# Patient Record
Sex: FEMALE | Race: WHITE | NOT HISPANIC OR LATINO | Employment: OTHER | URBAN - METROPOLITAN AREA
[De-identification: names, ages, dates, MRNs, and addresses within clinical notes are randomized per-mention and may not be internally consistent; named-entity substitution may affect disease eponyms.]

---

## 2017-01-05 ENCOUNTER — ALLSCRIPTS OFFICE VISIT (OUTPATIENT)
Dept: OTHER | Facility: OTHER | Age: 77
End: 2017-01-05

## 2017-01-13 ENCOUNTER — ALLSCRIPTS OFFICE VISIT (OUTPATIENT)
Dept: OTHER | Facility: OTHER | Age: 77
End: 2017-01-13

## 2017-03-17 ENCOUNTER — ALLSCRIPTS OFFICE VISIT (OUTPATIENT)
Dept: OTHER | Facility: OTHER | Age: 77
End: 2017-03-17

## 2017-04-13 ENCOUNTER — ALLSCRIPTS OFFICE VISIT (OUTPATIENT)
Dept: OTHER | Facility: OTHER | Age: 77
End: 2017-04-13

## 2017-05-22 ENCOUNTER — TRANSCRIBE ORDERS (OUTPATIENT)
Dept: ADMINISTRATIVE | Facility: HOSPITAL | Age: 77
End: 2017-05-22

## 2017-05-22 ENCOUNTER — ALLSCRIPTS OFFICE VISIT (OUTPATIENT)
Dept: OTHER | Facility: OTHER | Age: 77
End: 2017-05-22

## 2017-05-22 DIAGNOSIS — I10 BENIGN HYPERTENSION: Primary | ICD-10-CM

## 2017-06-08 ENCOUNTER — HOSPITAL ENCOUNTER (OUTPATIENT)
Dept: NON INVASIVE DIAGNOSTICS | Facility: HOSPITAL | Age: 77
Discharge: HOME | End: 2017-06-08
Attending: INTERNAL MEDICINE
Payer: MEDICARE

## 2017-06-08 DIAGNOSIS — I10 BENIGN HYPERTENSION: ICD-10-CM

## 2017-06-08 PROCEDURE — 93306 TTE W/DOPPLER COMPLETE: CPT

## 2017-06-29 ENCOUNTER — ALLSCRIPTS OFFICE VISIT (OUTPATIENT)
Dept: OTHER | Facility: OTHER | Age: 77
End: 2017-06-29

## 2017-06-29 DIAGNOSIS — E78.5 HYPERLIPIDEMIA: ICD-10-CM

## 2017-07-25 ENCOUNTER — APPOINTMENT (OUTPATIENT)
Dept: LAB | Facility: CLINIC | Age: 77
End: 2017-07-25
Payer: MEDICARE

## 2017-07-25 DIAGNOSIS — E78.5 HYPERLIPIDEMIA: ICD-10-CM

## 2017-07-25 LAB
ALBUMIN SERPL BCP-MCNC: 3.6 G/DL (ref 3.5–5)
ALP SERPL-CCNC: 59 U/L (ref 46–116)
ALT SERPL W P-5'-P-CCNC: 49 U/L (ref 12–78)
ANION GAP SERPL CALCULATED.3IONS-SCNC: 5 MMOL/L (ref 4–13)
AST SERPL W P-5'-P-CCNC: 30 U/L (ref 5–45)
BILIRUB SERPL-MCNC: 0.68 MG/DL (ref 0.2–1)
BUN SERPL-MCNC: 18 MG/DL (ref 5–25)
CALCIUM SERPL-MCNC: 8.9 MG/DL (ref 8.3–10.1)
CHLORIDE SERPL-SCNC: 103 MMOL/L (ref 100–108)
CHOLEST SERPL-MCNC: 145 MG/DL (ref 50–200)
CO2 SERPL-SCNC: 31 MMOL/L (ref 21–32)
CREAT SERPL-MCNC: 0.77 MG/DL (ref 0.6–1.3)
ERYTHROCYTE [DISTWIDTH] IN BLOOD BY AUTOMATED COUNT: 14 % (ref 11.6–15.1)
GFR SERPL CREATININE-BSD FRML MDRD: 74 ML/MIN/1.73SQ M
GLUCOSE P FAST SERPL-MCNC: 106 MG/DL (ref 65–99)
HCT VFR BLD AUTO: 40.2 % (ref 34.8–46.1)
HDLC SERPL-MCNC: 37 MG/DL (ref 40–60)
HGB BLD-MCNC: 13.4 G/DL (ref 11.5–15.4)
LDLC SERPL CALC-MCNC: 79 MG/DL (ref 0–100)
MCH RBC QN AUTO: 30.3 PG (ref 26.8–34.3)
MCHC RBC AUTO-ENTMCNC: 33.3 G/DL (ref 31.4–37.4)
MCV RBC AUTO: 91 FL (ref 82–98)
PLATELET # BLD AUTO: 134 THOUSANDS/UL (ref 149–390)
PMV BLD AUTO: 10.2 FL (ref 8.9–12.7)
POTASSIUM SERPL-SCNC: 4.3 MMOL/L (ref 3.5–5.3)
PROT SERPL-MCNC: 6.9 G/DL (ref 6.4–8.2)
RBC # BLD AUTO: 4.42 MILLION/UL (ref 3.81–5.12)
SODIUM SERPL-SCNC: 139 MMOL/L (ref 136–145)
TRIGL SERPL-MCNC: 145 MG/DL
WBC # BLD AUTO: 3.68 THOUSAND/UL (ref 4.31–10.16)

## 2017-07-25 PROCEDURE — 36415 COLL VENOUS BLD VENIPUNCTURE: CPT

## 2017-07-25 PROCEDURE — 85027 COMPLETE CBC AUTOMATED: CPT

## 2017-07-25 PROCEDURE — 80061 LIPID PANEL: CPT

## 2017-07-25 PROCEDURE — 80053 COMPREHEN METABOLIC PANEL: CPT

## 2017-08-28 ENCOUNTER — ALLSCRIPTS OFFICE VISIT (OUTPATIENT)
Dept: OTHER | Facility: OTHER | Age: 77
End: 2017-08-28

## 2017-10-06 ENCOUNTER — ALLSCRIPTS OFFICE VISIT (OUTPATIENT)
Dept: OTHER | Facility: OTHER | Age: 77
End: 2017-10-06

## 2017-10-19 ENCOUNTER — ALLSCRIPTS OFFICE VISIT (OUTPATIENT)
Dept: OTHER | Facility: OTHER | Age: 77
End: 2017-10-19

## 2017-10-19 ENCOUNTER — GENERIC CONVERSION - ENCOUNTER (OUTPATIENT)
Dept: OTHER | Facility: OTHER | Age: 77
End: 2017-10-19

## 2017-10-20 ENCOUNTER — GENERIC CONVERSION - ENCOUNTER (OUTPATIENT)
Dept: OTHER | Facility: OTHER | Age: 77
End: 2017-10-20

## 2017-10-26 ENCOUNTER — GENERIC CONVERSION - ENCOUNTER (OUTPATIENT)
Dept: OTHER | Facility: OTHER | Age: 77
End: 2017-10-26

## 2018-01-09 ENCOUNTER — GENERIC CONVERSION - ENCOUNTER (OUTPATIENT)
Dept: OTHER | Facility: OTHER | Age: 78
End: 2018-01-09

## 2018-01-12 VITALS
HEIGHT: 66 IN | HEART RATE: 72 BPM | WEIGHT: 228.56 LBS | DIASTOLIC BLOOD PRESSURE: 80 MMHG | BODY MASS INDEX: 36.73 KG/M2 | OXYGEN SATURATION: 95 % | SYSTOLIC BLOOD PRESSURE: 164 MMHG

## 2018-01-13 VITALS
BODY MASS INDEX: 36 KG/M2 | SYSTOLIC BLOOD PRESSURE: 137 MMHG | DIASTOLIC BLOOD PRESSURE: 85 MMHG | HEIGHT: 66 IN | HEART RATE: 78 BPM | WEIGHT: 224 LBS | RESPIRATION RATE: 17 BRPM

## 2018-01-14 VITALS
WEIGHT: 224.25 LBS | SYSTOLIC BLOOD PRESSURE: 126 MMHG | HEART RATE: 71 BPM | DIASTOLIC BLOOD PRESSURE: 80 MMHG | OXYGEN SATURATION: 97 % | HEIGHT: 66 IN | BODY MASS INDEX: 36.04 KG/M2

## 2018-01-14 VITALS
WEIGHT: 218 LBS | HEART RATE: 88 BPM | SYSTOLIC BLOOD PRESSURE: 144 MMHG | RESPIRATION RATE: 16 BRPM | BODY MASS INDEX: 35.03 KG/M2 | HEIGHT: 66 IN | DIASTOLIC BLOOD PRESSURE: 85 MMHG

## 2018-01-15 VITALS
BODY MASS INDEX: 35.03 KG/M2 | RESPIRATION RATE: 17 BRPM | HEIGHT: 66 IN | DIASTOLIC BLOOD PRESSURE: 89 MMHG | HEART RATE: 78 BPM | SYSTOLIC BLOOD PRESSURE: 141 MMHG | WEIGHT: 218 LBS

## 2018-01-18 ENCOUNTER — GENERIC CONVERSION - ENCOUNTER (OUTPATIENT)
Dept: OTHER | Facility: OTHER | Age: 78
End: 2018-01-18

## 2018-01-18 ENCOUNTER — ALLSCRIPTS OFFICE VISIT (OUTPATIENT)
Dept: OTHER | Facility: OTHER | Age: 78
End: 2018-01-18

## 2018-01-22 VITALS
DIASTOLIC BLOOD PRESSURE: 82 MMHG | SYSTOLIC BLOOD PRESSURE: 132 MMHG | BODY MASS INDEX: 35.84 KG/M2 | HEIGHT: 66 IN | WEIGHT: 223 LBS | HEART RATE: 78 BPM | RESPIRATION RATE: 16 BRPM

## 2018-01-22 VITALS
HEART RATE: 74 BPM | HEIGHT: 66 IN | OXYGEN SATURATION: 94 % | BODY MASS INDEX: 35.84 KG/M2 | SYSTOLIC BLOOD PRESSURE: 128 MMHG | WEIGHT: 223 LBS | DIASTOLIC BLOOD PRESSURE: 82 MMHG

## 2018-01-24 VITALS
RESPIRATION RATE: 17 BRPM | WEIGHT: 223 LBS | SYSTOLIC BLOOD PRESSURE: 132 MMHG | HEIGHT: 66 IN | DIASTOLIC BLOOD PRESSURE: 82 MMHG | BODY MASS INDEX: 35.84 KG/M2 | HEART RATE: 78 BPM

## 2018-02-19 DIAGNOSIS — I10 ESSENTIAL HYPERTENSION: Primary | ICD-10-CM

## 2018-02-19 DIAGNOSIS — I10 HYPERTENSION, UNSPECIFIED TYPE: Primary | ICD-10-CM

## 2018-02-19 RX ORDER — RAMIPRIL 5 MG/1
5 CAPSULE ORAL 2 TIMES DAILY
Qty: 60 CAPSULE | Refills: 5 | Status: SHIPPED | OUTPATIENT
Start: 2018-02-19 | End: 2018-11-08 | Stop reason: SDUPTHER

## 2018-02-19 RX ORDER — PRAVASTATIN SODIUM 10 MG
1 TABLET ORAL DAILY
COMMUNITY
Start: 2018-02-08 | End: 2018-07-01 | Stop reason: SDUPTHER

## 2018-02-19 RX ORDER — GLUC/MSM/COLGN2/HYAL/ANTIARTH3 375-375-20
TABLET ORAL 2 TIMES DAILY
COMMUNITY
Start: 2012-06-13

## 2018-02-19 RX ORDER — TEMAZEPAM 15 MG/1
1 CAPSULE ORAL
COMMUNITY
Start: 2018-01-31 | End: 2018-12-01 | Stop reason: ALTCHOICE

## 2018-02-19 RX ORDER — CLOBETASOL PROPIONATE 0.5 MG/G
CREAM TOPICAL
COMMUNITY
Start: 2017-11-16

## 2018-02-19 RX ORDER — SERTRALINE HYDROCHLORIDE 25 MG/1
TABLET, FILM COATED ORAL
COMMUNITY
Start: 2018-01-31 | End: 2020-08-18 | Stop reason: ALTCHOICE

## 2018-02-19 RX ORDER — SOTALOL HYDROCHLORIDE 80 MG/1
1 TABLET ORAL EVERY 12 HOURS
COMMUNITY
Start: 2017-11-26 | End: 2018-02-21 | Stop reason: SDUPTHER

## 2018-02-19 RX ORDER — GABAPENTIN 100 MG/1
1 CAPSULE ORAL
COMMUNITY
Start: 2018-01-09 | End: 2018-07-17 | Stop reason: SDUPTHER

## 2018-02-19 RX ORDER — SENNOSIDES 8.6 MG
CAPSULE ORAL
COMMUNITY

## 2018-02-19 RX ORDER — RAMIPRIL 5 MG/1
1 CAPSULE ORAL 2 TIMES DAILY
COMMUNITY
Start: 2017-11-19 | End: 2018-02-19 | Stop reason: SDUPTHER

## 2018-02-19 RX ORDER — FUROSEMIDE 20 MG/1
1 TABLET ORAL DAILY
COMMUNITY
Start: 2017-05-22 | End: 2021-05-03 | Stop reason: SDUPTHER

## 2018-02-19 RX ORDER — METOPROLOL SUCCINATE 25 MG/1
1 TABLET, EXTENDED RELEASE ORAL DAILY
COMMUNITY
Start: 2017-11-26 | End: 2018-02-21 | Stop reason: SDUPTHER

## 2018-02-20 RX ORDER — RAMIPRIL 5 MG/1
CAPSULE ORAL
Qty: 180 CAPSULE | Refills: 2 | Status: SHIPPED | OUTPATIENT
Start: 2018-02-20 | End: 2019-03-12 | Stop reason: SDUPTHER

## 2018-02-21 DIAGNOSIS — I48.91 ATRIAL FIBRILLATION, UNSPECIFIED TYPE (HCC): Primary | ICD-10-CM

## 2018-02-21 RX ORDER — SOTALOL HYDROCHLORIDE 80 MG/1
80 TABLET ORAL EVERY 12 HOURS
Qty: 60 TABLET | Refills: 5 | Status: SHIPPED | OUTPATIENT
Start: 2018-02-21 | End: 2018-08-13 | Stop reason: SDUPTHER

## 2018-02-21 RX ORDER — METOPROLOL SUCCINATE 25 MG/1
25 TABLET, EXTENDED RELEASE ORAL DAILY
Qty: 30 TABLET | Refills: 5 | Status: SHIPPED | OUTPATIENT
Start: 2018-02-21 | End: 2018-08-13 | Stop reason: SDUPTHER

## 2018-03-05 DIAGNOSIS — I48.91 ATRIAL FIBRILLATION, UNSPECIFIED TYPE (HCC): Primary | ICD-10-CM

## 2018-03-06 ENCOUNTER — OFFICE VISIT (OUTPATIENT)
Dept: CARDIOLOGY CLINIC | Facility: CLINIC | Age: 78
End: 2018-03-06
Payer: MEDICARE

## 2018-03-06 VITALS
BODY MASS INDEX: 37.15 KG/M2 | OXYGEN SATURATION: 97 % | HEART RATE: 96 BPM | SYSTOLIC BLOOD PRESSURE: 122 MMHG | WEIGHT: 223 LBS | DIASTOLIC BLOOD PRESSURE: 70 MMHG | HEIGHT: 65 IN

## 2018-03-06 DIAGNOSIS — E78.2 MIXED HYPERLIPIDEMIA: ICD-10-CM

## 2018-03-06 DIAGNOSIS — I50.32 CHRONIC DIASTOLIC CONGESTIVE HEART FAILURE (HCC): ICD-10-CM

## 2018-03-06 DIAGNOSIS — I10 ESSENTIAL HYPERTENSION: Chronic | ICD-10-CM

## 2018-03-06 DIAGNOSIS — I48.0 PAROXYSMAL ATRIAL FIBRILLATION (HCC): Primary | ICD-10-CM

## 2018-03-06 PROCEDURE — 99214 OFFICE O/P EST MOD 30 MIN: CPT | Performed by: INTERNAL MEDICINE

## 2018-03-06 PROCEDURE — 93000 ELECTROCARDIOGRAM COMPLETE: CPT | Performed by: INTERNAL MEDICINE

## 2018-03-06 NOTE — PROGRESS NOTES
Cardiology Follow Up    Ju Torres  1940  [id number]  [facility] [address]      Interval History: Ms Juani Bower is here for follow up of atrial fibrillation and hypertension  She denies any chest pain, lower extremity edema, orthopnea or paroxysmal nocturnal dyspnea  She is shortness of breath, upper airway congestion and postnasal drip that has been present for the past year  She has a history of atrial fibrillation along with sick sinus syndrome and had a pacemaker inserted at [place] [facility] after developing multiple pauses  She denies any previous syncope or near syncope  Pacemaker was interrogated in January showing episodes of atrial fibrillation  Patient Active Problem List   Diagnosis    Hypertension    Cardiac disease    Hyperlipidemia    Paroxysmal atrial fibrillation [facility])     Past Medical History:   Diagnosis Date    Arthritis     Atrial fibrillation ([address] )     Cardiac disease     Hypertension      Social History     Social History    Marital status:      Spouse name: N/A    Number of children: N/A    Years of education: N/A     Occupational History    Not on file  Social History Main Topics    Smoking status: Former Smoker     Packs/day: 1 00     Years: 25 00     Types: Cigarettes    Smokeless tobacco: Never Used      Comment: quit 22 yeears ago    Alcohol use Yes      Comment: occasional    Drug use: No    Sexual activity: Not on file     Other Topics Concern    Not on file     Social History Narrative    No narrative on file      History reviewed  No pertinent family history    Past Surgical History:   Procedure Laterality Date    CARDIAC PACEMAKER PLACEMENT Left     CHOLECYSTECTOMY      EYE SURGERY      KNEE SURGERY Left     left kknee replacement in 2009       Current Outpatient Prescriptions:     acetaminophen (TYLENOL ARTHRITIS PAIN) 650 mg CR tablet, Take by mouth, Disp: , Rfl:     apixaban (ELIQUIS) 5 mg, Take 1 tablet (5 mg total) by mouth 2 (two) times a day (Patient taking differently: Take 2 5 mg by mouth 2 (two) times a day  ), Disp: 60 tablet, Rfl: 5    Calcium Carbonate-Vitamin D 600-200 MG-UNIT CAPS, Take by mouth 2 (two) times a day, Disp: , Rfl:     clobetasol (TEMOVATE) 0 05 % cream, , Disp: , Rfl:     Coenzyme Q10 (COQ-10) 200 MG CAPS, Take 2 capsules by mouth daily, Disp: , Rfl:     Famotidine (PEPCID PO), Take by mouth daily Indications: pt unsure of dose taken at home   , Disp: , Rfl:     furosemide (LASIX) 20 mg tablet, Take 1 tablet by mouth daily, Disp: , Rfl:     gabapentin (NEURONTIN) 100 mg capsule, Take 1 capsule by mouth daily at bedtime, Disp: , Rfl:     metoprolol succinate (TOPROL-XL) 25 mg 24 hr tablet, Take 1 tablet (25 mg total) by mouth daily, Disp: 30 tablet, Rfl: 5    Multiple Vitamins-Minerals (DAILY MULTIVITAMIN PO), Take 1 tablet by mouth daily, Disp: , Rfl:     Omega-3-acid Ethyl Esters (LOVAZA PO), Take by mouth daily Indications: pt unsure of home dose of med   , Disp: , Rfl:     Pantoprazole Sodium (PROTONIX PO), Take by mouth daily Indications: pt unsure of at home dose   , Disp: , Rfl:     pravastatin (PRAVACHOL) 10 mg tablet, Take 1 tablet by mouth daily, Disp: , Rfl:     PRAVASTATIN SODIUM PO, Take by mouth 3 (three) times a week Indications: pt unsure of dose taken at home   , Disp: , Rfl:     ramipril (ALTACE) 5 mg capsule, take 1 capsule by mouth twice a day, Disp: 180 capsule, Rfl: 2    ramipril (ALTACE) 5 mg capsule, Take 1 capsule (5 mg total) by mouth 2 (two) times a day, Disp: 60 capsule, Rfl: 5    sertraline (ZOLOFT) 25 mg tablet, , Disp: , Rfl:     sotalol (BETAPACE) 80 mg tablet, Take 1 tablet (80 mg total) by mouth every 12 (twelve) hours, Disp: 60 tablet, Rfl: 5    temazepam (RESTORIL) 15 mg capsule, Take 1 capsule by mouth daily at bedtime, Disp: , Rfl: Allergies   Allergen Reactions    Ciprofloxacin Rash    Sulfa Antibiotics Rash    Synvisc [Hylan G-F 20] Rash       Labs:  Lab Results   Component Value Date     07/25/2017     09/10/2013    K 4 3 07/25/2017    K 4 0 09/10/2013     07/25/2017     09/10/2013    CO2 31 07/25/2017    CO2 29 09/10/2013    BUN 18 07/25/2017    BUN 26 09/10/2013    CREATININE 0 77 07/25/2017    CREATININE 1 3 09/10/2013    GLUCOSE 124 05/31/2016    CALCIUM 8 9 07/25/2017    CALCIUM 8 9 09/10/2013     Lab Results   Component Value Date    WBC 3 68 (L) 07/25/2017    WBC 5 4 09/10/2013    HGB 13 4 07/25/2017    HGB 14 2 09/10/2013    HCT 40 2 07/25/2017    HCT 42 7 09/10/2013    MCV 91 07/25/2017    MCV 88 8 09/10/2013     (L) 07/25/2017     09/10/2013     Lab Results   Component Value Date    CHOL 145 07/25/2017    CHOL 187 09/10/2013    TRIG 145 07/25/2017    TRIG 166 09/10/2013    HDL 37 (L) 07/25/2017    HDL 37 09/10/2013     Imaging: No results found  EKG:  normal sinus rhythm, unchanged from previous tracings, nonspecific ST and T waves changes  Review of Systems:  Review of Systems   Constitutional: Negative for chills, fatigue and fever  HENT: Positive for congestion, postnasal drip, rhinorrhea and sinus pressure  Negative for nosebleeds  Respiratory: Positive for shortness of breath  Negative for cough and chest tightness  Cardiovascular: Negative for chest pain, palpitations and leg swelling  Gastrointestinal: Negative for abdominal distention, abdominal pain, diarrhea, nausea and vomiting  Endocrine: Negative for polydipsia, polyphagia and polyuria  Musculoskeletal: Negative for gait problem and myalgias  Skin: Negative for color change, pallor and rash  Allergic/Immunologic: Negative for environmental allergies, food allergies and immunocompromised state  Neurological: Negative for dizziness, seizures, syncope and light-headedness     Hematological: Negative for adenopathy  Does not bruise/bleed easily  Psychiatric/Behavioral: Negative for dysphoric mood  The patient is not nervous/anxious  Physical Exam:  Physical Exam   Constitutional: She is oriented to person, place, and time  She appears well-developed  No distress  HENT:   Head: Normocephalic and atraumatic  Eyes: Conjunctivae and EOM are normal  Pupils are equal, round, and reactive to light  Neck: Neck supple  No JVD present  No thyromegaly present  Cardiovascular: Normal rate, regular rhythm and normal heart sounds  Exam reveals no gallop and no friction rub  No murmur heard  Pulmonary/Chest: Effort normal and breath sounds normal    Abdominal: Soft  She exhibits no distension  There is no tenderness  Musculoskeletal: She exhibits no edema  Neurological: She is alert and oriented to person, place, and time  No cranial nerve deficit  Skin: Skin is warm and dry  No rash noted  She is not diaphoretic  No erythema  Psychiatric: She has a normal mood and affect  Her behavior is normal  Judgment and thought content normal        Discussion/Summary:  1  Paroxysmal atrial fibrillation (HCC) - currently in sinus  Continue Eliquis and routine pacemaker clinic follow up  - Continue sotalol  QT interval normal     2  Essential hypertension  - BP well controlled on current Rx    - Comprehensive metabolic panel    3  Mixed hyperlipidemia  - Lipid panel  -  Continue pravastatin  4  Chronic diastolic CHF - stable on lasix 20 mg daily

## 2018-03-07 NOTE — PROGRESS NOTES
"  Discussion/Summary  Normal device function      Results/Data  Cardiac Device Remote [id number] 02:22PM Blaze Shell     Test Name Result Flag Reference   MISCELLANEOUS COMMENT (Report)     CARELINK TRANSMISSION: BATTERY VOLTAGE ADEQUATE  (7 5 YRS)  AP 95%  1% (MVP ON)  ALL AVAILABLE LEAD PARAMETERS WITHIN NORMAL LIMITS  4 VHR EPISODES DETECTED EGMS SHOW SVT VS NSVT CANNOT RULE OUT NSVT  33 AT/AF EPISODES DETECTED LONGEST AFLUTTER EPISODES 10 HOURS  PATIENT IS ON METOPROLOL SUCC AND ELIQUIS  NORMAL DEVICE FUNCTION --[facility]   Cardiac Electrophysiology Report      [id number]  pdf   DEVICE TYPE Pacemaker       Cardiac Electrophysiology Report [id number] 02:22PM Rodríguez Mouna     Test Name Result Flag Reference   Cardiac Electrophysiology Report      [id number]  pdf     Signatures   Electronically signed by : Michael Lindsay, ; Oct 14 2016  2:54PM EST                       (Author)    Electronically signed by : Luisa Maurer DO; Oct 14 2016  3:25PM EST                       (Author)    "

## 2018-03-07 NOTE — PROGRESS NOTES
"  Discussion/Summary  Normal device function     Afib episodes detected  Patient with known atrial fibrillation  Normal device function  Results/Data  Cardiac Device Remote [id number] 05:17PM Js Montezuma     Test Name Result Flag Reference   MISCELLANEOUS COMMENT (Report)     CARELINK TRANSMISSION: [id number] VOLTAGE ADEQUATE  (6 YRS)  AP 96%  <1%  ALL AVAILABLE LEAD PARAMETERS WITHIN NORMAL LIMITS  6 VHR EPISODE DETECTED 6 BEATS @ 350ms  159 AT/AF EPISODES DETECTED 3 HOUR LONG  PATIENT IS ON ELIQUIS AND METOPROLOL SUCC  NORMAL DEVICE FUNCTION --[facility]   Cardiac Electrophysiology Report      [id number]  pdf   DEVICE TYPE Pacemaker       Cardiac Electrophysiology Report [id number] 05:17PM Js Kirill     Test Name Result Flag Reference   Cardiac Electrophysiology Report      [id number]  pdf     Signatures   Electronically signed by : Mya Thompson, ; Jan 19 2018  2:32PM EST                       (Author)    Electronically signed by : Jessica Russ DO; Jan 22 2018 10:27AM EST                       (Author)    "

## 2018-03-07 NOTE — PROGRESS NOTES
"  Discussion/Summary  Normal device function     Patient scheduled for followup next week  Will discuss anticoagulation  She is taking Eliquis but not in chart  Results/Data  Cardiac Device In Clinic [id number] 07:03PM [facility] [employer]     Test Name Result Flag Reference   MISCELLANEOUS COMMENT (Report)     DEVICE INTERROGATED IN THE [facility] OFFICE: NP TO DEVICE CLINIC  BATTERY VOLTAGE ADEQUATE (6 5 YR)  AP 94%  0 2%  ALL AVAILABLE LEAD PARAMETERS WITHIN NORMAL LIMITS  448 AT/AF, 1 FAST A&V, AND 13 VT EPISODES SINCE 07/2016 WITH EGMS SHOWING AF/RVR  LONGEST > 3HR  AF BURDEN = 0 7%  1 NSVT EPISODE (7 @ 168 BPM)  PT TAKES METOPROLOL SUCC , AND SOTALOL  PT STATES SHE TAKES ELIQUIS, BUT NO AC LISTED IN CHART  EF 50 -55% (ECHO 6/2017)  NO PROGRAMMING CHANGES MADE TO DEVICE PARAMETERS  NORMAL DEVICE FUNCTION  RG   Cardiac Electrophysiology Report      [id number]  pdf   DEVICE TYPE Pacemaker       Cardiac Electrophysiology Report [id number] 07:03PM [facility] [employer]     Test Name Result Flag Reference   Cardiac Electrophysiology Report      [id number]     Signatures   Electronically signed by : Vaughn Ward, ; Oct  6 2017  3:21PM EST                       (Author)    Electronically signed by : Fish Mg DO; Oct 11 2017  4:19PM EST                       (Author)    "

## 2018-03-07 NOTE — PROGRESS NOTES
"  Discussion/Summary  Normal device function      Results/Data  Cardiac Device Remote [id number] 04:09PM [facility]     Test Name Result Flag Reference   MISCELLANEOUS COMMENT (Report)     CARELINK TRANSMISSION: BATTERY VOLTAGE ADEQUATE  (7 5 YRS)  AP 93%  1%  ALL AVAILABLE LEAD PARAMETERS WITHIN NORMAL LIMITS  1 VHR EPISODE DETECTED 6 BEATS @ 350ms  43 AT/AF EPISODES DETECTED 49 MIN LONG  PATIENT IS ON ELIQUIS AND METOPROLOL SUCC  NORMAL DEVICE FUNCTION --[facility]   Cardiac Electrophysiology Report      [id number]  pdf   DEVICE TYPE Pacemaker       Cardiac Electrophysiology Report [id number] 04:09PM Elidia Raines     Test Name Result Flag Reference   Cardiac Electrophysiology Report      [id number]  pdf     Signatures   Electronically signed by : Roger Cuba, ; Jan 19 2017  4:17PM EST                       (Author)    Electronically signed by : Candy Navarrete DO; Jan 27 2017  4:34PM EST                       (Author)    "

## 2018-03-07 NOTE — PROGRESS NOTES
"  Discussion/Summary  Normal device function   Additional Treatments: Reprogram Device   Comments: ATP device function turned off because patient has atrial fibrillation episodes frequently  Atrial tachycardia episode noted  Results/Data  Results   Cardiac Device In Clinic [id number] 07:01PM Sandra Nguyen     Test Name Result Flag Reference   MISCELLANEOUS COMMENT (Report)     DEVICE INTERROGATED IN THE [facility] OFFICE  NEW PT TO DEVICE CLINIC: BATTERY VOLTAGE ADEQUATE (8 YRS)  AP 95 2%  0 3%  ALL LEAD PARAMETERS WITHIN NORMAL LIMITS  72 TREATED AT/AF EPISODES WITH 20 EGRAMS SHOWING AT/AFL WITH UNSUCCESSFUL ATRIAL ATP (> 1000 ATP ATTEMPTS)  2 EPISODES SHOW ATRIAL ATP RESULTING IN AFIB  PER DR VIEYRA, ATRIAL THERAPY PROG "OFF"  PT TAKES ELIQUIS, SOTOLOL AND METOPROLOL SUSS  TOTAL ATRIAL BURDEN IS 1 4%  3 VT-MON EPISODES WITH AVAILABLE EGRAMS SHOWING PROBABLE NSAT (MORPHOLOGY SIMILAR TO INTRINSIC)  9 BEATS @ 167 BPM AND 28 BEATS @ 163 BPM  CANNOT RULE OUT NSVT  NO EF DOCUMENTED IN ALLSCRIPTS  REAL-TIME AND TEST EGRAMS UNAVAILABLE DUE TO  MALFUNCTION  PACEMAKER FUNCTIONING APPROPRIATELY  CP   Cardiac Electrophysiology Report      [id number] [id number]  pdf   DEVICE TYPE Pacemaker       Cardiac Electrophysiology Report [id number] 07:01PM Sandra Nguyen     Test Name Result Flag Reference   Cardiac Electrophysiology Report      [id number]  pdf     Signatures   Electronically signed by : Armani Harper, ; Jul 12 2016  9:12AM EST                       (Author)    Electronically signed by : Saloni Vela DO; Jul 15 2016 12:53PM EST                       (Author)    "

## 2018-03-07 NOTE — PROGRESS NOTES
"  Discussion/Summary  Normal device function      Results/Data  Cardiac Device Remote [id number] 08:03PM [facility]     Test Name Result Flag Reference   MISCELLANEOUS COMMENT (Report)     NONBILLABLE- CARELINK TRANSMISSION: 1 NSVT EPISODE FOR 5 BEATS, AVG CL~380MS  EF-50-55% (ECHO 6/8/17)  PT ON METOPROLOL & SOTALOL  16 AF/AFL EPISODES LONGEST 1 5 HRS  PT HAD PREVIOUSLY STATED THAT SHE WAS ON ELIQUIS  AF BURDEN: 1 5%  BATTERY VOLTAGE ADEQUATE (6 5 YRS)  AP-94%, -0 2%  ALL AVAILABLE LEAD PARAMETERS WITHIN NORMAL LIMITS  NORMAL DEVICE FUNCTION  GV   Cardiac Electrophysiology Report      [id number]  pdf   DEVICE TYPE Pacemaker       Cardiac Electrophysiology Report [id number] 08:03PM Ximena Murray     Test Name Result Flag Reference   Cardiac Electrophysiology Report      [id number]  pdf     Signatures   Electronically signed by : Winston Pelayo RN; Oct 20 2017  1:19PM EST                       (Author)    Electronically signed by : Marbella Ryan DO; Oct 20 2017  5:43PM EST                       (Author)    "

## 2018-03-13 ENCOUNTER — TRANSCRIBE ORDERS (OUTPATIENT)
Dept: ADMINISTRATIVE | Facility: HOSPITAL | Age: 78
End: 2018-03-13

## 2018-03-13 DIAGNOSIS — J32.9 CHRONIC SINUSITIS, UNSPECIFIED LOCATION: Primary | ICD-10-CM

## 2018-03-14 ENCOUNTER — APPOINTMENT (OUTPATIENT)
Dept: LAB | Facility: CLINIC | Age: 78
End: 2018-03-14
Payer: MEDICARE

## 2018-03-14 LAB
ALBUMIN SERPL BCP-MCNC: 3.4 G/DL (ref 3.5–5)
ALP SERPL-CCNC: 57 U/L (ref 46–116)
ALT SERPL W P-5'-P-CCNC: 37 U/L (ref 12–78)
ANION GAP SERPL CALCULATED.3IONS-SCNC: 6 MMOL/L (ref 4–13)
AST SERPL W P-5'-P-CCNC: 24 U/L (ref 5–45)
BILIRUB SERPL-MCNC: 0.51 MG/DL (ref 0.2–1)
BUN SERPL-MCNC: 18 MG/DL (ref 5–25)
CALCIUM SERPL-MCNC: 8.7 MG/DL (ref 8.3–10.1)
CHLORIDE SERPL-SCNC: 104 MMOL/L (ref 100–108)
CHOLEST SERPL-MCNC: 147 MG/DL (ref 50–200)
CO2 SERPL-SCNC: 30 MMOL/L (ref 21–32)
CREAT SERPL-MCNC: 0.8 MG/DL (ref 0.6–1.3)
GFR SERPL CREATININE-BSD FRML MDRD: 71 ML/MIN/1.73SQ M
GLUCOSE P FAST SERPL-MCNC: 119 MG/DL (ref 65–99)
HDLC SERPL-MCNC: 42 MG/DL (ref 40–60)
LDLC SERPL CALC-MCNC: 72 MG/DL (ref 0–100)
POTASSIUM SERPL-SCNC: 4.3 MMOL/L (ref 3.5–5.3)
PROT SERPL-MCNC: 6.9 G/DL (ref 6.4–8.2)
SODIUM SERPL-SCNC: 140 MMOL/L (ref 136–145)
TRIGL SERPL-MCNC: 163 MG/DL

## 2018-03-14 PROCEDURE — 80061 LIPID PANEL: CPT | Performed by: INTERNAL MEDICINE

## 2018-03-14 PROCEDURE — 80053 COMPREHEN METABOLIC PANEL: CPT | Performed by: INTERNAL MEDICINE

## 2018-03-14 PROCEDURE — 36415 COLL VENOUS BLD VENIPUNCTURE: CPT | Performed by: INTERNAL MEDICINE

## 2018-03-15 ENCOUNTER — HOSPITAL ENCOUNTER (OUTPATIENT)
Dept: RADIOLOGY | Facility: HOSPITAL | Age: 78
Discharge: HOME | End: 2018-03-15
Attending: OTOLARYNGOLOGY
Payer: MEDICARE

## 2018-03-15 DIAGNOSIS — J32.9 CHRONIC SINUSITIS, UNSPECIFIED LOCATION: ICD-10-CM

## 2018-03-15 PROCEDURE — 70486 CT MAXILLOFACIAL W/O DYE: CPT

## 2018-04-04 ENCOUNTER — TELEPHONE (OUTPATIENT)
Dept: CARDIOLOGY CLINIC | Facility: CLINIC | Age: 78
End: 2018-04-04

## 2018-04-04 NOTE — LETTER
Cardiology Pre Operative Clearance      PRE OPERATIVE CARDIAC RISK ASSESSMENT    04/04/18    Yudy Gaxiola  1940  [id number]    Date of Surgery: To be determined (next week pending)     Type of Surgery: Dental Extraction ( 1 tooth )    Surgeon: Dr Traci Jeffrey    No Cardiac Contrindication for Planned Surgical Procedures    Physician Comment:    Anticoagulation: Hold ELIQUIS 5mg for 48 hours prior to procedure      Physician Comment:     Lexy Timmons DO, [facility]

## 2018-04-04 NOTE — TELEPHONE ENCOUNTER
Patient is having a tooth extracted next week pending your instructions on Eliquis 5mg  Please advise instructions how soon to HOLD and Resume meds   Dentist: Dr Cloud Cuff [phone] Fax [phone]

## 2018-04-16 ENCOUNTER — OFFICE VISIT (OUTPATIENT)
Dept: PODIATRY | Facility: CLINIC | Age: 78
End: 2018-04-16
Payer: MEDICARE

## 2018-04-16 VITALS
SYSTOLIC BLOOD PRESSURE: 134 MMHG | HEART RATE: 80 BPM | DIASTOLIC BLOOD PRESSURE: 78 MMHG | RESPIRATION RATE: 17 BRPM | HEIGHT: 65 IN | WEIGHT: 223 LBS | BODY MASS INDEX: 37.15 KG/M2

## 2018-04-16 DIAGNOSIS — M54.16 RADICULOPATHY OF LUMBAR REGION: ICD-10-CM

## 2018-04-16 DIAGNOSIS — B35.1 ONYCHOMYCOSIS: ICD-10-CM

## 2018-04-16 DIAGNOSIS — I70.209 PERIPHERAL ARTERIOSCLEROSIS (HCC): Primary | ICD-10-CM

## 2018-04-16 DIAGNOSIS — M79.672 PAIN IN BOTH FEET: ICD-10-CM

## 2018-04-16 DIAGNOSIS — M79.671 PAIN IN BOTH FEET: ICD-10-CM

## 2018-04-16 DIAGNOSIS — L84 CORNS: ICD-10-CM

## 2018-04-16 PROCEDURE — 99212 OFFICE O/P EST SF 10 MIN: CPT | Performed by: PODIATRIST

## 2018-04-16 PROCEDURE — 11056 PARNG/CUTG B9 HYPRKR LES 2-4: CPT | Performed by: PODIATRIST

## 2018-04-16 RX ORDER — CEFUROXIME AXETIL 250 MG/1
250 TABLET ORAL 2 TIMES DAILY
Refills: 0 | COMMUNITY
Start: 2018-03-26 | End: 2019-10-02 | Stop reason: ALTCHOICE

## 2018-04-16 NOTE — PROGRESS NOTES
Assessment/Plan:  Pain  Radiculopathy  Callus  Mycotic toenail  Plan  Foot exam performed  All nails debrided  Calluses debrided  Patient will remain on gabapentin    No problem-specific Assessment & Plan notes found for this encounter  Discussion/Summary   The patient was counseled regarding instructions for management,-- patient and family education,-- risks and benefits of treatment options  Patient is able to Self-Care  Possible side effects of new medications were reviewed with the patient/guardian today  The treatment plan was reviewed with the patient/guardian  The patient/guardian understands and agrees with the treatment plan      Chief Complaint   nail care      History of Present Illness   HPI: Asian complains of pain in feet with ambulation  She has pain around the toes  She is also concerned with pain in her right heel  This is been ongoing for several weeks  She has no history of trauma  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      Review of Systems           Cardiac: chest pain,-- rhythm problems,-- AM fatigue-- and-- witnessed apnea episodes  Skin: No complaints of nonhealing sores or skin rash  Genitourinary: loss of bladder control      Psychological: No complaints of feeling depressed, anxiety, panic attacks, or difficulty concentrating  General: trouble sleeping-- and-- lack of energy/fatigue  Respiratory: shortness of breath  HEENT: snoring      Gastrointestinal: heartburn      Hematologic: anemia      Neurological: daytime sleepiness      Musculoskeletal: arthritis-- and-- back pain      Active Problems   1  Allergic rhinitis (477 9) (J30 9)   2  Anxiety disorder (300 00) (F41 9)   3  Arthropathy (716 90) (M12 9)   4  Atherosclerosis of arteries of extremities (440 20) (I70 209)   5  Atrial fibrillation (427 31) (I48 91)   6  Backache (724 5) (M54 9)   7  Callus (700) (L84)   8  Cervicalgia (723 1) (M54 2)   9  Depression (311) (F32 9)   10  Difficulty in walking (719 7) (R26 2)   11  Encounter for screening for malignant neoplasm of colon (V76 51) (Z12 11)   12  Esophagitis, reflux (530 11) (K21 0)   13  Essential hypertension (401 9) (I10)   14  Foot pain, bilateral (729 5) (M79 671,M79 672)   15  Herpes zoster (053 9) (B02 9)   16  Hospital discharge follow-up (V67 59) (Z09)   17  Hyperlipidemia (272 4) (E78 5)   18  Impaired fasting glucose (790 21) (R73 01)   19  Internal Hemorrhoids (455 0)   20  Leg swelling (729 81) (M79 89)   21  Lichen planus (195 5) (L43 9)   22  Limb pain (729 5) (M79 609)   23  Lumbar radiculopathy (724 4) (M54 16)   24  Multiple joint pain (719 49) (M25 50)   25  Need for influenza vaccination (V04 81) (Z23)   26  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)   27  Onychomycosis (110 1) (B35 1)   28  MIGUEL (obstructive sleep apnea) (327 23) (G47 33)   29  Other abnormal finding of urine (791 9) (R82 99)   30  Pes planus, congenital (754 61) (Q66 50)   31  Plantar fascial fibromatosis (728 71) (M72 2)   32  Rectal/anal hemorrhage (569 3) (K62 5)   33  Screening for malignant neoplasm of cervix (V76 2) (Z12 4)   34  Shoulder joint pain, unspecified laterality   35  Thrombocytopenia (287 5) (D69 6)   36  Urticaria (708 9) (L50 9)   37   Vulvovaginitis candida albicans (112 1) (B37 3)     Past Medical History    · History of Acute maxillary sinusitis (461 0) (J01 00)   · History of Acute upper respiratory infection (465 9) (J06 9)   · History of Cellulitis (682 9) (L03 90)   · History of Cough (786 2) (R05)   · History of Dysuria (788 1) (R30 0)   · History of High cholesterol (272 0) (E78 00)   · History of abdominal pain (V13 89) (Q39 267)   · History of acute bronchitis (V12 69) (Z87 09)   · History of acute sinusitis (V12 69) (Z87 09)   · History of arthritis (V13 4) (Z87 39)   · History of atrial fibrillation (V12 59) (Z86 79)   · History of cataract (V12 49) (Z86 69)   · History of gastroesophageal reflux (GERD) (V12 79) (Z87 19)   · History of hypertension (V12 59) (Z86 79)   · History of Skin rash (782 1) (R21)   · History of Vulvovaginitis (616 10) (N76 0)     The active problems and past medical history were reviewed and updated today  Surgical History    · History of Cataract Surgery   · History of Colonoscopy (Fiberoptic) Screening   · History of Gallbladder Surgery   · History of Knee Replacement   · History of Pacemaker Placement     The surgical history was reviewed and updated today  Family History   Father    · Family history of Coronary Artery Disease (V17 49)  Sister    · Family history of Diabetes Mellitus (V18 0)   · Family history of High cholesterol  Family History    · Family history of arthritis (V17 7) (Z82 61)   · Family history of hypertension (V17 49) (Z82 49)     The family history was reviewed and updated today  Social History    · Exercise: Walking   · 2 x/week   · Former smoker (V15 82) (W99 244)   · No alcohol use   ·   The social history was reviewed and updated today  Current Meds    1  Calcium Carbonate-Vitamin D 600-200 MG-UNIT TABS; 1 Two Times A Day; Therapy: 03RPX6673 to  Requested for: 35SHC5693 Recorded   2  CoQ-10 200 MG CAPS; TAKE 2 CAPSULE Daily; Therapy: (Raenelle Boeck) to Recorded   3  Daily Multivitamin TABS; TAKE 1 TABLET DAILY; Therapy: (Recorded:23Jun2016) to Recorded   4  Furosemide 20 MG Oral Tablet; TAKE 1 TABLET DAILY AS DIRECTED; Therapy: 49ZZQ5306 to (Benedetto Bloch)  Requested for: 32VRS6651; Last     Rx:22May2017 Ordered   5  Gabapentin 100 MG Oral Capsule; TAKE 1 CAPSULE AT BEDTIME; Therapy: 45AYV2150 to (Evaluate:05Ujw5746)  Requested for: 26Oct2017; Last     Rx:26Oct2017 Ordered   6  Lovaza 1 GM Oral Capsule; TAKE 2 CAPSULES TWICE DAILY; Therapy: (Recorded:23Jun2016) to Recorded   7  Metoprolol Succinate ER 25 MG Oral Tablet Extended Release 24 Hour; Take 1 tablet     daily  Requested for: 02CJI8927; Last Rx:22May2017 Ordered   8  Pantoprazole Sodium 40 MG Oral Tablet Delayed Release; Take 1 tab in the morning     and 1 tab in the evening; Therapy: (Recorded:19Oct2017) to Recorded   9  Pravastatin Sodium 10 MG Oral Tablet; Take 1 tablet (10 MG) on Monday, Wednesday,     and Friday  Requested for: 88CAE2452; Last Rx:04Jan2018 Ordered   10  Ramipril 5 MG Oral Capsule; Take 1 capsule twice daily  Requested for: 36VOD9262; Last      Rx:22May2017 Ordered   11  Sotalol HCl (AF) 80 MG Oral Tablet; TAKE 1 TABLET EVERY 12 HOURS DAILY       Requested for: 46NNU6150; Last Rx:22May2017 Ordered   12  Temazepam 15 MG Oral Capsule; TAKE 1 CAPSULE AT BEDTIME; Therapy: (VYFHBANZ:85SSY0510) to Recorded   13  Tylenol Arthritis Pain 650 MG TBCR; TAKE 2 TABS AT BEDTIME; Therapy: (ZPWFUPEK:76MVE7410) to Recorded   14  Vitamin D3 1000 UNIT Oral Tablet; TAKE 1 TABLET DAILY; Therapy: (Recorded:23Jun2016) to Recorded     The medication list was reviewed and updated today  Allergies   1  CIPRO   2  Sulfa Drugs   3  Synvisc INJ     Vitals     Recorded: 88ROC8271 02:59PM   Heart Rate 78   Respiration 17   Systolic 178   Diastolic 82   Height 5 ft 6 in   Weight 223 lb    BMI Calculated 35 99   BSA Calculated 2 09      Physical Exam   Left Foot: Appearance: Normal except as noted: excessive pronation-- and-- pes planus  Great toe deformities include a bunion  Tenderness: None except the great toe-- and-- distal first metatarsal     Right Foot: Appearance: Normal except as noted: excessive pronation-- and-- pes planus  Great toe deformities include a bunion  Tenderness: None except the great toe,-- distal first metatarsal,-- medial calcaneous-- and-- insertion of the plantar fascia  Left Ankle: ROM: limited ROM in all planes    Right Ankle: ROM: limited ROM in all planes    Neurological Exam: Light touch was decreased bilaterally  Vibratory sensation was decreased in both first metatarsophalangeal joints      Vascular Exam: performed Dorsalis pedis pulses were diminished bilaterally  Posterior tibial pulses were diminished bilaterally  Elevation Pallor: present bilaterally  Dependence rubor was present bilaterally  Capillary refill time was greater than 3 seconds bilaterally-- and-- Q  8, findings bilateral  Edema: moderate bilaterally  Toenails: All of the toenails were elongated,-- hypertrophied,-- discolored-- and-- Right ptotic  Hyperkeratosis: present on both first toes,-- present on both first sub metatarsals-- and-- Positive xerosis of skin noted  Shoe Gear Evaluation: performed ()  Recommendation(s): SAS style-- and-- OTC inlays  Procedure   All nails, debrided  Bilateral, pre-ulcerative lesions debrided  Procedures performed without pain or complication        There are no diagnoses linked to this encounter  Subjective:      Patient ID: Rojas Wesley is a 77 y o  female  Patient has pain in her feet and toes with ambulation  She is taking gabapentin  The following portions of the patient's history were reviewed and updated as appropriate: allergies, current medications, past family history, past medical history, past social history, past surgical history and problem list     Review of Systems      Objective:      Foot ExamPhysical Exam

## 2018-04-19 ENCOUNTER — IN-CLINIC DEVICE VISIT (OUTPATIENT)
Dept: CARDIOLOGY CLINIC | Facility: CLINIC | Age: 78
End: 2018-04-19
Payer: MEDICARE

## 2018-04-19 DIAGNOSIS — I49.5 SICK SINUS SYNDROME (HCC): Primary | ICD-10-CM

## 2018-04-19 DIAGNOSIS — Z95.0 PRESENCE OF PERMANENT CARDIAC PACEMAKER: ICD-10-CM

## 2018-04-19 PROCEDURE — 93296 REM INTERROG EVL PM/IDS: CPT | Performed by: INTERNAL MEDICINE

## 2018-04-19 PROCEDURE — 93294 REM INTERROG EVL PM/LDLS PM: CPT | Performed by: INTERNAL MEDICINE

## 2018-04-19 NOTE — PROGRESS NOTES
CARELINK TRANSMISSION: BATTERY VOLTAGE ADEQUATE  (6 YRS)  AP 96%  <1%  ALL AVAILABLE LEAD PARAMETERS WITHIN NORMAL LIMITS  135 AT/AF EPISODES DETECTED 6 HOURS LONG  PATIENT IS ON  ELIQUIS  NORMAL DEVICE FUNCTION  ---VENEGAS

## 2018-07-01 DIAGNOSIS — E78.5 DYSLIPIDEMIA: Primary | ICD-10-CM

## 2018-07-05 RX ORDER — PRAVASTATIN SODIUM 10 MG
TABLET ORAL
Qty: 30 TABLET | Refills: 5 | Status: SHIPPED | OUTPATIENT
Start: 2018-07-05 | End: 2018-12-24 | Stop reason: SDUPTHER

## 2018-07-17 DIAGNOSIS — M79.671 PAIN IN BOTH FEET: Primary | ICD-10-CM

## 2018-07-17 DIAGNOSIS — M79.672 PAIN IN BOTH FEET: Primary | ICD-10-CM

## 2018-07-17 RX ORDER — GABAPENTIN 100 MG/1
CAPSULE ORAL
Qty: 90 CAPSULE | Refills: 2 | Status: SHIPPED | OUTPATIENT
Start: 2018-07-17 | End: 2018-10-10 | Stop reason: SDUPTHER

## 2018-07-20 ENCOUNTER — REMOTE DEVICE CLINIC VISIT (OUTPATIENT)
Dept: CARDIOLOGY CLINIC | Facility: CLINIC | Age: 78
End: 2018-07-20
Payer: MEDICARE

## 2018-07-20 DIAGNOSIS — I49.5 SICK SINUS SYNDROME (HCC): Primary | ICD-10-CM

## 2018-07-20 DIAGNOSIS — Z95.0 PRESENCE OF PERMANENT CARDIAC PACEMAKER: ICD-10-CM

## 2018-07-20 PROCEDURE — 93296 REM INTERROG EVL PM/IDS: CPT | Performed by: INTERNAL MEDICINE

## 2018-07-20 PROCEDURE — 93294 REM INTERROG EVL PM/LDLS PM: CPT | Performed by: INTERNAL MEDICINE

## 2018-07-20 NOTE — PROGRESS NOTES
CARELINK TRANSMISSION: BATTERY VOLTAGE ADEQUATE  (6 YRS)  AP 90%  1%  ALL AVAILABLE LEAD PARAMETERS WITHIN NORMAL LIMITS  307 AT/AF EPISODES DETECTED <4 HOURS LONG  3 VHR EPISODES DETECTED  1 EGM (#1288) SHOWS NSVT EPISODE 7 BEATS @ 390ms  PATIENT IS ON ELIQUIS AND METOPROLOL SUCC  EF IS 50%(2017)  NORMAL DEVICE FUNCTION  ---VENEGAS

## 2018-08-13 DIAGNOSIS — I48.91 ATRIAL FIBRILLATION, UNSPECIFIED TYPE (HCC): ICD-10-CM

## 2018-08-13 RX ORDER — METOPROLOL SUCCINATE 25 MG/1
25 TABLET, EXTENDED RELEASE ORAL DAILY
Qty: 30 TABLET | Refills: 5 | Status: SHIPPED | OUTPATIENT
Start: 2018-08-13 | End: 2019-02-10 | Stop reason: SDUPTHER

## 2018-08-13 RX ORDER — SOTALOL HYDROCHLORIDE 80 MG/1
80 TABLET ORAL EVERY 12 HOURS
Qty: 60 TABLET | Refills: 5 | Status: SHIPPED | OUTPATIENT
Start: 2018-08-13 | End: 2019-02-10 | Stop reason: SDUPTHER

## 2018-09-06 ENCOUNTER — OFFICE VISIT (OUTPATIENT)
Dept: CARDIOLOGY CLINIC | Facility: CLINIC | Age: 78
End: 2018-09-06
Payer: MEDICARE

## 2018-09-06 VITALS
BODY MASS INDEX: 38.16 KG/M2 | OXYGEN SATURATION: 97 % | HEART RATE: 80 BPM | SYSTOLIC BLOOD PRESSURE: 120 MMHG | DIASTOLIC BLOOD PRESSURE: 86 MMHG | WEIGHT: 229.3 LBS

## 2018-09-06 DIAGNOSIS — I50.32 CHRONIC DIASTOLIC CONGESTIVE HEART FAILURE (HCC): ICD-10-CM

## 2018-09-06 DIAGNOSIS — I35.1 NONRHEUMATIC AORTIC VALVE INSUFFICIENCY: ICD-10-CM

## 2018-09-06 DIAGNOSIS — I48.91 ATRIAL FIBRILLATION, UNSPECIFIED TYPE (HCC): ICD-10-CM

## 2018-09-06 DIAGNOSIS — I10 ESSENTIAL HYPERTENSION: Primary | Chronic | ICD-10-CM

## 2018-09-06 DIAGNOSIS — E78.2 MIXED HYPERLIPIDEMIA: ICD-10-CM

## 2018-09-06 DIAGNOSIS — I48.0 PAROXYSMAL ATRIAL FIBRILLATION (HCC): ICD-10-CM

## 2018-09-06 DIAGNOSIS — R53.83 FATIGUE, UNSPECIFIED TYPE: ICD-10-CM

## 2018-09-06 PROCEDURE — 93000 ELECTROCARDIOGRAM COMPLETE: CPT | Performed by: INTERNAL MEDICINE

## 2018-09-06 PROCEDURE — 99214 OFFICE O/P EST MOD 30 MIN: CPT | Performed by: INTERNAL MEDICINE

## 2018-09-06 RX ORDER — MELATONIN
1000 DAILY
COMMUNITY

## 2018-09-06 NOTE — PROGRESS NOTES
Cardiology Follow Up    Manisha Scott  1940  [id number]  [place] [id number]      Interval History: Ms Yisel Carrion is here for follow up of atrial fibrillation and hypertension  She complains of shortness of breath and fatigue with exertion  Symptoms have been present for the past few months  She denies any chest pain, lower extremity edema, orthopnea or paroxysmal nocturnal dyspnea  She sleeps poorly and snores  No prior history of MIGUEL  She has a history of atrial fibrillation along with sick sinus syndrome and had a pacemaker inserted at [facility] after developing multiple pauses  She denies any previous syncope or near syncope  Pacemaker was interrogated in January showing episodes of atrial fibrillation  Patient Active Problem List   Diagnosis    Hypertension    Cardiac disease    Hyperlipidemia    Paroxysmal atrial fibrillation (HCC)    Chronic diastolic congestive heart failure (HCC)    Peripheral arteriosclerosis (HCC)    Pain in both feet    Corns    Onychomycosis    Radiculopathy of lumbar region     Past Medical History:   Diagnosis Date    Arthritis     Atrial fibrillation ([address] )     Cardiac disease     Hypertension      Social History     Social History    Marital status:      Spouse name: N/A    Number of children: N/A    Years of education: N/A     Occupational History    Not on file  Social History Main Topics    Smoking status: Former Smoker     Packs/day: 1 00     Years: 25 00     Types: Cigarettes    Smokeless tobacco: Never Used      Comment: quit 22 yeears ago    Alcohol use Yes      Comment: occasional    Drug use: No    Sexual activity: Not on file     Other Topics Concern    Not on file     Social History Narrative    No narrative on file      History reviewed  No pertinent family history    Past Surgical History:   Procedure Laterality Date    CARDIAC PACEMAKER PLACEMENT Left     CHOLECYSTECTOMY      EYE SURGERY      KNEE SURGERY Left     left kknee replacement in 2009       Current Outpatient Prescriptions:     acetaminophen (TYLENOL ARTHRITIS PAIN) 650 mg CR tablet, Take by mouth, Disp: , Rfl:     apixaban (ELIQUIS) 5 mg, Take 1 tablet (5 mg total) by mouth 2 (two) times a day (Patient taking differently: Take 2 5 mg by mouth 2 (two) times a day  ), Disp: 60 tablet, Rfl: 5    Calcium Carbonate-Vitamin D 600-200 MG-UNIT CAPS, Take by mouth 2 (two) times a day, Disp: , Rfl:     cholecalciferol (VITAMIN D3) 1,000 units tablet, Take 1,000 Units by mouth daily, Disp: , Rfl:     Coenzyme Q10 (COQ-10) 200 MG CAPS, Take 2 capsules by mouth daily, Disp: , Rfl:     Famotidine (PEPCID PO), Take by mouth daily Indications: pt unsure of dose taken at home   , Disp: , Rfl:     gabapentin (NEURONTIN) 100 mg capsule, take 1 capsule by mouth three times a day (Patient taking differently: take one qd), Disp: 90 capsule, Rfl: 2    Lactobacillus (ACIDOPHILUS PO), Take by mouth, Disp: , Rfl:     metoprolol succinate (TOPROL-XL) 25 mg 24 hr tablet, Take 1 tablet (25 mg total) by mouth daily, Disp: 30 tablet, Rfl: 5    Multiple Vitamins-Minerals (DAILY MULTIVITAMIN PO), Take 1 tablet by mouth daily, Disp: , Rfl:     Omega-3-acid Ethyl Esters (LOVAZA PO), Take by mouth daily  , Disp: , Rfl:     Pantoprazole Sodium (PROTONIX PO), Take by mouth daily Indications: pt unsure of at home dose   , Disp: , Rfl:     PRAVASTATIN SODIUM PO, Take by mouth 3 (three) times a week Indications: pt unsure of dose taken at home   , Disp: , Rfl:     ramipril (ALTACE) 5 mg capsule, take 1 capsule by mouth twice a day, Disp: 180 capsule, Rfl: 2    sertraline (ZOLOFT) 25 mg tablet, , Disp: , Rfl:     sotalol (BETAPACE) 80 mg tablet, Take 1 tablet (80 mg total) by mouth every 12 (twelve) hours, Disp: 60 tablet, Rfl: 5    temazepam (RESTORIL) 15 mg capsule, Take 1 capsule by mouth daily at bedtime, Disp: , Rfl:     cefuroxime (CEFTIN) 250 mg tablet, Take 250 mg by mouth 2 (two) times a day, Disp: , Rfl: 0    clobetasol (TEMOVATE) 0 05 % cream, , Disp: , Rfl:     furosemide (LASIX) 20 mg tablet, Take 1 tablet by mouth daily, Disp: , Rfl:     pravastatin (PRAVACHOL) 10 mg tablet, take 1 tablet by mouth ON MONDAY, WEDNESDAY AND FRIDAY (Patient not taking: Reported on 9/6/2018), Disp: 30 tablet, Rfl: 5    ramipril (ALTACE) 5 mg capsule, Take 1 capsule (5 mg total) by mouth 2 (two) times a day (Patient not taking: Reported on 9/6/2018 ), Disp: 60 capsule, Rfl: 5  Allergies   Allergen Reactions    Ciprofloxacin Rash    Sulfa Antibiotics Rash    Synvisc [Hylan G-F 20] Rash       Labs:  Lab Results   Component Value Date     03/14/2018     09/10/2013    K 4 3 03/14/2018    K 4 0 09/10/2013     03/14/2018     09/10/2013    CO2 30 03/14/2018    CO2 29 09/10/2013    BUN 18 03/14/2018    BUN 26 09/10/2013    CREATININE 0 80 03/14/2018    CREATININE 1 3 09/10/2013    CALCIUM 8 7 03/14/2018    CALCIUM 8 9 09/10/2013     Lab Results   Component Value Date    WBC 3 68 (L) 07/25/2017    WBC 5 4 09/10/2013    HGB 13 4 07/25/2017    HGB 14 2 09/10/2013    HCT 40 2 07/25/2017    HCT 42 7 09/10/2013    MCV 91 07/25/2017    MCV 88 8 09/10/2013     (L) 07/25/2017     09/10/2013     Lab Results   Component Value Date    CHOL 187 09/10/2013    TRIG 163 (H) 03/14/2018    TRIG 166 09/10/2013    HDL 42 03/14/2018    HDL 37 09/10/2013     Imaging: No results found  EKG:  Atrial paced rhythm with Q waves V1 and V2    Review of Systems:  Review of Systems   Constitutional: Positive for fatigue  Negative for chills and fever  HENT: Negative for congestion, nosebleeds and postnasal drip  Respiratory: Positive for shortness of breath  Negative for cough and chest tightness      Cardiovascular: Negative for chest pain, palpitations and leg swelling  Gastrointestinal: Negative for abdominal distention, abdominal pain, diarrhea, nausea and vomiting  Endocrine: Negative for polydipsia, polyphagia and polyuria  Musculoskeletal: Negative for gait problem and myalgias  Skin: Negative for color change, pallor and rash  Allergic/Immunologic: Negative for environmental allergies, food allergies and immunocompromised state  Neurological: Negative for dizziness, seizures, syncope and light-headedness  Hematological: Negative for adenopathy  Does not bruise/bleed easily  Psychiatric/Behavioral: Negative for dysphoric mood  The patient is not nervous/anxious  Physical Exam:  /86 (BP Location: Right arm, Patient Position: Sitting, Cuff Size: Standard)   Pulse 80   Wt 104 kg (229 lb 4 8 oz)   SpO2 97%   BMI 38 16 kg/m²     Physical Exam   Constitutional: She is oriented to person, place, and time  She appears well-developed  No distress  HENT:   Head: Normocephalic and atraumatic  Eyes: Conjunctivae and EOM are normal  Pupils are equal, round, and reactive to light  Neck: Neck supple  No JVD present  No thyromegaly present  Cardiovascular: Normal rate and regular rhythm  Exam reveals no gallop and no friction rub  Murmur heard  Pulmonary/Chest: Effort normal and breath sounds normal    Abdominal: Soft  She exhibits no distension  There is no tenderness  Musculoskeletal: She exhibits no edema  Neurological: She is alert and oriented to person, place, and time  No cranial nerve deficit  Skin: Skin is warm and dry  No rash noted  She is not diaphoretic  No erythema  Psychiatric: She has a normal mood and affect  Her behavior is normal  Judgment and thought content normal        Discussion/Summary:  1  Paroxysmal atrial fibrillation (HCC) - currently in sinus  Continue Eliquis and routine pacemaker clinic follow up  - Continue sotalol   QT interval normal   2  Essential hypertension  - BP well controlled on current Rx    - Comprehensive metabolic panel  3  Mixed hyperlipidemia  - Lipid panel  -  Continue pravastatin  - Check A1C as fasting blood sugar was elevated during last CMP  4  Chronic diastolic CHF - stable on lasix 20 mg daily   - Discussed risk factor reduction including refraining from smoking, eating a diet high in fruits and vegetables, maintaining a healthy weight, limiting screen time along with controlling BP and cholesterol  Encouraged to exercise 150 minutes a week at a moderate level such as a fast walk or 75 minutes of high intensity  5  Exertional dyspnea/fatigue - will further evaluate with stress test and echocardiogram     - will refer for sleep study    6  Moderate aortic regurgitation - 2D echocardiogram

## 2018-09-06 NOTE — LETTER
September 6, 2018     Fidel Shields [address]    Patient: Luis Manuel Sue   YOB: 1940   Date of Visit: 9/6/2018       Dear Dr Mima Naik: Thank you for referring Chiragaurelia Roby to me for evaluation  Below are my notes for this consultation  If you have questions, please do not hesitate to call me  I look forward to following your patient along with you  Sincerely,        Manuela Degroot DO        CC: No Recipients  Blaze Shell DO  9/6/2018  3:27 PM  Sign at close encounter                                             Cardiology Follow Up    Luis Manuel Sue  1940  [id number]  [address] [place] [id number]      Interval History: Ms Apoorva Kirk is here for follow up of atrial fibrillation and hypertension  She complains of shortness of breath and fatigue with exertion  Symptoms have been present for the past few months  She denies any chest pain, lower extremity edema, orthopnea or paroxysmal nocturnal dyspnea  She sleeps poorly and snores  No prior history of MIGUEL  She has a history of atrial fibrillation along with sick sinus syndrome and had a pacemaker inserted at [facility] after developing multiple pauses  She denies any previous syncope or near syncope  Pacemaker was interrogated in January showing episodes of atrial fibrillation  Patient Active Problem List   Diagnosis    Hypertension    Cardiac disease    Hyperlipidemia    Paroxysmal atrial fibrillation (HCC)    Chronic diastolic congestive heart failure (HCC)    Peripheral arteriosclerosis (HCC)    Pain in both feet    Corns    Onychomycosis    Radiculopathy of lumbar region     Past Medical History:   Diagnosis Date    Arthritis     Atrial fibrillation ([address] )     Cardiac disease     Hypertension      Social History     Social History    Marital status:       Spouse name: N/A    Number of children: N/A    Years of education: N/A     Occupational History    Not on file  Social History Main Topics    Smoking status: Former Smoker     Packs/day: 1 00     Years: 25 00     Types: Cigarettes    Smokeless tobacco: Never Used      Comment: quit 22 yeears ago    Alcohol use Yes      Comment: occasional    Drug use: No    Sexual activity: Not on file     Other Topics Concern    Not on file     Social History Narrative    No narrative on file      History reviewed  No pertinent family history  Past Surgical History:   Procedure Laterality Date    CARDIAC PACEMAKER PLACEMENT Left     CHOLECYSTECTOMY      EYE SURGERY      KNEE SURGERY Left     left kknee replacement in 2009       Current Outpatient Prescriptions:     acetaminophen (TYLENOL ARTHRITIS PAIN) 650 mg CR tablet, Take by mouth, Disp: , Rfl:     apixaban (ELIQUIS) 5 mg, Take 1 tablet (5 mg total) by mouth 2 (two) times a day (Patient taking differently: Take 2 5 mg by mouth 2 (two) times a day  ), Disp: 60 tablet, Rfl: 5    Calcium Carbonate-Vitamin D 600-200 MG-UNIT CAPS, Take by mouth 2 (two) times a day, Disp: , Rfl:     cholecalciferol (VITAMIN D3) 1,000 units tablet, Take 1,000 Units by mouth daily, Disp: , Rfl:     Coenzyme Q10 (COQ-10) 200 MG CAPS, Take 2 capsules by mouth daily, Disp: , Rfl:     Famotidine (PEPCID PO), Take by mouth daily Indications: pt unsure of dose taken at home   , Disp: , Rfl:     gabapentin (NEURONTIN) 100 mg capsule, take 1 capsule by mouth three times a day (Patient taking differently: take one qd), Disp: 90 capsule, Rfl: 2    Lactobacillus (ACIDOPHILUS PO), Take by mouth, Disp: , Rfl:     metoprolol succinate (TOPROL-XL) 25 mg 24 hr tablet, Take 1 tablet (25 mg total) by mouth daily, Disp: 30 tablet, Rfl: 5    Multiple Vitamins-Minerals (DAILY MULTIVITAMIN PO), Take 1 tablet by mouth daily, Disp: , Rfl:     Omega-3-acid Ethyl Esters (LOVAZA PO), Take by mouth daily  , Disp: , Rfl:     Pantoprazole Sodium (PROTONIX PO), Take by mouth daily Indications: pt unsure of at home dose   , Disp: , Rfl:     PRAVASTATIN SODIUM PO, Take by mouth 3 (three) times a week Indications: pt unsure of dose taken at home   , Disp: , Rfl:     ramipril (ALTACE) 5 mg capsule, take 1 capsule by mouth twice a day, Disp: 180 capsule, Rfl: 2    sertraline (ZOLOFT) 25 mg tablet, , Disp: , Rfl:     sotalol (BETAPACE) 80 mg tablet, Take 1 tablet (80 mg total) by mouth every 12 (twelve) hours, Disp: 60 tablet, Rfl: 5    temazepam (RESTORIL) 15 mg capsule, Take 1 capsule by mouth daily at bedtime, Disp: , Rfl:     cefuroxime (CEFTIN) 250 mg tablet, Take 250 mg by mouth 2 (two) times a day, Disp: , Rfl: 0    clobetasol (TEMOVATE) 0 05 % cream, , Disp: , Rfl:     furosemide (LASIX) 20 mg tablet, Take 1 tablet by mouth daily, Disp: , Rfl:     pravastatin (PRAVACHOL) 10 mg tablet, take 1 tablet by mouth ON MONDAY, WEDNESDAY AND FRIDAY (Patient not taking: Reported on 9/6/2018), Disp: 30 tablet, Rfl: 5    ramipril (ALTACE) 5 mg capsule, Take 1 capsule (5 mg total) by mouth 2 (two) times a day (Patient not taking: Reported on 9/6/2018 ), Disp: 60 capsule, Rfl: 5  Allergies   Allergen Reactions    Ciprofloxacin Rash    Sulfa Antibiotics Rash    Synvisc [Hylan G-F 20] Rash       Labs:  Lab Results   Component Value Date     03/14/2018     09/10/2013    K 4 3 03/14/2018    K 4 0 09/10/2013     03/14/2018     09/10/2013    CO2 30 03/14/2018    CO2 29 09/10/2013    BUN 18 03/14/2018    BUN 26 09/10/2013    CREATININE 0 80 03/14/2018    CREATININE 1 3 09/10/2013    CALCIUM 8 7 03/14/2018    CALCIUM 8 9 09/10/2013     Lab Results   Component Value Date    WBC 3 68 (L) 07/25/2017    WBC 5 4 09/10/2013    HGB 13 4 07/25/2017    HGB 14 2 09/10/2013    HCT 40 2 07/25/2017    HCT 42 7 09/10/2013    MCV 91 07/25/2017    MCV 88 8 09/10/2013     (L) 07/25/2017     09/10/2013     Lab Results   Component Value Date    CHOL 187 09/10/2013    TRIG 163 (H) 03/14/2018    TRIG 166 09/10/2013    HDL 42 03/14/2018    HDL 37 09/10/2013     Imaging: No results found  EKG:  Atrial paced rhythm with Q waves V1 and V2    Review of Systems:  Review of Systems   Constitutional: Positive for fatigue  Negative for chills and fever  HENT: Negative for congestion, nosebleeds and postnasal drip  Respiratory: Positive for shortness of breath  Negative for cough and chest tightness  Cardiovascular: Negative for chest pain, palpitations and leg swelling  Gastrointestinal: Negative for abdominal distention, abdominal pain, diarrhea, nausea and vomiting  Endocrine: Negative for polydipsia, polyphagia and polyuria  Musculoskeletal: Negative for gait problem and myalgias  Skin: Negative for color change, pallor and rash  Allergic/Immunologic: Negative for environmental allergies, food allergies and immunocompromised state  Neurological: Negative for dizziness, seizures, syncope and light-headedness  Hematological: Negative for adenopathy  Does not bruise/bleed easily  Psychiatric/Behavioral: Negative for dysphoric mood  The patient is not nervous/anxious  Physical Exam:  /86 (BP Location: Right arm, Patient Position: Sitting, Cuff Size: Standard)   Pulse 80   Wt 104 kg (229 lb 4 8 oz)   SpO2 97%   BMI 38 16 kg/m²      Physical Exam   Constitutional: She is oriented to person, place, and time  She appears well-developed  No distress  HENT:   Head: Normocephalic and atraumatic  Eyes: Conjunctivae and EOM are normal  Pupils are equal, round, and reactive to light  Neck: Neck supple  No JVD present  No thyromegaly present  Cardiovascular: Normal rate and regular rhythm  Exam reveals no gallop and no friction rub  Murmur heard  Pulmonary/Chest: Effort normal and breath sounds normal    Abdominal: Soft  She exhibits no distension  There is no tenderness  Musculoskeletal: She exhibits no edema     Neurological: She is alert and oriented to person, place, and time  No cranial nerve deficit  Skin: Skin is warm and dry  No rash noted  She is not diaphoretic  No erythema  Psychiatric: She has a normal mood and affect  Her behavior is normal  Judgment and thought content normal        Discussion/Summary:  1  Paroxysmal atrial fibrillation (HCC) - currently in sinus  Continue Eliquis and routine pacemaker clinic follow up  - Continue sotalol  QT interval normal   2  Essential hypertension  - BP well controlled on current Rx    - Comprehensive metabolic panel  3  Mixed hyperlipidemia  - Lipid panel  -  Continue pravastatin  - Check A1C as fasting blood sugar was elevated during last CMP  4  Chronic diastolic CHF - stable on lasix 20 mg daily  5  Exertional dyspnea/fatigue - will further evaluate with stress test and echocardiogram     - will refer for sleep study    6  Moderate aortic regurgitation - 2D echocardiogram

## 2018-09-17 ENCOUNTER — APPOINTMENT (OUTPATIENT)
Dept: LAB | Facility: CLINIC | Age: 78
End: 2018-09-17
Payer: MEDICARE

## 2018-09-17 LAB
ALBUMIN SERPL BCP-MCNC: 3.5 G/DL (ref 3.5–5)
ALP SERPL-CCNC: 59 U/L (ref 46–116)
ALT SERPL W P-5'-P-CCNC: 34 U/L (ref 12–78)
ANION GAP SERPL CALCULATED.3IONS-SCNC: 6 MMOL/L (ref 4–13)
AST SERPL W P-5'-P-CCNC: 24 U/L (ref 5–45)
BILIRUB SERPL-MCNC: 0.59 MG/DL (ref 0.2–1)
BUN SERPL-MCNC: 18 MG/DL (ref 5–25)
CALCIUM SERPL-MCNC: 8.9 MG/DL (ref 8.3–10.1)
CHLORIDE SERPL-SCNC: 102 MMOL/L (ref 100–108)
CHOLEST SERPL-MCNC: 145 MG/DL (ref 50–200)
CO2 SERPL-SCNC: 31 MMOL/L (ref 21–32)
CREAT SERPL-MCNC: 0.82 MG/DL (ref 0.6–1.3)
EST. AVERAGE GLUCOSE BLD GHB EST-MCNC: 123 MG/DL
GFR SERPL CREATININE-BSD FRML MDRD: 68 ML/MIN/1.73SQ M
GLUCOSE P FAST SERPL-MCNC: 117 MG/DL (ref 65–99)
HBA1C MFR BLD: 5.9 % (ref 4.2–6.3)
HDLC SERPL-MCNC: 36 MG/DL (ref 40–60)
LDLC SERPL CALC-MCNC: 78 MG/DL (ref 0–100)
NONHDLC SERPL-MCNC: 109 MG/DL
POTASSIUM SERPL-SCNC: 4.4 MMOL/L (ref 3.5–5.3)
PROT SERPL-MCNC: 7 G/DL (ref 6.4–8.2)
SODIUM SERPL-SCNC: 139 MMOL/L (ref 136–145)
TRIGL SERPL-MCNC: 155 MG/DL

## 2018-09-17 PROCEDURE — 36415 COLL VENOUS BLD VENIPUNCTURE: CPT | Performed by: INTERNAL MEDICINE

## 2018-09-17 PROCEDURE — 83036 HEMOGLOBIN GLYCOSYLATED A1C: CPT | Performed by: INTERNAL MEDICINE

## 2018-09-17 PROCEDURE — 80061 LIPID PANEL: CPT | Performed by: INTERNAL MEDICINE

## 2018-09-17 PROCEDURE — 80053 COMPREHEN METABOLIC PANEL: CPT | Performed by: INTERNAL MEDICINE

## 2018-09-19 ENCOUNTER — HOSPITAL ENCOUNTER (OUTPATIENT)
Dept: NON INVASIVE DIAGNOSTICS | Facility: HOSPITAL | Age: 78
Discharge: HOME | End: 2018-09-19
Attending: INTERNAL MEDICINE
Payer: MEDICARE

## 2018-09-19 ENCOUNTER — HOSPITAL ENCOUNTER (OUTPATIENT)
Dept: RADIOLOGY | Facility: HOSPITAL | Age: 78
Discharge: HOME | End: 2018-09-19
Attending: INTERNAL MEDICINE
Payer: MEDICARE

## 2018-09-19 DIAGNOSIS — I50.32 CHRONIC DIASTOLIC CONGESTIVE HEART FAILURE (HCC): ICD-10-CM

## 2018-09-19 DIAGNOSIS — I35.1 NONRHEUMATIC AORTIC VALVE INSUFFICIENCY: ICD-10-CM

## 2018-09-19 LAB
CHEST PAIN STATEMENT: NORMAL
MAX DIASTOLIC BP: 110 MMHG
MAX HEART RATE: 74 BPM
MAX PREDICTED HEART RATE: 141 BPM
MAX. SYSTOLIC BP: 198 MMHG
PROTOCOL NAME: NORMAL
REASON FOR TERMINATION: NORMAL
TARGET HR FORMULA: NORMAL
TIME IN EXERCISE PHASE: NORMAL

## 2018-09-19 PROCEDURE — A9502 TC99M TETROFOSMIN: HCPCS

## 2018-09-19 PROCEDURE — 93306 TTE W/DOPPLER COMPLETE: CPT | Performed by: INTERNAL MEDICINE

## 2018-09-19 PROCEDURE — 78452 HT MUSCLE IMAGE SPECT MULT: CPT

## 2018-09-19 PROCEDURE — 93017 CV STRESS TEST TRACING ONLY: CPT

## 2018-09-19 PROCEDURE — 93306 TTE W/DOPPLER COMPLETE: CPT

## 2018-09-19 RX ADMIN — REGADENOSON 0.4 MG: 0.08 INJECTION, SOLUTION INTRAVENOUS at 10:24

## 2018-09-20 PROCEDURE — 93018 CV STRESS TEST I&R ONLY: CPT | Performed by: INTERNAL MEDICINE

## 2018-09-20 PROCEDURE — 78452 HT MUSCLE IMAGE SPECT MULT: CPT | Performed by: INTERNAL MEDICINE

## 2018-09-20 PROCEDURE — 93016 CV STRESS TEST SUPVJ ONLY: CPT | Performed by: INTERNAL MEDICINE

## 2018-09-24 ENCOUNTER — TELEPHONE (OUTPATIENT)
Dept: CARDIOLOGY CLINIC | Facility: CLINIC | Age: 78
End: 2018-09-24

## 2018-09-24 NOTE — TELEPHONE ENCOUNTER
----- Message from Darlene Joyner DO sent at 9/24/2018 11:18 AM EDT -----  Can you please let the patient know stress test was normal and they should follow up in 6 months?

## 2018-10-10 DIAGNOSIS — M79.672 PAIN IN BOTH FEET: ICD-10-CM

## 2018-10-10 DIAGNOSIS — M79.671 PAIN IN BOTH FEET: ICD-10-CM

## 2018-10-10 RX ORDER — GABAPENTIN 100 MG/1
CAPSULE ORAL
Qty: 90 CAPSULE | Refills: 2 | Status: SHIPPED | OUTPATIENT
Start: 2018-10-10 | End: 2018-12-28 | Stop reason: SDUPTHER

## 2018-11-05 ENCOUNTER — IN-CLINIC DEVICE VISIT (OUTPATIENT)
Dept: CARDIOLOGY CLINIC | Facility: CLINIC | Age: 78
End: 2018-11-05
Payer: MEDICARE

## 2018-11-05 DIAGNOSIS — Z95.0 PRESENCE OF PERMANENT CARDIAC PACEMAKER: ICD-10-CM

## 2018-11-05 DIAGNOSIS — I49.5 SSS (SICK SINUS SYNDROME) (HCC): ICD-10-CM

## 2018-11-05 DIAGNOSIS — I48.0 PAROXYSMAL ATRIAL FIBRILLATION (HCC): Primary | ICD-10-CM

## 2018-11-05 PROCEDURE — 93280 PM DEVICE PROGR EVAL DUAL: CPT | Performed by: INTERNAL MEDICINE

## 2018-11-05 NOTE — PROGRESS NOTES
MDT DUAL PM  DEVICE INTERROGATED IN THE [facility] OFFICE:  BATTERY VOLTAGE ADEQUATE (5 YR)   AP 93 8%  0 7%    ALL LEAD PARAMETERS WITHIN NORMAL LIMITS   50 AT/AF EPISODES (3 6%) WITH EGMS SHOWING AF   LONGEST 10 HR   3 NEW VT EPISODES WITH 2 EGMS SHOWING PAT (6 @ 158 BPM, 13 @ 169 BPM), AND 1 EGM SHOWING NSVT (10 @ 190 BPM)   EF 50 - 55% (ECHO 06/2018)   PT TAKES ELIQUIS, METOPROLOL SUCC , AND SOTALOL   NO PROGRAMMING CHANGES MADE TO DEVICE PARAMETERS   NORMAL DEVICE FUNCTION   RG

## 2018-11-08 ENCOUNTER — TRANSCRIBE ORDERS (OUTPATIENT)
Dept: LAB | Facility: CLINIC | Age: 78
End: 2018-11-08

## 2018-11-08 ENCOUNTER — OFFICE VISIT (OUTPATIENT)
Dept: FAMILY MEDICINE CLINIC | Facility: CLINIC | Age: 78
End: 2018-11-08
Payer: MEDICARE

## 2018-11-08 ENCOUNTER — APPOINTMENT (OUTPATIENT)
Dept: LAB | Facility: CLINIC | Age: 78
End: 2018-11-08
Payer: MEDICARE

## 2018-11-08 VITALS
BODY MASS INDEX: 38.27 KG/M2 | DIASTOLIC BLOOD PRESSURE: 80 MMHG | TEMPERATURE: 97.7 F | SYSTOLIC BLOOD PRESSURE: 132 MMHG | RESPIRATION RATE: 12 BRPM | HEART RATE: 78 BPM | WEIGHT: 230 LBS

## 2018-11-08 DIAGNOSIS — G47.09 OTHER INSOMNIA: ICD-10-CM

## 2018-11-08 DIAGNOSIS — M25.512 ACUTE PAIN OF LEFT SHOULDER: ICD-10-CM

## 2018-11-08 DIAGNOSIS — M25.50 ARTHRALGIA, UNSPECIFIED JOINT: ICD-10-CM

## 2018-11-08 DIAGNOSIS — M25.50 ARTHRALGIA, UNSPECIFIED JOINT: Primary | ICD-10-CM

## 2018-11-08 DIAGNOSIS — K21.9 GASTROESOPHAGEAL REFLUX DISEASE WITHOUT ESOPHAGITIS: ICD-10-CM

## 2018-11-08 LAB — ERYTHROCYTE [SEDIMENTATION RATE] IN BLOOD: 18 MM/HOUR (ref 0–20)

## 2018-11-08 PROCEDURE — 85652 RBC SED RATE AUTOMATED: CPT

## 2018-11-08 PROCEDURE — 99214 OFFICE O/P EST MOD 30 MIN: CPT | Performed by: NURSE PRACTITIONER

## 2018-11-08 PROCEDURE — 36415 COLL VENOUS BLD VENIPUNCTURE: CPT

## 2018-11-08 PROCEDURE — 86431 RHEUMATOID FACTOR QUANT: CPT

## 2018-11-08 PROCEDURE — 86038 ANTINUCLEAR ANTIBODIES: CPT

## 2018-11-08 PROCEDURE — 86430 RHEUMATOID FACTOR TEST QUAL: CPT

## 2018-11-08 RX ORDER — FAMOTIDINE 40 MG/1
40 TABLET, FILM COATED ORAL 2 TIMES DAILY
Qty: 60 TABLET | Refills: 3 | Status: SHIPPED | OUTPATIENT
Start: 2018-11-08 | End: 2019-03-06 | Stop reason: SDUPTHER

## 2018-11-08 NOTE — PROGRESS NOTES
Chief Complaint   Patient presents with    Shoulder Pain     pt  c/o left shoulder pain x2 weeks        Patient ID: Jocelyne Walker is a 77 y o  female  Patient is here today to reestablish her primary care  She is transferring from the [facility] [place] in [facility] for primary care services  Patient sees Cardiology in the [facility] system  Patient reports new complaint of left shoulder pain that is been present for three weeks now  She notes a limitation in range of motion and a burning pain in the shoulder  She has treated this at home with topical creams and patches  She states it continues to be a problem  She has tried to avoid nonsteroidal anti-inflammatories because have her other medications that she uses  Patient reports a marked limitation in range of motion especially when trying to move her arm overhead or behind her back  No injury prior to onset  Patient reports 20 years or more ago she was told by an orthopedist that she had both osteo and rheumatoid arthritis  She has no prior rheumatology eval or treatment for rheumatoid arthritis  She did have left knee replacement with Dr Anthony at [place]  Pt follows with gyn at [facility] and does her mammograms there  Pt follows with Dr Johanna Manriquez for GI/colon cancer screening          Past Medical History:   Diagnosis Date    Arthritis     Atrial fibrillation ([address] )     Cardiac disease     Cataract     Gastro-esophageal reflux     GERD    Hypertension        Past Surgical History:   Procedure Laterality Date    CARDIAC PACEMAKER PLACEMENT Left 2014    CATARACT EXTRACTION      CHOLECYSTECTOMY      resolved: 2009    COLONOSCOPY      Fiberoptic, resolved 2015    EYE SURGERY      KNEE SURGERY Left     left kknee replacement in 2009       Patient Active Problem List   Diagnosis    Hypertension    Cardiac disease    Hyperlipidemia    Paroxysmal atrial fibrillation (HCC)    Chronic diastolic congestive heart failure ([address] )    Peripheral arteriosclerosis ([address] )    Pain in both feet    Corns    Onychomycosis    Radiculopathy of lumbar region    Nonrheumatic aortic valve insufficiency    Joint pain    Acute pain of left shoulder    Other insomnia       Family History   Problem Relation Age of Onset    Coronary artery disease Father     Diabetes Sister     Hyperlipidemia Sister     Arthritis Family     Hypertension Family        Immunization History   Administered Date(s) Administered    Influenza Split High Dose Preservative Free IM 09/18/2013, 10/15/2015    Pneumococcal Polysaccharide PPV23 11/17/2005, 09/18/2013       Allergies   Allergen Reactions    Ciprofloxacin Rash    Sulfa Antibiotics Rash    Synvisc [Hylan G-F 20] Rash       Current Outpatient Prescriptions   Medication Sig Dispense Refill    acetaminophen (TYLENOL ARTHRITIS PAIN) 650 mg CR tablet Take by mouth      apixaban (ELIQUIS) 5 mg Take 1 tablet (5 mg total) by mouth 2 (two) times a day 60 tablet 11    Calcium Carbonate-Vitamin D 600-200 MG-UNIT CAPS Take by mouth 2 (two) times a day      cholecalciferol (VITAMIN D3) 1,000 units tablet Take 1,000 Units by mouth daily      clobetasol (TEMOVATE) 0 05 % cream       Coenzyme Q10 (COQ-10) 200 MG CAPS Take 2 capsules by mouth daily      furosemide (LASIX) 20 mg tablet Take 1 tablet by mouth daily      gabapentin (NEURONTIN) 100 mg capsule take 1 capsule by mouth three times a day 90 capsule 2    Lactobacillus (ACIDOPHILUS PO) Take by mouth      metoprolol succinate (TOPROL-XL) 25 mg 24 hr tablet Take 1 tablet (25 mg total) by mouth daily 30 tablet 5    Multiple Vitamins-Minerals (DAILY MULTIVITAMIN PO) Take 1 tablet by mouth daily      Omega-3-acid Ethyl Esters (LOVAZA PO) Take by mouth daily        Pantoprazole Sodium (PROTONIX PO) Take 40 mg by mouth daily        PRAVASTATIN SODIUM PO Take by mouth 3 (three) times a week Indications: pt unsure of dose taken at home         ramipril (ALTACE) 5 mg capsule take 1 capsule by mouth twice a day 180 capsule 2    sotalol (BETAPACE) 80 mg tablet Take 1 tablet (80 mg total) by mouth every 12 (twelve) hours 60 tablet 5    temazepam (RESTORIL) 15 mg capsule Take 1 capsule by mouth daily at bedtime      cefuroxime (CEFTIN) 250 mg tablet Take 250 mg by mouth 2 (two) times a day  0    Famotidine (PEPCID PO) Take by mouth daily Indications: pt unsure of dose taken at home  Larissa Hart pravastatin (PRAVACHOL) 10 mg tablet take 1 tablet by mouth ON MONDAY, [facility] (Patient not taking: Reported on 9/6/2018) 30 tablet 5    sertraline (ZOLOFT) 25 mg tablet        No current facility-administered medications for this visit  Social History     Social History    Marital status:      Spouse name: N/A    Number of children: N/A    Years of education: N/A     Social History Main Topics    Smoking status: Former Smoker     Packs/day: 1 00     Years: 25 00     Types: Cigarettes    Smokeless tobacco: Never Used      Comment: quit 22 yeears ago    Alcohol use Yes      Comment: occasional, No alcohol use,per [employer]    Drug use: No    Sexual activity: Not Asked     Other Topics Concern    None     Social History Narrative    Exercise: Walking, 2x/week           Review of Systems   Constitutional: Negative  HENT: Negative  Eyes: Negative  Respiratory: Negative  Cardiovascular: Negative  Gastrointestinal: Negative  Endocrine: Negative  Genitourinary: Negative  Musculoskeletal: Positive for arthralgias and joint swelling  Skin: Negative  Allergic/Immunologic: Negative  Neurological: Negative  Hematological: Negative  Psychiatric/Behavioral: Negative            Objective:    /80 (BP Location: Right arm, Patient Position: Sitting, Cuff Size: Adult)   Pulse 78   Temp 97 7 °F (36 5 °C) (Temporal)   Resp 12   Wt 104 kg (230 lb)   BMI 38 27 kg/m²        Physical Exam   Constitutional: She is oriented to person, place, and time  She appears well-developed and well-nourished  No distress  HENT:   Head: Normocephalic  Right Ear: External ear normal    Left Ear: External ear normal    Eyes: Conjunctivae are normal  No scleral icterus  Neck: No JVD present  Cardiovascular: Normal rate, regular rhythm and normal heart sounds  Pulmonary/Chest: Effort normal and breath sounds normal    Abdominal: There is no tenderness  Musculoskeletal: She exhibits tenderness  She exhibits no edema  Enlargement of the IP joints several locations bilateral hands  The left shoulder is globally tender to palpation  Range of motion is globally limited  There is marked discomfort with passive range of motion maneuvers  Neurological: She is alert and oriented to person, place, and time  Skin: Skin is warm and dry  Psychiatric: She has a normal mood and affect  Assessment/Plan:    No problem-specific Assessment & Plan notes found for this encounter  Diagnoses and all orders for this visit:    Arthralgia, unspecified joint  Comments:  Rheumatology referral   Complete blood work  Orders:  -     Ambulatory referral to Rheumatology; Future  -     Sedimentation rate, automated; Future  -     UMAIR Screen w/ Reflex to Titer/Pattern; Future  -     RF Screen w/ Reflex to Titer; Future    Acute pain of left shoulder  Comments:  Rheumatology referral complete blood work  Orders:  -     Ambulatory referral to Rheumatology; Future  -     Sedimentation rate, automated; Future  -     UMAIR Screen w/ Reflex to Titer/Pattern; Future  -     RF Screen w/ Reflex to Titer; Future    Other insomnia  Comments:  Stop Restoril  Switch to over-the-counter melatonin 4 mg with magnesium 250 to 500 mg tab at HS  Lengthy visit with records review, problem focused education with lengthy Q&A to pt stated satisfaction  30 minutes spent with >50% time spent in supportive counseling/education  Medication use/risks/benefits discussed with return karri of same by patient  There are no Patient Instructions on file for this visit                    Cher Torres

## 2018-11-09 LAB
CRYOGLOB RF SER-ACNC: ABNORMAL [IU]/ML
RHEUMATOID FACT SER QL LA: POSITIVE

## 2018-11-13 LAB — RYE IGE QN: NEGATIVE

## 2018-11-21 DIAGNOSIS — E78.5 DYSLIPIDEMIA: Primary | ICD-10-CM

## 2018-11-27 RX ORDER — OMEGA-3-ACID ETHYL ESTERS 1 G/1
2 CAPSULE, LIQUID FILLED ORAL 2 TIMES DAILY
Qty: 60 CAPSULE | Refills: 5 | Status: SHIPPED | OUTPATIENT
Start: 2018-11-27 | End: 2019-02-21 | Stop reason: SDUPTHER

## 2018-12-01 ENCOUNTER — OFFICE VISIT (OUTPATIENT)
Dept: SLEEP CENTER | Facility: CLINIC | Age: 78
End: 2018-12-01
Payer: MEDICARE

## 2018-12-01 VITALS
DIASTOLIC BLOOD PRESSURE: 84 MMHG | HEART RATE: 68 BPM | WEIGHT: 225 LBS | BODY MASS INDEX: 37.49 KG/M2 | HEIGHT: 65 IN | SYSTOLIC BLOOD PRESSURE: 126 MMHG

## 2018-12-01 DIAGNOSIS — I50.32 CHRONIC DIASTOLIC CONGESTIVE HEART FAILURE (HCC): ICD-10-CM

## 2018-12-01 DIAGNOSIS — I48.0 PAROXYSMAL ATRIAL FIBRILLATION (HCC): ICD-10-CM

## 2018-12-01 DIAGNOSIS — E66.9 OBESITY (BMI 30-39.9): ICD-10-CM

## 2018-12-01 DIAGNOSIS — R45.86 MOOD DISTURBANCE: ICD-10-CM

## 2018-12-01 DIAGNOSIS — R53.83 FATIGUE, UNSPECIFIED TYPE: ICD-10-CM

## 2018-12-01 DIAGNOSIS — G47.33 OSA (OBSTRUCTIVE SLEEP APNEA): Primary | ICD-10-CM

## 2018-12-01 DIAGNOSIS — I10 ESSENTIAL HYPERTENSION: ICD-10-CM

## 2018-12-01 DIAGNOSIS — F40.240 CLAUSTROPHOBIA: ICD-10-CM

## 2018-12-01 DIAGNOSIS — G47.09 OTHER INSOMNIA: ICD-10-CM

## 2018-12-01 PROCEDURE — 99204 OFFICE O/P NEW MOD 45 MIN: CPT | Performed by: INTERNAL MEDICINE

## 2018-12-01 NOTE — PROGRESS NOTES
Consultation - [address] [facility] [address]  77 y o  female  UPU:[id number]  JET:[id number]    Physician Requesting Consult: Honorio Cardenas DO     Reason for Consult : At your kind request I saw this patient for initial sleep evaluation today  The patient is here to evaluate for suspected Obstructive Sleep Apnea  PFSH, Problem List, Medications & Allergies were reviewed in EMR  She  has a past medical history of Arthritis; Atrial fibrillation ([address] ); Cardiac disease; Cataract; Gastro-esophageal reflux; and Hypertension  She has a current medication list which includes the following prescription(s): acetaminophen, apixaban, calcium carbonate-vitamin d, cefuroxime, cholecalciferol, clobetasol, coq-10, famotidine, furosemide, gabapentin, lactobacillus, metoprolol succinate, multiple vitamins-minerals, omega-3-acid ethyl esters, omega-3-acid ethyl esters, pantoprazole sodium, pravastatin, pravastatin sodium, ramipril, sertraline, and sotalol  HPI:  She sleeps alone but family notes she snores loudly and have witnessed apneas     At times she awakens herself with snoring  She is not aware of modifying factors  Restless Leg Syndrome: reports no suggestive symptoms    Parasomnia activity: no features reported   Other Complaints:  She has difficulty both initiating and maintaining sleep of around 4 years duration, attributed to stress and started after passing of her   Sleep Routine: Typical Bedtime:  11:00 p m  Gets OOB:  8 or 9:00 a m  TIB:  9 or more hrs Estimated [email or web address] hrs  Sleep latency:> 60 minutes  She has ruminating thoughts, but denied clock watching  She was using temazepam but this has been discontinued and she is now using melatonin as a sleep aid instead  Sleep Interruptions: 1-2 x/night because of nocturia and struggles to fall back asleep  Awakens: spontaneously feeling not always refreshed  She denied Excessive Daytime Sleepiness but may doze off if sedentary    [place] Sleepiness Scale rated at Total score: 4 /24  Habits: reports that she has quit smoking  Her smoking use included Cigarettes  She has a 25 00 pack-year smoking history  She has never used smokeless tobacco , reports that she drinks alcohol ,  reports that she does not use drugs  ,Caffeine use: excessive until 7:00 p m , Exercise routine: none   Family History:  Sisters has obstructive sleep apnea  ROS: reviewed & as attached  Significant for weight has been stable  She has postnasal drip  She reports episodic palpitations  She has shortness of breath and swelling of her legs  She is on sertraline for depression  She is claustrophobic  EXAM:    Vitals /84   Pulse 68   Ht 5' 5" (1 651 m)   Wt 102 kg (225 lb)   BMI 37 44 kg/m²     General  Well groomed female, appears stated age, in no apparent distress  Psychiatric  Alert and cooperative  Mental state appears normal  Judgement & Insight  good   Head   Craniofacial anatomy:prognathia Sinuses: non- tender  TMJ: Normal     Eyes   EOM's intact, conjunctiva/corneas clear         Nasal Airway  is patent Septum:central, Mucous membranes:appear normal     Turbinates:  are normal  There is no rhinorrhea; No PND     Oral   Airway   crowded, there's AP narrowing  and laterally narrowed Tongue:Modified Mallampati class IV (only hard palate visible)  Palate:  redundant soft palate, high hard palate and narrow hard palateTonsils: no hypertrophy  Teeth: dentures - upper      Neck    appears thick and there's extra fatty tissue; Neck Circumference: 43cm; Supple; no abnormal masses; Thyroid:normal  Trachea:central      Lymph    No Cervical or Submandibular Lymhadenopathy   Heart:    RRR; S1,S2 normal; no gallop; nomurmurs     Lungs   Respiratory Effort:normal  Air entry good bilaterally  No wheezes  No rales   Abdomen   Obese, Soft & non-tender     Extremities    1+ pedal edema  No clubbing or cyanosis  Skin   Skin is warm and dry;  Color& Hydration good; no facial rashes or lesions    Neurologic  Speech is clear and coherent  CNII-XII intact  Rombergs positive  Muscskeltl    Muscle bulk, tone and power WNL Gait:  Broad-based          IMPRESSION: Primary Sleep/Secondary(to Medical or Psych conditions) & comorbidities   1  MIGUEL (obstructive sleep apnea)  Diagnostic Sleep Study   2  Other insomnia     3  Fatigue, unspecified type  Ambulatory referral to Sleep Medicine   4  Claustrophobia     5  Essential hypertension     6  Chronic diastolic congestive heart failure (HCC)     7  Paroxysmal atrial fibrillation ([address] )     8  Mood disturbance     9  Obesity (BMI 30-39  9)          PLAN:   1  Comprehensive counseling was provided on pathophysiology, diagnostic strategies & treatment options; effects on symptoms and comorbidities; risks of inadequate therapy; costs and insurance aspects  2  I advised on weight reduction, avoiding sleeping supine, using alcohol or sedating medications close to bed time and on safe driving practices  3  Cognitive behavioral therapy was initiated with advise on Sleep Hygiene and behavioral techniques to manage Insomnia  Specifically, limiting time in bed to less than 8 hours, starting an exercise routine, avoiding caffeine use of to 4:00 p m  And on relaxation techniques  4  Nocturnal polysomnography is indicated and a diagnostic study will be scheduled  5  Patient has reservations about being able to tolerate Positive airway pressure therapy and will need formal desensitization to be successful with use  6  Follow-up will be scheduled after the studies to review results, further details of treatment options and to initiate/adjust therapy  Thank you for allowing me to participate in the care of this patient  I will keep you apprised of developments      Sincerely,     Authenticated electronically by Chantelle Cintron MD   on 42/92/77   Board Certified Specialist

## 2018-12-01 NOTE — PROGRESS NOTES
Review of Systems      Genitourinary hot flashes at night   Cardiology ankle/leg swelling   Gastrointestinal frequent heartburn/acid reflux   Neurology balance problems   Constitutional fatigue   Integumentary none   Psychiatry mood change   Musculoskeletal none   Pulmonary none   ENT none   Endocrine frequent urination   Hematological none

## 2018-12-01 NOTE — PATIENT INSTRUCTIONS
What is MIGUEL? Obstructive sleep apnea is a common and serious sleep disorder that causes you to stop breathing during sleep  The airway repeatedly becomes blocked, limiting the amount of air that reaches your lungs  When this happens, you may snore loudly or making choking noises as you try to breathe  Your brain and body becomes oxygen deprived and you may wake up  This may happen a few times a night, or in more severe cases, several hundred times a night  Sleep apnea can make you wake up in the morning feeling tired or unrefreshed even though you have had a full night of sleep  During the day, you may feel fatigued, have difficulty concentrating or you may even unintentionally fall asleep  This is because your body is waking up numerous times throughout the night, even though you might not be conscious of each awakening  The lack of oxygen your body receives can have negative long-term consequences for your health  This includes:  High blood pressure  Heart disease  Irregular heart rhythms  Stroke  Pre-diabetes and diabetes  Depression    Testing  An objective evaluation of your sleep may be needed before your board certified sleep physician can make a diagnosis  Options include:   In-lab overnight sleep study  This type of sleep study requires you to stay overnight at a sleep center, in a bed that may resemble a hotel room  You will sleep with sensors hooked up to various parts of your body  These sensors record your brain waves, heartbeat, breathing and movement  An overnight sleep study also provides your doctor with the most complete information about your sleep  Learn more about an overnight sleep study      Home sleep apnea test  Some patients with high risk factors for obstructive sleep apnea and no other medical disorders may be candidates for a home sleep apnea test  The testing equipment differs in that it is less complicated than what is used in an overnight sleep study   As such, does not give all the data an in-lab will and if negative, may not mean you do not have the problem  Treatment for sleep apnea  includes using a continuous positive airway pressure (CPAP) machine to keep your airway open during sleep  A mask is placed over your nose and mouth, or just your nose  The mask is hooked to the CPAP machine that blows a gentle stream of air into the mask when you breathe  This helps keep your airway open so you can breathe more regularly  Extra oxygen may be given to you through the machine  You may be given a mouth device  It looks like a mouth guard or dental retainer and stops your tongue and mouth tissues from blocking your throat while you sleep  Surgery may be needed to remove extra tissues that block your mouth, throat, or nose  Manage sleep apnea:   Do not smoke  Nicotine and other chemicals in cigarettes and cigars can cause lung damage  Ask your healthcare provider for information if you currently smoke and need help to quit  E-cigarettes or smokeless tobacco still contain nicotine  Talk to your healthcare provider before you use these products  Do not drink alcohol or take sedative medicine before you go to sleep  Alcohol and sedatives can relax the muscles and tissues around your throat  This can block the airflow to your lungs  Maintain a healthy weight  Excess tissue around your throat may restrict your breathing  Ask your healthcare provider for information if you need to lose weight  Sleep on your side or use pillows designed to prevent sleep apnea  This prevents your tongue or other tissues from blocking your throat  You can also raise the head of your bed  Driving Safety  Refrain from driving when drowsy  Follow up with your healthcare provider as directed:  Write down your questions so you remember to ask them during your visits  Go to [employer] website for more information: Sleepeducation  org     What is MIGUEL?    Obstructive sleep apnea is a common and serious sleep disorder that causes you to stop breathing during sleep  The airway repeatedly becomes blocked, limiting the amount of air that reaches your lungs  When this happens, you may snore loudly or making choking noises as you try to breathe  Your brain and body becomes oxygen deprived and you may wake up  This may happen a few times a night, or in more severe cases, several hundred times a night  Sleep apnea can make you wake up in the morning feeling tired or unrefreshed even though you have had a full night of sleep  During the day, you may feel fatigued, have difficulty concentrating or you may even unintentionally fall asleep  This is because your body is waking up numerous times throughout the night, even though you might not be conscious of each awakening  The lack of oxygen your body receives can have negative long-term consequences for your health  This includes:  High blood pressure  Heart disease  Irregular heart rhythms  Stroke  Pre-diabetes and diabetes  Depression    Testing  An objective evaluation of your sleep may be needed before your board certified sleep physician can make a diagnosis  Options include:   In-lab overnight sleep study  This type of sleep study requires you to stay overnight at a sleep center, in a bed that may resemble a hotel room  You will sleep with sensors hooked up to various parts of your body  These sensors record your brain waves, heartbeat, breathing and movement  An overnight sleep study also provides your doctor with the most complete information about your sleep  Learn more about an overnight sleep study      Home sleep apnea test  Some patients with high risk factors for obstructive sleep apnea and no other medical disorders may be candidates for a home sleep apnea test  The testing equipment differs in that it is less complicated than what is used in an overnight sleep study   As such, does not give all the data an in-lab will and if negative, may not mean you do not have the problem  Treatment for sleep apnea  includes using a continuous positive airway pressure (CPAP) machine to keep your airway open during sleep  A mask is placed over your nose and mouth, or just your nose  The mask is hooked to the CPAP machine that blows a gentle stream of air into the mask when you breathe  This helps keep your airway open so you can breathe more regularly  Extra oxygen may be given to you through the machine  You may be given a mouth device  It looks like a mouth guard or dental retainer and stops your tongue and mouth tissues from blocking your throat while you sleep  Surgery may be needed to remove extra tissues that block your mouth, throat, or nose  Manage sleep apnea:   Do not smoke  Nicotine and other chemicals in cigarettes and cigars can cause lung damage  Ask your healthcare provider for information if you currently smoke and need help to quit  E-cigarettes or smokeless tobacco still contain nicotine  Talk to your healthcare provider before you use these products  Do not drink alcohol or take sedative medicine before you go to sleep  Alcohol and sedatives can relax the muscles and tissues around your throat  This can block the airflow to your lungs  Maintain a healthy weight  Excess tissue around your throat may restrict your breathing  Ask your healthcare provider for information if you need to lose weight  Sleep on your side or use pillows designed to prevent sleep apnea  This prevents your tongue or other tissues from blocking your throat  You can also raise the head of your bed  Driving Safety  Refrain from driving when drowsy  Follow up with your healthcare provider as directed:  Write down your questions so you remember to ask them during your visits  Go to AASM website for more information: Sleepeducation  org           What you can do to improve your sleep: (Sleep Hygiene) Basic rules for a good night's sleep    Create a regular sleep schedule    This will help you form a sleep routine  Keep a record of your sleep patterns, and any sleeping problems you have  Bring the record to follow-up visits with healthcare providers  Avoid prolonged use of light-emitting screens before bedtime or watching TV in bed  Avoid forcing sleep  Do not take naps  Naps could make it hard for you to fall asleep at bedtime  Deal with your worries before bedtime  Keep your bedroom cool, quiet, and dark  Turn on white noise, such as a fan, to help you relax  Do not use your bed for any activity that will keep you awake  Do not read, exercise, eat, or watch TV in your bedroom  Get up if you do not fall asleep within 20 minutes  Move to another room and do something relaxing until you become sleepy  Limit caffeine, alcohol, nicotine and food to earlier in the day  Only drink caffeine in the morning  Do not drink alcohol within 6 hours of bedtime  Do not eat a heavy meal right before you go to bed  Avoid smoking, especially in the evening  Exercise regularly  Daily exercise will help you sleep better  Do not exercise within 4 hours of bedtime  Stimulus control therapy rules  1  Go to bed only when sleepy  2  Do not watch television, read, eat, or worry while in bed  Use bed only for sleep and sex  3  Get out of bed if unable to fall asleep within 20 minutes and go to another room  Return to bed only when sleepy  Repeat this step as many times as necessary throughout the night  4  Set an alarm clock to wake up at a fixed time each morning, including weekends  5  Do not take a nap during the day  Data from: [facility], [address] Nonpharmacologic treatments of insomnia  [facility] 9349; 53:37  Go to AASM website for more information: Sleepeducation  org     Recommended Reading:  Book by authors [address]   No More sleepless nights

## 2018-12-12 ENCOUNTER — OFFICE VISIT (OUTPATIENT)
Dept: PODIATRY | Facility: CLINIC | Age: 78
End: 2018-12-12
Payer: MEDICARE

## 2018-12-12 ENCOUNTER — TELEPHONE (OUTPATIENT)
Dept: CARDIOLOGY CLINIC | Facility: CLINIC | Age: 78
End: 2018-12-12

## 2018-12-12 VITALS
SYSTOLIC BLOOD PRESSURE: 164 MMHG | RESPIRATION RATE: 17 BRPM | WEIGHT: 225 LBS | HEART RATE: 78 BPM | HEIGHT: 65 IN | DIASTOLIC BLOOD PRESSURE: 95 MMHG | BODY MASS INDEX: 37.49 KG/M2

## 2018-12-12 DIAGNOSIS — I70.209 PERIPHERAL ARTERIOSCLEROSIS (HCC): ICD-10-CM

## 2018-12-12 DIAGNOSIS — M54.16 RADICULOPATHY OF LUMBAR REGION: ICD-10-CM

## 2018-12-12 DIAGNOSIS — M79.672 PAIN IN BOTH FEET: Primary | ICD-10-CM

## 2018-12-12 DIAGNOSIS — L84 CORNS: ICD-10-CM

## 2018-12-12 DIAGNOSIS — M79.671 PAIN IN BOTH FEET: Primary | ICD-10-CM

## 2018-12-12 DIAGNOSIS — B35.1 ONYCHOMYCOSIS: ICD-10-CM

## 2018-12-12 PROCEDURE — 11056 PARNG/CUTG B9 HYPRKR LES 2-4: CPT | Performed by: PODIATRIST

## 2018-12-12 PROCEDURE — 99211 OFF/OP EST MAY X REQ PHY/QHP: CPT | Performed by: PODIATRIST

## 2018-12-12 NOTE — PROGRESS NOTES
Procedures   Foot Exam       Signed  Encounter Date: 4/16/2018   Assessment/Plan:  Pain  Radiculopathy  Callus  Mycotic toenail         Plan  Foot exam performed  All nails debrided  Calluses debrided  Patient will remain on gabapentin     No problem-specific Assessment & Plan notes found for this encounter  Discussion/Summary   The patient was counseled regarding instructions for management,-- patient and family education,-- risks and benefits of treatment options     Patient is able to Self-Care     Possible side effects of new medications were reviewed with the patient/guardian today  The treatment plan was reviewed with the patient/guardian  The patient/guardian understands and agrees with the treatment plan      Chief Complaint   nail care      History of Present Illness   HPI:  complains of pain in feet with ambulation  She has pain around the toes  She is also concerned with pain in her right heel  This is been ongoing for several weeks  She has no history of trauma   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      Review of Systems           Cardiac: chest pain,-- rhythm problems,-- AM fatigue-- and-- witnessed apnea episodes       Skin: No complaints of nonhealing sores or skin rash       Genitourinary: loss of bladder control      Psychological: No complaints of feeling depressed, anxiety, panic attacks, or difficulty concentrating       General: trouble sleeping-- and-- lack of energy/fatigue       Respiratory: shortness of breath       HEENT: snoring      Gastrointestinal: heartburn      Hematologic: anemia      Neurological: daytime sleepiness      Musculoskeletal: arthritis-- and-- back pain      Active Problems   1  Allergic rhinitis (477 9) (J30 9)   2  Anxiety disorder (300 00) (F41 9)   3  Arthropathy (716 90) (M12 9)   4  Atherosclerosis of arteries of extremities (440 20) (I70 209)   5  Atrial fibrillation (427 31) (I48 91)   6  Backache (724 5) (M54 9)   7  Callus (700) (L84)   8  Cervicalgia (723 1) (M54 2)   9  Depression (311) (F32 9)   10  Difficulty in walking (719 7) (R26 2)   11  Encounter for screening for malignant neoplasm of colon (V76 51) (Z12 11)   12  Esophagitis, reflux (530 11) (K21 0)   13  Essential hypertension (401 9) (I10)   14  Foot pain, bilateral (729 5) (M79 671,M79 672)   15  Herpes zoster (053 9) (B02 9)   16  Hospital discharge follow-up (V67 59) (Z09)   17  Hyperlipidemia (272 4) (E78 5)   18  Impaired fasting glucose (790 21) (R73 01)   19  Internal Hemorrhoids (455 0)   20  Leg swelling (729 81) (J51 73)   21  Lichen planus (453 6) (L43 9)   22  Limb pain (729 5) (M79 609)   23  Lumbar radiculopathy (724 4) (M54 16)   24  Multiple joint pain (719 49) (M25 50)   25  Need for influenza vaccination (V04 81) (Z23)   26  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)   27  Onychomycosis (110 1) (B35 1)   28  MIGUEL (obstructive sleep apnea) (327 23) (G47 33)   29  Other abnormal finding of urine (791 9) (R82 99)   30  Pes planus, congenital (754 61) (Q66 50)   31  Plantar fascial fibromatosis (728 71) (M72 2)   32  Rectal/anal hemorrhage (569 3) (K62 5)   33  Screening for malignant neoplasm of cervix (V76 2) (Z12 4)   34  Shoulder joint pain, unspecified laterality   35  Thrombocytopenia (287 5) (D69 6)   36  Urticaria (708 9) (L50 9)   37  Vulvovaginitis candida albicans (112 1) (B37 3)     Past Medical History    · History of Acute maxillary sinusitis (461 0) (J01 00)   · History of Acute upper respiratory infection (465 9) (J06 9)   · History of Cellulitis (682 9) (L03 90)   · History of Cough (786 2) (R05)   · History of Dysuria (788 1) (R30 0)   · History of High cholesterol (272 0) (E78 00)   · History of abdominal pain (V13 89) (N68 659)   · History of acute bronchitis (V12 69) (Z87 09)   · History of acute sinusitis (V12 69) (Z87 09)   · History of arthritis (V13 4) (Z87 39)   · History of atrial fibrillation (V12 59) (Z86 79)   · History of cataract (V12 49) (Z86 69)   · History of gastroesophageal reflux (GERD) (V12 79) (Z87 19)   · History of hypertension (V12 59) (Z86 79)   · History of Skin rash (782 1) (R21)   · History of Vulvovaginitis (616 10) (N76 0)     The active problems and past medical history were reviewed and updated today       Surgical History    · History of Cataract Surgery   · History of Colonoscopy (Fiberoptic) Screening   · History of Gallbladder Surgery   · History of Knee Replacement   · History of Pacemaker Placement     The surgical history was reviewed and updated today        Family History   Father    · Family history of Coronary Artery Disease (V17 49)  Sister    · Family history of Diabetes Mellitus (V18 0)   · Family history of High cholesterol  Family History    · Family history of arthritis (V17 7) (Z82 61)   · Family history of hypertension (V17 49) (Z82 49)     The family history was reviewed and updated today        Social History    · Exercise: Walking   · 2 x/week   · Former smoker (V15 82) (Z87 891)   · No alcohol use   ·   The social history was reviewed and updated today       Current Meds    1  Calcium Carbonate-Vitamin D 600-200 MG-UNIT TABS; 1 Two Times A Day;     Therapy: 31UOO7231 to  Requested for: 46WUR4444 Recorded   2  CoQ-10 200 MG CAPS; TAKE 2 CAPSULE Daily;     Therapy: (Recorded:22May2017) to Recorded   3  Daily Multivitamin TABS; TAKE 1 TABLET DAILY;     Therapy: (Recorded:23Jun2016) to Recorded   4  Furosemide 20 MG Oral Tablet; TAKE 1 TABLET DAILY AS DIRECTED;     Therapy: 13WBD4506 to (Renew:31Jat5913)  Requested for: 79GUZ5423; Last     Rx:22May2017 Ordered   5  Gabapentin 100 MG Oral Capsule; TAKE 1 CAPSULE AT BEDTIME;     Therapy: 26Oct2017 to (Evaluate:85Oeh9954)  Requested for: 26Oct2017; Last     Rx:26Oct2017 Ordered   6  Lovaza 1 GM Oral Capsule; TAKE 2 CAPSULES TWICE DAILY;     Therapy: (Recorded:23Jun2016) to Recorded   7  Metoprolol Succinate ER 25 MG Oral Tablet Extended Release 24 Hour;  Take 1 tablet     daily  Requested for: 22RLQ2911; Last Rx:22May2017 Ordered   8  Pantoprazole Sodium 40 MG Oral Tablet Delayed Release; Take 1 tab in the morning     and 1 tab in the evening;     Therapy: (Recorded:19Oct2017) to Recorded   9  Pravastatin Sodium 10 MG Oral Tablet; Take 1 tablet (10 MG) on Monday, Wednesday,     and Friday  Requested for: 49WLP6273; Last Rx:04Jan2018 Ordered   10  Ramipril 5 MG Oral Capsule; Take 1 capsule twice daily  Requested for: 90XLH6044; Last      Rx:22May2017 Ordered   11  Sotalol HCl (AF) 80 MG Oral Tablet; TAKE 1 TABLET EVERY 12 HOURS DAILY       Requested for: 10CIU5458; Last Rx:22May2017 Ordered   12  Temazepam 15 MG Oral Capsule; TAKE 1 CAPSULE AT BEDTIME;      Therapy: (Recorded:23Jun2016) to Recorded   13  Tylenol Arthritis Pain 650 MG TBCR; TAKE 2 TABS AT BEDTIME;      Therapy: (Recorded:23Jun2016) to Recorded   14  Vitamin D3 1000 UNIT Oral Tablet; TAKE 1 TABLET DAILY;      Therapy: (Recorded:23Jun2016) to Recorded     The medication list was reviewed and updated today        Allergies   1  CIPRO   2  Sulfa Drugs   3  Synvisc INJ     Vitals        Heart Rate 78   Respiration 17   Systolic 945   Diastolic 82   Height 5 ft 6 in   Weight 223 lb    BMI Calculated 35 99   BSA Calculated 2 09      Physical Exam   Left Foot: Appearance: Normal except as noted: excessive pronation-- and-- pes planus  Great toe deformities include a bunion  Tenderness: None except the great toe-- and-- distal first metatarsal     Right Foot: Appearance: Normal except as noted: excessive pronation-- and-- pes planus  Great toe deformities include a bunion  Tenderness: None except the great toe,-- distal first metatarsal,-- medial calcaneous-- and-- insertion of the plantar fascia     Left Ankle: ROM: limited ROM in all planes    Right Ankle: ROM: limited ROM in all planes    Neurological Exam: Light touch was decreased bilaterally   Vibratory sensation was decreased in both first metatarsophalangeal joints     Vascular Exam: performed Dorsalis pedis pulses were diminished bilaterally  Posterior tibial pulses were diminished bilaterally  Elevation Pallor: present bilaterally  Dependence rubor was present bilaterally  Capillary refill time was greater than 3 seconds bilaterally-- and-- Q  9, findings bilateral  Edema: moderate bilaterally     Toenails: All of the toenails were elongated,-- hypertrophied,-- discolored-- and-- Right ptotic     Hyperkeratosis: present on both first toes,-- present on both first sub metatarsals-- and-- Positive xerosis of skin noted     Shoe Gear Evaluation: performed ()  Recommendation(s): SAS style-- and-- OTC inlays       Procedure   All nails, debrided  Bilateral, pre-ulcerative lesions debrided  Procedures performed without pain or complication          There are no diagnoses linked to this encounter        Subjective:       Patient ID: Emi Blackmon is a 77 y o  female      Patient has pain in her feet and toes with ambulation    She is taking gabapentin         The following portions of the patient's history were reviewed and updated as appropriate: allergies, current medications, past family history, past medical history, past social history, past surgical history and problem list

## 2018-12-12 NOTE — LETTER
Cardiology Pre Operative Clearance      PRE OPERATIVE CARDIAC RISK ASSESSMENT    12/12/18    Taina Gaxiola  1940  [id number]    Date of Surgery: January 11, 2019    Type of Surgery: Colonoscopy    Surgeon: [facility]    No Cardiac Contraindication for Planned Surgical Procedures    Anticoagulation: YES: Eliquis 5 mg BID - she should hold for 48 hours prior to colonoscopy    Physician Comment:     Electronically Signed: Lalito Celaya DO

## 2018-12-12 NOTE — TELEPHONE ENCOUNTER
Received a Cardiac Clearance form from [facility]   (o) [phone] (Fax) [phone]  Colonoscopy scheduled for Jan 19, 2019

## 2018-12-24 ENCOUNTER — APPOINTMENT (OUTPATIENT)
Dept: RADIOLOGY | Facility: CLINIC | Age: 78
End: 2018-12-24
Payer: MEDICARE

## 2018-12-24 ENCOUNTER — APPOINTMENT (OUTPATIENT)
Dept: LAB | Facility: CLINIC | Age: 78
End: 2018-12-24
Payer: MEDICARE

## 2018-12-24 ENCOUNTER — OFFICE VISIT (OUTPATIENT)
Dept: RHEUMATOLOGY | Facility: CLINIC | Age: 78
End: 2018-12-24
Payer: MEDICARE

## 2018-12-24 VITALS
BODY MASS INDEX: 38.15 KG/M2 | HEART RATE: 71 BPM | SYSTOLIC BLOOD PRESSURE: 167 MMHG | HEIGHT: 65 IN | WEIGHT: 229 LBS | DIASTOLIC BLOOD PRESSURE: 103 MMHG

## 2018-12-24 DIAGNOSIS — M25.512 ACUTE PAIN OF LEFT SHOULDER: ICD-10-CM

## 2018-12-24 DIAGNOSIS — M15.9 PRIMARY OSTEOARTHRITIS INVOLVING MULTIPLE JOINTS: ICD-10-CM

## 2018-12-24 DIAGNOSIS — M25.50 ARTHRALGIA, UNSPECIFIED JOINT: ICD-10-CM

## 2018-12-24 DIAGNOSIS — R76.8 RHEUMATOID FACTOR POSITIVE: Primary | ICD-10-CM

## 2018-12-24 DIAGNOSIS — Z11.59 ENCOUNTER FOR SCREENING FOR OTHER VIRAL DISEASES: ICD-10-CM

## 2018-12-24 LAB
ALBUMIN SERPL BCP-MCNC: 3.7 G/DL (ref 3.5–5)
ALP SERPL-CCNC: 69 U/L (ref 46–116)
ALT SERPL W P-5'-P-CCNC: 34 U/L (ref 12–78)
ANION GAP SERPL CALCULATED.3IONS-SCNC: 3 MMOL/L (ref 4–13)
AST SERPL W P-5'-P-CCNC: 25 U/L (ref 5–45)
BASOPHILS # BLD AUTO: 0.03 THOUSANDS/ΜL (ref 0–0.1)
BASOPHILS NFR BLD AUTO: 1 % (ref 0–1)
BILIRUB SERPL-MCNC: 0.52 MG/DL (ref 0.2–1)
BUN SERPL-MCNC: 20 MG/DL (ref 5–25)
CALCIUM SERPL-MCNC: 9.2 MG/DL (ref 8.3–10.1)
CHLORIDE SERPL-SCNC: 102 MMOL/L (ref 100–108)
CO2 SERPL-SCNC: 33 MMOL/L (ref 21–32)
CREAT SERPL-MCNC: 0.84 MG/DL (ref 0.6–1.3)
CRP SERPL QL: 10.3 MG/L
EOSINOPHIL # BLD AUTO: 0.13 THOUSAND/ΜL (ref 0–0.61)
EOSINOPHIL NFR BLD AUTO: 3 % (ref 0–6)
ERYTHROCYTE [DISTWIDTH] IN BLOOD BY AUTOMATED COUNT: 13.7 % (ref 11.6–15.1)
ERYTHROCYTE [SEDIMENTATION RATE] IN BLOOD: 18 MM/HOUR (ref 0–20)
GFR SERPL CREATININE-BSD FRML MDRD: 66 ML/MIN/1.73SQ M
GLUCOSE SERPL-MCNC: 106 MG/DL (ref 65–140)
HCT VFR BLD AUTO: 43.6 % (ref 34.8–46.1)
HGB BLD-MCNC: 14 G/DL (ref 11.5–15.4)
IMM GRANULOCYTES # BLD AUTO: 0.01 THOUSAND/UL (ref 0–0.2)
IMM GRANULOCYTES NFR BLD AUTO: 0 % (ref 0–2)
LYMPHOCYTES # BLD AUTO: 1.27 THOUSANDS/ΜL (ref 0.6–4.47)
LYMPHOCYTES NFR BLD AUTO: 27 % (ref 14–44)
MCH RBC QN AUTO: 30.4 PG (ref 26.8–34.3)
MCHC RBC AUTO-ENTMCNC: 32.1 G/DL (ref 31.4–37.4)
MCV RBC AUTO: 95 FL (ref 82–98)
MONOCYTES # BLD AUTO: 0.45 THOUSAND/ΜL (ref 0.17–1.22)
MONOCYTES NFR BLD AUTO: 10 % (ref 4–12)
NEUTROPHILS # BLD AUTO: 2.81 THOUSANDS/ΜL (ref 1.85–7.62)
NEUTS SEG NFR BLD AUTO: 59 % (ref 43–75)
NRBC BLD AUTO-RTO: 0 /100 WBCS
PLATELET # BLD AUTO: 160 THOUSANDS/UL (ref 149–390)
PMV BLD AUTO: 10.5 FL (ref 8.9–12.7)
POTASSIUM SERPL-SCNC: 4.6 MMOL/L (ref 3.5–5.3)
PROT SERPL-MCNC: 7.6 G/DL (ref 6.4–8.2)
RBC # BLD AUTO: 4.61 MILLION/UL (ref 3.81–5.12)
SODIUM SERPL-SCNC: 138 MMOL/L (ref 136–145)
WBC # BLD AUTO: 4.7 THOUSAND/UL (ref 4.31–10.16)

## 2018-12-24 PROCEDURE — 80053 COMPREHEN METABOLIC PANEL: CPT

## 2018-12-24 PROCEDURE — 99204 OFFICE O/P NEW MOD 45 MIN: CPT | Performed by: INTERNAL MEDICINE

## 2018-12-24 PROCEDURE — 86705 HEP B CORE ANTIBODY IGM: CPT

## 2018-12-24 PROCEDURE — 86140 C-REACTIVE PROTEIN: CPT

## 2018-12-24 PROCEDURE — 87340 HEPATITIS B SURFACE AG IA: CPT

## 2018-12-24 PROCEDURE — 73130 X-RAY EXAM OF HAND: CPT

## 2018-12-24 PROCEDURE — 36415 COLL VENOUS BLD VENIPUNCTURE: CPT

## 2018-12-24 PROCEDURE — 85025 COMPLETE CBC W/AUTO DIFF WBC: CPT

## 2018-12-24 PROCEDURE — 73630 X-RAY EXAM OF FOOT: CPT

## 2018-12-24 PROCEDURE — 86803 HEPATITIS C AB TEST: CPT

## 2018-12-24 PROCEDURE — 73030 X-RAY EXAM OF SHOULDER: CPT

## 2018-12-24 PROCEDURE — 85652 RBC SED RATE AUTOMATED: CPT

## 2018-12-24 PROCEDURE — 86704 HEP B CORE ANTIBODY TOTAL: CPT

## 2018-12-24 PROCEDURE — 86200 CCP ANTIBODY: CPT

## 2018-12-24 RX ORDER — POLYETHYLENE GLYCOL 3350, SODIUM CHLORIDE, SODIUM BICARBONATE, POTASSIUM CHLORIDE 420; 11.2; 5.72; 1.48 G/4L; G/4L; G/4L; G/4L
POWDER, FOR SOLUTION ORAL
Refills: 0 | COMMUNITY
Start: 2018-12-11 | End: 2021-11-24

## 2018-12-24 NOTE — PROGRESS NOTES
Assessment and Plan:   Ms Marcie Delgado is a 69-year-old female with history significant for osteoarthritis status post left total knee replacement, lumbar radiculopathy and obesity, who presents for further evaluation of left shoulder pain  - Annmarie Moore presents today for further evaluation of diffuse arthralgias, most predominantly affecting her left shoulder in the past 2 months, and was also found to have a borderline positive rheumatoid factor of 20  She does also report additional symptoms concerning for an inflammatory arthritis including prolonged morning stiffness  Based on her physical examination today she does have significant osteoarthritic changes present at her hands bilaterally, with possible mild synovitis verses bony enlargement noted  There are also mild flexion deformities noted at her bilateral 4th PIPs  In view of these findings and the borderline positive rheumatoid factor, I would like to obtain the additional labs and x-rays as listed below  - I suspect the acute onset of left shoulder pain may have been related to osteoarthritic symptoms or frozen shoulder, but overall her symptoms appear to be improving  We will obtain a left shoulder x-ray to further evaluate  - I advised her to continue Tylenol or NSAIDs as needed for now  - I would like to see her back in the office in 4 weeks to review the results, and discuss further treatment if indicated for rheumatoid arthritis  Plan:  Diagnoses and all orders for this visit:    Rheumatoid factor positive    Arthralgia, unspecified joint  Comments:  Rheumatology referral   Complete blood work  Orders:  -     Ambulatory referral to Rheumatology  -     CBC and differential; Future  -     Chronic Hepatitis Panel; Future  -     Comprehensive metabolic panel; Future  -     C-reactive protein; Future  -     Sedimentation rate, automated; Future  -     Cyclic citrul peptide antibody, IgG;  Future  -     XR hand 3+ vw right; Future  -     XR hand 3+ vw left; Future  -     XR foot 3+ vw right; Future  -     XR foot 3+ vw left; Future  -     XR shoulder 2+ vw left; Future    Acute pain of left shoulder  Comments:  Rheumatology referral complete blood work  Orders:  -     Ambulatory referral to Rheumatology    Encounter for screening for other viral diseases   -     Chronic Hepatitis Panel; Future    Primary osteoarthritis involving multiple joints    Other orders  -     polyethylene glycol-electrolytes (NULYTELY) 4000 mL solution; take by mouth as directed  FOR COLONOSCOPY      Activities as tolerated    Diet: low carb/low fat, more greens/vegetables, adequate hydration  Exercise: try to maintain a low impact exercise regimen as much as possible  Walk for 30 minutes a day for at least 3 days a week    Encouraged to maintain good sleep hygiene  Continue other medications as prescribed by PCP and other specialists        RTC in 4 weeks  HPI  Ms  Jeferson Gage is a 48-year-old female with history significant for osteoarthritis status post left total knee replacement, lumbar radiculopathy and obesity, who presents for further evaluation of left shoulder pain  Patient states she developed sudden onset of pain and limited range of motion in her left shoulder approximately 2 months ago, and states it has been gradually improving since then  Initially the symptoms were constant, but more recently they have been intermittent in nature  She does have slight recurrence of pain with certain movements, but states her range of motion has also improved  She cannot associate any aggravating or relieving factors  When her symptoms were more constant she did try topical BenGay and salon pas, but they did not help her  She did not really try taking any over-the-counter NSAIDs  She does take Tylenol daily at bedtime, but is unsure if this really helps with any of her joint pains    Otherwise she also reports pain affecting all of her joints diffusely, but states they are intermittent in nature  She occasionally will develop swelling around her ankles, which is managed by her cardiologist with as needed diuretics  She denies any other joint swelling  She does experience morning stiffness diffusely which lasts for a few hours  She was seen by a rheumatologist in [place] approximately 15 years ago, and was diagnosed with rheumatoid arthritis and osteoarthritis at that time  She did not return for follow-up to see the rheumatologist, and was never started on any antirheumatic medications  At her first visit with the rheumatologist she was advised to continue Tylenol as needed for her joint pains  She was previously seen by Orthopedics in [place] where she had her knee surgery done, but is currently not under the care of an orthopedist   She has not had any recent x-rays done  She was seen by her primary care physician in view of the acute onset of left shoulder pain, and had labs done which showed a borderline positive rheumatoid factor of 20  UMAIR screen and ESR were unremarkable  The following portions of the patient's history were reviewed and updated as appropriate: allergies, current medications, past family history, past medical history, past social history, past surgical history and problem list       Review of Systems  Constitutional: Negative for fevers, chills, night sweats, fatigue  Positive for weight gain  ENT/Mouth: Negative for hearing changes, ear pain, nasal congestion, sinus pain, hoarseness, sore throat, rhinorrhea, swallowing difficulty  Eyes: Negative for pain, redness, discharge, vision changes  Cardiovascular: Negative for chest pain, SOB, palpitations  Positive for leg swelling  Respiratory: Negative for sputum  Positive for cough, shortness of breath and wheezing  Gastrointestinal: Negative for nausea, vomiting, diarrhea, constipation, heartburn  Positive for pain  Genitourinary: Negative for dysuria, hematuria    Positive for urinary frequency  Musculoskeletal: As per HPI  Skin: Negative for color changes  Positive for rash on hands  Neuro: Negative for weakness, tingling, loss of consciousness  Positive for numbness in feet  Psych: Negative for anxiety, depression  Heme/Lymph: Negative for easy bruising, bleeding, lymphadenopathy  Past Medical History:   Diagnosis Date    Arthritis     Atrial fibrillation ([facility] [address] )     Cardiac disease     Cataract     Gastro-esophageal reflux     GERD    Hypertension        Past Surgical History:   Procedure Laterality Date    CARDIAC PACEMAKER PLACEMENT Left 2014    CATARACT EXTRACTION      CHOLECYSTECTOMY      resolved: 2009    COLONOSCOPY      Fiberoptic, resolved 2015    EYE SURGERY      KNEE SURGERY Left     left kknee replacement in 2009       Social History     Social History    Marital status:      Spouse name: N/A    Number of children: N/A    Years of education: N/A     Occupational History    Not on file       Social History Main Topics    Smoking status: Former Smoker     Packs/day: 1 00     Years: 25 00     Types: Cigarettes    Smokeless tobacco: Never Used      Comment: quit 25 yeears ago    Alcohol use Yes      Comment: occasional, No alcohol use,per [employer]    Drug use: No    Sexual activity: Not on file     Other Topics Concern    Not on file     Social History Narrative    Exercise: Walking, 2x/week           Family History   Problem Relation Age of Onset    Coronary artery disease Father     Diabetes Sister     Hyperlipidemia Sister     Arthritis Family     Hypertension Family        Allergies   Allergen Reactions    Ciprofloxacin Rash    Sulfa Antibiotics Rash    Synvisc [Hylan G-F 20] Rash       Current Outpatient Prescriptions:     acetaminophen (TYLENOL ARTHRITIS PAIN) 650 mg CR tablet, Take by mouth, Disp: , Rfl:     apixaban (ELIQUIS) 5 mg, Take 1 tablet (5 mg total) by mouth 2 (two) times a day, Disp: 60 tablet, Rfl: 11    Calcium Carbonate-Vitamin D 600-200 MG-UNIT CAPS, Take by mouth 2 (two) times a day, Disp: , Rfl:     cefuroxime (CEFTIN) 250 mg tablet, Take 250 mg by mouth 2 (two) times a day, Disp: , Rfl: 0    cholecalciferol (VITAMIN D3) 1,000 units tablet, Take 1,000 Units by mouth daily, Disp: , Rfl:     clobetasol (TEMOVATE) 0 05 % cream, , Disp: , Rfl:     Coenzyme Q10 (COQ-10) 200 MG CAPS, Take 2 capsules by mouth daily, Disp: , Rfl:     famotidine (PEPCID) 40 MG tablet, Take 1 tablet (40 mg total) by mouth 2 (two) times a day, Disp: 60 tablet, Rfl: 3    furosemide (LASIX) 20 mg tablet, Take 1 tablet by mouth daily, Disp: , Rfl:     gabapentin (NEURONTIN) 100 mg capsule, take 1 capsule by mouth three times a day, Disp: 90 capsule, Rfl: 2    Lactobacillus (ACIDOPHILUS PO), Take by mouth, Disp: , Rfl:     metoprolol succinate (TOPROL-XL) 25 mg 24 hr tablet, Take 1 tablet (25 mg total) by mouth daily, Disp: 30 tablet, Rfl: 5    Multiple Vitamins-Minerals (DAILY MULTIVITAMIN PO), Take 1 tablet by mouth daily, Disp: , Rfl:     omega-3-acid ethyl esters (LOVAZA) 1 g capsule, Take 2 capsules (2 g total) by mouth 2 (two) times a day, Disp: 60 capsule, Rfl: 5    polyethylene glycol-electrolytes (NULYTELY) 4000 mL solution, take by mouth as directed  FOR COLONOSCOPY, Disp: , Rfl: 0    PRAVASTATIN SODIUM PO, Take by mouth 3 (three) times a week Indications: pt unsure of dose taken at home   , Disp: , Rfl:     ramipril (ALTACE) 5 mg capsule, take 1 capsule by mouth twice a day, Disp: 180 capsule, Rfl: 2    sertraline (ZOLOFT) 25 mg tablet, , Disp: , Rfl:     sotalol (BETAPACE) 80 mg tablet, Take 1 tablet (80 mg total) by mouth every 12 (twelve) hours, Disp: 60 tablet, Rfl: 5      Objective:    Vitals:    12/24/18 1005   BP: (!) 167/103   Pulse: 71   Weight: 104 kg (229 lb)   Height: 5' 5" (1 651 m)       Physical Exam  General: Well appearing, well nourished, in no distress   Oriented x 3, normal mood and affect  Ambulating without difficulty  Skin: Good turgor, no rash, unusual bruising or prominent lesions  Hair: Normal texture and distribution  Nails: Normal color, no deformities  HEENT:  Head: Normocephalic, atraumatic  Eyes: Conjunctiva clear, sclera non-icteric, EOM intact  Nose: No external lesions, mucosa non-inflamed  Mouth: Mucous membranes moist, no mucosal lesions  Neck: Supple, thyroid non-enlarged and non-tender  No lymphadenopathy  Heart: Regular rate and rhythm, no murmur or gallop  Lungs: Clear to auscultation, no crackles or wheezing  Abdomen: Soft, non-tender, non-distended, bowel sounds normal    Extremities: No amputations or deformities, cyanosis  Musculoskeletal:   Hands - she is unable to make a full fist with her right hand  She has possible mild soft tissue swelling noted at her 3rd and 4th PIPs bilaterally, which could also be bony enlargement  She has osteoarthritic changes present at her DIPs and PIPs, as well as at her bilateral 1st IP joints     She has mild fixed flexion deformities present at her bilateral 4th PIPs  MCPs are unremarkable  No ulnar deviation noted  Wrists - she has mild discomfort on manipulation, but there is no soft tissue swelling noted  Elbows - mild tenderness to palpation of her right elbow, but I do not appreciate any evidence of soft tissue swelling bilaterally  Shoulders - the right shoulder is unremarkable  The left shoulder demonstrates mild tenderness to palpation anteriorly, she has full abduction, but has limited external range of motion  No soft tissue swelling identified  Hips - unremarkable  Knees - left knee status post total knee replacement  She has bony enlargement noted at her right knee with difficulty in palpating the joint space  Crepitus present  She is nontender  Ankles - unremarkable  Feet - mild positive MTP squeeze test   14/18 positive fibromyalgia tender points  Neurologic: Alert and oriented   No focal neurological deficits appreciated  Psychiatric: Normal mood and affect  DOMINIQUE Leos    Rheumatology

## 2018-12-25 LAB
HBV CORE AB SER QL: NORMAL
HBV CORE IGM SER QL: NORMAL
HBV SURFACE AG SER QL: NORMAL
HCV AB SER QL: NORMAL

## 2018-12-26 LAB — CCP IGA+IGG SERPL IA-ACNC: 5 UNITS (ref 0–19)

## 2018-12-28 DIAGNOSIS — M79.671 PAIN IN BOTH FEET: ICD-10-CM

## 2018-12-28 DIAGNOSIS — M79.672 PAIN IN BOTH FEET: ICD-10-CM

## 2018-12-28 RX ORDER — GABAPENTIN 100 MG/1
CAPSULE ORAL
Qty: 90 CAPSULE | Refills: 2 | Status: SHIPPED | OUTPATIENT
Start: 2018-12-28 | End: 2019-03-06 | Stop reason: SDUPTHER

## 2019-01-21 ENCOUNTER — TRANSCRIBE ORDERS (OUTPATIENT)
Dept: SLEEP CENTER | Facility: CLINIC | Age: 79
End: 2019-01-21

## 2019-01-21 ENCOUNTER — OFFICE VISIT (OUTPATIENT)
Dept: RHEUMATOLOGY | Facility: CLINIC | Age: 79
End: 2019-01-21
Payer: COMMERCIAL

## 2019-01-21 VITALS
HEIGHT: 65 IN | BODY MASS INDEX: 38.42 KG/M2 | WEIGHT: 230.6 LBS | DIASTOLIC BLOOD PRESSURE: 88 MMHG | SYSTOLIC BLOOD PRESSURE: 150 MMHG | RESPIRATION RATE: 94 BRPM | HEART RATE: 91 BPM

## 2019-01-21 DIAGNOSIS — R76.8 RHEUMATOID FACTOR POSITIVE: ICD-10-CM

## 2019-01-21 DIAGNOSIS — M15.9 PRIMARY OSTEOARTHRITIS INVOLVING MULTIPLE JOINTS: Primary | ICD-10-CM

## 2019-01-21 DIAGNOSIS — G47.33 OSA (OBSTRUCTIVE SLEEP APNEA): Primary | ICD-10-CM

## 2019-01-21 PROCEDURE — 20610 DRAIN/INJ JOINT/BURSA W/O US: CPT | Performed by: INTERNAL MEDICINE

## 2019-01-21 PROCEDURE — 99214 OFFICE O/P EST MOD 30 MIN: CPT | Performed by: INTERNAL MEDICINE

## 2019-01-21 RX ORDER — METHYLPREDNISOLONE ACETATE 40 MG/ML
40 INJECTION, SUSPENSION INTRA-ARTICULAR; INTRALESIONAL; INTRAMUSCULAR; SOFT TISSUE ONCE
Status: COMPLETED | OUTPATIENT
Start: 2019-01-21 | End: 2019-01-21

## 2019-01-21 RX ORDER — HYDROXYCHLOROQUINE SULFATE 200 MG/1
200 TABLET, FILM COATED ORAL 2 TIMES DAILY WITH MEALS
Qty: 60 TABLET | Refills: 11 | Status: SHIPPED | OUTPATIENT
Start: 2019-01-21 | End: 2020-01-03

## 2019-01-21 RX ORDER — BUPIVACAINE HYDROCHLORIDE 2.5 MG/ML
4 INJECTION, SOLUTION INFILTRATION; PERINEURAL ONCE
Status: COMPLETED | OUTPATIENT
Start: 2019-01-21 | End: 2019-01-21

## 2019-01-21 RX ADMIN — METHYLPREDNISOLONE ACETATE 40 MG: 40 INJECTION, SUSPENSION INTRA-ARTICULAR; INTRALESIONAL; INTRAMUSCULAR; SOFT TISSUE at 12:41

## 2019-01-21 RX ADMIN — BUPIVACAINE HYDROCHLORIDE 4 ML: 2.5 INJECTION, SOLUTION INFILTRATION; PERINEURAL at 12:39

## 2019-01-21 NOTE — PROGRESS NOTES
Assessment and Plan:   Ms Keisha Light is a 69-year-old female with history significant for osteoarthritis status post left total knee replacement, lumbar radiculopathy and obesity, who presents for follow-up of arthritis  - Dylan Orantes presents today for follow-up of diffuse arthralgias, which has predominantly been affecting her left shoulder recently  She does also intermittently complain of other joint pains, also affecting her bilateral hands and feet  She has not noticed any joint swelling, but does report symptoms concerning for an underlying inflammatory arthritis such as prolonged morning stiffness  Till date her labs have revealed a borderline positive rheumatoid factor at a titer of 20, with a negative CCP antibody  X-rays of her hands and feet have showed significant osteoarthritic changes, without any erosive arthropathy  We also reviewed her left shoulder x-ray which shows mild acromioclavicular and glenohumeral osteoarthritis  - At this point of time I discussed with her that her symptoms are likely related to advanced osteoarthritis  As her left shoulder has continue to be bothersome, we did proceed with an intra-articular cortisone injection which she tolerated well  I would also like her to start physical therapy exercises at home and maintain range of motion of her left shoulder joint  In view of the significant hand and feet osteoarthritis, I did discuss with her that it is difficult for me to appreciate for any subtle erosive changes, and I would like to proceed with an ultrasound of her hands in view of this  In addition I did discuss starting her on hydroxychloroquine 200 mg twice daily, which can possibly also help with advanced erosive type osteoarthritis, as otherwise we are limited with options  Once I review the results of the ultrasound, and assess her response to the hydroxychloroquine, we can decide at that time if it is a medication she needs to be continued on or discontinued    - I will plan to see her back in the office in 4 months, but advised her to call with any concerns  I will also call her with the results of the ultrasound when I receive it  Plan:  Diagnoses and all orders for this visit:    Primary osteoarthritis involving multiple joints  -     US MSK limited; Future  -     hydroxychloroquine (PLAQUENIL) 200 mg tablet; Take 1 tablet (200 mg total) by mouth 2 (two) times a day with meals  -     bupivacaine (MARCAINE) 0 25 % injection 4 mL; Inject 4 mL as directed once   -     methylPREDNISolone acetate (DEPO-MEDROL) injection 40 mg; Inject 1 mL (40 mg total) into the joint once   -     Large joint arthrocentesis    Rheumatoid factor positive  -     US MSK limited; Future  -     hydroxychloroquine (PLAQUENIL) 200 mg tablet; Take 1 tablet (200 mg total) by mouth 2 (two) times a day with meals      Procedure notes:  After consent was obtained, the area (left shoulder) was prepared in sterile fashion with chlorhexidine and ETOH pads  Ethylchloride was used for numbing purposes   The area was injected in a posterior approach with Depo-Medrol 40 mg and 4 mL Bupivacaine 0 25%  The patient tolerated the procedure well with no complications  Patient was informed about possible complications like swelling, pain, fever and bleeding  Patient was told to use Tylenol as needed and apply ice locally as needed  Advised to go to ER if the mentioned measures do not help  Avoid strenuous exercise for 48 hours  Activities as tolerated    Diet: low carb/low fat, more greens/vegetables, adequate hydration  Exercise: try to maintain a low impact exercise regimen as much as possible  Walk for 30 minutes a day for at least 3 days a week    Encouraged to maintain good sleep hygiene  Continue other medications as prescribed by PCP and other specialists        RTC in 4 months          HPI    INITIAL VISIT NOTE:  Ms Ez Sol is a 50-year-old female with history significant for osteoarthritis status post left total knee replacement, lumbar radiculopathy and obesity, who presents for further evaluation of left shoulder pain      Patient states she developed sudden onset of pain and limited range of motion in her left shoulder approximately 2 months ago, and states it has been gradually improving since then  Initially the symptoms were constant, but more recently they have been intermittent in nature  She does have slight recurrence of pain with certain movements, but states her range of motion has also improved  She cannot associate any aggravating or relieving factors  When her symptoms were more constant she did try topical BenGay and salon pas, but they did not help her  She did not really try taking any over-the-counter NSAIDs  She does take Tylenol daily at bedtime, but is unsure if this really helps with any of her joint pains  Otherwise she also reports pain affecting all of her joints diffusely, but states they are intermittent in nature  She occasionally will develop swelling around her ankles, which is managed by her cardiologist with as needed diuretics  She denies any other joint swelling  She does experience morning stiffness diffusely which lasts for a few hours      She was seen by a rheumatologist in [place] approximately 15 years ago, and was diagnosed with rheumatoid arthritis and osteoarthritis at that time  She did not return for follow-up to see the rheumatologist, and was never started on any antirheumatic medications  At her first visit with the rheumatologist she was advised to continue Tylenol as needed for her joint pains  She was previously seen by Orthopedics in [place] where she had her knee surgery done, but is currently not under the care of an orthopedist   She has not had any recent x-rays done    She was seen by her primary care physician in view of the acute onset of left shoulder pain, and had labs done which showed a borderline positive rheumatoid factor of 20  UMAIR screen and ESR were unremarkable  1/21/2019:  Patient presents for follow-up today  We reviewed her labs done following the last visit which showed a negative CCP antibody and normal ESR  CRP was elevated at 10 3  CBC, CMP and chronic hepatitis panel unremarkable  The x-rays were also reviewed and showed mild degenerative changes in the glenohumeral and acromioclavicular joints  X-rays of her bilateral feet showed osteoarthritis without any erosive arthropathy  X-rays of her hands showed advanced polyarticular osteoarthritis  On personal review of the x-rays it is difficult to appreciate for any erosive changes due to severe osteoarthritis  Patient states since the last office visit she has continued to experience pain in her left shoulder, but it is not as severe as when the symptoms first started  There are certain activities that she does which aggravates the symptoms, and it is also aggravated when she sleeps on her shoulder at night  She has not noticed any specific relieving factors  Otherwise she continues to experience diffuse intermittent joint pains affecting her bilateral hands, wrists, hips, knees, ankles and feet  She has not noticed any joint swelling  She experiences morning stiffness which affects her diffusely and lasts for a few hours  She refrains from NSAID use as per her cardiologist's instructions  She takes 2 Tylenol at bedtime which does not provide her with significant relief  As mentioned previously she does have a borderline rheumatoid factor with a titer of 20, and approximately 15 years ago was diagnosed with rheumatoid arthritis and osteoarthritis, but patient states she was never started on any DMARDs  No other acute complaints at today's visit      The following portions of the patient's history were reviewed and updated as appropriate: allergies, current medications, past family history, past medical history, past social history, past surgical history and problem list       Review of Systems  Constitutional: Negative for weight change, fevers, chills, night sweats, fatigue  ENT/Mouth: Negative for hearing changes, ear pain, nasal congestion, sinus pain, hoarseness, sore throat, rhinorrhea, swallowing difficulty  Eyes: Negative for pain, redness, discharge, vision changes  Cardiovascular: Negative for chest pain, palpitations  Respiratory: Negative for cough, sputum  Positive for shortness of breath and wheezing  Gastrointestinal: Negative for nausea, vomiting, diarrhea, constipation, pain, heartburn  Genitourinary: Negative for dysuria, urinary frequency, hematuria  Musculoskeletal: As per HPI  Skin: Negative for skin rash, color changes  Neuro: Negative for weakness, numbness, tingling, loss of consciousness  Psych: Negative for anxiety, depression  Heme/Lymph: Negative for easy bruising, bleeding, lymphadenopathy  Past Medical History:   Diagnosis Date    Arthritis     Atrial fibrillation ([address] )     Cardiac disease     Cataract     Gastro-esophageal reflux     GERD    Hypertension        Past Surgical History:   Procedure Laterality Date    CARDIAC PACEMAKER PLACEMENT Left 2014    CATARACT EXTRACTION      CHOLECYSTECTOMY      resolved: 2009    COLONOSCOPY      Fiberoptic, resolved 2015    EYE SURGERY      KNEE SURGERY Left     left kknee replacement in 2009       Social History     Social History    Marital status:      Spouse name: N/A    Number of children: N/A    Years of education: N/A     Occupational History    Not on file       Social History Main Topics    Smoking status: Former Smoker     Packs/day: 1 00     Years: 25 00     Types: Cigarettes    Smokeless tobacco: Never Used      Comment: quit 22 yeears ago    Alcohol use Yes      Comment: occasional, No alcohol use,per [employer]    Drug use: No    Sexual activity: Not on file     Other Topics Concern    Not on file     Social History Narrative Exercise: Walking, 2x/week           Family History   Problem Relation Age of Onset    Coronary artery disease Father     Diabetes Sister     Hyperlipidemia Sister     Arthritis Family     Hypertension Family        Allergies   Allergen Reactions    Ciprofloxacin Rash    Sulfa Antibiotics Rash    Synvisc [[address]F 20] Rash       Current Outpatient Prescriptions:     acetaminophen (TYLENOL ARTHRITIS PAIN) 650 mg CR tablet, Take by mouth, Disp: , Rfl:     apixaban (ELIQUIS) 5 mg, Take 1 tablet (5 mg total) by mouth 2 (two) times a day, Disp: 60 tablet, Rfl: 11    Calcium Carbonate-Vitamin D 600-200 MG-UNIT CAPS, Take by mouth 2 (two) times a day, Disp: , Rfl:     cefuroxime (CEFTIN) 250 mg tablet, Take 250 mg by mouth 2 (two) times a day, Disp: , Rfl: 0    cholecalciferol (VITAMIN D3) 1,000 units tablet, Take 1,000 Units by mouth daily, Disp: , Rfl:     clobetasol (TEMOVATE) 0 05 % cream, , Disp: , Rfl:     Coenzyme Q10 (COQ-10) 200 MG CAPS, Take 2 capsules by mouth daily, Disp: , Rfl:     famotidine (PEPCID) 40 MG tablet, Take 1 tablet (40 mg total) by mouth 2 (two) times a day, Disp: 60 tablet, Rfl: 3    furosemide (LASIX) 20 mg tablet, Take 1 tablet by mouth daily, Disp: , Rfl:     gabapentin (NEURONTIN) 100 mg capsule, take 1 capsule by mouth three times a day, Disp: 90 capsule, Rfl: 2    Lactobacillus (ACIDOPHILUS PO), Take by mouth, Disp: , Rfl:     metoprolol succinate (TOPROL-XL) 25 mg 24 hr tablet, Take 1 tablet (25 mg total) by mouth daily, Disp: 30 tablet, Rfl: 5    Multiple Vitamins-Minerals (DAILY MULTIVITAMIN PO), Take 1 tablet by mouth daily, Disp: , Rfl:     omega-3-acid ethyl esters (LOVAZA) 1 g capsule, Take 2 capsules (2 g total) by mouth 2 (two) times a day, Disp: 60 capsule, Rfl: 5    polyethylene glycol-electrolytes (NULYTELY) 4000 mL solution, take by mouth as directed  FOR COLONOSCOPY, Disp: , Rfl: 0    PRAVASTATIN SODIUM PO, Take by mouth 3 (three) times a week Indications: pt unsure of dose taken at home   , Disp: , Rfl:     ramipril (ALTACE) 5 mg capsule, take 1 capsule by mouth twice a day, Disp: 180 capsule, Rfl: 2    sertraline (ZOLOFT) 25 mg tablet, , Disp: , Rfl:     sotalol (BETAPACE) 80 mg tablet, Take 1 tablet (80 mg total) by mouth every 12 (twelve) hours, Disp: 60 tablet, Rfl: 5    hydroxychloroquine (PLAQUENIL) 200 mg tablet, Take 1 tablet (200 mg total) by mouth 2 (two) times a day with meals, Disp: 60 tablet, Rfl: 11    Current Facility-Administered Medications:     bupivacaine (MARCAINE) 0 25 % injection 4 mL, 4 mL, Injection, Once, Patti Harman MD    methylPREDNISolone acetate (DEPO-MEDROL) injection 40 mg, 40 mg, Intra-articular, Once, Patti Harman MD      Objective:    Vitals:    01/21/19 1107   BP: 150/88   BP Location: Left arm   Patient Position: Sitting   Cuff Size: Standard   Pulse: 91   Resp: (!) 94   Weight: 105 kg (230 lb 9 6 oz)   Height: 5' 5" (1 651 m)       Physical Exam  General: Well appearing, well nourished, in no distress  Oriented x 3, normal mood and affect  Ambulating without difficulty  Skin: Good turgor, no rash, unusual bruising or prominent lesions  Hair: Normal texture and distribution  Nails: Normal color, no deformities  HEENT:  Head: Normocephalic, atraumatic  Eyes: Conjunctiva clear, sclera non-icteric, EOM intact  Nose: No external lesions, mucosa non-inflamed  Mouth: Mucous membranes moist, no mucosal lesions  Neck: Supple, thyroid non-enlarged and non-tender  No lymphadenopathy  Extremities: No amputations or deformities, cyanosis, edema  Musculoskeletal:   Hands - she is unable to make a full fist with her right hand  She has more bony enlargement noted at her right hand 3rd and 4th PIPs  She has osteoarthritic changes present at her DIPs and PIPs, as well as at her bilateral 1st IP joints  She has mild fixed flexion deformities present at her bilateral 4th PIPs  MCPs are unremarkable    No ulnar deviation noted  Wrists - no tenderness or soft tissue swelling noted  Elbows - no tenderness or soft tissue swelling noted  Shoulders - the right shoulder is unremarkable  The left shoulder demonstrates mild tenderness to palpation anteriorly, she has full abduction, but has limited external range of motion  No soft tissue swelling identified  Hips - unremarkable  Knees - left knee status post total knee replacement  She has bony enlargement noted at her right knee with difficulty in palpating the joint space  Crepitus present  She is nontender  Ankles - unremarkable  Feet - mild positive MTP squeeze test   14/18 positive fibromyalgia tender points  Neurologic: Alert and oriented  No focal neurological deficits appreciated  Psychiatric: Normal mood and affect  DOMINIQUE Stahl    Rheumatology

## 2019-01-22 DIAGNOSIS — G47.9 SLEEP DISTURBANCE: Primary | ICD-10-CM

## 2019-01-22 RX ORDER — ZOLPIDEM TARTRATE 5 MG/1
TABLET ORAL
Qty: 1 TABLET | Refills: 0 | Status: SHIPPED | OUTPATIENT
Start: 2019-01-22 | End: 2019-09-26 | Stop reason: ALTCHOICE

## 2019-01-24 ENCOUNTER — DOCUMENTATION (OUTPATIENT)
Dept: SLEEP CENTER | Facility: CLINIC | Age: 79
End: 2019-01-24

## 2019-01-24 ENCOUNTER — HOSPITAL ENCOUNTER (OUTPATIENT)
Dept: SLEEP CENTER | Facility: CLINIC | Age: 79
Discharge: HOME | End: 2019-01-24
Payer: COMMERCIAL

## 2019-01-24 DIAGNOSIS — G47.33 OSA (OBSTRUCTIVE SLEEP APNEA): ICD-10-CM

## 2019-01-24 PROCEDURE — 95811 POLYSOM 6/>YRS CPAP 4/> PARM: CPT

## 2019-01-24 NOTE — TELEPHONE ENCOUNTER
Ordered zolpidem 5 mg- take 1/2 hour before lights out on the night of your sleep study- advise pt  to take to sleep study

## 2019-01-25 ENCOUNTER — DOCUMENTATION (OUTPATIENT)
Dept: SLEEP CENTER | Facility: CLINIC | Age: 79
End: 2019-01-25

## 2019-01-25 DIAGNOSIS — G47.33 OSA (OBSTRUCTIVE SLEEP APNEA): Primary | ICD-10-CM

## 2019-01-25 NOTE — PROGRESS NOTES
Sleep Study Documentation    Pre-Sleep Study       Sleep testing procedure explained to patient:YES    Patient napped prior to study:NO    Caffeine:Dayshift worker after 12PM   Caffeine use:NO    Alcohol:Dayshift workers after 5PM: Alcohol use:NO    Typical day for patient:YES       Study Documentation  Split Optimal PAP pressure: 15cm  Leak:Small  Snore:Eliminated  REM Obtained:yes  Supplemental O2: yes  O2 flow rate (L/min) range 2L  O2 flow rate (L/min) final 2L  Minimum SaO2 53%  Baseline SaO2 88%  PAP mask tried (list all)Resmed F20  PAP mask choice (final)Resmed F20  PAP mask type:full face  PAP pressure at which snoring was eliminated 10cm  Minimum SaO2 at final PAP pressure 90%  Mode of Therapy:CPAP  ETCO2:No  CPAP changed to BiPAP:No    Mode of Therapy:CPAP    EKG abnormalities: no     EEG abnormalities: no    Study Terminated:yes N/A study was completed    Patient classification: retired       Post-Sleep Study    Medication used at bedtime or during sleep study:YES prescription sleep aid and other prescription medications    Patient reports time it took to fall asleep:20 to 30 minutes    Patient reports waking up during study:Denied    Patient reports sleeping 4 to 6 hours without dreaming  Patient reports sleep during study:better than usual    Patient rated sleepiness: Somewhat sleepy or tired    PAP treatment:yes: Post PAP treatment patient reports feeling better and  would wear PAP mask at home

## 2019-01-31 ENCOUNTER — TELEPHONE (OUTPATIENT)
Dept: CARDIOLOGY CLINIC | Facility: CLINIC | Age: 79
End: 2019-01-31

## 2019-01-31 NOTE — TELEPHONE ENCOUNTER
Patient is going to have a colonoscopy on 2/5 and wants to ask Dr Maximiliano William how many days she should hold her Eliquis

## 2019-02-01 ENCOUNTER — REMOTE DEVICE CLINIC VISIT (OUTPATIENT)
Dept: CARDIOLOGY CLINIC | Facility: CLINIC | Age: 79
End: 2019-02-01
Payer: COMMERCIAL

## 2019-02-01 DIAGNOSIS — Z95.0 PRESENCE OF PERMANENT CARDIAC PACEMAKER: ICD-10-CM

## 2019-02-01 DIAGNOSIS — I49.5 SICK SINUS SYNDROME (HCC): ICD-10-CM

## 2019-02-01 DIAGNOSIS — I48.0 PAROXYSMAL ATRIAL FIBRILLATION (HCC): Primary | ICD-10-CM

## 2019-02-01 PROCEDURE — 93296 REM INTERROG EVL PM/IDS: CPT | Performed by: INTERNAL MEDICINE

## 2019-02-01 PROCEDURE — 93294 REM INTERROG EVL PM/LDLS PM: CPT | Performed by: INTERNAL MEDICINE

## 2019-02-01 NOTE — PROGRESS NOTES
Results for orders placed or performed in visit on 02/01/19   Cardiac EP device report    Narrative    MDT DUAL PM  CARELINK TRANSMISSION: BATTERY VOLTAGE ADEQUATE  (5 5 YRS)  AP 97%  1%  ALL AVAILABLE LEAD PARAMETERS WITHIN NORMAL LIMITS  76 AT/AF EPISODES DETECTED <4 HOURS LONG (1 1% OF TIME)  6 VHR EPISODES DETECTED  1 EGM (#5233) SHOWS NSVT EPISODE 5 BEATS @ 330ms  OTHERS SHOW SVT  PATIENT IS ON ELIQUIS AND METOPROLOL SUCC  EF IS 50%(2017)  NORMAL DEVICE FUNCTION  ---VENEGAS

## 2019-02-10 DIAGNOSIS — I48.91 ATRIAL FIBRILLATION, UNSPECIFIED TYPE (HCC): ICD-10-CM

## 2019-02-11 RX ORDER — METOPROLOL SUCCINATE 25 MG/1
TABLET, EXTENDED RELEASE ORAL
Qty: 30 TABLET | Refills: 5 | Status: SHIPPED | OUTPATIENT
Start: 2019-02-11 | End: 2019-07-29 | Stop reason: SDUPTHER

## 2019-02-11 RX ORDER — SOTALOL HYDROCHLORIDE 80 MG/1
TABLET ORAL
Qty: 60 TABLET | Refills: 5 | Status: SHIPPED | OUTPATIENT
Start: 2019-02-11 | End: 2019-07-22 | Stop reason: SDUPTHER

## 2019-02-21 ENCOUNTER — HOSPITAL ENCOUNTER (OUTPATIENT)
Dept: RADIOLOGY | Facility: HOSPITAL | Age: 79
Discharge: HOME | End: 2019-02-21
Attending: INTERNAL MEDICINE | Admitting: RADIOLOGY
Payer: COMMERCIAL

## 2019-02-21 ENCOUNTER — TRANSCRIBE ORDERS (OUTPATIENT)
Dept: RADIOLOGY | Facility: HOSPITAL | Age: 79
End: 2019-02-21

## 2019-02-21 DIAGNOSIS — R76.8 RHEUMATOID FACTOR POSITIVE: ICD-10-CM

## 2019-02-21 DIAGNOSIS — E78.5 DYSLIPIDEMIA: ICD-10-CM

## 2019-02-21 DIAGNOSIS — M15.9 PRIMARY OSTEOARTHRITIS INVOLVING MULTIPLE JOINTS: ICD-10-CM

## 2019-02-21 PROCEDURE — 76881 US COMPL JOINT R-T W/IMG: CPT

## 2019-02-22 RX ORDER — OMEGA-3-ACID ETHYL ESTERS 1 G/1
CAPSULE, LIQUID FILLED ORAL
Qty: 60 CAPSULE | Refills: 5 | Status: SHIPPED | OUTPATIENT
Start: 2019-02-22 | End: 2019-06-18 | Stop reason: SDUPTHER

## 2019-03-06 ENCOUNTER — OFFICE VISIT (OUTPATIENT)
Dept: PODIATRY | Facility: CLINIC | Age: 79
End: 2019-03-06
Payer: COMMERCIAL

## 2019-03-06 VITALS
SYSTOLIC BLOOD PRESSURE: 137 MMHG | HEART RATE: 87 BPM | HEIGHT: 65 IN | WEIGHT: 230 LBS | DIASTOLIC BLOOD PRESSURE: 80 MMHG | RESPIRATION RATE: 16 BRPM | BODY MASS INDEX: 38.32 KG/M2

## 2019-03-06 DIAGNOSIS — M79.671 PAIN IN BOTH FEET: ICD-10-CM

## 2019-03-06 DIAGNOSIS — M79.672 PAIN IN BOTH FEET: ICD-10-CM

## 2019-03-06 DIAGNOSIS — K21.9 GASTROESOPHAGEAL REFLUX DISEASE WITHOUT ESOPHAGITIS: ICD-10-CM

## 2019-03-06 DIAGNOSIS — I70.209 PERIPHERAL ARTERIOSCLEROSIS (HCC): Primary | ICD-10-CM

## 2019-03-06 DIAGNOSIS — L84 CORNS: ICD-10-CM

## 2019-03-06 DIAGNOSIS — M54.16 RADICULOPATHY OF LUMBAR REGION: ICD-10-CM

## 2019-03-06 DIAGNOSIS — B35.1 ONYCHOMYCOSIS: ICD-10-CM

## 2019-03-06 PROCEDURE — 11056 PARNG/CUTG B9 HYPRKR LES 2-4: CPT | Performed by: PODIATRIST

## 2019-03-06 PROCEDURE — 99211 OFF/OP EST MAY X REQ PHY/QHP: CPT | Performed by: PODIATRIST

## 2019-03-06 RX ORDER — GABAPENTIN 100 MG/1
100 CAPSULE ORAL 3 TIMES DAILY
Qty: 90 CAPSULE | Refills: 0 | OUTPATIENT
Start: 2019-03-06 | End: 2019-10-02 | Stop reason: SDUPTHER

## 2019-03-06 NOTE — PROGRESS NOTES
Procedures   Foot Exam     Assessment/Plan:  Pain   Radiculopathy   Callus   Mycotic toenail         Plan   Foot exam performed   All nails debrided   Calluses debrided   Patient will remain on gabapentin     No problem-specific Assessment & Plan notes found for this encounter  Discussion/Summary   The patient was counseled regarding instructions for management,-- patient and family education,-- risks and benefits of treatment options     Patient is able to Self-Care     Possible side effects of new medications were reviewed with the patient/guardian today  The treatment plan was reviewed with the patient/guardian  The patient/guardian understands and agrees with the treatment plan      Chief Complaint   nail care      History of Present Illness   HPI:  complains of pain in feet with ambulation  She has pain around the toes  She is also concerned with pain in her right heel  This is been ongoing for several weeks  She has no history of trauma   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      Review of Systems           Cardiac: chest pain,-- rhythm problems,-- AM fatigue-- and-- witnessed apnea episodes       Skin: No complaints of nonhealing sores or skin rash       Genitourinary: loss of bladder control      Psychological: No complaints of feeling depressed, anxiety, panic attacks, or difficulty concentrating       General: trouble sleeping-- and-- lack of energy/fatigue       Respiratory: shortness of breath       HEENT: snoring      Gastrointestinal: heartburn      Hematologic: anemia      Neurological: daytime sleepiness      Musculoskeletal: arthritis-- and-- back pain      Active Problems   1  Allergic rhinitis (477 9) (J30 9)   2  Anxiety disorder (300 00) (F41 9)   3  Arthropathy (716 90) (M12 9)   4  Atherosclerosis of arteries of extremities (440 20) (I70 209)   5  Atrial fibrillation (427 31) (I48 91)   6  Backache (724 5) (M54 9)   7  Callus (700) (L84)   8  Cervicalgia (723 1) (M54 2)   9  Depression (311) (F32 9)   10  Difficulty in walking (719 7) (R26 2)   11  Encounter for screening for malignant neoplasm of colon (V76 51) (Z12 11)   12  Esophagitis, reflux (530 11) (K21 0)   13  Essential hypertension (401 9) (I10)   14  Foot pain, bilateral (729 5) (M79 671,M79 672)   15  Herpes zoster (053 9) (B02 9)   16  Hospital discharge follow-up (V67 59) (Z09)   17  Hyperlipidemia (272 4) (E78 5)   18  Impaired fasting glucose (790 21) (R73 01)   19  Internal Hemorrhoids (455 0)   20  Leg swelling (729 81) (O88 11)   21  Lichen planus (514 7) (L43 9)   22  Limb pain (729 5) (M79 609)   23  Lumbar radiculopathy (724 4) (M54 16)   24  Multiple joint pain (719 49) (M25 50)   25  Need for influenza vaccination (V04 81) (Z23)   26  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)   27  Onychomycosis (110 1) (B35 1)   28  MIGUEL (obstructive sleep apnea) (327 23) (G47 33)   29  Other abnormal finding of urine (791 9) (R82 99)   30  Pes planus, congenital (754 61) (Q66 50)   31  Plantar fascial fibromatosis (728 71) (M72 2)   32  Rectal/anal hemorrhage (569 3) (K62 5)   33  Screening for malignant neoplasm of cervix (V76 2) (Z12 4)   34  Shoulder joint pain, unspecified laterality   35  Thrombocytopenia (287 5) (D69 6)   36  Urticaria (708 9) (L50 9)   37  Vulvovaginitis candida albicans (112 1) (B37 3)     Past Medical History    · History of Acute maxillary sinusitis (461 0) (J01 00)   · History of Acute upper respiratory infection (465 9) (J06 9)   · History of Cellulitis (682 9) (L03 90)   · History of Cough (786 2) (R05)   · History of Dysuria (788 1) (R30 0)   · History of High cholesterol (272 0) (E78 00)   · History of abdominal pain (V13 89) (Q87 696)   · History of acute bronchitis (V12 69) (Z87 09)   · History of acute sinusitis (V12 69) (Z87 09)   · History of arthritis (V13 4) (Z87 39)   · History of atrial fibrillation (V12 59) (Z86 79)   · History of cataract (V12 49) (Z86 69)   · History of gastroesophageal reflux (GERD) (V12 79) (Z87 19)   · History of hypertension (V12 59) (Z86 79)   · History of Skin rash (782 1) (R21)   · History of Vulvovaginitis (616 10) (N76 0)     The active problems and past medical history were reviewed and updated today       Surgical History    · History of Cataract Surgery   · History of Colonoscopy (Fiberoptic) Screening   · History of Gallbladder Surgery   · History of Knee Replacement   · History of Pacemaker Placement     The surgical history was reviewed and updated today        Family History   Father    · Family history of Coronary Artery Disease (V17 49)  Sister    · Family history of Diabetes Mellitus (V18 0)   · Family history of High cholesterol  Family History    · Family history of arthritis (V17 7) (Z82 61)   · Family history of hypertension (V17 49) (Z82 49)     The family history was reviewed and updated today        Social History    · Exercise: Walking   · 2 x/week   · Former smoker (V15 82) (Z87 891)   · No alcohol use   ·   The social history was reviewed and updated today       Physical Exam   Left Foot: Appearance: Normal except as noted: excessive pronation-- and-- pes planus  Great toe deformities include a bunion  Tenderness: None except the great toe-- and-- distal first metatarsal     Right Foot: Appearance: Normal except as noted: excessive pronation-- and-- pes planus  Great toe deformities include a bunion  Tenderness: None except the great toe,-- distal first metatarsal,-- medial calcaneous-- and-- insertion of the plantar fascia     Left Ankle: ROM: limited ROM in all planes    Right Ankle: ROM: limited ROM in all planes    Neurological Exam: Light touch was decreased bilaterally  Vibratory sensation was decreased in both first metatarsophalangeal joints     Vascular Exam: performed Dorsalis pedis pulses were diminished bilaterally  Posterior tibial pulses were diminished bilaterally  Elevation Pallor: present bilaterally  Dependence rubor was present bilaterally   Capillary refill time was greater than 3 seconds bilaterally-- and-- Q  9, findings bilateral  Edema: moderate bilaterally     Toenails: All of the toenails were elongated,-- hypertrophied,-- discolored-- and-- Right ptotic     Hyperkeratosis: present on both first toes,-- present on both first sub metatarsals-- and-- Positive xerosis of skin noted     Shoe Gear Evaluation: performed ()  Recommendation(s): SAS style-- and-- OTC inlays       Procedure   All nails, debrided  Bilateral, pre-ulcerative lesions debrided   Procedures performed without pain or complication

## 2019-03-10 RX ORDER — FAMOTIDINE 40 MG/1
TABLET, FILM COATED ORAL
Qty: 60 TABLET | Refills: 3 | Status: SHIPPED | OUTPATIENT
Start: 2019-03-10 | End: 2019-06-30 | Stop reason: SDUPTHER

## 2019-03-12 DIAGNOSIS — I10 ESSENTIAL HYPERTENSION: ICD-10-CM

## 2019-03-12 RX ORDER — RAMIPRIL 5 MG/1
CAPSULE ORAL
Qty: 60 CAPSULE | Refills: 5 | Status: SHIPPED | OUTPATIENT
Start: 2019-03-12 | End: 2019-09-17 | Stop reason: SDUPTHER

## 2019-03-25 DIAGNOSIS — I51.9 CARDIAC DISEASE: Primary | Chronic | ICD-10-CM

## 2019-03-27 RX ORDER — NITROGLYCERIN 0.4 MG/1
0.4 TABLET SUBLINGUAL
Qty: 30 TABLET | Refills: 0 | Status: SHIPPED | OUTPATIENT
Start: 2019-03-27 | End: 2019-03-30 | Stop reason: SDUPTHER

## 2019-03-29 DIAGNOSIS — M79.671 PAIN IN BOTH FEET: ICD-10-CM

## 2019-03-29 DIAGNOSIS — M79.672 PAIN IN BOTH FEET: ICD-10-CM

## 2019-03-29 RX ORDER — GABAPENTIN 100 MG/1
CAPSULE ORAL
Qty: 90 CAPSULE | Refills: 2 | Status: SHIPPED | OUTPATIENT
Start: 2019-03-29 | End: 2019-06-26 | Stop reason: SDUPTHER

## 2019-03-30 DIAGNOSIS — I51.9 CARDIAC DISEASE: Chronic | ICD-10-CM

## 2019-04-01 RX ORDER — NITROGLYCERIN 0.4 MG/1
TABLET SUBLINGUAL
Qty: 25 TABLET | Refills: 0 | Status: SHIPPED | OUTPATIENT
Start: 2019-04-01 | End: 2021-04-22 | Stop reason: SDUPTHER

## 2019-04-25 ENCOUNTER — OFFICE VISIT (OUTPATIENT)
Dept: SLEEP CENTER | Facility: CLINIC | Age: 79
End: 2019-04-25
Payer: COMMERCIAL

## 2019-04-25 VITALS
HEIGHT: 65 IN | HEART RATE: 90 BPM | WEIGHT: 224 LBS | SYSTOLIC BLOOD PRESSURE: 153 MMHG | DIASTOLIC BLOOD PRESSURE: 91 MMHG | BODY MASS INDEX: 37.32 KG/M2

## 2019-04-25 DIAGNOSIS — G47.33 OSA (OBSTRUCTIVE SLEEP APNEA): Primary | ICD-10-CM

## 2019-04-25 DIAGNOSIS — F40.240 CLAUSTROPHOBIA: ICD-10-CM

## 2019-04-25 DIAGNOSIS — R45.86 MOOD DISTURBANCE: ICD-10-CM

## 2019-04-25 DIAGNOSIS — E66.9 OBESITY (BMI 30-39.9): ICD-10-CM

## 2019-04-25 DIAGNOSIS — G47.34 SLEEP RELATED HYPOXIA: ICD-10-CM

## 2019-04-25 DIAGNOSIS — I48.0 PAROXYSMAL ATRIAL FIBRILLATION (HCC): ICD-10-CM

## 2019-04-25 DIAGNOSIS — I50.32 CHRONIC DIASTOLIC CONGESTIVE HEART FAILURE (HCC): ICD-10-CM

## 2019-04-25 DIAGNOSIS — G47.09 OTHER INSOMNIA: ICD-10-CM

## 2019-04-25 PROCEDURE — 99214 OFFICE O/P EST MOD 30 MIN: CPT | Performed by: INTERNAL MEDICINE

## 2019-05-03 ENCOUNTER — REMOTE DEVICE CLINIC VISIT (OUTPATIENT)
Dept: CARDIOLOGY CLINIC | Facility: CLINIC | Age: 79
End: 2019-05-03
Payer: COMMERCIAL

## 2019-05-03 DIAGNOSIS — Z95.0 PRESENCE OF PERMANENT CARDIAC PACEMAKER: ICD-10-CM

## 2019-05-03 DIAGNOSIS — I49.5 SICK SINUS SYNDROME (HCC): Primary | ICD-10-CM

## 2019-05-03 PROCEDURE — 93296 REM INTERROG EVL PM/IDS: CPT | Performed by: INTERNAL MEDICINE

## 2019-05-03 PROCEDURE — 93294 REM INTERROG EVL PM/LDLS PM: CPT | Performed by: INTERNAL MEDICINE

## 2019-05-08 ENCOUNTER — OFFICE VISIT (OUTPATIENT)
Dept: PODIATRY | Facility: CLINIC | Age: 79
End: 2019-05-08
Payer: COMMERCIAL

## 2019-05-08 VITALS — RESPIRATION RATE: 16 BRPM | HEIGHT: 65 IN | WEIGHT: 224 LBS | BODY MASS INDEX: 37.32 KG/M2

## 2019-05-08 DIAGNOSIS — I70.209 PERIPHERAL ARTERIOSCLEROSIS (HCC): Primary | ICD-10-CM

## 2019-05-08 DIAGNOSIS — M54.16 RADICULOPATHY OF LUMBAR REGION: ICD-10-CM

## 2019-05-08 DIAGNOSIS — M79.671 PAIN IN BOTH FEET: ICD-10-CM

## 2019-05-08 DIAGNOSIS — M79.672 PAIN IN BOTH FEET: ICD-10-CM

## 2019-05-08 DIAGNOSIS — L84 CORNS: ICD-10-CM

## 2019-05-08 DIAGNOSIS — B35.1 ONYCHOMYCOSIS: ICD-10-CM

## 2019-05-08 PROCEDURE — 11056 PARNG/CUTG B9 HYPRKR LES 2-4: CPT | Performed by: PODIATRIST

## 2019-05-08 PROCEDURE — 11721 DEBRIDE NAIL 6 OR MORE: CPT | Performed by: PODIATRIST

## 2019-05-08 PROCEDURE — 99212 OFFICE O/P EST SF 10 MIN: CPT | Performed by: PODIATRIST

## 2019-05-08 RX ORDER — GABAPENTIN 300 MG/1
300 CAPSULE ORAL 2 TIMES DAILY
Qty: 60 CAPSULE | Refills: 1 | Status: SHIPPED | OUTPATIENT
Start: 2019-05-08 | End: 2019-06-07

## 2019-05-23 ENCOUNTER — OFFICE VISIT (OUTPATIENT)
Dept: RHEUMATOLOGY | Facility: CLINIC | Age: 79
End: 2019-05-23
Payer: COMMERCIAL

## 2019-05-23 VITALS
BODY MASS INDEX: 37.22 KG/M2 | WEIGHT: 223.4 LBS | DIASTOLIC BLOOD PRESSURE: 84 MMHG | HEART RATE: 87 BPM | SYSTOLIC BLOOD PRESSURE: 140 MMHG | HEIGHT: 65 IN

## 2019-05-23 DIAGNOSIS — M15.0 PRIMARY GENERALIZED (OSTEO)ARTHRITIS: ICD-10-CM

## 2019-05-23 DIAGNOSIS — M05.9 SEROPOSITIVE RHEUMATOID ARTHRITIS (HCC): Primary | ICD-10-CM

## 2019-05-23 PROCEDURE — 99213 OFFICE O/P EST LOW 20 MIN: CPT | Performed by: INTERNAL MEDICINE

## 2019-05-23 PROCEDURE — 20610 DRAIN/INJ JOINT/BURSA W/O US: CPT | Performed by: INTERNAL MEDICINE

## 2019-05-23 RX ORDER — METHYLPREDNISOLONE ACETATE 40 MG/ML
40 INJECTION, SUSPENSION INTRA-ARTICULAR; INTRALESIONAL; INTRAMUSCULAR; SOFT TISSUE ONCE
Status: COMPLETED | OUTPATIENT
Start: 2019-05-23 | End: 2019-05-23

## 2019-05-23 RX ORDER — BUPIVACAINE HYDROCHLORIDE 2.5 MG/ML
4 INJECTION, SOLUTION INFILTRATION; PERINEURAL ONCE
Status: DISCONTINUED | OUTPATIENT
Start: 2019-05-23 | End: 2019-05-23

## 2019-05-23 RX ORDER — BUPIVACAINE HYDROCHLORIDE 5 MG/ML
4 INJECTION, SOLUTION PERINEURAL ONCE
Status: COMPLETED | OUTPATIENT
Start: 2019-05-23 | End: 2019-05-23

## 2019-05-23 RX ORDER — METHYLPREDNISOLONE ACETATE 80 MG/ML
80 INJECTION, SUSPENSION INTRA-ARTICULAR; INTRALESIONAL; INTRAMUSCULAR; SOFT TISSUE ONCE
Status: DISCONTINUED | OUTPATIENT
Start: 2019-05-23 | End: 2019-05-23

## 2019-05-23 RX ADMIN — METHYLPREDNISOLONE ACETATE 40 MG: 40 INJECTION, SUSPENSION INTRA-ARTICULAR; INTRALESIONAL; INTRAMUSCULAR; SOFT TISSUE at 12:17

## 2019-05-23 RX ADMIN — BUPIVACAINE HYDROCHLORIDE 4 ML: 5 INJECTION, SOLUTION PERINEURAL at 12:17

## 2019-06-18 DIAGNOSIS — E78.5 DYSLIPIDEMIA: ICD-10-CM

## 2019-06-19 RX ORDER — OMEGA-3-ACID ETHYL ESTERS 1 G/1
2 CAPSULE, LIQUID FILLED ORAL 2 TIMES DAILY
Qty: 120 CAPSULE | Refills: 5 | Status: SHIPPED | OUTPATIENT
Start: 2019-06-19 | End: 2020-01-13

## 2019-06-26 DIAGNOSIS — M79.671 PAIN IN BOTH FEET: ICD-10-CM

## 2019-06-26 DIAGNOSIS — M79.672 PAIN IN BOTH FEET: ICD-10-CM

## 2019-06-26 RX ORDER — GABAPENTIN 100 MG/1
CAPSULE ORAL
Qty: 90 CAPSULE | Refills: 2 | Status: SHIPPED | OUTPATIENT
Start: 2019-06-26 | End: 2020-08-18 | Stop reason: ALTCHOICE

## 2019-06-30 DIAGNOSIS — K21.9 GASTROESOPHAGEAL REFLUX DISEASE WITHOUT ESOPHAGITIS: ICD-10-CM

## 2019-07-01 RX ORDER — FAMOTIDINE 40 MG/1
TABLET, FILM COATED ORAL
Qty: 60 TABLET | Refills: 3 | Status: SHIPPED | OUTPATIENT
Start: 2019-07-01 | End: 2019-10-27 | Stop reason: SDUPTHER

## 2019-07-10 ENCOUNTER — OFFICE VISIT (OUTPATIENT)
Dept: PODIATRY | Facility: CLINIC | Age: 79
End: 2019-07-10
Payer: COMMERCIAL

## 2019-07-10 VITALS
WEIGHT: 223 LBS | HEIGHT: 65 IN | RESPIRATION RATE: 17 BRPM | DIASTOLIC BLOOD PRESSURE: 83 MMHG | BODY MASS INDEX: 37.15 KG/M2 | HEART RATE: 89 BPM | SYSTOLIC BLOOD PRESSURE: 143 MMHG

## 2019-07-10 DIAGNOSIS — M79.671 PAIN IN BOTH FEET: ICD-10-CM

## 2019-07-10 DIAGNOSIS — M79.672 PAIN IN BOTH FEET: ICD-10-CM

## 2019-07-10 DIAGNOSIS — B35.1 ONYCHOMYCOSIS: ICD-10-CM

## 2019-07-10 DIAGNOSIS — M54.16 RADICULOPATHY OF LUMBAR REGION: ICD-10-CM

## 2019-07-10 DIAGNOSIS — L84 CORNS: ICD-10-CM

## 2019-07-10 DIAGNOSIS — I70.209 PERIPHERAL ARTERIOSCLEROSIS (HCC): Primary | ICD-10-CM

## 2019-07-10 PROCEDURE — 99213 OFFICE O/P EST LOW 20 MIN: CPT | Performed by: PODIATRIST

## 2019-07-10 NOTE — PROGRESS NOTES
Assessment/Plan:  Pain   Radiculopathy   Callus   Mycotic toenail  Peripheral artery disease         Plan   Foot exam performed   All nails debrided   Calluses debrided   Procedures performed without pain or complication      No problem-specific Assessment & Plan notes found for this encounter  Discussion/Summary   The patient was counseled regarding instructions for management,-- patient and family education,-- risks and benefits of treatment options     Patient is able to Self-Care     Possible side effects of new medications were reviewed with the patient/guardian today  The treatment plan was reviewed with the patient/guardian  The patient/guardian understands and agrees with the treatment plan      Chief Complaint   Foot pain wearing of shoes     History of Present Illness   HPI:  Patient complains of pain in feet with ambulation  She has pain around the toes  She is also concerned with pain in her right heel  This is been ongoing for several weeks  She has no history of trauma  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     Patient was unable tolerate gabapentin     Review of Systems           Cardiac: chest pain,-- rhythm problems,-- AM fatigue-- and-- witnessed apnea episodes       Skin: No complaints of nonhealing sores or skin rash       Genitourinary: loss of bladder control      Psychological: No complaints of feeling depressed, anxiety, panic attacks, or difficulty concentrating       General: trouble sleeping-- and-- lack of energy/fatigue       Respiratory: shortness of breath       HEENT: snoring      Gastrointestinal: heartburn      Hematologic: anemia      Neurological: daytime sleepiness      Musculoskeletal: arthritis-- and-- back pain      Active Problems   1  Allergic rhinitis (477 9) (J30 9)   2  Anxiety disorder (300 00) (F41 9)   3  Arthropathy (716 90) (M12 9)   4  Atherosclerosis of arteries of extremities (440 20) (I70 209)   5  Atrial fibrillation (427 31) (I48 91)   6  Backache (724 5) (M54 9)   7  Callus (700) (L84)   8  Cervicalgia (723 1) (M54 2)   9  Depression (311) (F32 9)   10  Difficulty in walking (719 7) (R26 2)   11  Encounter for screening for malignant neoplasm of colon (V76 51) (Z12 11)   12  Esophagitis, reflux (530 11) (K21 0)   13  Essential hypertension (401 9) (I10)   14  Foot pain, bilateral (729 5) (M79 671,M79 672)   15  Herpes zoster (053 9) (B02 9)   16  Hospital discharge follow-up (V67 59) (Z09)   17  Hyperlipidemia (272 4) (E78 5)   18  Impaired fasting glucose (790 21) (R73 01)   19  Internal Hemorrhoids (455 0)   20  Leg swelling (729 81) (N71 94)   21  Lichen planus (531 2) (L43 9)   22  Limb pain (729 5) (M79 609)   23  Lumbar radiculopathy (724 4) (M54 16)   24  Multiple joint pain (719 49) (M25 50)   25  Need for influenza vaccination (V04 81) (Z23)   26  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)   27  Onychomycosis (110 1) (B35 1)   28  MIGUEL (obstructive sleep apnea) (327 23) (G47 33)   29  Other abnormal finding of urine (791 9) (R82 99)   30  Pes planus, congenital (754 61) (Q66 50)   31  Plantar fascial fibromatosis (728 71) (M72 2)   32  Rectal/anal hemorrhage (569 3) (K62 5)   33  Screening for malignant neoplasm of cervix (V76 2) (Z12 4)   34  Shoulder joint pain, unspecified laterality   35  Thrombocytopenia (287 5) (D69 6)   36  Urticaria (708 9) (L50 9)   37  Vulvovaginitis candida albicans (112 1) (B37 3)     Past Medical History    · History of Acute maxillary sinusitis (461 0) (J01 00)   · History of Acute upper respiratory infection (465 9) (J06 9)   · History of Cellulitis (682 9) (L03 90)   · History of Cough (786 2) (R05)   · History of Dysuria (788 1) (R30 0)   · History of High cholesterol (272 0) (E78 00)   · History of abdominal pain (V13 89) (M83 821)   · History of acute bronchitis (V12 69) (Z87 09)   · History of acute sinusitis (V12 69) (Z87 09)   · History of arthritis (V13 4) (Z87 39)   · History of atrial fibrillation (V12 59) (Z86 79)   · History of cataract (V12 49) (Z86 69)   · History of gastroesophageal reflux (GERD) (V12 79) (Z87 19)   · History of hypertension (V12 59) (Z86 79)   · History of Skin rash (782 1) (R21)   · History of Vulvovaginitis (616 10) (N76 0)     The active problems and past medical history were reviewed and updated today       Surgical History    · History of Cataract Surgery   · History of Colonoscopy (Fiberoptic) Screening   · History of Gallbladder Surgery   · History of Knee Replacement   · History of Pacemaker Placement     The surgical history was reviewed and updated today        Family History   Father    · Family history of Coronary Artery Disease (V17 49)  Sister    · Family history of Diabetes Mellitus (V18 0)   · Family history of High cholesterol  Family History    · Family history of arthritis (V17 7) (Z82 61)   · Family history of hypertension (V17 49) (Z82 49)     The family history was reviewed and updated today        Social History    · Exercise: Walking   · 2 x/week   · Former smoker (V15 82) (Z87 891)   · No alcohol use   ·   The social history was reviewed and updated today       Physical Exam   Left Foot: Appearance: Normal except as noted: excessive pronation-- and-- pes planus  Great toe deformities include a bunion  Tenderness: None except the great toe-- and-- distal first metatarsal     Right Foot: Appearance: Normal except as noted: excessive pronation-- and-- pes planus  Great toe deformities include a bunion  Tenderness: None except the great toe,-- distal first metatarsal,-- medial calcaneous-- and-- insertion of the plantar fascia     Left Ankle: ROM: limited ROM in all planes    Right Ankle: ROM: limited ROM in all planes    Neurological Exam: Light touch was decreased bilaterally  Vibratory sensation was decreased in both first metatarsophalangeal joints     Vascular Exam: performed Dorsalis pedis pulses were diminished bilaterally  Posterior tibial pulses were diminished bilaterally  Elevation Pallor: present bilaterally  Dependence rubor was present bilaterally  Capillary refill time was greater than 3 seconds bilaterally-- and-- Q  9, findings bilateral  Edema: moderate bilaterally     Toenails: All of the toenails were elongated,-- hypertrophied,-- discolored-- and-- Right ptotic     Hyperkeratosis: present on both first toes,-- present on both first sub metatarsals-- and-- Positive xerosis of skin noted     Shoe Gear Evaluation: performed ()   Recommendation(s): SAS style-- and-- OTC inlays

## 2019-07-22 DIAGNOSIS — I48.91 ATRIAL FIBRILLATION, UNSPECIFIED TYPE (HCC): ICD-10-CM

## 2019-07-22 RX ORDER — SOTALOL HYDROCHLORIDE 80 MG/1
TABLET ORAL
Qty: 60 TABLET | Refills: 5 | Status: SHIPPED | OUTPATIENT
Start: 2019-07-22 | End: 2020-02-11

## 2019-07-29 ENCOUNTER — TELEPHONE (OUTPATIENT)
Dept: SLEEP CENTER | Facility: CLINIC | Age: 79
End: 2019-07-29

## 2019-07-29 DIAGNOSIS — I48.91 ATRIAL FIBRILLATION, UNSPECIFIED TYPE (HCC): ICD-10-CM

## 2019-07-29 RX ORDER — METOPROLOL SUCCINATE 25 MG/1
TABLET, EXTENDED RELEASE ORAL
Qty: 30 TABLET | Refills: 5 | Status: SHIPPED | OUTPATIENT
Start: 2019-07-29 | End: 2020-02-11

## 2019-07-29 NOTE — TELEPHONE ENCOUNTER
Patient feels fine using CPAP without oxygen- she doesn't want to use the oxygen anymore  She has called [facility] and they are picking it up tomorrow  She is going to sign a waiver with them as there is no discontinue order  I did attempt to explain that her oxygen levels dropped significantly during her sleep study but she states she hasn't used the oxygen in almost 2 months and feels fine just with CPAP  Next office visit 9/26

## 2019-07-29 NOTE — TELEPHONE ENCOUNTER
Patient called & left message stating she doesn't want her oxygen anymore- she states she hasn't used it for over a month and wants it returned to [employer]  Left message for patient to call office to get more information  I personally performed the service described in the documentation recorded by the scribe in my presence, and it accurately and completely records my words and actions.

## 2019-08-02 ENCOUNTER — REMOTE DEVICE CLINIC VISIT (OUTPATIENT)
Dept: CARDIOLOGY CLINIC | Facility: CLINIC | Age: 79
End: 2019-08-02
Payer: COMMERCIAL

## 2019-08-02 DIAGNOSIS — Z95.0 PRESENCE OF PERMANENT CARDIAC PACEMAKER: Primary | ICD-10-CM

## 2019-08-02 PROCEDURE — 93294 REM INTERROG EVL PM/LDLS PM: CPT | Performed by: INTERNAL MEDICINE

## 2019-08-02 PROCEDURE — 93296 REM INTERROG EVL PM/IDS: CPT | Performed by: INTERNAL MEDICINE

## 2019-08-02 NOTE — PROGRESS NOTES
Results for orders placed or performed in visit on 08/02/19   Cardiac EP device report    Narrative    MDT DUAL PM  CARELINK TRANSMISSION: BATTERY VOLTAGE ADEQUATE  (4 5 YRS)  AP 99%  1%  ALL AVAILABLE LEAD PARAMETERS WITHIN NORMAL LIMITS  NO SIGNIFICANT HIGH RATE EPISODES  NORMAL DEVICE FUNCTION  ---VENEGAS

## 2019-08-14 DIAGNOSIS — E78.5 DYSLIPIDEMIA: ICD-10-CM

## 2019-08-16 RX ORDER — PRAVASTATIN SODIUM 10 MG
TABLET ORAL
Qty: 30 TABLET | Refills: 5 | Status: SHIPPED | OUTPATIENT
Start: 2019-08-16 | End: 2020-02-13 | Stop reason: SDUPTHER

## 2019-09-06 ENCOUNTER — TELEPHONE (OUTPATIENT)
Dept: CARDIOLOGY CLINIC | Facility: CLINIC | Age: 79
End: 2019-09-06

## 2019-09-06 NOTE — LETTER
Cardiology Pre Operative Clearance      PRE OPERATIVE CARDIAC RISK ASSESSMENT    09/06/19    Heather Hinkle  1940  [id number]    Date of Surgery: 10/15/19    Type of Surgery: EGD    Surgeon: [facility]    No Cardiac Contraindication for Planned Surgical Procedures    Anticoagulation: Patient is on chronic Eliquis therapy for atrial fibrillation which should be held for 48 hours prior to procedure    Electronically Signed: Kylie Perez DO

## 2019-09-09 DIAGNOSIS — I10 ESSENTIAL HYPERTENSION: ICD-10-CM

## 2019-09-09 RX ORDER — RAMIPRIL 5 MG/1
CAPSULE ORAL
Qty: 60 CAPSULE | Refills: 0 | OUTPATIENT
Start: 2019-09-09

## 2019-09-17 DIAGNOSIS — I10 ESSENTIAL HYPERTENSION: ICD-10-CM

## 2019-09-17 RX ORDER — RAMIPRIL 5 MG/1
CAPSULE ORAL
Qty: 60 CAPSULE | Refills: 0 | Status: SHIPPED | OUTPATIENT
Start: 2019-09-17 | End: 2019-10-18 | Stop reason: SDUPTHER

## 2019-09-18 ENCOUNTER — OFFICE VISIT (OUTPATIENT)
Dept: PODIATRY | Facility: CLINIC | Age: 79
End: 2019-09-18
Payer: COMMERCIAL

## 2019-09-18 VITALS
RESPIRATION RATE: 17 BRPM | HEART RATE: 78 BPM | DIASTOLIC BLOOD PRESSURE: 80 MMHG | SYSTOLIC BLOOD PRESSURE: 130 MMHG | HEIGHT: 65 IN | WEIGHT: 223 LBS | BODY MASS INDEX: 37.15 KG/M2

## 2019-09-18 DIAGNOSIS — L84 CORNS: ICD-10-CM

## 2019-09-18 DIAGNOSIS — M79.672 PAIN IN BOTH FEET: ICD-10-CM

## 2019-09-18 DIAGNOSIS — M79.671 PAIN IN BOTH FEET: ICD-10-CM

## 2019-09-18 DIAGNOSIS — I70.209 PERIPHERAL ARTERIOSCLEROSIS (HCC): Primary | ICD-10-CM

## 2019-09-18 DIAGNOSIS — M54.16 RADICULOPATHY OF LUMBAR REGION: ICD-10-CM

## 2019-09-18 DIAGNOSIS — B35.1 ONYCHOMYCOSIS: ICD-10-CM

## 2019-09-18 PROCEDURE — 99213 OFFICE O/P EST LOW 20 MIN: CPT | Performed by: PODIATRIST

## 2019-09-18 NOTE — PROGRESS NOTES
Assessment/Plan:  Pain   Radiculopathy   Callus   Mycotic toenail  Peripheral artery disease         Plan   Foot exam performed   All nails debrided   Calluses debrided   Procedures performed without pain or complication  Patient remain on gabapentin as directed       Discussion/Summary   The patient was counseled regarding instructions for management,-- patient and family education,-- risks and benefits of treatment options     Patient is able to Self-Care     Possible side effects of new medications were reviewed with the patient/guardian today  The treatment plan was reviewed with the patient/guardian  The patient/guardian understands and agrees with the treatment plan      Chief Complaint   Foot pain wearing of shoes     History of Present Illness   HPI:  Patient complains of pain in feet with ambulation  She has pain around the toes  She is also concerned with pain in her right heel  This is been ongoing for several weeks  She has no history of trauma  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     Patient was unable tolerate gabapentin     Review of Systems           Cardiac: chest pain,-- rhythm problems,-- AM fatigue-- and-- witnessed apnea episodes       Skin: No complaints of nonhealing sores or skin rash       Genitourinary: loss of bladder control      Psychological: No complaints of feeling depressed, anxiety, panic attacks, or difficulty concentrating       General: trouble sleeping-- and-- lack of energy/fatigue       Respiratory: shortness of breath       HEENT: snoring      Gastrointestinal: heartburn      Hematologic: anemia      Neurological: daytime sleepiness      Musculoskeletal: arthritis-- and-- back pain      Active Problems   1  Allergic rhinitis (477 9) (J30 9)   2  Anxiety disorder (300 00) (F41 9)   3  Arthropathy (716 90) (M12 9)   4  Atherosclerosis of arteries of extremities (440 20) (I70 209)   5  Atrial fibrillation (427 31) (I48 91)   6  Backache (724 5) (M54 9)   7  Callus (700) (L84)   8  Cervicalgia (723 1) (M54 2)   9  Depression (311) (F32 9)   10  Difficulty in walking (719 7) (R26 2)   11  Encounter for screening for malignant neoplasm of colon (V76 51) (Z12 11)   12  Esophagitis, reflux (530 11) (K21 0)   13  Essential hypertension (401 9) (I10)   14  Foot pain, bilateral (729 5) (M79 671,M79 672)   15  Herpes zoster (053 9) (B02 9)   16  Hospital discharge follow-up (V67 59) (Z09)   17  Hyperlipidemia (272 4) (E78 5)   18  Impaired fasting glucose (790 21) (R73 01)   19  Internal Hemorrhoids (455 0)   20  Leg swelling (729 81) (W40 78)   21  Lichen planus (656 9) (L43 9)   22  Limb pain (729 5) (M79 609)   23  Lumbar radiculopathy (724 4) (M54 16)   24  Multiple joint pain (719 49) (M25 50)   25  Need for influenza vaccination (V04 81) (Z23)   26  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)   27  Onychomycosis (110 1) (B35 1)   28  MIGUEL (obstructive sleep apnea) (327 23) (G47 33)   29  Other abnormal finding of urine (791 9) (R82 99)   30  Pes planus, congenital (754 61) (Q66 50)   31  Plantar fascial fibromatosis (728 71) (M72 2)   32  Rectal/anal hemorrhage (569 3) (K62 5)   33  Screening for malignant neoplasm of cervix (V76 2) (Z12 4)   34  Shoulder joint pain, unspecified laterality   35  Thrombocytopenia (287 5) (D69 6)   36  Urticaria (708 9) (L50 9)   37  Vulvovaginitis candida albicans (112 1) (B37 3)     Past Medical History    · History of Acute maxillary sinusitis (461 0) (J01 00)   · History of Acute upper respiratory infection (465 9) (J06 9)   · History of Cellulitis (682 9) (L03 90)   · History of Cough (786 2) (R05)   · History of Dysuria (788 1) (R30 0)   · History of High cholesterol (272 0) (E78 00)   · History of abdominal pain (V13 89) (K90 804)   · History of acute bronchitis (V12 69) (Z87 09)   · History of acute sinusitis (V12 69) (Z87 09)   · History of arthritis (V13 4) (Z87 39)   · History of atrial fibrillation (V12 59) (Z86 79)   · History of cataract (V12 49) (Z86 69)   · History of gastroesophageal reflux (GERD) (V12 79) (Z87 19)   · History of hypertension (V12 59) (Z86 79)   · History of Skin rash (782 1) (R21)   · History of Vulvovaginitis (616 10) (N76 0)     The active problems and past medical history were reviewed and updated today       Surgical History    · History of Cataract Surgery   · History of Colonoscopy (Fiberoptic) Screening   · History of Gallbladder Surgery   · History of Knee Replacement   · History of Pacemaker Placement     The surgical history was reviewed and updated today        Family History   Father    · Family history of Coronary Artery Disease (V17 49)  Sister    · Family history of Diabetes Mellitus (V18 0)   · Family history of High cholesterol  Family History    · Family history of arthritis (V17 7) (Z82 61)   · Family history of hypertension (V17 49) (Z82 49)     The family history was reviewed and updated today        Social History    · Exercise: Walking   · 2 x/week   · Former smoker (V15 82) (Z87 891)   · No alcohol use   ·   The social history was reviewed and updated today       Physical Exam   Left Foot: Appearance: Normal except as noted: excessive pronation-- and-- pes planus  Great toe deformities include a bunion  Tenderness: None except the great toe-- and-- distal first metatarsal     Right Foot: Appearance: Normal except as noted: excessive pronation-- and-- pes planus  Great toe deformities include a bunion  Tenderness: None except the great toe,-- distal first metatarsal,-- medial calcaneous-- and-- insertion of the plantar fascia     Left Ankle: ROM: limited ROM in all planes    Right Ankle: ROM: limited ROM in all planes    Neurological Exam: Light touch was decreased bilaterally  Vibratory sensation was decreased in both first metatarsophalangeal joints     Vascular Exam: performed Dorsalis pedis pulses were diminished bilaterally  Posterior tibial pulses were diminished bilaterally   Elevation Pallor: present bilaterally  Dependence rubor was present bilaterally  Capillary refill time was greater than 3 seconds bilaterally-- and-- Q  9, findings bilateral  Edema: moderate bilaterally     Toenails: All of the toenails were elongated,-- hypertrophied,-- discolored-- and-- Right ptotic     Hyperkeratosis: present on both first toes,-- present on both first sub metatarsals-- and-- Positive xerosis of skin noted     Shoe Gear Evaluation: performed ()   Recommendation(s): SAS style-- and-- OTC inlays

## 2019-09-26 ENCOUNTER — OFFICE VISIT (OUTPATIENT)
Dept: SLEEP CENTER | Facility: CLINIC | Age: 79
End: 2019-09-26
Payer: COMMERCIAL

## 2019-09-26 VITALS
BODY MASS INDEX: 36.32 KG/M2 | DIASTOLIC BLOOD PRESSURE: 90 MMHG | HEIGHT: 65 IN | HEART RATE: 80 BPM | WEIGHT: 218 LBS | SYSTOLIC BLOOD PRESSURE: 158 MMHG

## 2019-09-26 DIAGNOSIS — E66.9 OBESITY (BMI 30-39.9): ICD-10-CM

## 2019-09-26 DIAGNOSIS — I50.32 CHRONIC DIASTOLIC CONGESTIVE HEART FAILURE (HCC): ICD-10-CM

## 2019-09-26 DIAGNOSIS — G47.34 SLEEP RELATED HYPOXIA: ICD-10-CM

## 2019-09-26 DIAGNOSIS — G47.09 OTHER INSOMNIA: ICD-10-CM

## 2019-09-26 DIAGNOSIS — G47.33 OSA (OBSTRUCTIVE SLEEP APNEA): Primary | ICD-10-CM

## 2019-09-26 DIAGNOSIS — R06.02 SHORTNESS OF BREATH: ICD-10-CM

## 2019-09-26 DIAGNOSIS — I48.0 PAROXYSMAL ATRIAL FIBRILLATION (HCC): ICD-10-CM

## 2019-09-26 DIAGNOSIS — I10 ESSENTIAL HYPERTENSION: ICD-10-CM

## 2019-09-26 DIAGNOSIS — M25.50 ARTHRALGIA, UNSPECIFIED JOINT: ICD-10-CM

## 2019-09-26 PROCEDURE — 99214 OFFICE O/P EST MOD 30 MIN: CPT | Performed by: INTERNAL MEDICINE

## 2019-09-26 NOTE — PROGRESS NOTES
Review of Systems      Genitourinary none   Cardiology none   Gastrointestinal frequent heartburn/acid reflux   Neurology none   Constitutional none   Integumentary none   Psychiatry none   Musculoskeletal joint pain and sciatica   Pulmonary shortness of breath with activity, wheezing, frequent cough and snoring   ENT throat clearing and ringing in ears   Endocrine excessive thirst   Hematological none

## 2019-09-26 NOTE — PROGRESS NOTES
Follow-Up Note - [facility]   Yumiko Gaxiola  [de-identified] y o  female  VR  BYB:[id number]    CC: I saw this patient for follow-up in clinic today for her Sleep Disordered Breathing, Coexisting Sleep and Medical Problems  PFSH, Problem List, Medications & Allergies were reviewed in EMR  Interval changes: none reported  She  has a past medical history of Arthritis, Atrial fibrillation ([address] ), Cardiac disease, Cataract, Gastro-esophageal reflux, and Hypertension  She has a current medication list which includes the following prescription(s): acetaminophen, apixaban, calcium carbonate-vitamin d, cefuroxime, cholecalciferol, clobetasol, coq-10, famotidine, furosemide, gabapentin, gabapentin, gabapentin, hydroxychloroquine, lactobacillus, metoprolol succinate, multiple vitamins-minerals, nitroglycerin, omega-3-acid ethyl esters, polyethylene glycol-electrolytes, pravastatin, pravastatin sodium, ramipril, sertraline, and sotalol  ROS: Reviewed (see attached)  Significant for some postnasal drip  She has some shortness of breath  She is not reporting any cardiac symptoms  Musculoskeletal aches and pains are reasonably controlled  She feels mood is stable and is no longer taking Zoloft  DATA REVIEWED:  using PAP > 4 hours/night 97% of the time  Estimated GELA 1 4/hour at pressure of 16 6cm H2O @90th percentile  She discontinued supplemental oxygen because she found the concentrated to be too noisy  SUBJECTIVE: Regarding use of PAP, Yumiko reports:   · She is experiencing some adverse effects: dry mouth and mask leaks   · She is   benefiting from use: sleeping better   Sleep Routine: She reports getting 8 hrs sleep  ; less frequently has difficulty initiating and maintaining sleep   She awakens spontaneously and feels refreshed  She denied excessive drowsiness   She rated herself at Total score: 2 /24 on the [place] sleepiness scale  Habits: reports that she has quit smoking   Her smoking use included cigarettes  She has a 25 00 pack-year smoking history  She has never used smokeless tobacco ,  reports that she drinks alcohol ,  reports that she does not use drugs  , Caffeine use: limited , Exercise routine: regular    OBJECTIVE: /90   Pulse 80   Ht 5' 5" (1 651 m)   Wt 98 9 kg (218 lb)   BMI 36 28 kg/m²    Constitutional: Patient is well groomed; well appearing  Skin/Extrem: warm & dry; col & hydration normal; no edema  Psych: cooperativeand in no distress  Mental State appears normal   CNS: Alert, orientated, clear & coherent speech  H&N: EOMI; NC/AT:no facial pressure marks, no rashes  ENMT Mucus membranes normal Nasal airway:patent  Oral airway: crowded  Resp:effort is normal CVS: RRR ABD:truncal obesity MSK:Gait ambulant with a cane     ASSESSMENT: Primary Sleep/Secondary(to Medical or Psych conditions) & comorbidities   1  MIGUEL (obstructive sleep apnea)  Pulse oximetry overnight   2  Sleep related hypoxia  Pulse oximetry overnight   3  Other insomnia     4  Shortness of breath     5  Essential hypertension     6  Chronic diastolic congestive heart failure (HCC)     7  Paroxysmal atrial fibrillation ([address] )     8  Obesity (BMI 30-39 9)     9  Arthralgia, unspecified joint       PLAN:  1  Results review  2  I discuss treatment options with risks and benefit  3  Treatment with  PAP is medically necessary and Yumiko is agreable to continue use  4  Care of equipment, methods to improve comfort using PAP and importance of compliance with therapy were discussed  5  Pressure setting: continue 15-18 cmH2O     6  Rx provided to replace supplies and Care coordinated with DME provider  7  Strategies for weight reduction were discussed  8  Will repeat nocturnal pulse oximetry on CPAP  9  If she continues to have hypoxia, may benefit from BiPAP in place of CPAP for which she will need a repeat titration study    Unfortunately, she is insistent that she will not use supplemental oxygen during sleep even if necessary  She understands the risks and is willing to accept  10  Follow-up is advised in 2 months or sooner if needed to monitor progress, compliance and to adjust therapy  Thank you for allowing me to participate in the care of this patient      Sincerely,    Authenticated electronically by Ivanna Wilkerson MD on 65/75/07   Board Certified Specialist

## 2019-09-26 NOTE — PATIENT INSTRUCTIONS

## 2019-09-29 DIAGNOSIS — I48.91 ATRIAL FIBRILLATION, UNSPECIFIED TYPE (HCC): ICD-10-CM

## 2019-09-30 ENCOUNTER — OFFICE VISIT (OUTPATIENT)
Dept: RHEUMATOLOGY | Facility: CLINIC | Age: 79
End: 2019-09-30
Payer: COMMERCIAL

## 2019-09-30 VITALS
HEIGHT: 65 IN | BODY MASS INDEX: 36.46 KG/M2 | WEIGHT: 218.8 LBS | SYSTOLIC BLOOD PRESSURE: 167 MMHG | DIASTOLIC BLOOD PRESSURE: 95 MMHG | HEART RATE: 90 BPM

## 2019-09-30 DIAGNOSIS — M05.9 SEROPOSITIVE RHEUMATOID ARTHRITIS (HCC): Primary | ICD-10-CM

## 2019-09-30 DIAGNOSIS — Z79.899 LONG-TERM USE OF PLAQUENIL: ICD-10-CM

## 2019-09-30 DIAGNOSIS — M15.0 PRIMARY GENERALIZED (OSTEO)ARTHRITIS: ICD-10-CM

## 2019-09-30 PROCEDURE — 99213 OFFICE O/P EST LOW 20 MIN: CPT | Performed by: INTERNAL MEDICINE

## 2019-09-30 RX ORDER — AMOXICILLIN 500 MG/1
CAPSULE ORAL
Refills: 0 | COMMUNITY
Start: 2019-07-16 | End: 2019-10-02 | Stop reason: ALTCHOICE

## 2019-09-30 RX ORDER — GABAPENTIN 300 MG/1
300 CAPSULE ORAL 2 TIMES DAILY
Refills: 0 | COMMUNITY
Start: 2019-08-20 | End: 2019-10-02 | Stop reason: ALTCHOICE

## 2019-09-30 NOTE — PROGRESS NOTES
Assessment and Plan:   Ms Gaxiola is an 76-year-old female with history significant for seropositive rheumatoid arthritis, osteoarthritis status post left total knee replacement, lumbar radiculopathy and obesity, who presents for follow-up  She is currently on hydroxychloroquine 200 mg twice daily      # Seropositive rheumatoid arthritis (positive rheumatoid factor), with evidence of erosive arthropathy  - Yumiko presents today for follow-up of seropositive rheumatoid arthritis  She continues to experience diffuse arthralgias, although they appear to be intermittent, with ongoing hand swelling and a new region of right thumb MCP swelling  She is currently only on hydroxychloroquine 200 mg twice daily   I discussed with her that based on the ultrasound findings and her ongoing symptoms/exam, I would strongly suggest step up therapy to alternate DMARDs keeping in mind her comorbidities and extensive medication list   She is very hesitant to start any sort of immunosuppressive medication, although she is aware that uncontrolled inflammation from the rheumatoid arthritis can eventually lead to joint damage and deformities  There is already slight evidence of hand deformities and synovitis present at multiple joints  She is understanding of the complications, and would like to refrain from the use of additional DMARDs  In view of this I will plan to continue her on hydroxychloroquine 200 mg twice daily  I advised her if at any time the arthralgias are more persistent or bothersome, we can always discuss the use of alternate medications  I also offered her a short course of oral prednisone to acutely bring down the inflammation, but she would like to defer on this for now    - I advised her to follow up with annual ophthalmology exams for the hydroxychloroquine monitoring      # Right knee osteoarthritis  - She was previously advised that she would be a candidate for a total knee replacement surgery, but she would like to avoid this if possible  She did receive some benefit (1-2 months) with the last intra-articular cortisone injection in May 2019  She does not want a repeat injection today  Plan:  Diagnoses and all orders for this visit:    Seropositive rheumatoid arthritis ([address] )    Primary generalized (osteo)arthritis    Long-term use of Plaquenil    Other orders  -     amoxicillin (AMOXIL) 500 mg capsule; take 4 capsules by mouth 1 hour prior to appointment  -     gabapentin (NEURONTIN) 300 mg capsule; Take 300 mg by mouth 2 (two) times a day      Activities as tolerated    Diet: low carb/low fat, more greens/vegetables, adequate hydration  Exercise: try to maintain a low impact exercise regimen as much as possible  Walk for 30 minutes a day for at least 3 days a week    Encouraged to maintain good sleep hygiene  Continue other medications as prescribed by PCP and other specialists        RTC in 4 months          HPI    INITIAL VISIT NOTE:  Ms Gaxiola is a 65-year-old female with history significant for osteoarthritis status post left total knee replacement, lumbar radiculopathy and obesity, who presents for further evaluation of left shoulder pain      Patient states she developed sudden onset of pain and limited range of motion in her left shoulder approximately 2 months ago, and states it has been gradually improving since then   Initially the symptoms were constant, but more recently they have been intermittent in nature   She does have slight recurrence of pain with certain movements, but states her range of motion has also improved   She cannot associate any aggravating or relieving factors   When her symptoms were more constant she did try topical BenGay and salon pas, but they did not help her  Childress Charles did not really try taking any over-the-counter NSAIDs   She does take Tylenol daily at bedtime, but is unsure if this really helps with any of her joint pains   Otherwise she also reports pain affecting all of her joints diffusely, but states they are intermittent in nature   She occasionally will develop swelling around her ankles, which is managed by her cardiologist with as needed diuretics   She denies any other joint swelling   She does experience morning stiffness diffusely which lasts for a few hours      She was seen by a rheumatologist in [place] approximately 15 years ago, and was diagnosed with rheumatoid arthritis and osteoarthritis at that time  Josephine Batres did not return for follow-up to see the rheumatologist, and was never started on any antirheumatic medications   At her first visit with the rheumatologist she was advised to continue Tylenol as needed for her joint pains   She was previously seen by Orthopedics in [facility] she had her knee surgery done, but is currently not under the care of an orthopedist  Josephine Batres has not had any recent x-rays done   She was seen by her primary care physician in view of the acute onset of left shoulder pain, and had labs done which showed a borderline positive rheumatoid factor of 20  UMAIR screen and ESR were unremarkable         1/21/2019:  Patient presents for follow-up today  Bonita Narayan reviewed her labs done following the last visit which showed a negative CCP antibody and normal ESR   CRP was elevated at 10  3   CBC, CMP and chronic hepatitis panel unremarkable   The x-rays were also reviewed and showed mild degenerative changes in the glenohumeral and acromioclavicular joints   X-rays of her bilateral feet showed osteoarthritis without any erosive arthropathy   X-rays of her hands showed advanced polyarticular osteoarthritis   On personal review of the x-rays it is difficult to appreciate for any erosive changes due to severe osteoarthritis      Patient states since the last office visit she has continued to experience pain in her left shoulder, but it is not as severe as when the symptoms first started  José Miguel Morillo are certain activities that she does which aggravates the symptoms, and it is also aggravated when she sleeps on her shoulder at night   She has not noticed any specific relieving factors   Otherwise she continues to experience diffuse intermittent joint pains affecting her bilateral hands, wrists, hips, knees, ankles and feet   She has not noticed any joint swelling   She experiences morning stiffness which affects her diffusely and lasts for a few hours   She refrains from NSAID use as per her cardiologist's instructions   She takes 2 Tylenol at bedtime which does not provide her with significant relief      As mentioned previously she does have a borderline rheumatoid factor with a titer of 20, and approximately 15 years ago was diagnosed with rheumatoid arthritis and osteoarthritis, but patient states she was never started on any DMARDs   No other acute complaints at today's visit         5/23/2019:  Patient presents for a follow-up of seropositive rheumatoid arthritis  She is currently on hydroxychloroquine 200 mg twice daily  We reviewed the ultrasound done following the prior office visit which showed evidence of inflammatory arthritis superimposed on severe osteoarthritis  It was present at multiple joints most pronounced at the bilateral wrists, right 2nd PIP joint, with evidence of scattered bony erosions in several MCP and PIP joints      Patient states with the hydroxychloroquine she has been tolerating it well, and thinks it may have had a mild effect with her overall joint pains, especially at her left shoulder  She did receive an intra-articular cortisone injection to the left shoulder at the last visit, and she states this significantly helped her  The same degree of pain has not recurred and she has improved range of motion with her left shoulder  She does continue to experience pain most prominent at her hands, hips, right knee, ankles and feet  She has noticed swelling affecting her hands    She experiences morning stiffness which affects her diffusely and lasts 1-2 hours  She does not take any over-the-counter NSAIDs       9/30/2019:  Patient presents for a follow-up of seropositive rheumatoid arthritis  She is currently on hydroxychloroquine 200 mg twice daily  Patient states with the hydroxychloroquine she has been tolerating it well, and thinks it may have had an overall improvement with her joint pains  We did administer an intra-articular cortisone injection into her right knee at the last office visit which helped her for about 1-2 months  She is aware that she is a candidate for a total knee replacement, but would like to hold off on surgery for now  At today's office visit she is primarily complaining of intermittent pain in her neck region, in her hands and chronically affecting her lower extremities  She has noticed an area of swelling at her right thumb MCP joint and reports right knee swelling, but denies any other swollen joints  She does experience morning stiffness which primarily affects her neck and shoulder region and lasts a few minutes  She states resting significantly improves all of her joint pains, and she does take Tylenol as needed  No other complaints noted at this time  The following portions of the patient's history were reviewed and updated as appropriate: allergies, current medications, past family history, past medical history, past social history, past surgical history and problem list       Review of Systems  Constitutional: Negative for weight change, fevers, chills, night sweats, fatigue  ENT/Mouth: Negative for hearing changes, ear pain, nasal congestion, sinus pain, hoarseness, sore throat, rhinorrhea, swallowing difficulty  Eyes: Negative for pain, redness, discharge, vision changes  Positive for dry eyes  Cardiovascular: Negative for chest pain, palpitations  Respiratory: Negative for sputum, wheezing  Positive for cough and shortness of breath    Gastrointestinal: Negative for vomiting, diarrhea, constipation, heartburn  Positive for intermittent pain and nausea  Genitourinary: Negative for dysuria, urinary frequency, hematuria  Musculoskeletal: As per HPI  Skin: Negative for skin rash, color changes  Neuro: Negative for weakness, numbness, tingling, loss of consciousness  Psych: Negative for anxiety, depression  Heme/Lymph: Negative for easy bruising, bleeding, lymphadenopathy  Past Medical History:   Diagnosis Date    Arthritis     Atrial fibrillation ([address] )     Cardiac disease     Cataract     Gastro-esophageal reflux     GERD    Hypertension        Past Surgical History:   Procedure Laterality Date    CARDIAC PACEMAKER PLACEMENT Left 2014    CATARACT EXTRACTION      CHOLECYSTECTOMY      resolved: 2009    COLONOSCOPY      Fiberoptic, resolved 2015    EYE SURGERY      KNEE SURGERY Left     left kknee replacement in 2009       Social History     Socioeconomic History    Marital status:       Spouse name: Not on file    Number of children: Not on file    Years of education: Not on file    Highest education level: Not on file   Occupational History    Not on file   Social Needs    Financial resource strain: Not on file    Food insecurity:     Worry: Not on file     Inability: Not on file    Transportation needs:     Medical: Not on file     Non-medical: Not on file   Tobacco Use    Smoking status: Former Smoker     Packs/day: 1 00     Years: 25 00     Pack years: 25 00     Types: Cigarettes    Smokeless tobacco: Never Used    Tobacco comment: quit 22 yeears ago   Substance and Sexual Activity    Alcohol use: Yes     Comment: occasional, No alcohol use,per [employer]    Drug use: No    Sexual activity: Not on file   Lifestyle    Physical activity:     Days per week: Not on file     Minutes per session: Not on file    Stress: Not on file   Relationships    Social connections:     Talks on phone: Not on file     Gets together: Not on file     Attends Nondenominational service: Not on file     Active member of club or organization: Not on file     Attends meetings of clubs or organizations: Not on file     Relationship status: Not on file    Intimate partner violence:     Fear of current or ex partner: Not on file     Emotionally abused: Not on file     Physically abused: Not on file     Forced sexual activity: Not on file   Other Topics Concern    Not on file   Social History Narrative    Exercise: Walking, 2x/week       Family History   Problem Relation Age of Onset    Coronary artery disease Father     Diabetes Sister     Hyperlipidemia Sister     Arthritis Family     Hypertension Family        Allergies   Allergen Reactions    Ciprofloxacin Rash    Sulfa Antibiotics Rash    Synvisc [[address]] Rash       Current Outpatient Medications:     acetaminophen (TYLENOL ARTHRITIS PAIN) 650 mg CR tablet, Take by mouth, Disp: , Rfl:     amoxicillin (AMOXIL) 500 mg capsule, take 4 capsules by mouth 1 hour prior to appointment, Disp: , Rfl: 0    apixaban (ELIQUIS) 5 mg, Take 1 tablet (5 mg total) by mouth 2 (two) times a day, Disp: 60 tablet, Rfl: 11    Calcium Carbonate-Vitamin D 600-200 MG-UNIT CAPS, Take by mouth 2 (two) times a day, Disp: , Rfl:     cefuroxime (CEFTIN) 250 mg tablet, Take 250 mg by mouth 2 (two) times a day, Disp: , Rfl: 0    cholecalciferol (VITAMIN D3) 1,000 units tablet, Take 1,000 Units by mouth daily, Disp: , Rfl:     clobetasol (TEMOVATE) 0 05 % cream, , Disp: , Rfl:     Coenzyme Q10 (COQ-10) 200 MG CAPS, Take 2 capsules by mouth daily, Disp: , Rfl:     famotidine (PEPCID) 40 MG tablet, take 1 tablet by mouth twice a day, Disp: 60 tablet, Rfl: 3    furosemide (LASIX) 20 mg tablet, Take 1 tablet by mouth daily, Disp: , Rfl:     gabapentin (NEURONTIN) 100 mg capsule, take 1 capsule by mouth three times a day, Disp: 90 capsule, Rfl: 2    gabapentin (NEURONTIN) 300 mg capsule, Take 300 mg by mouth 2 (two) times a day, Disp: , Rfl: 0    hydroxychloroquine (PLAQUENIL) 200 mg tablet, Take 1 tablet (200 mg total) by mouth 2 (two) times a day with meals, Disp: 60 tablet, Rfl: 11    Lactobacillus (ACIDOPHILUS PO), Take by mouth, Disp: , Rfl:     metoprolol succinate (TOPROL-XL) 25 mg 24 hr tablet, take 1 tablet by mouth once daily, Disp: 30 tablet, Rfl: 5    Multiple Vitamins-Minerals (DAILY MULTIVITAMIN PO), Take 1 tablet by mouth daily, Disp: , Rfl:     nitroglycerin (NITROSTAT) 0 4 mg SL tablet, place 1 tablet under the tongue if needed every 5 minutes for chest pain for 3 doses IF NO RELIEF AFTER FIRST DOSE CALL PRESCRIBER  , Disp: 25 tablet, Rfl: 0    omega-3-acid ethyl esters (LOVAZA) 1 g capsule, Take 2 capsules (2 g total) by mouth 2 (two) times a day, Disp: 120 capsule, Rfl: 5    polyethylene glycol-electrolytes (NULYTELY) 4000 mL solution, take by mouth as directed  FOR COLONOSCOPY, Disp: , Rfl: 0    pravastatin (PRAVACHOL) 10 mg tablet, take 1 tablet by mouth ON MONDAY, WEDNESDAY AND FRIDAY, Disp: 30 tablet, Rfl: 5    PRAVASTATIN SODIUM PO, Take by mouth 3 (three) times a week Indications: pt unsure of dose taken at home   , Disp: , Rfl:     ramipril (ALTACE) 5 mg capsule, take 1 capsule by mouth twice a day, Disp: 60 capsule, Rfl: 0    sertraline (ZOLOFT) 25 mg tablet, , Disp: , Rfl:     sotalol (BETAPACE) 80 mg tablet, take 1 tablet by mouth every 12 hours, Disp: 60 tablet, Rfl: 5    gabapentin (NEURONTIN) 100 mg capsule, Take 1 capsule (100 mg total) by mouth 3 (three) times a day for 30 days, Disp: 90 capsule, Rfl: 0    gabapentin (NEURONTIN) 300 mg capsule, Take 1 capsule (300 mg total) by mouth 2 (two) times a day for 30 days, Disp: 60 capsule, Rfl: 1      Objective:    Vitals:    09/30/19 1432   BP: 167/95   BP Location: Right arm   Patient Position: Sitting   Cuff Size: Extra-Large   Pulse: 90   Weight: 99 2 kg (218 lb 12 8 oz)   Height: 5' 5" (1 651 m)       Physical Exam  General: Well appearing, well nourished, in no distress  Oriented x 3, normal mood and affect  Ambulating with aid of a cane  Skin: Good turgor, no rash, unusual bruising or prominent lesions  Hair: Normal texture and distribution  Nails: Normal color, no deformities  HEENT:  Head: Normocephalic, atraumatic  Eyes: Conjunctiva clear, sclera non-icteric, EOM intact  Nose: No external lesions, mucosa non-inflamed  Mouth: Mucous membranes moist, no mucosal lesions  Extremities: No amputations or deformities, cyanosis  Musculoskeletal:   Hands - there is synovitis appreciated at her right hand 3rd and 4th PIPs  There are mild deformities noted at the same joints  She has osteoarthritic changes present at her DIPs and PIPs, as well as at her bilateral 1st IP joints  There is significant soft tissue swelling present at her right thumb MCP without significant tenderness  She has mild fixed flexion deformities present at her bilateral 4th PIPs  There is no soft tissue swelling or tenderness appreciated at her MCPs bilaterally  No ulnar deviation noted  Wrists - there is mild puffiness noted at her bilateral wrists, but no obvious soft tissue swelling or tenderness appreciated  She has full range of motion bilaterally  Elbows - there is mild synovial hypertrophy noted at the left elbow, but there is no tenderness or restriction in range of motion  Shoulders - there is no tenderness or soft tissue swelling noted  Knees - left knee status post total knee replacement  She has bony enlargement and patellar shift at the right knee with difficulty in accurately palpating the joint space  Crepitus is present  There is no tenderness  Ankles - unremarkable  Feet - mildly positive MTP squeeze test bilaterally  Neurologic: Alert and oriented  No focal neurological deficits appreciated  Psychiatric: Normal mood and affect  DOMINIQUE Ko    Rheumatology

## 2019-10-02 ENCOUNTER — OFFICE VISIT (OUTPATIENT)
Dept: FAMILY MEDICINE CLINIC | Facility: CLINIC | Age: 79
End: 2019-10-02
Payer: COMMERCIAL

## 2019-10-02 VITALS
HEIGHT: 65 IN | SYSTOLIC BLOOD PRESSURE: 138 MMHG | DIASTOLIC BLOOD PRESSURE: 88 MMHG | BODY MASS INDEX: 36.15 KG/M2 | HEART RATE: 80 BPM | TEMPERATURE: 97.8 F | WEIGHT: 217 LBS | RESPIRATION RATE: 16 BRPM

## 2019-10-02 DIAGNOSIS — I48.91 ATRIAL FIBRILLATION, UNSPECIFIED TYPE (HCC): ICD-10-CM

## 2019-10-02 DIAGNOSIS — G47.33 OSA (OBSTRUCTIVE SLEEP APNEA): ICD-10-CM

## 2019-10-02 DIAGNOSIS — I10 ESSENTIAL HYPERTENSION: Chronic | ICD-10-CM

## 2019-10-02 DIAGNOSIS — R73.03 PREDIABETES: Primary | ICD-10-CM

## 2019-10-02 DIAGNOSIS — E78.2 MIXED HYPERLIPIDEMIA: ICD-10-CM

## 2019-10-02 DIAGNOSIS — Z78.0 POSTMENOPAUSAL: ICD-10-CM

## 2019-10-02 DIAGNOSIS — Z23 FLU VACCINE NEED: ICD-10-CM

## 2019-10-02 PROCEDURE — 3079F DIAST BP 80-89 MM HG: CPT | Performed by: FAMILY MEDICINE

## 2019-10-02 PROCEDURE — 99214 OFFICE O/P EST MOD 30 MIN: CPT | Performed by: FAMILY MEDICINE

## 2019-10-02 PROCEDURE — G0008 ADMIN INFLUENZA VIRUS VAC: HCPCS | Performed by: FAMILY MEDICINE

## 2019-10-02 PROCEDURE — 1101F PT FALLS ASSESS-DOCD LE1/YR: CPT | Performed by: FAMILY MEDICINE

## 2019-10-02 PROCEDURE — 90662 IIV NO PRSV INCREASED AG IM: CPT | Performed by: FAMILY MEDICINE

## 2019-10-02 PROCEDURE — 3075F SYST BP GE 130 - 139MM HG: CPT | Performed by: FAMILY MEDICINE

## 2019-10-02 RX ORDER — APIXABAN 5 MG/1
TABLET, FILM COATED ORAL
Qty: 60 TABLET | Refills: 2 | Status: SHIPPED | OUTPATIENT
Start: 2019-10-02 | End: 2020-01-02

## 2019-10-02 NOTE — PROGRESS NOTES
Assessment/Plan:     Diagnoses and all orders for this visit:    Prediabetes  -     Hemoglobin A1C; Future  Will repeat as a1c 1 year ago 5 8  No diet changes made  Mixed hyperlipidemia  -     Comprehensive metabolic panel; Future  -     CBC; Future  -     Lipid Panel with Direct LDL reflex; Future  Will repeat lipid panel to monitor TAG  Will check cmp  Essential hypertension  -     CBC; Future  BP monitoring requesting at home as previous BP elevated  Advised to monitor 1-2 times a day at the same time and bring log in 1 month  Discussed low sodium diet  Precautions including dizziness, visual disturbance, or headaches  Continue ramipril  MIGUEL (obstructive sleep apnea)  Follow up with sleep medicine  Continue with machine  Postmenopausal  -     DXA bone density spine hip and pelvis; Future    Flu vaccine need  -     influenza vaccine, [id number], high-dose, PF 0 5 mL (FLUZONE HIGH-DOSE)    Atrial fibrillation, unspecified type [facility])  Cardiology appointment made  Continue with sotalol and eliquis  BMI 36 0-36 9,adult    BMI Counseling: Body mass index is 36 11 kg/m²  The BMI is above normal  Nutrition recommendations include reducing portion sizes, 3-5 servings of fruits/vegetables daily, increasing intake of lean protein and reducing intake of cholesterol  Exercise recommendations include exercising 3-5 times per week and strength training exercises  rtc in 1 month for BP check, AVW  Subjective:      Patient ID: Dain Jim is a [de-identified] y o  female  [de-identified] y/o female presents to establish care  HTN: currently on ramipril 5 mg  Patient does not monitor BP at home  Does see a cardiologist which she has an appointment end of October  No diet restrictions  No exercising  HLD: last lipid panel 1 year ago with elevated TAG  Will repeat  Atrial fibrillation: currently on sotalol and eliquis  Cardiology appointment end of October       Sleep Apnea: currently using cpap machine and has been helping, but want her to switch to bipap  Follows up with sleep medicine  No snoring and improvement on apnea as per patient  Prediabetes: A1c: 5 8 one year ago  The following portions of the patient's history were reviewed and updated as appropriate: allergies, current medications, past family history, past medical history, past social history, past surgical history and problem list     Review of Systems   Constitutional: Negative for appetite change and fever  HENT: Negative for ear pain and sore throat  Eyes: Negative for visual disturbance  Respiratory: Negative for shortness of breath  Cardiovascular: Negative for chest pain and leg swelling  Gastrointestinal: Negative for abdominal pain, diarrhea, nausea and vomiting  Musculoskeletal: Negative for arthralgias  Skin: Negative for color change  Neurological: Negative for dizziness, tremors, light-headedness and headaches  Psychiatric/Behavioral: Negative for agitation and behavioral problems  Objective:      /88 (BP Location: Left arm, Patient Position: Sitting, Cuff Size: Extra-Large)   Pulse 80   Temp 97 8 °F (36 6 °C) (Tympanic)   Resp 16   Ht 5' 5" (1 651 m)   Wt 98 4 kg (217 lb)   PF 93 L/min   BMI 36 11 kg/m²          Physical Exam   Constitutional: She is oriented to person, place, and time  She appears well-developed and well-nourished  No distress  Obese     HENT:   Head: Normocephalic and atraumatic  Right Ear: External ear normal    Left Ear: External ear normal    Nose: Nose normal    Mouth/Throat: Oropharynx is clear and moist  No oropharyngeal exudate  Eyes: EOM are normal  Right eye exhibits no discharge  Left eye exhibits no discharge  Neck: Normal range of motion  Neck supple  Cardiovascular: Normal rate, regular rhythm and intact distal pulses  Murmur heard  Pulmonary/Chest: Effort normal and breath sounds normal  No respiratory distress  Abdominal: Soft   Bowel sounds are normal  She exhibits no distension  There is no tenderness  Musculoskeletal: Normal range of motion  She exhibits no edema  Neurological: She is alert and oriented to person, place, and time  Skin: Skin is warm  No rash noted  Psychiatric: She has a normal mood and affect   Her behavior is normal

## 2019-10-07 ENCOUNTER — OFFICE VISIT (OUTPATIENT)
Dept: CARDIOLOGY CLINIC | Facility: CLINIC | Age: 79
End: 2019-10-07
Payer: COMMERCIAL

## 2019-10-07 VITALS
BODY MASS INDEX: 35.99 KG/M2 | HEIGHT: 65 IN | SYSTOLIC BLOOD PRESSURE: 122 MMHG | HEART RATE: 71 BPM | OXYGEN SATURATION: 96 % | WEIGHT: 216 LBS | DIASTOLIC BLOOD PRESSURE: 86 MMHG

## 2019-10-07 DIAGNOSIS — G47.33 OSA (OBSTRUCTIVE SLEEP APNEA): ICD-10-CM

## 2019-10-07 DIAGNOSIS — I48.0 PAF (PAROXYSMAL ATRIAL FIBRILLATION) (HCC): Primary | ICD-10-CM

## 2019-10-07 DIAGNOSIS — I48.0 PAROXYSMAL ATRIAL FIBRILLATION (HCC): ICD-10-CM

## 2019-10-07 DIAGNOSIS — I50.32 CHRONIC DIASTOLIC CONGESTIVE HEART FAILURE (HCC): ICD-10-CM

## 2019-10-07 DIAGNOSIS — I35.1 NONRHEUMATIC AORTIC VALVE INSUFFICIENCY: ICD-10-CM

## 2019-10-07 DIAGNOSIS — E78.2 MIXED HYPERLIPIDEMIA: ICD-10-CM

## 2019-10-07 DIAGNOSIS — I10 ESSENTIAL HYPERTENSION: Chronic | ICD-10-CM

## 2019-10-07 DIAGNOSIS — I50.32 CHRONIC DIASTOLIC (CONGESTIVE) HEART FAILURE (HCC): ICD-10-CM

## 2019-10-07 PROCEDURE — 93000 ELECTROCARDIOGRAM COMPLETE: CPT | Performed by: INTERNAL MEDICINE

## 2019-10-07 PROCEDURE — 99214 OFFICE O/P EST MOD 30 MIN: CPT | Performed by: INTERNAL MEDICINE

## 2019-10-07 NOTE — PROGRESS NOTES
Cardiology Follow Up    Rupert Funk  1940  [id number]    Interval History: Ms Elias Hernandez is here for follow up of atrial fibrillation and hypertension  She has not returned for followup for a year but has regular remote pacemaker interrogations  She denies any chest pain  Continues to have exertional dyspnea  Denies any LE edema, orthopnea or PND  Symptoms have been present for over a year  No recent episodes of atrial fibrillation (last occurred in April 2019)  She is taking Eliquis regularly along with sotalol  She has a history of atrial fibrillation along with sick sinus syndrome and had a pacemaker inserted at [facility] after developing multiple pauses  She denies any previous syncope or near syncope  Pacemaker was interrogated in September showing no recent atrial fibrillation  Past Medical History:   Diagnosis Date    Arthritis     Atrial fibrillation ([address] )     Cardiac disease     Cataract     Gastro-esophageal reflux     GERD    Hypertension      Social History     Socioeconomic History    Marital status:       Spouse name: Not on file    Number of children: Not on file    Years of education: Not on file    Highest education level: Not on file   Occupational History    Not on file   Social Needs    Financial resource strain: Not on file    Food insecurity:     Worry: Not on file     Inability: Not on file    Transportation needs:     Medical: Not on file     Non-medical: Not on file   Tobacco Use    Smoking status: Former Smoker     Packs/day: 1 00     Years: 25 00     Pack years: 25 00     Types: Cigarettes    Smokeless tobacco: Never Used    Tobacco comment: quit 22 yeears ago   Substance and Sexual Activity    Alcohol use: Yes     Comment: occasional, No alcohol use,per [employer]    Drug use: No    Sexual activity: Not on file   Lifestyle    Physical activity:     Days per week: Not on file     Minutes per session: Not on file    Stress: Not on file   Relationships    Social connections:     Talks on phone: Not on file     Gets together: Not on file     Attends Mormon service: Not on file     Active member of club or organization: Not on file     Attends meetings of clubs or organizations: Not on file     Relationship status: Not on file    Intimate partner violence:     Fear of current or ex partner: Not on file     Emotionally abused: Not on file     Physically abused: Not on file     Forced sexual activity: Not on file   Other Topics Concern    Not on file   Social History Narrative    Exercise: Walking, 2x/week      Family History   Problem Relation Age of Onset    Coronary artery disease Father     Diabetes Sister     Hyperlipidemia Sister     Arthritis Family     Hypertension Family      Past Surgical History:   Procedure Laterality Date    CARDIAC PACEMAKER PLACEMENT Left 2014    CATARACT EXTRACTION      CHOLECYSTECTOMY      resolved: 2009    COLONOSCOPY      Fiberoptic, resolved 2015    EYE SURGERY      KNEE SURGERY Left     left kknee replacement in 2009       Current Outpatient Medications:     acetaminophen (TYLENOL ARTHRITIS PAIN) 650 mg CR tablet, Take by mouth, Disp: , Rfl:     Calcium Carbonate-Vitamin D 600-200 MG-UNIT CAPS, Take by mouth 2 (two) times a day, Disp: , Rfl:     cholecalciferol (VITAMIN D3) 1,000 units tablet, Take 1,000 Units by mouth daily, Disp: , Rfl:     clobetasol (TEMOVATE) 0 05 % cream, , Disp: , Rfl:     Coenzyme Q10 (COQ-10) 200 MG CAPS, Take 2 capsules by mouth daily, Disp: , Rfl:     ELIQUIS 5 MG, take 1 tablet by mouth twice a day, Disp: 60 tablet, Rfl: 2    famotidine (PEPCID) 40 MG tablet, take 1 tablet by mouth twice a day, Disp: 60 tablet, Rfl: 3    furosemide (LASIX) 20 mg tablet, Take 1 tablet by mouth daily, Disp: , Rfl:     gabapentin (NEURONTIN) 100 mg capsule, take 1 capsule by mouth three times a day (Patient taking differently: 2 (two) times a day ), Disp: 90 capsule, Rfl: 2    hydroxychloroquine (PLAQUENIL) 200 mg tablet, Take 1 tablet (200 mg total) by mouth 2 (two) times a day with meals, Disp: 60 tablet, Rfl: 11    Lactobacillus (ACIDOPHILUS PO), Take by mouth, Disp: , Rfl:     metoprolol succinate (TOPROL-XL) 25 mg 24 hr tablet, take 1 tablet by mouth once daily, Disp: 30 tablet, Rfl: 5    Multiple Vitamins-Minerals (DAILY MULTIVITAMIN PO), Take 1 tablet by mouth daily, Disp: , Rfl:     nitroglycerin (NITROSTAT) 0 4 mg SL tablet, place 1 tablet under the tongue if needed every 5 minutes for chest pain for 3 doses IF NO RELIEF AFTER FIRST DOSE CALL PRESCRIBER  , Disp: 25 tablet, Rfl: 0    omega-3-acid ethyl esters (LOVAZA) 1 g capsule, Take 2 capsules (2 g total) by mouth 2 (two) times a day, Disp: 120 capsule, Rfl: 5    pravastatin (PRAVACHOL) 10 mg tablet, take 1 tablet by mouth ON MONDAY, WEDNESDAY AND FRIDAY, Disp: 30 tablet, Rfl: 5    ramipril (ALTACE) 5 mg capsule, take 1 capsule by mouth twice a day, Disp: 60 capsule, Rfl: 0    sertraline (ZOLOFT) 25 mg tablet, , Disp: , Rfl:     sotalol (BETAPACE) 80 mg tablet, take 1 tablet by mouth every 12 hours, Disp: 60 tablet, Rfl: 5    gabapentin (NEURONTIN) 300 mg capsule, Take 1 capsule (300 mg total) by mouth 2 (two) times a day for 30 days, Disp: 60 capsule, Rfl: 1    polyethylene glycol-electrolytes (NULYTELY) 4000 mL solution, take by mouth as directed  FOR COLONOSCOPY, Disp: , Rfl: 0  Allergies   Allergen Reactions    Ciprofloxacin Rash    Sulfa Antibiotics Rash    Synvisc [Hylan G-F 20] Rash       Labs:  Lab Results   Component Value Date     09/10/2013    K 4 6 12/24/2018    K 4 0 09/10/2013     12/24/2018     09/10/2013    CO2 33 (H) 12/24/2018    CO2 29 09/10/2013    BUN 20 12/24/2018    BUN 26 09/10/2013    CREATININE 0 84 12/24/2018    CREATININE 1 3 09/10/2013    CALCIUM 9 2 12/24/2018    CALCIUM 8 9 09/10/2013     Lab Results Component Value Date    WBC 4 70 12/24/2018    WBC 5 4 09/10/2013    HGB 14 0 12/24/2018    HGB 14 2 09/10/2013    HCT 43 6 12/24/2018    HCT 42 7 09/10/2013    MCV 95 12/24/2018    MCV 88 8 09/10/2013     12/24/2018     09/10/2013     Lab Results   Component Value Date    CHOL 187 09/10/2013    TRIG 155 (H) 09/17/2018    TRIG 166 09/10/2013    HDL 36 (L) 09/17/2018    HDL 37 09/10/2013     Imaging: No results found  EKG:  Atrial paced rhythm with Q waves V1 and V2    Review of Systems:  Review of Systems   Constitutional: Positive for fatigue  Negative for chills and fever  HENT: Negative for congestion, nosebleeds and postnasal drip  Respiratory: Positive for shortness of breath  Negative for cough and chest tightness  Cardiovascular: Negative for chest pain, palpitations and leg swelling  Gastrointestinal: Negative for abdominal distention, abdominal pain, diarrhea, nausea and vomiting  Endocrine: Negative for polydipsia, polyphagia and polyuria  Musculoskeletal: Negative for gait problem and myalgias  Skin: Negative for color change, pallor and rash  Allergic/Immunologic: Negative for environmental allergies, food allergies and immunocompromised state  Neurological: Negative for dizziness, seizures, syncope and light-headedness  Hematological: Negative for adenopathy  Does not bruise/bleed easily  Psychiatric/Behavioral: Negative for dysphoric mood  The patient is not nervous/anxious  Physical Exam:  /86 (BP Location: Left arm, Patient Position: Sitting, Cuff Size: Large)   Pulse 71   Ht 5' 5" (1 651 m)   Wt 98 kg (216 lb)   SpO2 96%   BMI 35 94 kg/m²     Physical Exam   Constitutional: She is oriented to person, place, and time  She appears well-developed  No distress  HENT:   Head: Normocephalic and atraumatic  Eyes: Pupils are equal, round, and reactive to light  Conjunctivae and EOM are normal    Neck: Neck supple  No JVD present   No thyromegaly present  Cardiovascular: Normal rate and regular rhythm  Exam reveals no gallop and no friction rub  Murmur heard  Pulmonary/Chest: Effort normal and breath sounds normal    Abdominal: Soft  She exhibits no distension  There is no tenderness  Musculoskeletal: She exhibits no edema  Neurological: She is alert and oriented to person, place, and time  No cranial nerve deficit  Skin: Skin is warm and dry  No rash noted  She is not diaphoretic  No erythema  Psychiatric: She has a normal mood and affect  Her behavior is normal  Judgment and thought content normal        Discussion/Summary:  1  Paroxysmal atrial fibrillation (HCC) - currently in sinus  Continue Eliquis and routine pacemaker clinic follow up  - Continue sotalol  QT interval normal   2  Essential hypertension  - BP well controlled on current Rx    - Comprehensive metabolic panel  3  Mixed hyperlipidemia  - Lipid panel  -  Continue pravastatin  4  Chronic diastolic CHF - stable on lasix 20 mg daily   - Discussed risk factor reduction including refraining from smoking, eating a diet high in fruits and vegetables, maintaining a healthy weight, limiting screen time along with controlling BP and cholesterol  Encouraged to exercise 150 minutes a week at a moderate level such as a fast walk or 75 minutes of high intensity  5  Exertional dyspnea/fatigue - stress test and echocardiogram were done last visit  No ischemia present and EF was normal       6  Moderate aortic regurgitation - 2D echocardiogram will be repeated

## 2019-10-08 LAB
ALBUMIN SERPL-MCNC: 4.4 G/DL (ref 3.5–4.7)
ALBUMIN/GLOB SERPL: 2.4 {RATIO} (ref 1.2–2.2)
ALP SERPL-CCNC: 52 IU/L (ref 39–117)
ALT SERPL-CCNC: 20 IU/L (ref 0–32)
AST SERPL-CCNC: 25 IU/L (ref 0–40)
BASOPHILS # BLD AUTO: 0 X10E3/UL (ref 0–0.2)
BASOPHILS NFR BLD AUTO: 0 %
BILIRUB SERPL-MCNC: 0.4 MG/DL (ref 0–1.2)
BUN SERPL-MCNC: 21 MG/DL (ref 8–27)
BUN/CREAT SERPL: 19 (ref 12–28)
CALCIUM SERPL-MCNC: 9.1 MG/DL (ref 8.7–10.3)
CHLORIDE SERPL-SCNC: 100 MMOL/L (ref 96–106)
CHOLEST SERPL-MCNC: 119 MG/DL (ref 100–199)
CO2 SERPL-SCNC: 26 MMOL/L (ref 20–29)
CREAT SERPL-MCNC: 1.1 MG/DL (ref 0.57–1)
EOSINOPHIL # BLD AUTO: 0.1 X10E3/UL (ref 0–0.4)
EOSINOPHIL NFR BLD AUTO: 3 %
ERYTHROCYTE [DISTWIDTH] IN BLOOD BY AUTOMATED COUNT: 15.1 % (ref 12.3–15.4)
GLOBULIN SER-MCNC: 1.8 G/DL (ref 1.5–4.5)
GLUCOSE SERPL-MCNC: 99 MG/DL (ref 65–99)
HBA1C MFR BLD: 5.2 % (ref 4.8–5.6)
HCT VFR BLD AUTO: 35.1 % (ref 34–46.6)
HDLC SERPL-MCNC: 34 MG/DL
HGB BLD-MCNC: 11.9 G/DL (ref 11.1–15.9)
IMM GRANULOCYTES # BLD: 0 X10E3/UL (ref 0–0.1)
IMM GRANULOCYTES NFR BLD: 0 %
LDLC SERPL CALC-MCNC: 60 MG/DL (ref 0–99)
LYMPHOCYTES # BLD AUTO: 0.9 X10E3/UL (ref 0.7–3.1)
LYMPHOCYTES NFR BLD AUTO: 26 %
MCH RBC QN AUTO: 30.6 PG (ref 26.6–33)
MCHC RBC AUTO-ENTMCNC: 33.9 G/DL (ref 31.5–35.7)
MCV RBC AUTO: 90 FL (ref 79–97)
MICRODELETION SYND BLD/T FISH: NORMAL
MICRODELETION SYND BLD/T FISH: NORMAL
MONOCYTES # BLD AUTO: 0.3 X10E3/UL (ref 0.1–0.9)
MONOCYTES NFR BLD AUTO: 10 %
NEUTROPHILS # BLD AUTO: 2.1 X10E3/UL (ref 1.4–7)
NEUTROPHILS NFR BLD AUTO: 61 %
PLATELET # BLD AUTO: 111 X10E3/UL (ref 150–450)
POTASSIUM SERPL-SCNC: 4.6 MMOL/L (ref 3.5–5.2)
PROT SERPL-MCNC: 6.2 G/DL (ref 6–8.5)
RBC # BLD AUTO: 3.89 X10E6/UL (ref 3.77–5.28)
SL AMB EGFR AFRICAN AMERICAN: 55 ML/MIN/1.73
SL AMB EGFR NON AFRICAN AMERICAN: 48 ML/MIN/1.73
SL AMB PDF IMAGE: NORMAL
SODIUM SERPL-SCNC: 141 MMOL/L (ref 134–144)
TRIGL SERPL-MCNC: 123 MG/DL (ref 0–149)
WBC # BLD AUTO: 3.4 X10E3/UL (ref 3.4–10.8)

## 2019-10-09 ENCOUNTER — TELEPHONE (OUTPATIENT)
Dept: FAMILY MEDICINE CLINIC | Facility: CLINIC | Age: 79
End: 2019-10-09

## 2019-10-09 NOTE — TELEPHONE ENCOUNTER
Health Maintenance Summary     Topic Due On Due Status Completed On    Colorectal Cancer Screening - Colonoscopy Mar 24, 2025 Not Due Mar 24, 2015    Diabetes Eye Exam May 1, 2018 Not Due May 1, 2017    Glycohemoglobin A1C  (Diabetes Sugar)  Apr 20, 2017 Overdue Jan 20, 2017    Diabetes Foot Exam  Feb 24, 2018 Not Due Feb 24, 2017    IMMUNIZATION - DTaP/Tdap/Td Nov 6, 2020 Not Due Nov 6, 2010    Immunization-Influenza Sep 1, 2017 Not Due Oct 1, 2016          Patient is due for topics as listed above, he wishes to discuss with provider .     Patient missed your call

## 2019-10-10 DIAGNOSIS — D69.1 PLATELET DYSFUNCTION (HCC): ICD-10-CM

## 2019-10-10 DIAGNOSIS — R79.89 ELEVATED SERUM CREATININE: Primary | ICD-10-CM

## 2019-10-18 ENCOUNTER — TELEPHONE (OUTPATIENT)
Dept: CARDIOLOGY CLINIC | Facility: CLINIC | Age: 79
End: 2019-10-18

## 2019-10-18 DIAGNOSIS — I10 ESSENTIAL HYPERTENSION: ICD-10-CM

## 2019-10-21 RX ORDER — RAMIPRIL 5 MG/1
CAPSULE ORAL
Qty: 60 CAPSULE | Refills: 5 | Status: SHIPPED | OUTPATIENT
Start: 2019-10-21 | End: 2020-02-13 | Stop reason: SDUPTHER

## 2019-10-23 ENCOUNTER — HOSPITAL ENCOUNTER (OUTPATIENT)
Dept: NON INVASIVE DIAGNOSTICS | Facility: HOSPITAL | Age: 79
Discharge: HOME | End: 2019-10-23
Attending: INTERNAL MEDICINE
Payer: COMMERCIAL

## 2019-10-23 ENCOUNTER — TELEPHONE (OUTPATIENT)
Dept: CARDIOLOGY CLINIC | Facility: CLINIC | Age: 79
End: 2019-10-23

## 2019-10-23 DIAGNOSIS — I35.1 NONRHEUMATIC AORTIC VALVE INSUFFICIENCY: ICD-10-CM

## 2019-10-23 PROCEDURE — 93306 TTE W/DOPPLER COMPLETE: CPT | Performed by: INTERNAL MEDICINE

## 2019-10-23 PROCEDURE — 93306 TTE W/DOPPLER COMPLETE: CPT

## 2019-10-23 NOTE — TELEPHONE ENCOUNTER
----- Message from Kentrell Reece DO sent at 10/23/2019  3:39 PM EDT -----  Can you please let the patient know echocardiogram was unchanged from her last one

## 2019-10-27 DIAGNOSIS — K21.9 GASTROESOPHAGEAL REFLUX DISEASE WITHOUT ESOPHAGITIS: ICD-10-CM

## 2019-10-30 RX ORDER — FAMOTIDINE 40 MG/1
TABLET, FILM COATED ORAL
Qty: 60 TABLET | Refills: 2 | Status: SHIPPED | OUTPATIENT
Start: 2019-10-30 | End: 2021-05-06

## 2019-11-01 ENCOUNTER — HOSPITAL ENCOUNTER (OUTPATIENT)
Dept: RADIOLOGY | Facility: HOSPITAL | Age: 79
Discharge: HOME | End: 2019-11-01
Attending: FAMILY MEDICINE
Payer: COMMERCIAL

## 2019-11-01 DIAGNOSIS — Z78.0 POSTMENOPAUSAL: ICD-10-CM

## 2019-11-01 PROCEDURE — 77080 DXA BONE DENSITY AXIAL: CPT

## 2019-11-22 RX ORDER — PANTOPRAZOLE SODIUM 40 MG/1
40 TABLET, DELAYED RELEASE ORAL DAILY
Refills: 0 | COMMUNITY
Start: 2019-10-15 | End: 2020-08-18 | Stop reason: ALTCHOICE

## 2019-11-25 ENCOUNTER — OFFICE VISIT (OUTPATIENT)
Dept: FAMILY MEDICINE CLINIC | Facility: CLINIC | Age: 79
End: 2019-11-25
Payer: COMMERCIAL

## 2019-11-25 VITALS
TEMPERATURE: 98.3 F | WEIGHT: 214.4 LBS | SYSTOLIC BLOOD PRESSURE: 130 MMHG | HEIGHT: 65 IN | BODY MASS INDEX: 35.72 KG/M2 | RESPIRATION RATE: 16 BRPM | HEART RATE: 76 BPM | DIASTOLIC BLOOD PRESSURE: 80 MMHG

## 2019-11-25 DIAGNOSIS — R79.89 ELEVATED SERUM CREATININE: ICD-10-CM

## 2019-11-25 DIAGNOSIS — L25.3 CONTACT DERMATITIS DUE TO OTHER CHEMICAL PRODUCT, UNSPECIFIED CONTACT DERMATITIS TYPE: ICD-10-CM

## 2019-11-25 DIAGNOSIS — Z00.00 MEDICARE ANNUAL WELLNESS VISIT, INITIAL: Primary | ICD-10-CM

## 2019-11-25 PROCEDURE — 3725F SCREEN DEPRESSION PERFORMED: CPT | Performed by: FAMILY MEDICINE

## 2019-11-25 PROCEDURE — 4040F PNEUMOC VAC/ADMIN/RCVD: CPT | Performed by: FAMILY MEDICINE

## 2019-11-25 PROCEDURE — 1160F RVW MEDS BY RX/DR IN RCRD: CPT | Performed by: FAMILY MEDICINE

## 2019-11-25 PROCEDURE — G0438 PPPS, INITIAL VISIT: HCPCS | Performed by: FAMILY MEDICINE

## 2019-11-25 RX ORDER — BETAMETHASONE DIPROPIONATE 0.5 MG/G
CREAM TOPICAL 2 TIMES DAILY
Qty: 45 G | Refills: 2 | Status: SHIPPED | OUTPATIENT
Start: 2019-11-25

## 2019-11-25 NOTE — PROGRESS NOTES
Assessment and Plan:     Problem List Items Addressed This Visit     None      Visit Diagnoses     Medicare annual wellness visit, initial    -  Primary    Contact dermatitis due to other chemical product, unspecified contact dermatitis type        Relevant Medications    betamethasone dipropionate (DIPROSONE) 0 05 % cream    Elevated serum creatinine                Contact dermatitis medications refilled at this time; rash is not present  Reviewed elevated creatinine/low platelet levels  Encouraged to repeat CBC and CMP  Will address appropriately once results are in  Encouraged hydration  Review DEXA scan results with patient      Preventive health issues were discussed with patient, and age appropriate screening tests were ordered as noted in patient's After Visit Summary  Personalized health advice and appropriate referrals for health education or preventive services given if needed, as noted in patient's After Visit Summary  History of Present Illness:     Patient presents for Medicare Annual Wellness visit  Would like to review results  Would like contact dermtitis refill of her cream  Which works amazing  Usually 2/2 to cleaning agents  Dr Lupillo Landry Rheum  Dr Kim Herrera Sleep Study  Dr Joseph Rizzo PCP  Dr Matteo Garrison GI-> recent EGD/ streching performed to help with swallowing    Dr Bridgett Keith Podiatry  Dr Keara Nicole Cardiology    Patient Care Team:  Boo Truong MD as PCP - General (Rheumatology)  MD Ronaldo Rodriguez DO     Problem List:     Patient Active Problem List   Diagnosis    Hypertension    Cardiac disease    Hyperlipidemia    Paroxysmal atrial fibrillation ([address] )    Chronic diastolic congestive heart failure ([address] )    Peripheral arteriosclerosis ([address]    Pain in both feet    Corns    Onychomycosis    Radiculopathy of lumbar region    Nonrheumatic aortic valve insufficiency    Joint pain    Acute pain of left shoulder    Other insomnia    Gastroesophageal reflux disease without esophagitis    MIGUEL (obstructive sleep apnea)    Fatigue    Claustrophobia    Mood disturbance    Obesity (BMI 30-39  9)    Prediabetes      Past Medical and Surgical History:     Past Medical History:   Diagnosis Date    Arthritis     Atrial fibrillation ([address] )     Cardiac disease     Cataract     Gastro-esophageal reflux     GERD    Hypertension      Past Surgical History:   Procedure Laterality Date    CARDIAC PACEMAKER PLACEMENT Left 2014    CATARACT EXTRACTION      CHOLECYSTECTOMY      resolved: 2009    COLONOSCOPY      Fiberoptic, resolved 2015    EYE SURGERY      KNEE SURGERY Left     left kknee replacement in 2009      Family History:     Family History   Problem Relation Age of Onset    Coronary artery disease Father     Diabetes Sister     Hyperlipidemia Sister     Arthritis Family     Hypertension Family       Social History:     Social History     Socioeconomic History    Marital status:       Spouse name: None    Number of children: None    Years of education: None    Highest education level: None   Occupational History    None   Social Needs    Financial resource strain: None    Food insecurity:     Worry: None     Inability: None    Transportation needs:     Medical: None     Non-medical: None   Tobacco Use    Smoking status: Former Smoker     Packs/day: 1 00     Years: 25 00     Pack years: 25 00     Types: Cigarettes    Smokeless tobacco: Never Used    Tobacco comment: quit 22 yeears ago   Substance and Sexual Activity    Alcohol use: Yes     Comment: occasional, No alcohol use,per [employer]    Drug use: No    Sexual activity: None   Lifestyle    Physical activity:     Days per week: None     Minutes per session: None    Stress: None   Relationships    Social connections:     Talks on phone: None     Gets together: None     Attends Taoism service: None     Active member of club or organization: None     Attends meetings of clubs or organizations: None     Relationship status: None    Intimate partner violence:     Fear of current or ex partner: None     Emotionally abused: None     Physically abused: None     Forced sexual activity: None   Other Topics Concern    None   Social History Narrative    Exercise: Walking, 2x/week       Medications and Allergies:     Current Outpatient Medications   Medication Sig Dispense Refill    acetaminophen (TYLENOL ARTHRITIS PAIN) 650 mg CR tablet Take by mouth      Calcium Carbonate-Vitamin D 600-200 MG-UNIT CAPS Take by mouth 2 (two) times a day      cholecalciferol (VITAMIN D3) 1,000 units tablet Take 1,000 Units by mouth daily      clobetasol (TEMOVATE) 0 05 % cream       Coenzyme Q10 (COQ-10) 200 MG CAPS Take 2 capsules by mouth daily      ELIQUIS 5 MG take 1 tablet by mouth twice a day 60 tablet 2    famotidine (PEPCID) 40 MG tablet take 1 tablet by mouth twice a day 60 tablet 2    furosemide (LASIX) 20 mg tablet Take 1 tablet by mouth daily      gabapentin (NEURONTIN) 100 mg capsule take 1 capsule by mouth three times a day (Patient taking differently: 2 (two) times a day ) 90 capsule 2    hydroxychloroquine (PLAQUENIL) 200 mg tablet Take 1 tablet (200 mg total) by mouth 2 (two) times a day with meals 60 tablet 11    Lactobacillus (ACIDOPHILUS PO) Take by mouth      metoprolol succinate (TOPROL-XL) 25 mg 24 hr tablet take 1 tablet by mouth once daily 30 tablet 5    Multiple Vitamins-Minerals (DAILY MULTIVITAMIN PO) Take 1 tablet by mouth daily      nitroglycerin (NITROSTAT) 0 4 mg SL tablet place 1 tablet under the tongue if needed every 5 minutes for chest pain for 3 doses IF NO RELIEF AFTER FIRST DOSE CALL PRESCRIBER   25 tablet 0    omega-3-acid ethyl esters (LOVAZA) 1 g capsule Take 2 capsules (2 g total) by mouth 2 (two) times a day 120 capsule 5    pantoprazole (PROTONIX) 40 mg tablet Take 40 mg by mouth daily  0    polyethylene glycol-electrolytes (NULYTELY) 4000 mL solution take by mouth as directed  FOR COLONOSCOPY  0    pravastatin (PRAVACHOL) 10 mg tablet take 1 tablet by mouth ON MONDAY, WEDNESDAY AND FRIDAY 30 tablet 5    ramipril (ALTACE) 5 mg capsule take 1 capsule by mouth twice a day 60 capsule 5    sertraline (ZOLOFT) 25 mg tablet       sotalol (BETAPACE) 80 mg tablet take 1 tablet by mouth every 12 hours 60 tablet 5    betamethasone dipropionate (DIPROSONE) 0 05 % cream Apply topically 2 (two) times a day 45 g 2    gabapentin (NEURONTIN) 300 mg capsule Take 1 capsule (300 mg total) by mouth 2 (two) times a day for 30 days 60 capsule 1     No current facility-administered medications for this visit  Allergies   Allergen Reactions    Ciprofloxacin Rash    Sulfa Antibiotics Rash    Synvisc [Hylan G-F 20] Rash      Immunizations:     Immunization History   Administered Date(s) Administered    Influenza Split High Dose Preservative Free IM 09/18/2013, 10/15/2015    Influenza, high dose seasonal 0 5 mL 10/02/2019    Pneumococcal Conjugate 13-Valent 11/20/2019    Pneumococcal Polysaccharide PPV23 11/17/2005, 09/18/2013, 06/16/2018      Health Maintenance:         Topic Date Due    CRC Screening: Colonoscopy  02/05/2022     There are no preventive care reminders to display for this patient  Medicare Health Risk Assessment:     Blood Pressure 130/80 (BP Location: Left arm, Patient Position: Sitting, Cuff Size: Standard)   Pulse 76   Temperature 98 3 °F (36 8 °C)   Respiration 16   Height 5' 5" (1 651 m)   Weight 97 3 kg (214 lb 6 4 oz)   Body Mass Index 35 68 kg/m²      Yumiko is here for her Initial Wellness visit  Health Risk Assessment:   Patient rates overall health as good  Patient feels that their physical health rating is same  Eyesight was rated as same  Hearing was rated as same  Patient feels that their emotional and mental health rating is same  Pain experienced in the last 7 days has been some  Patient's pain rating has been 6/10  Arthritis neuropathy; knee/legs/ and hands    Depression Screening:   PHQ-2 Score: 0      Fall Risk Screening: In the past year, patient has experienced: no history of falling in past year      Urinary Incontinence Screening:   Patient has leaked urine accidently in the last six months  History of vaginal deliveries  Home Safety:  Patient has trouble with stairs inside or outside of their home  Patient has working smoke alarms and has working carbon monoxide detector  Home safety hazards include: none  Unstable 2/2 to arthritis  Nutrition:   Current diet is Regular  Medications:   Patient is currently taking over-the-counter supplements  OTC medications include: see medication list tylenol PM  Patient is able to manage medications  Activities of Daily Living (ADLs)/Instrumental Activities of Daily Living (IADLs):   Walk and transfer into and out of bed and chair?: Yes  Dress and groom yourself?: Yes    Bathe or shower yourself?: Yes    Feed yourself? Yes  Do your laundry/housekeeping?: Yes  Manage your money, pay your bills and track your expenses?: Yes  Make your own meals?: Yes    Do your own shopping?: Yes    Previous Hospitalizations:   Any hospitalizations or ED visits within the last 12 months?: No      Advance Care Planning:   Living will: Yes    Durable POA for healthcare:  Yes    Advanced directive: Yes    Advanced directive counseling given: Yes    End of Life Decisions reviewed with patient: Yes    Provider agrees with end of life decisions: Yes      Cognitive Screening:   Provider or family/friend/caregiver concerned regarding cognition?: No    PREVENTIVE SCREENINGS      Cardiovascular Screening:    General: History Lipid Disorder and Screening Current      Diabetes Screening:     General: Screening Current      Colorectal Cancer Screening:     General: Screening Current      Breast Cancer Screening:     General: Screening Not Indicated      Cervical Cancer Screening:    General: Screening Not Indicated      Osteoporosis Screening:    General: Screening Current      Abdominal Aortic Aneurysm (AAA) Screening:        General: Screening Not Indicated      Lung Cancer Screening:     General: Screening Not Indicated      Hepatitis C Screening:    General: Risks and Benefits Discussed    Physical Exam   Constitutional: She is oriented to person, place, and time  She appears well-developed and well-nourished  HENT:   Head: Normocephalic and atraumatic  Right Ear: External ear normal    Left Ear: External ear normal    Nose: Nose normal    Mouth/Throat: Oropharynx is clear and moist    Eyes: Pupils are equal, round, and reactive to light  Conjunctivae and EOM are normal    Neck: Normal range of motion  Neck supple  Cardiovascular: Normal rate, regular rhythm, normal heart sounds and intact distal pulses  No murmur heard  Pulmonary/Chest: Effort normal and breath sounds normal  No respiratory distress  She has no wheezes  Abdominal: Soft  Bowel sounds are normal  She exhibits no distension  There is no tenderness  Musculoskeletal: Normal range of motion  She exhibits no edema  Normal gait with use of cane   Neurological: She is alert and oriented to person, place, and time  She displays normal reflexes  No cranial nerve deficit  She exhibits normal muscle tone  Coordination normal    Skin: Skin is warm  No rash noted     Rash currently not present   Psychiatric: Her behavior is normal          Arnav Sears MD

## 2019-12-09 ENCOUNTER — IN-CLINIC DEVICE VISIT (OUTPATIENT)
Dept: CARDIOLOGY CLINIC | Facility: CLINIC | Age: 79
End: 2019-12-09
Payer: COMMERCIAL

## 2019-12-09 DIAGNOSIS — I49.5 SSS (SICK SINUS SYNDROME) (HCC): ICD-10-CM

## 2019-12-09 DIAGNOSIS — I48.0 PAROXYSMAL ATRIAL FIBRILLATION (HCC): Primary | ICD-10-CM

## 2019-12-09 DIAGNOSIS — Z95.0 PRESENCE OF PERMANENT CARDIAC PACEMAKER: ICD-10-CM

## 2019-12-09 PROCEDURE — 93280 PM DEVICE PROGR EVAL DUAL: CPT | Performed by: INTERNAL MEDICINE

## 2019-12-09 NOTE — PROGRESS NOTES
MDT DUAL PM  DEVICE INTERROGATED IN THE [facility] OFFICE:  BATTERY VOLTAGE ADEQUATE (4 YR)   AP 99 1%  0 1%    ALL LEAD PARAMETERS WITHIN NORMAL LIMITS   NO NEW SIGNIFICANT HIGH RATE EPISODES   NO PROGRAMMING CHANGES MADE TO DEVICE PARAMETERS   NORMAL DEVICE FUNCTION  Brisa Rangel

## 2020-01-01 DIAGNOSIS — I48.91 ATRIAL FIBRILLATION, UNSPECIFIED TYPE (HCC): ICD-10-CM

## 2020-01-02 RX ORDER — APIXABAN 5 MG/1
TABLET, FILM COATED ORAL
Qty: 60 TABLET | Refills: 0 | Status: SHIPPED | OUTPATIENT
Start: 2020-01-02 | End: 2020-02-04

## 2020-01-03 ENCOUNTER — OFFICE VISIT (OUTPATIENT)
Dept: PODIATRY | Facility: CLINIC | Age: 80
End: 2020-01-03
Payer: COMMERCIAL

## 2020-01-03 VITALS
DIASTOLIC BLOOD PRESSURE: 76 MMHG | BODY MASS INDEX: 35.65 KG/M2 | SYSTOLIC BLOOD PRESSURE: 132 MMHG | RESPIRATION RATE: 16 BRPM | HEIGHT: 65 IN | HEART RATE: 78 BPM | WEIGHT: 214 LBS

## 2020-01-03 DIAGNOSIS — B35.1 ONYCHOMYCOSIS: ICD-10-CM

## 2020-01-03 DIAGNOSIS — M15.9 PRIMARY OSTEOARTHRITIS INVOLVING MULTIPLE JOINTS: ICD-10-CM

## 2020-01-03 DIAGNOSIS — L84 CORNS: ICD-10-CM

## 2020-01-03 DIAGNOSIS — R76.8 RHEUMATOID FACTOR POSITIVE: ICD-10-CM

## 2020-01-03 DIAGNOSIS — M79.672 PAIN IN BOTH FEET: ICD-10-CM

## 2020-01-03 DIAGNOSIS — M79.671 PAIN IN BOTH FEET: ICD-10-CM

## 2020-01-03 DIAGNOSIS — M54.16 RADICULOPATHY OF LUMBAR REGION: ICD-10-CM

## 2020-01-03 DIAGNOSIS — I70.209 PERIPHERAL ARTERIOSCLEROSIS (HCC): Primary | ICD-10-CM

## 2020-01-03 PROCEDURE — 99213 OFFICE O/P EST LOW 20 MIN: CPT | Performed by: PODIATRIST

## 2020-01-03 RX ORDER — HYDROXYCHLOROQUINE SULFATE 200 MG/1
TABLET, FILM COATED ORAL
Qty: 60 TABLET | Refills: 5 | Status: SHIPPED | OUTPATIENT
Start: 2020-01-03 | End: 2020-07-06

## 2020-01-03 NOTE — PROGRESS NOTES
Assessment/Plan:  Pain   Radiculopathy   Callus   Mycotic toenail   Peripheral artery disease         Plan   Foot exam performed   All nails debrided   Calluses debrided   Procedures performed without pain or complication  Patient remain on gabapentin as directed        Discussion/Summary   The patient was counseled regarding instructions for management,-- patient and family education,-- risks and benefits of treatment options     Patient is able to Self-Care     Possible side effects of new medications were reviewed with the patient/guardian today  The treatment plan was reviewed with the patient/guardian  The patient/guardian understands and agrees with the treatment plan      Chief Complaint   Patient has foot pain  She has pain when she wears shoes  No history of trauma         History of Present Illness   HPI:  Patient complains of pain in feet with ambulation  She has pain around the toes  She is also concerned with pain in her right heel  This is been ongoing for several weeks  She has no history of trauma   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   Sushant Schmidt was unable tolerate gabapentin     Review of Systems           Cardiac: chest pain,-- rhythm problems,-- AM fatigue-- and-- witnessed apnea episodes       Skin: No complaints of nonhealing sores or skin rash       Genitourinary: loss of bladder control      Psychological: No complaints of feeling depressed, anxiety, panic attacks, or difficulty concentrating       General: trouble sleeping-- and-- lack of energy/fatigue       Respiratory: shortness of breath       HEENT: snoring      Gastrointestinal: heartburn      Hematologic: anemia      Neurological: daytime sleepiness      Musculoskeletal: arthritis-- and-- back pain      Active Problems   1  Allergic rhinitis (477 9) (J30 9)   2  Anxiety disorder (300 00) (F41 9)   3  Arthropathy (716 90) (M12 9)   4  Atherosclerosis of arteries of extremities (440 20) (I70 209)   5  Atrial fibrillation (427 31) (I48 91)   6  Backache (724 5) (M54 9)   7  Callus (700) (L84)   8  Cervicalgia (723 1) (M54 2)   9  Depression (311) (F32 9)   10  Difficulty in walking (719 7) (R26 2)   11  Encounter for screening for malignant neoplasm of colon (V76 51) (Z12 11)   12  Esophagitis, reflux (530 11) (K21 0)   13  Essential hypertension (401 9) (I10)   14  Foot pain, bilateral (729 5) (M79 671,M79 672)   15  Herpes zoster (053 9) (B02 9)   16  Hospital discharge follow-up (V67 59) (Z09)   17  Hyperlipidemia (272 4) (E78 5)   18  Impaired fasting glucose (790 21) (R73 01)   19  Internal Hemorrhoids (455 0)   20  Leg swelling (729 81) (S18 18)   21  Lichen planus (199 6) (L43 9)   22  Limb pain (729 5) (M79 609)   23  Lumbar radiculopathy (724 4) (M54 16)   24  Multiple joint pain (719 49) (M25 50)   25  Need for influenza vaccination (V04 81) (Z23)   26  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)   27  Onychomycosis (110 1) (B35 1)   28  MIGUEL (obstructive sleep apnea) (327 23) (G47 33)   29  Other abnormal finding of urine (791 9) (R82 99)   30  Pes planus, congenital (754 61) (Q66 50)   31  Plantar fascial fibromatosis (728 71) (M72 2)   32  Rectal/anal hemorrhage (569 3) (K62 5)   33  Screening for malignant neoplasm of cervix (V76 2) (Z12 4)   34  Shoulder joint pain, unspecified laterality   35  Thrombocytopenia (287 5) (D69 6)   36  Urticaria (708 9) (L50 9)   37  Vulvovaginitis candida albicans (112 1) (B37 3)     Past Medical History    · History of Acute maxillary sinusitis (461 0) (J01 00)   · History of Acute upper respiratory infection (465 9) (J06 9)   · History of Cellulitis (682 9) (L03 90)   · History of Cough (786 2) (R05)   · History of Dysuria (788 1) (R30 0)   · History of High cholesterol (272 0) (E78 00)   · History of abdominal pain (V13 89) (T69 109)   · History of acute bronchitis (V12 69) (Z87 09)   · History of acute sinusitis (V12 69) (Z87 09)   · History of arthritis (V13 4) (Z87 39)   · History of atrial fibrillation (V12 59) (Z86 79)   · History of cataract (V12 49) (Z86 69)   · History of gastroesophageal reflux (GERD) (V12 79) (Z87 19)   · History of hypertension (V12 59) (Z86 79)   · History of Skin rash (782 1) (R21)   · History of Vulvovaginitis (616 10) (N76 0)     The active problems and past medical history were reviewed and updated today       Surgical History    · History of Cataract Surgery   · History of Colonoscopy (Fiberoptic) Screening   · History of Gallbladder Surgery   · History of Knee Replacement   · History of Pacemaker Placement     The surgical history was reviewed and updated today        Family History   Father    · Family history of Coronary Artery Disease (V17 49)  Sister    · Family history of Diabetes Mellitus (V18 0)   · Family history of High cholesterol  Family History    · Family history of arthritis (V17 7) (Z82 61)   · Family history of hypertension (V17 49) (Z82 49)     The family history was reviewed and updated today        Social History    · Exercise: Walking   · 2 x/week   · Former smoker (V15 82) (Z87 891)   · No alcohol use   ·   The social history was reviewed and updated today       Physical Exam   Left Foot: Appearance: Normal except as noted: excessive pronation-- and-- pes planus  Great toe deformities include a bunion  Tenderness: None except the great toe-- and-- distal first metatarsal     Right Foot: Appearance: Normal except as noted: excessive pronation-- and-- pes planus  Great toe deformities include a bunion  Tenderness: None except the great toe,-- distal first metatarsal,-- medial calcaneous-- and-- insertion of the plantar fascia     Left Ankle: ROM: limited ROM in all planes    Right Ankle: ROM: limited ROM in all planes    Neurological Exam: Light touch was decreased bilaterally  Vibratory sensation was decreased in both first metatarsophalangeal joints     Vascular Exam: performed Dorsalis pedis pulses were diminished bilaterally   Posterior tibial pulses were diminished bilaterally  Elevation Pallor: present bilaterally  Dependence rubor was present bilaterally  Capillary refill time was greater than 3 seconds bilaterally-- and-- Q  9, findings bilateral  Edema: moderate bilaterally     Toenails: All of the toenails were elongated,-- hypertrophied,-- discolored-- and-- Right ptotic     Hyperkeratosis: present on both first toes,-- present on both first sub metatarsals-- and-- Positive xerosis of skin noted     Shoe Gear Evaluation: performed ()   Recommendation(s): SAS style-- and-- OTC inlays

## 2020-01-11 DIAGNOSIS — E78.5 DYSLIPIDEMIA: ICD-10-CM

## 2020-01-13 RX ORDER — OMEGA-3-ACID ETHYL ESTERS 1 G/1
CAPSULE, LIQUID FILLED ORAL
Qty: 120 CAPSULE | Refills: 0 | Status: SHIPPED | OUTPATIENT
Start: 2020-01-13 | End: 2020-02-11

## 2020-01-23 ENCOUNTER — OFFICE VISIT (OUTPATIENT)
Dept: SLEEP CENTER | Facility: CLINIC | Age: 80
End: 2020-01-23
Payer: COMMERCIAL

## 2020-01-23 VITALS
WEIGHT: 209 LBS | HEIGHT: 65 IN | SYSTOLIC BLOOD PRESSURE: 131 MMHG | DIASTOLIC BLOOD PRESSURE: 87 MMHG | HEART RATE: 92 BPM | BODY MASS INDEX: 34.82 KG/M2

## 2020-01-23 DIAGNOSIS — G47.09 OTHER INSOMNIA: ICD-10-CM

## 2020-01-23 DIAGNOSIS — R06.02 SHORTNESS OF BREATH: ICD-10-CM

## 2020-01-23 DIAGNOSIS — I10 ESSENTIAL HYPERTENSION: ICD-10-CM

## 2020-01-23 DIAGNOSIS — I48.0 PAROXYSMAL ATRIAL FIBRILLATION (HCC): ICD-10-CM

## 2020-01-23 DIAGNOSIS — G47.33 OSA (OBSTRUCTIVE SLEEP APNEA): Primary | ICD-10-CM

## 2020-01-23 DIAGNOSIS — I50.32 CHRONIC DIASTOLIC CONGESTIVE HEART FAILURE (HCC): ICD-10-CM

## 2020-01-23 DIAGNOSIS — G47.34 SLEEP RELATED HYPOXIA: ICD-10-CM

## 2020-01-23 DIAGNOSIS — E66.9 OBESITY (BMI 30-39.9): ICD-10-CM

## 2020-01-23 PROCEDURE — 3075F SYST BP GE 130 - 139MM HG: CPT | Performed by: INTERNAL MEDICINE

## 2020-01-23 PROCEDURE — 3079F DIAST BP 80-89 MM HG: CPT | Performed by: INTERNAL MEDICINE

## 2020-01-23 PROCEDURE — 99214 OFFICE O/P EST MOD 30 MIN: CPT | Performed by: INTERNAL MEDICINE

## 2020-01-23 NOTE — PROGRESS NOTES
Follow[facility]Up Note - [facility]   Yumiko Gaxiola  [de-identified] y o  female  VAV:[birth year]  JDS:[id number]    CC: I saw this patient for follow-up in clinic today for her Sleep Disordered Breathing, Coexisting Sleep and Medical Problems  Had split study: The diagnostic portion demonstrated  : AHI of 60 per hour,  Intermittent snoring of moderate intensity was noted  Minimum oxygen saturation was 53 % and 62 minutes of total sleep time during this portion of the study was spent with saturations less than 90%  During the therapeutic portion of the study, his sleep disordered breathing was partially remediated with nasal CPAP at 15 cm H2O together with supplemental oxygen at 2 liters/minute  The AHI at this setting was 9 6 per hour  PFSH, Problem List, Medications & Allergies were reviewed in EMR  Interval changes: none reported  She  has a past medical history of Arthritis, Atrial fibrillation ([address] ), Cardiac disease, Cataract, Gastro-esophageal reflux, and Hypertension  She has a current medication list which includes the following prescription(s): acetaminophen, betamethasone dipropionate, calcium carbonate-vitamin d, cholecalciferol, clobetasol, coq-10, eliquis, famotidine, furosemide, gabapentin, gabapentin, hydroxychloroquine, lactobacillus, metoprolol succinate, multiple vitamins-minerals, nitroglycerin, omega-3-acid ethyl esters, pantoprazole, polyethylene glycol-electrolytes, pravastatin, ramipril, sertraline, and sotalol  ROS: A 10 point review of systems undertaken (as attached)  Significant for some intentional LOW  +THORPE but no other Resp/ cardiac symptoms  DATA REVIEWED:  using PAP > 4 hours/night 83% of the time  Estimated GELA 1 1/hour at pressure of 16 7cm H2O @90th percentile     SUBJECTIVE: Regarding use of PAP, Yumiko reports:   · She is experiencing some adverse effects: dry mouth and mask dislodges during sleep  · She is   benefiting from use: sleeping better   Sleep Routine: She reports getting 6-7 hrs sleep; she has no difficulty initiating or maintaining sleep   She awakens spontaneously and feels refreshed  Unable to sleep longer even if she tries  She denied excessive drowsiness   She rated herself at Total score: 2 /24 on the [place] sleepiness scale  Habits: reports that she has quit smoking  Her smoking use included cigarettes  She has a 25 00 pack-year smoking history  She has never used smokeless tobacco ,  reports that she drinks alcohol ,  reports that she does not use drugs  , Caffeine use: limited , Exercise routine: none but is physically active  OBJECTIVE: /87   Pulse 92   Ht 5' 5" (1 651 m)   Wt 94 8 kg (209 lb)   BMI 34 78 kg/m²    Constitutional: Patient is well groomed; well appearing  Skin/Extrem: warm & dry; col & hydration normal; no edema  Psych: cooperativeand in no distress  Mental State appears normal   CNS: Alert, orientated, clear & coherent speech  H&N: EOMI; NC/AT:no facial pressure marks, no rashes  ENMT Mucus membranes normal Nasal airway:patent  Oral airway: crowded  Resp:effort is normal CVS: RRR ABD:truncal obesity MSK:Gait normal     ASSESSMENT: Primary Sleep/Secondary(to Medical or Psych conditions) & comorbidities   1  MIGUEL (obstructive sleep apnea)  PAP DME Resupply/Reorder   2  Sleep related hypoxia     3  Other insomnia      Improved   4  Shortness of breath     5  Chronic diastolic congestive heart failure ([address] )     6  Essential hypertension     7  Paroxysmal atrial fibrillation (HCC)     8  Obesity (BMI 30-39  9)       PLAN:  1  Treatment with  PAP is medically necessary and Yumiko is agreable to continue use  2  Care of equipment, methods to improve comfort using PAP and importance of compliance with therapy were discussed  3  Pressure setting: continue 15-18 cmH2O     4  Rx provided to replace supplies and Care coordinated with DME provider  5  Strategies for weight reduction were discussed      6  Follow-up is advised in 1 year or sooner if needed to monitor progress, compliance and to adjust therapy  Thank you for allowing me to participate in the care of this patient      Sincerely,    Authenticated electronically by David Davenport MD on 67/21/46   Board Certified Specialist

## 2020-01-23 NOTE — PROGRESS NOTES
Review of Systems      Genitourinary none   Cardiology none   Gastrointestinal none   Neurology none   Constitutional none   Integumentary itching   Psychiatry none   Musculoskeletal joint pain and sciatica   Pulmonary shortness of breath with activity   ENT throat clearing   Endocrine none   Hematological none

## 2020-01-23 NOTE — PATIENT INSTRUCTIONS

## 2020-01-24 ENCOUNTER — TELEPHONE (OUTPATIENT)
Dept: SLEEP CENTER | Facility: CLINIC | Age: 80
End: 2020-01-24

## 2020-01-29 NOTE — PROGRESS NOTES
Assessment and Plan:   Ms Gaxiola is an 43-year-old female with history significant for seropositive rheumatoid arthritis, osteoarthritis status post left total knee replacement, lumbar radiculopathy and obesity, who presents for follow-up   She is currently on hydroxychloroquine 200 mg daily      # Seropositive rheumatoid arthritis (positive rheumatoid factor), with evidence of erosive arthropathy  # Pruritis  - Yumiko presents today for follow-up of seropositive rheumatoid arthritis which is currently managed with hydroxychloroquine 200 mg once daily  She decreased the dose from twice daily approximately 3 weeks ago, as she felt like this could be contributing to pruritis  An improvement was noted in her symptoms with the dose decrease, but she continues to be symptomatic at her right shoulder region  In view of this I offered her the option of discontinuing the hydroxychloroquine and changing to an alternate DMARD, but she would like to hold off on this for now until workup for the kidney disease is complete  - I advised her in this case to continue the hydroxychloroquine 200 mg once daily, and to treat the local pruritus she can take antihistamines as needed or apply topical cortisone  - In terms of the rheumatoid arthritis, she appears to be most symptomatic at her right hand at this time  As she opts to continue the hydroxychloroquine and Tylenol at this time, she can continue with these measures  At future office visits we can discuss if a change to an alternate DMARD may be more beneficial     - I advised her to follow up with annual ophthalmology exams for the hydroxychloroquine monitoring      # Right knee osteoarthritis  - She was previously advised that she would be a candidate for a total knee replacement surgery, but she would like to avoid this if possible  She did receive some benefit (1-2 months) with the last intra-articular cortisone injection in May 2019    She does not want a repeat injection today  Plan:  Diagnoses and all orders for this visit:    Seropositive rheumatoid arthritis ([address] )    Primary generalized (osteo)arthritis    Long-term use of Plaquenil    Pruritus      Activities as tolerated    Diet: low carb/low fat, more greens/vegetables, adequate hydration  Exercise: try to maintain a low impact exercise regimen as much as possible  Walk for 30 minutes a day for at least 3 days a week    Encouraged to maintain good sleep hygiene  Continue other medications as prescribed by PCP and other specialists        RTC in 3 months          HPI    INITIAL VISIT NOTE:  Ms Gaxiola is a 71-year-old female with history significant for osteoarthritis status post left total knee replacement, lumbar radiculopathy and obesity, who presents for further evaluation of left shoulder pain      Patient states she developed sudden onset of pain and limited range of motion in her left shoulder approximately 2 months ago, and states it has been gradually improving since then   Initially the symptoms were constant, but more recently they have been intermittent in nature   She does have slight recurrence of pain with certain movements, but states her range of motion has also improved   She cannot associate any aggravating or relieving factors   When her symptoms were more constant she did try topical BenGay and salon pas, but they did not help her  Jolinda Melissa did not really try taking any over-the-counter NSAIDs   She does take Tylenol daily at bedtime, but is unsure if this really helps with any of her joint pains   Otherwise she also reports pain affecting all of her joints diffusely, but states they are intermittent in nature   She occasionally will develop swelling around her ankles, which is managed by her cardiologist with as needed diuretics   She denies any other joint swelling   She does experience morning stiffness diffusely which lasts for a few hours      She was seen by a rheumatologist in [place] approximately 15 years ago, and was diagnosed with rheumatoid arthritis and osteoarthritis at that time  Kailash Rao did not return for follow-up to see the rheumatologist, and was never started on any antirheumatic medications   At her first visit with the rheumatologist she was advised to continue Tylenol as needed for her joint pains   She was previously seen by Orthopedics in [facility] she had her knee surgery done, but is currently not under the care of an orthopedist  Kailash Rao has not had any recent x-rays done   She was seen by her primary care physician in view of the acute onset of left shoulder pain, and had labs done which showed a borderline positive rheumatoid factor of 20  UMAIR screen and ESR were unremarkable         1/21/2019:  Patient presents for follow-up today  Deja López reviewed her labs done following the last visit which showed a negative CCP antibody and normal ESR   CRP was elevated at 10  3   CBC, CMP and chronic hepatitis panel unremarkable   The x-rays were also reviewed and showed mild degenerative changes in the glenohumeral and acromioclavicular joints   X-rays of her bilateral feet showed osteoarthritis without any erosive arthropathy   X-rays of her hands showed advanced polyarticular osteoarthritis   On personal review of the x-rays it is difficult to appreciate for any erosive changes due to severe osteoarthritis      Patient states since the last office visit she has continued to experience pain in her left shoulder, but it is not as severe as when the symptoms first started  Buzzy Mad are certain activities that she does which aggravates the symptoms, and it is also aggravated when she sleeps on her shoulder at night   She has not noticed any specific relieving factors   Otherwise she continues to experience diffuse intermittent joint pains affecting her bilateral hands, wrists, hips, knees, ankles and feet   She has not noticed any joint swelling   She experiences morning stiffness which affects her diffusely and lasts for a few hours   She refrains from NSAID use as per her cardiologist's instructions   She takes 2 Tylenol at bedtime which does not provide her with significant relief      As mentioned previously she does have a borderline rheumatoid factor with a titer of 20, and approximately 15 years ago was diagnosed with rheumatoid arthritis and osteoarthritis, but patient states she was never started on any DMARDs   No other acute complaints at today's visit         5/23/2019:  Patient presents for a follow-up of seropositive rheumatoid arthritis  Geoff Mccracken is currently on hydroxychloroquine 200 mg twice daily   We reviewed the ultrasound done following the prior office visit which showed evidence of inflammatory arthritis superimposed on severe osteoarthritis   It was present at multiple joints most pronounced at the bilateral wrists, right 2nd PIP joint, with evidence of scattered bony erosions in several MCP and PIP joints      Patient states with the hydroxychloroquine she has been tolerating it well, and thinks it may have had a mild effect with her overall joint pains, especially at her left shoulder   She did receive an intra-articular cortisone injection to the left shoulder at the last visit, and she states this significantly helped her   The same degree of pain has not recurred and she has improved range of motion with her left shoulder   She does continue to experience pain most prominent at her hands, hips, right knee, ankles and feet   She has noticed swelling affecting her hands   She experiences morning stiffness which affects her diffusely and lasts 1-2 hours  Millen Field does not take any over-the-counter NSAIDs         9/30/2019:  Patient presents for a follow-up of seropositive rheumatoid arthritis    She is currently on hydroxychloroquine 200 mg twice daily      Patient states with the hydroxychloroquine she has been tolerating it well, and thinks it may have had an overall improvement with her joint pains  We did administer an intra-articular cortisone injection into her right knee at the last office visit which helped her for about 1-2 months  She is aware that she is a candidate for a total knee replacement, but would like to hold off on surgery for now  At today's office visit she is primarily complaining of intermittent pain in her neck region, in her hands and chronically affecting her lower extremities  She has noticed an area of swelling at her right thumb MCP joint and reports right knee swelling, but denies any other swollen joints  She does experience morning stiffness which primarily affects her neck and shoulder region and lasts a few minutes  She states resting significantly improves all of her joint pains, and she does take Tylenol as needed      No other complaints noted at this time  1/30/2020:  Patient presents for a follow-up of seropositive rheumatoid arthritis  She is currently on hydroxychloroquine 200 mg once daily  Patient reports approximately 4 weeks ago she had onset of itching affecting various parts of her body, but this eventually settled down and she is only complaining of itching affecting her right shoulder at this time  She states that it is not present all the time, but when it does appear it can be very significant  She has tried applying topical cortisone which does provide her with some relief  She was concerned that this may be related to a side effect of the hydroxychloroquine, so approximately 3 weeks ago she decreased the dose to 1 tablet once daily  She states there was an improvement noted with the itching  She has otherwise been tolerating the hydroxychloroquine well, and is due to schedule an eye exam     She mentions in terms of her joint pains, it is most prominently affecting her right hand and right knee at this time  She does experience other aches and pains, but states that they are short-lived and not significant    She has noticed swelling affecting her right hand  She does experience morning stiffness affecting her hands which can last up to an hour  She will take Tylenol as needed but this does not help significantly  She does mention that she is being worked up for chronic kidney disease, and is scheduled to follow-up with her primary care physician  The following portions of the patient's history were reviewed and updated as appropriate: allergies, current medications, past family history, past medical history, past social history, past surgical history and problem list       Review of Systems  Constitutional: Negative for weight change, fevers, chills, night sweats, fatigue  ENT/Mouth: Negative for hearing changes, ear pain, sinus pain, hoarseness, sore throat, rhinorrhea, swallowing difficulty  Positive for nasal congestion  Eyes: Negative for pain, redness, discharge, vision changes  Positive for dry eyes  Cardiovascular: Negative for chest pain, palpitations  Respiratory: Negative for sputum, wheezing  Positive for cough and shortness of breath  Gastrointestinal: Negative for nausea, vomiting, diarrhea, constipation, pain, heartburn  Genitourinary: Negative for dysuria, urinary frequency, hematuria  Musculoskeletal: As per HPI  Skin: Negative for skin rash, color changes  Neuro: Negative for weakness, numbness, tingling, loss of consciousness  Psych: Negative for anxiety, depression  Heme/Lymph: Negative for easy bruising, bleeding, lymphadenopathy          Past Medical History:   Diagnosis Date    Arthritis     Atrial fibrillation ([address] )     Cardiac disease     Cataract     Gastro-esophageal reflux     GERD    Hypertension        Past Surgical History:   Procedure Laterality Date    CARDIAC PACEMAKER PLACEMENT Left 2014    CATARACT EXTRACTION      CHOLECYSTECTOMY      resolved: 2009    COLONOSCOPY      Fiberoptic, resolved 2015    EYE SURGERY      KNEE SURGERY Left     left [facility] replacement in 2009       Social History     Socioeconomic History    Marital status:       Spouse name: Not on file    Number of children: Not on file    Years of education: Not on file    Highest education level: Not on file   Occupational History    Not on file   Social Needs    Financial resource strain: Not on file    Food insecurity:     Worry: Not on file     Inability: Not on file    Transportation needs:     Medical: Not on file     Non-medical: Not on file   Tobacco Use    Smoking status: Former Smoker     Packs/day: 1 00     Years: 25 00     Pack years: 25 00     Types: Cigarettes    Smokeless tobacco: Never Used    Tobacco comment: quit 22 yeears ago   Substance and Sexual Activity    Alcohol use: Yes     Comment: occasional, No alcohol use,per [employer]    Drug use: No    Sexual activity: Not on file   Lifestyle    Physical activity:     Days per week: Not on file     Minutes per session: Not on file    Stress: Not on file   Relationships    Social connections:     Talks on phone: Not on file     Gets together: Not on file     Attends [employer] service: Not on file     Active member of club or organization: Not on file     Attends meetings of clubs or organizations: Not on file     Relationship status: Not on file    Intimate partner violence:     Fear of current or ex partner: Not on file     Emotionally abused: Not on file     Physically abused: Not on file     Forced sexual activity: Not on file   Other Topics Concern    Not on file   Social History Narrative    Exercise: Walking, 2x/week       Family History   Problem Relation Age of Onset    Coronary artery disease Father     Diabetes Sister     Hyperlipidemia Sister     Arthritis Family     Hypertension Family        Allergies   Allergen Reactions    Ciprofloxacin Rash    Sulfa Antibiotics Rash    Synvisc [[address]] Rash       Current Outpatient Medications:     acetaminophen (TYLENOL ARTHRITIS PAIN) 650 mg CR tablet, Take by mouth, Disp: , Rfl:     betamethasone dipropionate (DIPROSONE) 0 05 % cream, Apply topically 2 (two) times a day, Disp: 45 g, Rfl: 2    Calcium Carbonate-Vitamin D 600-200 MG-UNIT CAPS, Take by mouth 2 (two) times a day, Disp: , Rfl:     cholecalciferol (VITAMIN D3) 1,000 units tablet, Take 1,000 Units by mouth daily, Disp: , Rfl:     clobetasol (TEMOVATE) 0 05 % cream, , Disp: , Rfl:     Coenzyme Q10 (COQ-10) 200 MG CAPS, Take 2 capsules by mouth daily, Disp: , Rfl:     ELIQUIS 5 MG, take 1 tablet by mouth twice a day, Disp: 60 tablet, Rfl: 0    famotidine (PEPCID) 40 MG tablet, take 1 tablet by mouth twice a day, Disp: 60 tablet, Rfl: 2    furosemide (LASIX) 20 mg tablet, Take 1 tablet by mouth daily, Disp: , Rfl:     hydroxychloroquine (PLAQUENIL) 200 mg tablet, take 1 tablet by mouth twice a day with meals, Disp: 60 tablet, Rfl: 5    Lactobacillus (ACIDOPHILUS PO), Take by mouth, Disp: , Rfl:     metoprolol succinate (TOPROL-XL) 25 mg 24 hr tablet, take 1 tablet by mouth once daily, Disp: 30 tablet, Rfl: 5    Multiple Vitamins-Minerals (DAILY MULTIVITAMIN PO), Take 1 tablet by mouth daily, Disp: , Rfl:     nitroglycerin (NITROSTAT) 0 4 mg SL tablet, place 1 tablet under the tongue if needed every 5 minutes for chest pain for 3 doses IF NO RELIEF AFTER FIRST DOSE CALL PRESCRIBER  , Disp: 25 tablet, Rfl: 0    omega-3-acid ethyl esters (LOVAZA) 1 g capsule, take 2 capsules by mouth twice a day, Disp: 120 capsule, Rfl: 0    pantoprazole (PROTONIX) 40 mg tablet, Take 40 mg by mouth daily, Disp: , Rfl: 0    pravastatin (PRAVACHOL) 10 mg tablet, take 1 tablet by mouth ON MONDAY, WEDNESDAY AND FRIDAY, Disp: 30 tablet, Rfl: 5    ramipril (ALTACE) 5 mg capsule, take 1 capsule by mouth twice a day, Disp: 60 capsule, Rfl: 5    sotalol (BETAPACE) 80 mg tablet, take 1 tablet by mouth every 12 hours, Disp: 60 tablet, Rfl: 5    gabapentin (NEURONTIN) 100 mg capsule, take 1 capsule by mouth three times a day (Patient not taking: No sig reported), Disp: 90 capsule, Rfl: 2    gabapentin (NEURONTIN) 300 mg capsule, Take 1 capsule (300 mg total) by mouth 2 (two) times a day for 30 days, Disp: 60 capsule, Rfl: 1    polyethylene glycol-electrolytes (NULYTELY) 4000 mL solution, take by mouth as directed  FOR COLONOSCOPY, Disp: , Rfl: 0    sertraline (ZOLOFT) 25 mg tablet, , Disp: , Rfl:       Objective:    Vitals:    01/30/20 1028   BP: 122/76   Pulse: 81   Weight: 95 7 kg (211 lb)   Height: 5' 5 25" (1 657 m)       Physical Exam  General: Well appearing, well nourished, in no distress  Oriented x 3, normal mood and affect  Ambulating with aid of a cane  Skin: Good turgor, no rash, unusual bruising or prominent lesions  Hair: Normal texture and distribution  Nails: Normal color, no deformities  HEENT:  Head: Normocephalic, atraumatic  Eyes: Conjunctiva clear, sclera non-icteric, EOM intact  Nose: No external lesions, mucosa non-inflamed  Mouth: Mucous membranes moist, no mucosal lesions  Neck: Supple  Extremities: No amputations or deformities, cyanosis, edema  Musculoskeletal:   Hands - she does have synovial enlargement present at her right hand 3rd and 4th PIP joints  There are mild deformities noted at the same joints  She has osteoarthritic changes present at her DIPs and PIPs, as well as at her bilateral 1st IP joints  There is soft tissue swelling present at her right thumb MCP without significant tenderness  She has mild fixed flexion deformities present at her bilateral 4th PIP joints  There is soft tissue swelling with tenderness present at her right hand 2nd MCP joint  Her left hand is unremarkable in terms of soft tissue swelling  There is no ulnar deviation noted  Wrists - there does not appear to be any soft tissue swelling or tenderness bilaterally  She has full range of motion    Elbows - there is mild synovial hypertrophy noted at the left elbow, but there is no tenderness or restriction in range of motion  Shoulders - there is no tenderness or soft tissue swelling noted  Knees - left knee status post total knee replacement  She has bony enlargement and patellar shift at the right knee with difficulty in accurately palpating the joint space  Crepitus is present  There is no tenderness  Ankles and feet - no tenderness or soft tissue swelling noted  Neurologic: Alert and oriented  No focal neurological deficits appreciated  Psychiatric: Normal mood and affect  DOMINIQUE Garcia    Rheumatology

## 2020-01-30 ENCOUNTER — OFFICE VISIT (OUTPATIENT)
Dept: RHEUMATOLOGY | Facility: CLINIC | Age: 80
End: 2020-01-30
Payer: COMMERCIAL

## 2020-01-30 VITALS
HEIGHT: 65 IN | HEART RATE: 81 BPM | SYSTOLIC BLOOD PRESSURE: 122 MMHG | WEIGHT: 211 LBS | BODY MASS INDEX: 35.16 KG/M2 | DIASTOLIC BLOOD PRESSURE: 76 MMHG

## 2020-01-30 DIAGNOSIS — M05.9 SEROPOSITIVE RHEUMATOID ARTHRITIS (HCC): Primary | ICD-10-CM

## 2020-01-30 DIAGNOSIS — Z79.899 LONG-TERM USE OF PLAQUENIL: ICD-10-CM

## 2020-01-30 DIAGNOSIS — M15.0 PRIMARY GENERALIZED (OSTEO)ARTHRITIS: ICD-10-CM

## 2020-01-30 DIAGNOSIS — L29.9 PRURITUS: ICD-10-CM

## 2020-01-30 PROCEDURE — 99214 OFFICE O/P EST MOD 30 MIN: CPT | Performed by: INTERNAL MEDICINE

## 2020-02-04 ENCOUNTER — APPOINTMENT (OUTPATIENT)
Dept: LAB | Facility: CLINIC | Age: 80
End: 2020-02-04
Payer: COMMERCIAL

## 2020-02-04 ENCOUNTER — OFFICE VISIT (OUTPATIENT)
Dept: FAMILY MEDICINE CLINIC | Facility: CLINIC | Age: 80
End: 2020-02-04
Payer: COMMERCIAL

## 2020-02-04 VITALS
DIASTOLIC BLOOD PRESSURE: 90 MMHG | TEMPERATURE: 97.5 F | HEIGHT: 65 IN | WEIGHT: 211 LBS | SYSTOLIC BLOOD PRESSURE: 150 MMHG | BODY MASS INDEX: 35.16 KG/M2 | HEART RATE: 78 BPM | RESPIRATION RATE: 14 BRPM

## 2020-02-04 DIAGNOSIS — Z83.49 FAMILY HISTORY OF THYROID DISEASE: ICD-10-CM

## 2020-02-04 DIAGNOSIS — E78.2 MIXED HYPERLIPIDEMIA: Primary | ICD-10-CM

## 2020-02-04 DIAGNOSIS — E78.2 MIXED HYPERLIPIDEMIA: ICD-10-CM

## 2020-02-04 DIAGNOSIS — I72.3 ANEURYSM OF ILIAC ARTERY (HCC): ICD-10-CM

## 2020-02-04 DIAGNOSIS — I48.91 ATRIAL FIBRILLATION, UNSPECIFIED TYPE (HCC): ICD-10-CM

## 2020-02-04 DIAGNOSIS — D69.1 PLATELET DYSFUNCTION (HCC): ICD-10-CM

## 2020-02-04 LAB
ALBUMIN SERPL BCP-MCNC: 3.9 G/DL (ref 3.5–5)
ALP SERPL-CCNC: 52 U/L (ref 46–116)
ALT SERPL W P-5'-P-CCNC: 26 U/L (ref 12–78)
ANION GAP SERPL CALCULATED.3IONS-SCNC: 2 MMOL/L (ref 4–13)
AST SERPL W P-5'-P-CCNC: 17 U/L (ref 5–45)
BILIRUB SERPL-MCNC: 0.55 MG/DL (ref 0.2–1)
BUN SERPL-MCNC: 22 MG/DL (ref 5–25)
CALCIUM SERPL-MCNC: 9 MG/DL (ref 8.3–10.1)
CHLORIDE SERPL-SCNC: 107 MMOL/L (ref 100–108)
CO2 SERPL-SCNC: 29 MMOL/L (ref 21–32)
CREAT SERPL-MCNC: 0.85 MG/DL (ref 0.6–1.3)
ERYTHROCYTE [DISTWIDTH] IN BLOOD BY AUTOMATED COUNT: 13.7 % (ref 11.6–15.1)
GFR SERPL CREATININE-BSD FRML MDRD: 65 ML/MIN/1.73SQ M
GLUCOSE P FAST SERPL-MCNC: 111 MG/DL (ref 65–99)
HCT VFR BLD AUTO: 38.3 % (ref 34.8–46.1)
HGB BLD-MCNC: 12.1 G/DL (ref 11.5–15.4)
MCH RBC QN AUTO: 29.4 PG (ref 26.8–34.3)
MCHC RBC AUTO-ENTMCNC: 31.6 G/DL (ref 31.4–37.4)
MCV RBC AUTO: 93 FL (ref 82–98)
PLATELET # BLD AUTO: 137 THOUSANDS/UL (ref 149–390)
PMV BLD AUTO: 10.1 FL (ref 8.9–12.7)
POTASSIUM SERPL-SCNC: 4.5 MMOL/L (ref 3.5–5.3)
PROT SERPL-MCNC: 7.2 G/DL (ref 6.4–8.2)
RBC # BLD AUTO: 4.12 MILLION/UL (ref 3.81–5.12)
SODIUM SERPL-SCNC: 138 MMOL/L (ref 136–145)
TSH SERPL DL<=0.05 MIU/L-ACNC: 1.07 UIU/ML (ref 0.36–3.74)
WBC # BLD AUTO: 4.06 THOUSAND/UL (ref 4.31–10.16)

## 2020-02-04 PROCEDURE — 85027 COMPLETE CBC AUTOMATED: CPT

## 2020-02-04 PROCEDURE — 3080F DIAST BP >= 90 MM HG: CPT | Performed by: FAMILY MEDICINE

## 2020-02-04 PROCEDURE — 99213 OFFICE O/P EST LOW 20 MIN: CPT | Performed by: FAMILY MEDICINE

## 2020-02-04 PROCEDURE — 36415 COLL VENOUS BLD VENIPUNCTURE: CPT

## 2020-02-04 PROCEDURE — 1036F TOBACCO NON-USER: CPT | Performed by: FAMILY MEDICINE

## 2020-02-04 PROCEDURE — 3077F SYST BP >= 140 MM HG: CPT | Performed by: FAMILY MEDICINE

## 2020-02-04 PROCEDURE — 1160F RVW MEDS BY RX/DR IN RCRD: CPT | Performed by: FAMILY MEDICINE

## 2020-02-04 PROCEDURE — 80053 COMPREHEN METABOLIC PANEL: CPT

## 2020-02-04 PROCEDURE — 4040F PNEUMOC VAC/ADMIN/RCVD: CPT | Performed by: FAMILY MEDICINE

## 2020-02-04 PROCEDURE — 84443 ASSAY THYROID STIM HORMONE: CPT

## 2020-02-04 RX ORDER — APIXABAN 5 MG/1
TABLET, FILM COATED ORAL
Qty: 60 TABLET | Refills: 0 | Status: SHIPPED | OUTPATIENT
Start: 2020-02-04 | End: 2020-02-13 | Stop reason: SDUPTHER

## 2020-02-07 PROBLEM — D69.1 PLATELET DYSFUNCTION (HCC): Status: ACTIVE | Noted: 2020-02-07

## 2020-02-07 NOTE — PROGRESS NOTES
Assessment/Plan:    1  Mixed hyperlipidemia  -     CBC and Platelet; Future  -     Comprehensive metabolic panel; Future; Expected date: 02/04/2020    2  Aneurysm of iliac artery (HCC)  -     Ambulatory referral to Vascular Surgery; Future    3  Family history of thyroid disease  -     TSH, 3rd generation with Free T4 reflex; Future    4  Platelet dysfunction (HCC)  -     CBC and differential; Future; Expected date: 03/01/2020        1  HLD: Will check CMP as on statin therapy  2  Aneurysm of iliac artery and abnormal carotid screening: will send to vascular for evaluation  3  Cbc abnormal will check cbc in 1 month for WBC as decreased along with platelet  There are no Patient Instructions on file for this visit  No follow-ups on file  Subjective:      Patient ID: Kalee Gibson is a [de-identified] y o  female  Chief Complaint   Patient presents with    Follow-up     [facility]  HPI  Patient presents with reports after going to [facility] [place] for screening  Patient had an abnormal screening for carotid and right iliac artery  Patient had two abnormal screening 2 X year for the iliac artery  No changes in blood flow for patient with discoloration in the leg  No difficulty with walking  Patient also would like to get her TSH checked as she has a family history  On statin will check LFts  The following portions of the patient's history were reviewed and updated as appropriate: allergies, current medications, past family history, past medical history, past social history, past surgical history and problem list     Review of Systems   Constitutional: Negative for chills and fever  HENT: Negative for sore throat  Eyes: Negative for photophobia and discharge  Respiratory: Negative for cough and shortness of breath  Cardiovascular: Negative for chest pain and leg swelling  Gastrointestinal: Negative for abdominal pain, constipation, nausea and vomiting  Genitourinary: Negative for dysuria  Musculoskeletal: Negative for back pain  Neurological: Negative for dizziness, syncope, speech difficulty, weakness, light-headedness and headaches  Psychiatric/Behavioral: Negative for agitation           Current Outpatient Medications   Medication Sig Dispense Refill    acetaminophen (TYLENOL ARTHRITIS PAIN) 650 mg CR tablet Take by mouth      betamethasone dipropionate (DIPROSONE) 0 05 % cream Apply topically 2 (two) times a day 45 g 2    Calcium Carbonate-Vitamin D 600-200 MG-UNIT CAPS Take by mouth 2 (two) times a day      cholecalciferol (VITAMIN D3) 1,000 units tablet Take 1,000 Units by mouth daily      clobetasol (TEMOVATE) 0 05 % cream       Coenzyme Q10 (COQ-10) 200 MG CAPS Take 2 capsules by mouth daily      famotidine (PEPCID) 40 MG tablet take 1 tablet by mouth twice a day 60 tablet 2    furosemide (LASIX) 20 mg tablet Take 1 tablet by mouth daily      hydroxychloroquine (PLAQUENIL) 200 mg tablet take 1 tablet by mouth twice a day with meals 60 tablet 5    Lactobacillus (ACIDOPHILUS PO) Take by mouth      metoprolol succinate (TOPROL-XL) 25 mg 24 hr tablet take 1 tablet by mouth once daily 30 tablet 5    Multiple Vitamins-Minerals (DAILY MULTIVITAMIN PO) Take 1 tablet by mouth daily      nitroglycerin (NITROSTAT) 0 4 mg SL tablet place 1 tablet under the tongue if needed every 5 minutes for chest pain for 3 doses IF NO RELIEF AFTER FIRST DOSE CALL PRESCRIBER   25 tablet 0    omega-3-acid ethyl esters (LOVAZA) 1 g capsule take 2 capsules by mouth twice a day 120 capsule 0    pantoprazole (PROTONIX) 40 mg tablet Take 40 mg by mouth daily  0    pravastatin (PRAVACHOL) 10 mg tablet take 1 tablet by mouth ON MONDAY, WEDNESDAY AND FRIDAY 30 tablet 5    ramipril (ALTACE) 5 mg capsule take 1 capsule by mouth twice a day 60 capsule 5    sertraline (ZOLOFT) 25 mg tablet       sotalol (BETAPACE) 80 mg tablet take 1 tablet by mouth every 12 hours 60 tablet 5    ELIQUIS 5 MG take 1 tablet by mouth twice a day 60 tablet 0    gabapentin (NEURONTIN) 100 mg capsule take 1 capsule by mouth three times a day (Patient not taking: No sig reported) 90 capsule 2    gabapentin (NEURONTIN) 300 mg capsule Take 1 capsule (300 mg total) by mouth 2 (two) times a day for 30 days 60 capsule 1    polyethylene glycol-electrolytes (NULYTELY) 4000 mL solution take by mouth as directed  FOR COLONOSCOPY  0     No current facility-administered medications for this visit  Objective:    /90 (BP Location: Left arm, Patient Position: Sitting, Cuff Size: Adult)   Pulse 78   Temp 97 5 °F (36 4 °C) (Tympanic)   Resp 14   Ht 5' 5 25" (1 657 m)   Wt 95 7 kg (211 lb)   BMI 34 84 kg/m²        Physical Exam   Constitutional: She is oriented to person, place, and time  She appears well-developed and well-nourished  No distress  HENT:   Head: Normocephalic and atraumatic  Right Ear: External ear normal    Left Ear: External ear normal    Nose: Nose normal    Mouth/Throat: Oropharynx is clear and moist  No oropharyngeal exudate  Eyes: EOM are normal  Right eye exhibits no discharge  Left eye exhibits no discharge  Neck: Normal range of motion  Neck supple  No JVD present  Cardiovascular: Normal rate, regular rhythm and intact distal pulses  Murmur heard  Pulmonary/Chest: Effort normal and breath sounds normal  No respiratory distress  Abdominal: Soft  Bowel sounds are normal  She exhibits no distension  There is no tenderness  Musculoskeletal: Normal range of motion  She exhibits no edema, tenderness or deformity  Neurological: She is alert and oriented to person, place, and time  Skin: Skin is warm  No rash noted  Psychiatric: She has a normal mood and affect   Her behavior is normal               Justin Vitale MD

## 2020-02-08 DIAGNOSIS — E78.5 DYSLIPIDEMIA: ICD-10-CM

## 2020-02-08 DIAGNOSIS — I48.91 ATRIAL FIBRILLATION, UNSPECIFIED TYPE (HCC): ICD-10-CM

## 2020-02-11 RX ORDER — SOTALOL HYDROCHLORIDE 80 MG/1
TABLET ORAL
Qty: 60 TABLET | Refills: 5 | Status: SHIPPED | OUTPATIENT
Start: 2020-02-11 | End: 2020-02-13 | Stop reason: SDUPTHER

## 2020-02-11 RX ORDER — METOPROLOL SUCCINATE 25 MG/1
TABLET, EXTENDED RELEASE ORAL
Qty: 30 TABLET | Refills: 5 | Status: SHIPPED | OUTPATIENT
Start: 2020-02-11 | End: 2020-02-13 | Stop reason: SDUPTHER

## 2020-02-11 RX ORDER — OMEGA-3-ACID ETHYL ESTERS 1 G/1
CAPSULE, LIQUID FILLED ORAL
Qty: 120 CAPSULE | Refills: 0 | Status: SHIPPED | OUTPATIENT
Start: 2020-02-11 | End: 2020-03-09 | Stop reason: SDUPTHER

## 2020-02-13 ENCOUNTER — OFFICE VISIT (OUTPATIENT)
Dept: CARDIOLOGY CLINIC | Facility: CLINIC | Age: 80
End: 2020-02-13
Payer: COMMERCIAL

## 2020-02-13 VITALS
WEIGHT: 212 LBS | OXYGEN SATURATION: 94 % | HEIGHT: 65 IN | DIASTOLIC BLOOD PRESSURE: 76 MMHG | BODY MASS INDEX: 35.32 KG/M2 | HEART RATE: 70 BPM | SYSTOLIC BLOOD PRESSURE: 124 MMHG

## 2020-02-13 DIAGNOSIS — I35.1 NONRHEUMATIC AORTIC VALVE INSUFFICIENCY: ICD-10-CM

## 2020-02-13 DIAGNOSIS — I10 ESSENTIAL HYPERTENSION: Chronic | ICD-10-CM

## 2020-02-13 DIAGNOSIS — E78.2 MIXED HYPERLIPIDEMIA: ICD-10-CM

## 2020-02-13 DIAGNOSIS — I48.91 ATRIAL FIBRILLATION, UNSPECIFIED TYPE (HCC): ICD-10-CM

## 2020-02-13 DIAGNOSIS — I48.0 PAROXYSMAL ATRIAL FIBRILLATION (HCC): Primary | ICD-10-CM

## 2020-02-13 DIAGNOSIS — I50.32 CHRONIC DIASTOLIC CONGESTIVE HEART FAILURE (HCC): ICD-10-CM

## 2020-02-13 DIAGNOSIS — E78.5 DYSLIPIDEMIA: ICD-10-CM

## 2020-02-13 PROCEDURE — 1160F RVW MEDS BY RX/DR IN RCRD: CPT | Performed by: INTERNAL MEDICINE

## 2020-02-13 PROCEDURE — 3008F BODY MASS INDEX DOCD: CPT | Performed by: INTERNAL MEDICINE

## 2020-02-13 PROCEDURE — 3078F DIAST BP <80 MM HG: CPT | Performed by: INTERNAL MEDICINE

## 2020-02-13 PROCEDURE — 4040F PNEUMOC VAC/ADMIN/RCVD: CPT | Performed by: INTERNAL MEDICINE

## 2020-02-13 PROCEDURE — 3074F SYST BP LT 130 MM HG: CPT | Performed by: INTERNAL MEDICINE

## 2020-02-13 PROCEDURE — 1036F TOBACCO NON-USER: CPT | Performed by: INTERNAL MEDICINE

## 2020-02-13 PROCEDURE — 99214 OFFICE O/P EST MOD 30 MIN: CPT | Performed by: INTERNAL MEDICINE

## 2020-02-13 RX ORDER — PRAVASTATIN SODIUM 10 MG
10 TABLET ORAL 3 TIMES WEEKLY
Qty: 60 TABLET | Refills: 3 | Status: SHIPPED | OUTPATIENT
Start: 2020-02-14 | End: 2021-03-24

## 2020-02-13 RX ORDER — SOTALOL HYDROCHLORIDE 80 MG/1
80 TABLET ORAL EVERY 12 HOURS
Qty: 180 TABLET | Refills: 3 | Status: SHIPPED | OUTPATIENT
Start: 2020-02-13 | End: 2021-03-31

## 2020-02-13 RX ORDER — RAMIPRIL 5 MG/1
5 CAPSULE ORAL 2 TIMES DAILY
Qty: 180 CAPSULE | Refills: 3 | Status: SHIPPED | OUTPATIENT
Start: 2020-02-13 | End: 2021-04-02

## 2020-02-13 RX ORDER — METOPROLOL SUCCINATE 25 MG/1
25 TABLET, EXTENDED RELEASE ORAL DAILY
Qty: 90 TABLET | Refills: 3 | Status: SHIPPED | OUTPATIENT
Start: 2020-02-13 | End: 2021-03-31

## 2020-02-13 NOTE — PROGRESS NOTES
Cardiology Follow Up    Alfonso Carr  1940  [id number]    Interval History: Ms Alaina Hurley is here for follow up of atrial fibrillation and hypertension  Since her last visit, she denies any chest pain or shortness of breath  She denies any palpitations, lower extremity edema, orthopnea or paroxysmal nocturnal dyspnea  No recent episodes of atrial fibrillation (last occurred in April 2019)  She is taking Eliquis regularly along with sotalol  She has a history of atrial fibrillation along with sick sinus syndrome and had a pacemaker inserted at [facility] after developing multiple pauses  She denies any previous syncope or near syncope  Pacemaker was interrogated in December showing no recent atrial fibrillation  Past Medical History:   Diagnosis Date    Arthritis     Atrial fibrillation ([facility] [address] )     Cardiac disease     Cataract     Gastro-esophageal reflux     GERD    Hypertension      Social History     Socioeconomic History    Marital status:       Spouse name: Not on file    Number of children: Not on file    Years of education: Not on file    Highest education level: Not on file   Occupational History    Not on file   Social Needs    Financial resource strain: Not on file    Food insecurity:     Worry: Not on file     Inability: Not on file    Transportation needs:     Medical: Not on file     Non-medical: Not on file   Tobacco Use    Smoking status: Former Smoker     Packs/day: 1 00     Years: 25 00     Pack years: 25 00     Types: Cigarettes    Smokeless tobacco: Never Used    Tobacco comment: quit 22 yeears ago   Substance and Sexual Activity    Alcohol use: Yes     Comment: occasional, No alcohol use,per [employer]    Drug use: No    Sexual activity: Not on file   Lifestyle    Physical activity:     Days per week: Not on file     Minutes per session: Not on file    Stress: Not on file   Relationships    Social connections:     Talks on phone: Not on file     Gets together: Not on file     Attends [employer] service: Not on file     Active member of club or organization: Not on file     Attends meetings of clubs or organizations: Not on file     Relationship status: Not on file    Intimate partner violence:     Fear of current or ex partner: Not on file     Emotionally abused: Not on file     Physically abused: Not on file     Forced sexual activity: Not on file   Other Topics Concern    Not on file   Social History Narrative    Exercise: Walking, 2x/week      Family History   Problem Relation Age of Onset    Coronary artery disease Father     Diabetes Sister     Hyperlipidemia Sister     Arthritis Family     Hypertension Family      Past Surgical History:   Procedure Laterality Date    CARDIAC PACEMAKER PLACEMENT Left 2014    CATARACT EXTRACTION      CHOLECYSTECTOMY      resolved: 2009    COLONOSCOPY      Fiberoptic, resolved 2015    EYE SURGERY      KNEE SURGERY Left     left kknee replacement in 2009       Current Outpatient Medications:     acetaminophen (TYLENOL ARTHRITIS PAIN) 650 mg CR tablet, Take by mouth, Disp: , Rfl:     betamethasone dipropionate (DIPROSONE) 0 05 % cream, Apply topically 2 (two) times a day, Disp: 45 g, Rfl: 2    Calcium Carbonate-Vitamin D 600-200 MG-UNIT CAPS, Take by mouth 2 (two) times a day, Disp: , Rfl:     cholecalciferol (VITAMIN D3) 1,000 units tablet, Take 1,000 Units by mouth daily, Disp: , Rfl:     clobetasol (TEMOVATE) 0 05 % cream, , Disp: , Rfl:     Coenzyme Q10 (COQ-10) 200 MG CAPS, Take 2 capsules by mouth daily, Disp: , Rfl:     ELIQUIS 5 MG, take 1 tablet by mouth twice a day, Disp: 60 tablet, Rfl: 0    famotidine (PEPCID) 40 MG tablet, take 1 tablet by mouth twice a day, Disp: 60 tablet, Rfl: 2    hydroxychloroquine (PLAQUENIL) 200 mg tablet, take 1 tablet by mouth twice a day with meals, Disp: 60 tablet, Rfl: 5    Lactobacillus (ACIDOPHILUS PO), Take by mouth, Disp: , Rfl:     metoprolol succinate (TOPROL-XL) 25 mg 24 hr tablet, take 1 tablet by mouth once daily, Disp: 30 tablet, Rfl: 5    Multiple Vitamins-Minerals (DAILY MULTIVITAMIN PO), Take 1 tablet by mouth daily, Disp: , Rfl:     nitroglycerin (NITROSTAT) 0 4 mg SL tablet, place 1 tablet under the tongue if needed every 5 minutes for chest pain for 3 doses IF NO RELIEF AFTER FIRST DOSE CALL PRESCRIBER  , Disp: 25 tablet, Rfl: 0    omega-3-acid ethyl esters (LOVAZA) 1 g capsule, take 2 capsules by mouth twice a day (Patient taking differently: Take 1 g by mouth 2 (two) times a day ), Disp: 120 capsule, Rfl: 0    pantoprazole (PROTONIX) 40 mg tablet, Take 40 mg by mouth daily, Disp: , Rfl: 0    pravastatin (PRAVACHOL) 10 mg tablet, take 1 tablet by mouth ON MONDAY, WEDNESDAY AND FRIDAY, Disp: 30 tablet, Rfl: 5    ramipril (ALTACE) 5 mg capsule, take 1 capsule by mouth twice a day, Disp: 60 capsule, Rfl: 5    sertraline (ZOLOFT) 25 mg tablet, , Disp: , Rfl:     sotalol (BETAPACE) 80 mg tablet, take 1 tablet by mouth every 12 hours, Disp: 60 tablet, Rfl: 5    furosemide (LASIX) 20 mg tablet, Take 1 tablet by mouth daily, Disp: , Rfl:     gabapentin (NEURONTIN) 100 mg capsule, take 1 capsule by mouth three times a day (Patient not taking: No sig reported), Disp: 90 capsule, Rfl: 2    gabapentin (NEURONTIN) 300 mg capsule, Take 1 capsule (300 mg total) by mouth 2 (two) times a day for 30 days, Disp: 60 capsule, Rfl: 1    polyethylene glycol-electrolytes (NULYTELY) 4000 mL solution, take by mouth as directed  FOR COLONOSCOPY, Disp: , Rfl: 0  Allergies   Allergen Reactions    Ciprofloxacin Rash    Sulfa Antibiotics Rash    Synvisc [Hylan G-F 20] Rash       Labs:  Lab Results   Component Value Date     09/10/2013    K 4 5 02/04/2020    K 4 6 10/08/2019     02/04/2020     10/08/2019    CO2 29 02/04/2020    CO2 26 10/08/2019    BUN 22 02/04/2020    BUN 21 10/08/2019 CREATININE 0 85 02/04/2020    CREATININE 1 3 09/10/2013    CALCIUM 9 0 02/04/2020    CALCIUM 8 9 09/10/2013     Lab Results   Component Value Date    WBC 4 06 (L) 02/04/2020    WBC 5 4 09/10/2013    HGB 12 1 02/04/2020    HGB 14 2 09/10/2013    HCT 38 3 02/04/2020    HCT 42 7 09/10/2013    MCV 93 02/04/2020    MCV 88 8 09/10/2013     (L) 02/04/2020     09/10/2013     Lab Results   Component Value Date    CHOL 187 09/10/2013    TRIG 123 10/08/2019    HDL 34 (L) 10/08/2019     Imaging: No results found  EKG:  Normal sinus rhythm with Q waves V1 and V2    Review of Systems:  Review of Systems   Constitutional: Negative for chills, fatigue and fever  HENT: Negative for congestion, nosebleeds and postnasal drip  Respiratory: Negative for cough, chest tightness and shortness of breath  Cardiovascular: Negative for chest pain, palpitations and leg swelling  Gastrointestinal: Negative for abdominal distention, abdominal pain, diarrhea, nausea and vomiting  Endocrine: Negative for polydipsia, polyphagia and polyuria  Musculoskeletal: Negative for gait problem and myalgias  Skin: Negative for color change, pallor and rash  Allergic/Immunologic: Negative for environmental allergies, food allergies and immunocompromised state  Neurological: Negative for dizziness, seizures, syncope and light-headedness  Hematological: Negative for adenopathy  Does not bruise/bleed easily  Psychiatric/Behavioral: Negative for dysphoric mood  The patient is not nervous/anxious  Physical Exam:  /76 (BP Location: Left arm, Patient Position: Sitting, Cuff Size: Large)   Pulse 70   Ht 5' 5 23" (1 657 m)   Wt 96 2 kg (212 lb)   SpO2 94% Comment: RA  BMI 35 04 kg/m²     Physical Exam   Constitutional: She is oriented to person, place, and time  She appears well-developed  No distress  HENT:   Head: Normocephalic and atraumatic  Eyes: Pupils are equal, round, and reactive to light  Conjunctivae and EOM are normal    Neck: Neck supple  No JVD present  No thyromegaly present  Cardiovascular: Normal rate and regular rhythm  Exam reveals no gallop and no friction rub  Murmur heard  Pulmonary/Chest: Effort normal and breath sounds normal    Abdominal: Soft  She exhibits no distension  There is no tenderness  Musculoskeletal: She exhibits no edema  Neurological: She is alert and oriented to person, place, and time  No cranial nerve deficit  Skin: Skin is warm and dry  No rash noted  She is not diaphoretic  No erythema  Psychiatric: She has a normal mood and affect  Her behavior is normal  Judgment and thought content normal        Discussion/Summary:  1  Paroxysmal atrial fibrillation (HCC) - currently in sinus  Continue Eliquis and routine pacemaker clinic follow up  - Continue sotalol  QT interval normal   2  Essential hypertension  - BP well controlled on current Rx    - Comprehensive metabolic panel was reviewed  No significant electrolyte abnormalities  LFTs normal   3  Mixed hyperlipidemia  - Lipid panel reviewed - LDL was 60  -  Continue pravastatin  4  Chronic diastolic CHF - stable on lasix 20 mg daily   - Discussed risk factor reduction including refraining from smoking, eating a diet high in fruits and vegetables, maintaining a healthy weight, limiting screen time along with controlling BP and cholesterol  Encouraged to exercise 150 minutes a week at a moderate level such as a fast walk or 75 minutes of high intensity  5  Exertional dyspnea/fatigue - stress test and echocardiogram were done in 2019    No ischemia present and EF was normal       6  Moderate aortic regurgitation - 2D echocardiogram will be repeated in 2021

## 2020-02-14 PROCEDURE — 93000 ELECTROCARDIOGRAM COMPLETE: CPT | Performed by: INTERNAL MEDICINE

## 2020-02-18 ENCOUNTER — OFFICE VISIT (OUTPATIENT)
Dept: FAMILY MEDICINE CLINIC | Facility: CLINIC | Age: 80
End: 2020-02-18
Payer: COMMERCIAL

## 2020-02-18 VITALS
BODY MASS INDEX: 35.16 KG/M2 | HEART RATE: 72 BPM | HEIGHT: 65 IN | TEMPERATURE: 97.5 F | WEIGHT: 211 LBS | RESPIRATION RATE: 14 BRPM | DIASTOLIC BLOOD PRESSURE: 90 MMHG | SYSTOLIC BLOOD PRESSURE: 140 MMHG

## 2020-02-18 DIAGNOSIS — I10 ESSENTIAL HYPERTENSION: Primary | Chronic | ICD-10-CM

## 2020-02-18 PROCEDURE — 1036F TOBACCO NON-USER: CPT | Performed by: FAMILY MEDICINE

## 2020-02-18 PROCEDURE — 1160F RVW MEDS BY RX/DR IN RCRD: CPT | Performed by: FAMILY MEDICINE

## 2020-02-18 PROCEDURE — 99213 OFFICE O/P EST LOW 20 MIN: CPT | Performed by: FAMILY MEDICINE

## 2020-02-18 PROCEDURE — 3008F BODY MASS INDEX DOCD: CPT | Performed by: FAMILY MEDICINE

## 2020-02-18 PROCEDURE — 4040F PNEUMOC VAC/ADMIN/RCVD: CPT | Performed by: FAMILY MEDICINE

## 2020-02-18 PROCEDURE — 3080F DIAST BP >= 90 MM HG: CPT | Performed by: FAMILY MEDICINE

## 2020-02-18 PROCEDURE — 3077F SYST BP >= 140 MM HG: CPT | Performed by: FAMILY MEDICINE

## 2020-02-19 NOTE — PROGRESS NOTES
Assessment/Plan:    1  Essential hypertension    Given home BP are stable  Will continue with current regimen  Patient should monitor BP at home  Monitor symptoms including dizziness, light-headedness, or visual disturbance  Exercise and salt restriction  There are no Patient Instructions on file for this visit  Return in about 6 months (around 8/18/2020)  Subjective:      Patient ID: Simba Salazar is a [de-identified] y o  female  Chief Complaint   Patient presents with    Blood Pressure Check       HPI  [de-identified] y/o female presents for follow up for BP check  Patient brings log of BP twice a day ranging from 120-130's/80's  Patient states elevated today as her friend is in the hospital  Patient states home BP have been stable  Denies any chest pain, sob, or abdominal pain  No dizziness or light-headedness  The following portions of the patient's history were reviewed and updated as appropriate: allergies, current medications, past family history, past medical history, past social history, past surgical history and problem list     Review of Systems   Constitutional: Negative for appetite change and fever  HENT: Negative for ear pain and sore throat  Eyes: Negative for visual disturbance  Respiratory: Negative for shortness of breath  Cardiovascular: Negative for chest pain and leg swelling  Gastrointestinal: Negative for abdominal pain, diarrhea, nausea and vomiting  Genitourinary: Negative for dysuria  Skin: Negative for color change  Neurological: Negative for dizziness, tremors, light-headedness and headaches  Psychiatric/Behavioral: Negative for agitation and behavioral problems           Current Outpatient Medications   Medication Sig Dispense Refill    apixaban (ELIQUIS) 5 mg Take 1 tablet (5 mg total) by mouth 2 (two) times a day 180 tablet 3    betamethasone dipropionate (DIPROSONE) 0 05 % cream Apply topically 2 (two) times a day 45 g 2    Calcium Carbonate-Vitamin D 600-200 MG-UNIT CAPS Take by mouth 2 (two) times a day      cholecalciferol (VITAMIN D3) 1,000 units tablet Take 1,000 Units by mouth daily      clobetasol (TEMOVATE) 0 05 % cream       Coenzyme Q10 (COQ-10) 200 MG CAPS Take 2 capsules by mouth daily      famotidine (PEPCID) 40 MG tablet take 1 tablet by mouth twice a day 60 tablet 2    furosemide (LASIX) 20 mg tablet Take 1 tablet by mouth daily      hydroxychloroquine (PLAQUENIL) 200 mg tablet take 1 tablet by mouth twice a day with meals 60 tablet 5    Lactobacillus (ACIDOPHILUS PO) Take by mouth      metoprolol succinate (TOPROL-XL) 25 mg 24 hr tablet Take 1 tablet (25 mg total) by mouth daily 90 tablet 3    Multiple Vitamins-Minerals (DAILY MULTIVITAMIN PO) Take 1 tablet by mouth daily      nitroglycerin (NITROSTAT) 0 4 mg SL tablet place 1 tablet under the tongue if needed every 5 minutes for chest pain for 3 doses IF NO RELIEF AFTER FIRST DOSE CALL PRESCRIBER   25 tablet 0    omega-3-acid ethyl esters (LOVAZA) 1 g capsule take 2 capsules by mouth twice a day (Patient taking differently: Take 1 g by mouth 2 (two) times a day ) 120 capsule 0    pantoprazole (PROTONIX) 40 mg tablet Take 40 mg by mouth daily  0    polyethylene glycol-electrolytes (NULYTELY) 4000 mL solution take by mouth as directed  FOR COLONOSCOPY  0    pravastatin (PRAVACHOL) 10 mg tablet Take 1 tablet (10 mg total) by mouth 3 (three) times a week Monday Wednesday and Friday 60 tablet 3    ramipril (ALTACE) 5 mg capsule Take 1 capsule (5 mg total) by mouth 2 (two) times a day 180 capsule 3    sertraline (ZOLOFT) 25 mg tablet       sotalol (BETAPACE) 80 mg tablet Take 1 tablet (80 mg total) by mouth every 12 (twelve) hours 180 tablet 3    acetaminophen (TYLENOL ARTHRITIS PAIN) 650 mg CR tablet Take by mouth      gabapentin (NEURONTIN) 100 mg capsule take 1 capsule by mouth three times a day (Patient not taking: No sig reported) 90 capsule 2    gabapentin (NEURONTIN) 300 mg capsule Take 1 capsule (300 mg total) by mouth 2 (two) times a day for 30 days 60 capsule 1     No current facility-administered medications for this visit  Objective:    /90 (BP Location: Left arm, Patient Position: Sitting, Cuff Size: Adult)   Pulse 72   Temp 97 5 °F (36 4 °C) (Tympanic)   Resp 14   Ht 5' 5 25" (1 657 m)   Wt 95 7 kg (211 lb)   BMI 34 84 kg/m²        Physical Exam   Constitutional: She is oriented to person, place, and time  She appears well-developed and well-nourished  No distress  HENT:   Head: Normocephalic and atraumatic  Right Ear: External ear normal    Left Ear: External ear normal    Nose: Nose normal    Mouth/Throat: Oropharynx is clear and moist  No oropharyngeal exudate  Eyes: EOM are normal  Right eye exhibits no discharge  Left eye exhibits no discharge  Neck: Normal range of motion  Neck supple  Cardiovascular: Normal rate, regular rhythm, normal heart sounds and intact distal pulses  No murmur heard  Pulmonary/Chest: Effort normal and breath sounds normal  No respiratory distress  Abdominal: Soft  Bowel sounds are normal  She exhibits no distension  There is no tenderness  Musculoskeletal: Normal range of motion  She exhibits no edema  Neurological: She is alert and oriented to person, place, and time  Skin: Skin is warm  Psychiatric: She has a normal mood and affect   Her behavior is normal               Terry Porras MD

## 2020-02-21 ENCOUNTER — OFFICE VISIT (OUTPATIENT)
Dept: VASCULAR SURGERY | Facility: CLINIC | Age: 80
End: 2020-02-21
Payer: COMMERCIAL

## 2020-02-21 VITALS
TEMPERATURE: 98.5 F | HEART RATE: 70 BPM | BODY MASS INDEX: 34.66 KG/M2 | RESPIRATION RATE: 14 BRPM | HEIGHT: 65 IN | SYSTOLIC BLOOD PRESSURE: 128 MMHG | DIASTOLIC BLOOD PRESSURE: 78 MMHG | WEIGHT: 208 LBS

## 2020-02-21 DIAGNOSIS — Z82.49 FAMILY HISTORY OF INTRACRANIAL ANEURYSMS: ICD-10-CM

## 2020-02-21 DIAGNOSIS — I10 ESSENTIAL HYPERTENSION: Chronic | ICD-10-CM

## 2020-02-21 DIAGNOSIS — E78.2 MIXED HYPERLIPIDEMIA: ICD-10-CM

## 2020-02-21 DIAGNOSIS — I70.209 PERIPHERAL ARTERIOSCLEROSIS (HCC): ICD-10-CM

## 2020-02-21 DIAGNOSIS — G47.33 OSA (OBSTRUCTIVE SLEEP APNEA): ICD-10-CM

## 2020-02-21 DIAGNOSIS — Z82.49 FAMILY HISTORY OF ABDOMINAL AORTIC ANEURYSM: ICD-10-CM

## 2020-02-21 DIAGNOSIS — R73.03 PREDIABETES: Primary | ICD-10-CM

## 2020-02-21 DIAGNOSIS — I48.0 PAROXYSMAL ATRIAL FIBRILLATION (HCC): ICD-10-CM

## 2020-02-21 DIAGNOSIS — E66.9 OBESITY (BMI 30-39.9): ICD-10-CM

## 2020-02-21 DIAGNOSIS — I72.3 ANEURYSM OF ILIAC ARTERY (HCC): ICD-10-CM

## 2020-02-21 PROCEDURE — 1160F RVW MEDS BY RX/DR IN RCRD: CPT | Performed by: PHYSICIAN ASSISTANT

## 2020-02-21 PROCEDURE — 99204 OFFICE O/P NEW MOD 45 MIN: CPT | Performed by: PHYSICIAN ASSISTANT

## 2020-02-21 NOTE — LETTER
February 23, 2020     Reina Ley, [address]    Patient: Meri Flores   YOB: 1940   Date of Visit: 2/21/2020     Dear Dr Kae Guillen      Thank you for referring Herberth Rock to me for evaluation  Below are the relevant portions of my assessment and plan of care  If you have questions, please do not hesitate to call me  I look forward to following Yumiko along with you  Sincerely,        Nelida Brown PA-C        CC: No Recipients    Progress Notes:    Assessment/Plan:    Left iliac artery ectasia (enlargement) by [employer] screening  Peripheral arterial disease by examination  Atrial fibrillation on anticoagulation  ? AAA on [facility] screening  Family history of aneurysms, including father presumably passed from AAA  Family history of cerebral aneurysms    Plan: We reviewed her [facility] screening report  We had a detailed discussion about AAA, PAD and surveillance of aneurysms  Patient education provided  We will check formal AOIL and JENNIFER studies to establish baseline  If L iliac artery is 2 3 cm, we can likely continue to monitor annually for now as the rupture would be quite low   Further recommendations forthcoming her arterial studies      - Recommend screening of 1st degree family members for AAA  - We will check formal aortoiliac duplex and lower extremity arterial duplex for baseline AAA, iliacs and PAD  - Continue with apixaban and statin therapy; consider intensified statin therapy  - Continue with activity as tolerated  - We will call patient with results of studies (unless indicated to bring her back for OV to discuss)  - Follow-up office visit in 1 year, or sooner if needed

## 2020-02-21 NOTE — PROGRESS NOTES
Assessment/Plan:    Left iliac artery ectasia (enlargement) by [facility] screening  Peripheral arterial disease by examination (asymptomatic)  Atrial fibrillation on anticoagulation  ? AAA on [facility] screening  Family history of aneurysms, including father presumably passed from AAA  Family history of cerebral aneurysms    Plan: We reviewed her [facility] screening report  We had a detailed discussion about AAA, PAD and surveillance of aneurysms  Patient education provided  We will check formal AOIL and JENNIFER studies to establish baseline  If L iliac artery is 2 3 cm, we can likely continue to monitor annually for now as the rupture would be quite low  Further recommendations forthcoming her arterial studies      - Recommend screening of 1st degree family members for AAA  - We will check formal aortoiliac duplex and lower extremity arterial duplex for baseline AAA, iliacs and PAD  - Continue with apixaban and statin therapy; consider intensified statin therapy  - Continue with activity as tolerated  - We will call patient with results of studies (unless indicated to bring her back for OV to discuss)  - Follow-up office visit in 1 year, or sooner if needed       Diagnoses and all orders for this visit:    Aneurysm of iliac artery ([address] )  -     VAS abdominal aorta/iliacs; complete study; Future  -     Ambulatory referral to Vascular Surgery  -     VAS lower limb arterial duplex, complete bilateral; Future    Prediabetes    Obesity (BMI 30-39 9)  -     VAS abdominal aorta/iliacs; complete study; Future  -     VAS lower limb arterial duplex, complete bilateral; Future    MIGUEL (obstructive sleep apnea)    Peripheral arteriosclerosis (HCC)  -     VAS abdominal aorta/iliacs; complete study; Future  -     VAS lower limb arterial duplex, complete bilateral; Future    Essential hypertension  -     VAS abdominal aorta/iliacs; complete study;  Future  -     VAS lower limb arterial duplex, complete bilateral; Future    Family history of AAA        -     VAS abdominal aorta/iliacs; complete study; Future        Subjective:      Patient ID: Tanja Kumar is a [de-identified] y o  female  Pt is new to our practice and was referred by Dr Yin Rodriguez MD for evaluation of left iliac artery ectasia  Pt denies any abdominal or back pain  Pt had a Vascular Screening done by [facility]  Pt is currently taking Eliquis and Pravastatin  HPI    Yumiko Gaxiola [de-identified] y/o F Htn, PAF on Eliquis who recently had a vascular screening performed by [employer]  She brings in the screening study report to review in the office  Study is concerning for left iliac artery ectasia, mild carotid artery disease, as well as right lower extremity peripheral arterial disease  Additionally, she has a prior screening, which she did not realize was abnormal suggesting abdominal aortic aneurysm of greater than 3 cm  The AAA was not seen on her most recent study  Patient presents today in the office accompanied with her daughter  She denies any cardiovascular history  No history of heart attack, stroke or TIA  She has no chest pain or overt shortness of breath  She does ambulate using a cane  She has no buttocks/thigh / calf claudication with her level of activity  She has no rest pain or wounds  She was a former smoker but quit many years ago  The patient reports that she is very concerned because she has family history of aneurysms  Her father  presumably from a ruptured AAA  She also has several family members who have  from intracranial aneurysms including a son who is 64 at the time  Her other children have not been screened yet for aneurysms  We reviewed her lifeline screening test  I would like to obtain a formal aortoiliac duplex  To assess size of abdominal aorta as well as iliacs  We will also check lower extremity arterial duplex to further evaluate her for aneurysms and baseline peripheral arterial disease      Given vascular disease, I recommend intensified medical therapy  She is only on Pravachol 10 mg 3 times per week  LDL is stable at 78, however in treating her vascular disease we ought to consider at least a moderate intensity statin therapy  I explained to her that after we evaluate her formal AOIL and JENNIFER studies we can discussed medical therapy  [facility]:   left common iliac artery 2 3 x 2 3 x 2 3 cm   mild carotid artery disease   right lower extremity peripheral arterial disease   total cholesterol 144 HDL 41 LDL 78 triglycerides 121   glucose 114   blood pressure 160/84, obesity   high risk for stroke with history of AF   moderate risk for coronary artery disease       [facility]: Additionally, she has screening for 2 years ago suggesting AAA greater than 3 cm    She also has mild carotid artery stenosis on screening study  In the future, we will obtain a formal carotid duplex study  We discussed symptoms of stroke for which call 911  Extra time was required evaluating the patient, reviewing her studies and poviding patient education  Medical therapy includes apixaban 5 b i d  and Pravachol 10 -  3 times a week    The following portions of the patient's history were reviewed and updated as appropriate: allergies, current medications, past family history, past medical history, past social history, past surgical history and problem list     She has no hx of heart attack/stroke/TIA  She focal weakness  We discussed sx of stroke for which she should call 911  She has no chest pain or shortness of breath at her level of activity  She denies abdominal pain, back pain, n/v, food fear, weight loss  She has no buttock, thigh or calf claudication on exertion  No wounds  Review of Systems   Constitutional: Negative  HENT: Positive for drooling, postnasal drip and tinnitus  Eyes: Positive for itching  Respiratory: Positive for apnea, cough and shortness of breath (With Activity)  Cardiovascular: Positive for palpitations  Gastrointestinal: Positive for abdominal pain  Endocrine: Negative  Genitourinary: Negative  Musculoskeletal: Positive for arthralgias  Skin: Negative  Allergic/Immunologic: Negative  Neurological: Negative  Hematological: Bruises/bleeds easily  Psychiatric/Behavioral: Negative  Objective:      /78 (BP Location: Right arm, Patient Position: Sitting, Cuff Size: Adult)   Pulse 70   Temp 98 5 °F (36 9 °C) (Tympanic)   Resp 14   Ht 5' 5" (1 651 m)   Wt 94 3 kg (208 lb)   BMI 34 61 kg/m²        Physical Exam   Constitutional: She is oriented to person, place, and time  She appears well-developed and well-nourished  She is cooperative  HENT:   Head: Normocephalic and atraumatic  Eyes: Pupils are equal, round, and reactive to light  EOM are normal    Neck: Trachea normal  Neck supple  No JVD present  No carotid bruits   Cardiovascular: Normal rate, regular rhythm, S1 normal and S2 normal  Exam reveals no gallop and no friction rub  Murmur (2/6 syst m) heard  Pulses:       Carotid pulses are 2+ on the right side, and 2+ on the left side  Radial pulses are 2+ on the right side, and 2+ on the left side  Dorsalis pedis pulses are 0 on the right side, and 2+ on the left side  Unable to appreciate femoral pulses inpatient possibly due to clothing and/ or body habitus       Pulmonary/Chest: Effort normal and breath sounds normal  No accessory muscle usage  No respiratory distress  She has no wheezes  She has no rales  Abdominal: Soft  Bowel sounds are normal  She exhibits no distension  There is no hepatosplenomegaly  There is no tenderness  Obese    Abdomen is soft; Nonpalpable aorta   Musculoskeletal: Normal range of motion  She exhibits no edema or deformity  Neurological: She is alert and oriented to person, place, and time  Grossly normal    Skin: Skin is warm and dry  Capillary refill takes 2 to 3 seconds  No lesion and no rash noted   No cyanosis  Nails show no clubbing  Psychiatric: She has a normal mood and affect  Nursing note and vitals reviewed  I have reviewed and made appropriate changes to the review of systems input by the medical assistant  Vitals:    02/21/20 1453   BP: 128/78   BP Location: Right arm   Patient Position: Sitting   Cuff Size: Adult   Pulse: 70   Resp: 14   Temp: 98 5 °F (36 9 °C)   TempSrc: Tympanic   Weight: 94 3 kg (208 lb)   Height: 5' 5" (1 651 m)       Patient Active Problem List   Diagnosis    Hypertension    Cardiac disease    Hyperlipidemia    Paroxysmal atrial fibrillation (HCC)    Chronic diastolic congestive heart failure (HCC)    Peripheral arteriosclerosis (HCC)    Pain in both feet    Corns    Onychomycosis    Radiculopathy of lumbar region    Nonrheumatic aortic valve insufficiency    Joint pain    Acute pain of left shoulder    Other insomnia    Gastroesophageal reflux disease without esophagitis    MIGUEL (obstructive sleep apnea)    Fatigue    Claustrophobia    Mood disturbance    Obesity (BMI 30-39  9)    Prediabetes    Platelet dysfunction (HCC)    Aneurysm of iliac artery ([address] )    Family history of intracranial aneurysms    Family history of abdominal aortic aneurysm       Past Surgical History:   Procedure Laterality Date    CARDIAC PACEMAKER PLACEMENT Left 2014    CATARACT EXTRACTION      CHOLECYSTECTOMY      resolved: 2009    COLONOSCOPY      Fiberoptic, resolved 2015    EYE SURGERY      KNEE SURGERY Left     left kknee replacement in 2009       Family History   Problem Relation Age of Onset    Coronary artery disease Father     Diabetes Sister     Hyperlipidemia Sister     Arthritis Family     Hypertension Family        Social History     Socioeconomic History    Marital status:       Spouse name: Not on file    Number of children: Not on file    Years of education: Not on file    Highest education level: Not on file   Occupational History    Not on file   Social Needs    Financial resource strain: Not on file    Food insecurity:     Worry: Not on file     Inability: Not on file    Transportation needs:     Medical: Not on file     Non-medical: Not on file   Tobacco Use    Smoking status: Former Smoker     Packs/day: 1 00     Years: 25 00     Pack years: 25 00     Types: Cigarettes    Smokeless tobacco: Never Used    Tobacco comment: quit 22 yeears ago   Substance and Sexual Activity    Alcohol use: Yes     Comment: occasional, No alcohol use,per [employer]    Drug use: No    Sexual activity: Not on file   Lifestyle    Physical activity:     Days per week: Not on file     Minutes per session: Not on file    Stress: Not on file   Relationships    Social connections:     Talks on phone: Not on file     Gets together: Not on file     Attends Jew service: Not on file     Active member of club or organization: Not on file     Attends meetings of clubs or organizations: Not on file     Relationship status: Not on file    Intimate partner violence:     Fear of current or ex partner: Not on file     Emotionally abused: Not on file     Physically abused: Not on file     Forced sexual activity: Not on file   Other Topics Concern    Not on file   Social History Narrative    Exercise: Walking, 2x/week       Allergies   Allergen Reactions    Ciprofloxacin Rash    Sulfa Antibiotics Rash    Synvisc [Hylan G-F 20] Rash         Current Outpatient Medications:     apixaban (ELIQUIS) 5 mg, Take 1 tablet (5 mg total) by mouth 2 (two) times a day, Disp: 180 tablet, Rfl: 3    betamethasone dipropionate (DIPROSONE) 0 05 % cream, Apply topically 2 (two) times a day, Disp: 45 g, Rfl: 2    Calcium Carbonate-Vitamin D 600-200 MG-UNIT CAPS, Take by mouth 2 (two) times a day, Disp: , Rfl:     cholecalciferol (VITAMIN D3) 1,000 units tablet, Take 1,000 Units by mouth daily, Disp: , Rfl:     clobetasol (TEMOVATE) 0 05 % cream, , Disp: , Rfl:     Coenzyme Q10 (COQ-10) 200 MG CAPS, Take 2 capsules by mouth daily, Disp: , Rfl:     famotidine (PEPCID) 40 MG tablet, take 1 tablet by mouth twice a day, Disp: 60 tablet, Rfl: 2    furosemide (LASIX) 20 mg tablet, Take 1 tablet by mouth daily, Disp: , Rfl:     hydroxychloroquine (PLAQUENIL) 200 mg tablet, take 1 tablet by mouth twice a day with meals, Disp: 60 tablet, Rfl: 5    Lactobacillus (ACIDOPHILUS PO), Take by mouth, Disp: , Rfl:     metoprolol succinate (TOPROL-XL) 25 mg 24 hr tablet, Take 1 tablet (25 mg total) by mouth daily, Disp: 90 tablet, Rfl: 3    Multiple Vitamins-Minerals (DAILY MULTIVITAMIN PO), Take 1 tablet by mouth daily, Disp: , Rfl:     nitroglycerin (NITROSTAT) 0 4 mg SL tablet, place 1 tablet under the tongue if needed every 5 minutes for chest pain for 3 doses IF NO RELIEF AFTER FIRST DOSE CALL PRESCRIBER  , Disp: 25 tablet, Rfl: 0    omega-3-acid ethyl esters (LOVAZA) 1 g capsule, take 2 capsules by mouth twice a day (Patient taking differently: Take 1 g by mouth 2 (two) times a day ), Disp: 120 capsule, Rfl: 0    pantoprazole (PROTONIX) 40 mg tablet, Take 40 mg by mouth daily, Disp: , Rfl: 0    pravastatin (PRAVACHOL) 10 mg tablet, Take 1 tablet (10 mg total) by mouth 3 (three) times a week Monday Wednesday and Friday, Disp: 60 tablet, Rfl: 3    ramipril (ALTACE) 5 mg capsule, Take 1 capsule (5 mg total) by mouth 2 (two) times a day, Disp: 180 capsule, Rfl: 3    sertraline (ZOLOFT) 25 mg tablet, , Disp: , Rfl:     sotalol (BETAPACE) 80 mg tablet, Take 1 tablet (80 mg total) by mouth every 12 (twelve) hours, Disp: 180 tablet, Rfl: 3    acetaminophen (TYLENOL ARTHRITIS PAIN) 650 mg CR tablet, Take by mouth, Disp: , Rfl:     gabapentin (NEURONTIN) 100 mg capsule, take 1 capsule by mouth three times a day (Patient not taking: No sig reported), Disp: 90 capsule, Rfl: 2    gabapentin (NEURONTIN) 300 mg capsule, Take 1 capsule (300 mg total) by mouth 2 (two) times a day for 30 days, Disp: 60 capsule, Rfl: 1    polyethylene glycol-electrolytes (NULYTELY) 4000 mL solution, take by mouth as directed  FOR COLONOSCOPY, Disp: , Rfl: 0

## 2020-02-21 NOTE — PATIENT INSTRUCTIONS
Left iliac artery ectasia (enlargement) by [facility] screening   peripheral arterial disease by examination   atrial fibrillation on anticoagulation   ?  AAA on life screening   family history of aneurysms, including father presumably passed from AAA      - continue with apixaban and statin therapy  - continue with activity as tolerated  - recommend screening of 1st degree family members for AAA  - we will check formal aortoiliac duplex and lower extremity arterial duplex  - we will call you with results of your studies  - follow-up office visit in 1 year, or sooner if needed

## 2020-03-06 ENCOUNTER — OFFICE VISIT (OUTPATIENT)
Dept: PODIATRY | Facility: CLINIC | Age: 80
End: 2020-03-06
Payer: COMMERCIAL

## 2020-03-06 VITALS
SYSTOLIC BLOOD PRESSURE: 130 MMHG | RESPIRATION RATE: 17 BRPM | DIASTOLIC BLOOD PRESSURE: 78 MMHG | WEIGHT: 208 LBS | BODY MASS INDEX: 34.66 KG/M2 | HEIGHT: 65 IN

## 2020-03-06 DIAGNOSIS — M79.672 PAIN IN BOTH FEET: ICD-10-CM

## 2020-03-06 DIAGNOSIS — I70.209 PERIPHERAL ARTERIOSCLEROSIS (HCC): Primary | ICD-10-CM

## 2020-03-06 DIAGNOSIS — M79.671 PAIN IN BOTH FEET: ICD-10-CM

## 2020-03-06 DIAGNOSIS — L84 CORNS: ICD-10-CM

## 2020-03-06 PROCEDURE — 11056 PARNG/CUTG B9 HYPRKR LES 2-4: CPT | Performed by: PODIATRIST

## 2020-03-06 NOTE — PROGRESS NOTES
Assessment/Plan:  Pain   Radiculopathy   Callus   Mycotic toenail   Peripheral artery disease         Plan   Foot exam performed   All nails debrided   Calluses debrided   Procedures performed without pain or complication   Patient remain on gabapentin as directed        Discussion/Summary   The patient was counseled regarding instructions for management,-- patient and family education,-- risks and benefits of treatment options     Patient is able to Self-Care     Possible side effects of new medications were reviewed with the patient/guardian today  The treatment plan was reviewed with the patient/guardian  The patient/guardian understands and agrees with the treatment plan      Chief Complaint   Patient has foot pain  She has pain when she wears shoes  No history of trauma          History of Present Illness   HPI:  Patient complains of pain in feet with ambulation  She has pain around the toes  She is also concerned with pain in her right heel  This is been ongoing for several weeks  She has no history of trauma   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   Zarina Taylor was unable tolerate gabapentin     Review of Systems           Cardiac: chest pain,-- rhythm problems,-- AM fatigue-- and-- witnessed apnea episodes       Skin: No complaints of nonhealing sores or skin rash       Genitourinary: loss of bladder control      Psychological: No complaints of feeling depressed, anxiety, panic attacks, or difficulty concentrating       General: trouble sleeping-- and-- lack of energy/fatigue       Respiratory: shortness of breath       HEENT: snoring      Gastrointestinal: heartburn      Hematologic: anemia      Neurological: daytime sleepiness      Musculoskeletal: arthritis-- and-- back pain      Active Problems   1  Allergic rhinitis (477 9) (J30 9)   2  Anxiety disorder (300 00) (F41 9)   3  Arthropathy (716 90) (M12 9)   4  Atherosclerosis of arteries of extremities (440 20) (I70 209)   5  Atrial fibrillation (427 31) (I48 91)   6  Backache (724 5) (M54 9)   7  Callus (700) (L84)   8  Cervicalgia (723 1) (M54 2)   9  Depression (311) (F32 9)   10  Difficulty in walking (719 7) (R26 2)   11  Encounter for screening for malignant neoplasm of colon (V76 51) (Z12 11)   12  Esophagitis, reflux (530 11) (K21 0)   13  Essential hypertension (401 9) (I10)   14  Foot pain, bilateral (729 5) (M79 671,M79 672)   15  Herpes zoster (053 9) (B02 9)   16  Hospital discharge follow-up (V67 59) (Z09)   17  Hyperlipidemia (272 4) (E78 5)   18  Impaired fasting glucose (790 21) (R73 01)   19  Internal Hemorrhoids (455 0)   20  Leg swelling (729 81) (L20 92)   21  Lichen planus (398 3) (L43 9)   22  Limb pain (729 5) (M79 609)   23  Lumbar radiculopathy (724 4) (M54 16)   24  Multiple joint pain (719 49) (M25 50)   25  Need for influenza vaccination (V04 81) (Z23)   26  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)   27  Onychomycosis (110 1) (B35 1)   28  MIGUEL (obstructive sleep apnea) (327 23) (G47 33)   29  Other abnormal finding of urine (791 9) (R82 99)   30  Pes planus, congenital (754 61) (Q66 50)   31  Plantar fascial fibromatosis (728 71) (M72 2)   32  Rectal/anal hemorrhage (569 3) (K62 5)   33  Screening for malignant neoplasm of cervix (V76 2) (Z12 4)   34  Shoulder joint pain, unspecified laterality   35  Thrombocytopenia (287 5) (D69 6)   36  Urticaria (708 9) (L50 9)   37  Vulvovaginitis candida albicans (112 1) (B37 3)     Past Medical History    · History of Acute maxillary sinusitis (461 0) (J01 00)   · History of Acute upper respiratory infection (465 9) (J06 9)   · History of Cellulitis (682 9) (L03 90)   · History of Cough (786 2) (R05)   · History of Dysuria (788 1) (R30 0)   · History of High cholesterol (272 0) (E78 00)   · History of abdominal pain (V13 89) (I52 692)   · History of acute bronchitis (V12 69) (Z87 09)   · History of acute sinusitis (V12 69) (Z87 09)   · History of arthritis (V13 4) (Z87 39)   · History of atrial fibrillation (V12 59) (Z86 79)   · History of cataract (V12 49) (Z86 69)   · History of gastroesophageal reflux (GERD) (V12 79) (Z87 19)   · History of hypertension (V12 59) (Z86 79)   · History of Skin rash (782 1) (R21)   · History of Vulvovaginitis (616 10) (N76 0)     The active problems and past medical history were reviewed and updated today       Surgical History    · History of Cataract Surgery   · History of Colonoscopy (Fiberoptic) Screening   · History of Gallbladder Surgery   · History of Knee Replacement   · History of Pacemaker Placement     The surgical history was reviewed and updated today        Family History   Father    · Family history of Coronary Artery Disease (V17 49)  Sister    · Family history of Diabetes Mellitus (V18 0)   · Family history of High cholesterol  Family History    · Family history of arthritis (V17 7) (Z82 61)   · Family history of hypertension (V17 49) (Z82 49)     The family history was reviewed and updated today        Social History    · Exercise: Walking   · 2 x/week   · Former smoker (V15 82) (Z87 891)   · No alcohol use   ·   The social history was reviewed and updated today       Physical Exam   Left Foot: Appearance: Normal except as noted: excessive pronation-- and-- pes planus  Great toe deformities include a bunion  Tenderness: None except the great toe-- and-- distal first metatarsal     Right Foot: Appearance: Normal except as noted: excessive pronation-- and-- pes planus  Great toe deformities include a bunion  Tenderness: None except the great toe,-- distal first metatarsal,-- medial calcaneous-- and-- insertion of the plantar fascia     Left Ankle: ROM: limited ROM in all planes    Right Ankle: ROM: limited ROM in all planes    Neurological Exam: Light touch was decreased bilaterally  Vibratory sensation was decreased in both first metatarsophalangeal joints     Vascular Exam: performed Dorsalis pedis pulses were diminished bilaterally   Posterior tibial pulses were diminished bilaterally  Elevation Pallor: present bilaterally  Dependence rubor was present bilaterally  Capillary refill time was greater than 3 seconds bilaterally-- and-- Q  9, findings bilateral  Edema: moderate bilaterally     Toenails: All of the toenails were elongated,-- hypertrophied,-- discolored-- and-- Right ptotic     Hyperkeratosis: present on both first toes,-- present on both first sub metatarsals-- and-- Positive xerosis of skin noted     Shoe Gear Evaluation: performed ()   Recommendation(s): SAS style-- and-- OTC inlays

## 2020-03-09 DIAGNOSIS — E78.5 DYSLIPIDEMIA: ICD-10-CM

## 2020-03-09 RX ORDER — OMEGA-3-ACID ETHYL ESTERS 1 G/1
2 CAPSULE, LIQUID FILLED ORAL 2 TIMES DAILY
Qty: 120 CAPSULE | Refills: 0 | Status: SHIPPED | OUTPATIENT
Start: 2020-03-09 | End: 2020-12-14 | Stop reason: SDUPTHER

## 2020-03-12 ENCOUNTER — REMOTE DEVICE CLINIC VISIT (OUTPATIENT)
Dept: CARDIOLOGY CLINIC | Facility: CLINIC | Age: 80
End: 2020-03-12
Payer: COMMERCIAL

## 2020-03-12 DIAGNOSIS — Z95.0 PRESENCE OF PERMANENT CARDIAC PACEMAKER: Primary | ICD-10-CM

## 2020-03-12 PROCEDURE — 93296 REM INTERROG EVL PM/IDS: CPT | Performed by: INTERNAL MEDICINE

## 2020-03-12 PROCEDURE — 93294 REM INTERROG EVL PM/LDLS PM: CPT | Performed by: INTERNAL MEDICINE

## 2020-03-13 NOTE — PROGRESS NOTES
Results for orders placed or performed in visit on 03/12/20   Cardiac EP device report    Narrative    MDT DUAL PM  CARELINK TRANSMISSION: BATTERY VOLTAGE ADEQUATE  (4 YRS)  AP 99%  1%  ALL AVAILABLE LEAD PARAMETERS WITHIN NORMAL LIMITS  NO SIGNIFICANT HIGH RATE EPISODES  NORMAL DEVICE FUNCTION  ---VENEGAS

## 2020-05-15 ENCOUNTER — OFFICE VISIT (OUTPATIENT)
Dept: PODIATRY | Facility: CLINIC | Age: 80
End: 2020-05-15
Payer: COMMERCIAL

## 2020-05-15 VITALS
RESPIRATION RATE: 16 BRPM | SYSTOLIC BLOOD PRESSURE: 130 MMHG | WEIGHT: 208 LBS | BODY MASS INDEX: 34.66 KG/M2 | HEART RATE: 70 BPM | HEIGHT: 65 IN | DIASTOLIC BLOOD PRESSURE: 78 MMHG

## 2020-05-15 DIAGNOSIS — M79.672 PAIN IN BOTH FEET: ICD-10-CM

## 2020-05-15 DIAGNOSIS — L84 CORNS: ICD-10-CM

## 2020-05-15 DIAGNOSIS — M79.671 PAIN IN BOTH FEET: ICD-10-CM

## 2020-05-15 DIAGNOSIS — I70.209 PERIPHERAL ARTERIOSCLEROSIS (HCC): Primary | ICD-10-CM

## 2020-05-15 PROCEDURE — 11056 PARNG/CUTG B9 HYPRKR LES 2-4: CPT | Performed by: PODIATRIST

## 2020-06-11 ENCOUNTER — REMOTE DEVICE CLINIC VISIT (OUTPATIENT)
Dept: CARDIOLOGY CLINIC | Facility: CLINIC | Age: 80
End: 2020-06-11
Payer: COMMERCIAL

## 2020-06-11 DIAGNOSIS — Z95.0 PRESENCE OF PERMANENT CARDIAC PACEMAKER: Primary | ICD-10-CM

## 2020-06-11 PROCEDURE — 93296 REM INTERROG EVL PM/IDS: CPT | Performed by: INTERNAL MEDICINE

## 2020-06-11 PROCEDURE — 93294 REM INTERROG EVL PM/LDLS PM: CPT | Performed by: INTERNAL MEDICINE

## 2020-06-18 ENCOUNTER — OFFICE VISIT (OUTPATIENT)
Dept: CARDIOLOGY CLINIC | Facility: CLINIC | Age: 80
End: 2020-06-18
Payer: COMMERCIAL

## 2020-06-18 VITALS
HEART RATE: 88 BPM | HEIGHT: 65 IN | DIASTOLIC BLOOD PRESSURE: 72 MMHG | SYSTOLIC BLOOD PRESSURE: 134 MMHG | OXYGEN SATURATION: 98 % | WEIGHT: 208 LBS | BODY MASS INDEX: 34.66 KG/M2 | TEMPERATURE: 98.5 F

## 2020-06-18 DIAGNOSIS — I50.32 CHRONIC DIASTOLIC CONGESTIVE HEART FAILURE (HCC): ICD-10-CM

## 2020-06-18 DIAGNOSIS — I10 ESSENTIAL HYPERTENSION: ICD-10-CM

## 2020-06-18 DIAGNOSIS — I35.1 NONRHEUMATIC AORTIC VALVE INSUFFICIENCY: ICD-10-CM

## 2020-06-18 DIAGNOSIS — I48.0 PAROXYSMAL ATRIAL FIBRILLATION (HCC): Primary | ICD-10-CM

## 2020-06-18 DIAGNOSIS — E78.5 DYSLIPIDEMIA: ICD-10-CM

## 2020-06-18 DIAGNOSIS — E78.2 MIXED HYPERLIPIDEMIA: ICD-10-CM

## 2020-06-18 DIAGNOSIS — I49.5 SSS (SICK SINUS SYNDROME) (HCC): ICD-10-CM

## 2020-06-18 DIAGNOSIS — Z95.0 PRESENCE OF PERMANENT CARDIAC PACEMAKER: ICD-10-CM

## 2020-06-18 PROCEDURE — 93000 ELECTROCARDIOGRAM COMPLETE: CPT | Performed by: INTERNAL MEDICINE

## 2020-06-18 PROCEDURE — 1160F RVW MEDS BY RX/DR IN RCRD: CPT | Performed by: INTERNAL MEDICINE

## 2020-06-18 PROCEDURE — 3008F BODY MASS INDEX DOCD: CPT | Performed by: INTERNAL MEDICINE

## 2020-06-18 PROCEDURE — 4040F PNEUMOC VAC/ADMIN/RCVD: CPT | Performed by: INTERNAL MEDICINE

## 2020-06-18 PROCEDURE — 1036F TOBACCO NON-USER: CPT | Performed by: INTERNAL MEDICINE

## 2020-06-18 PROCEDURE — 3075F SYST BP GE 130 - 139MM HG: CPT | Performed by: INTERNAL MEDICINE

## 2020-06-18 PROCEDURE — 3078F DIAST BP <80 MM HG: CPT | Performed by: INTERNAL MEDICINE

## 2020-06-18 PROCEDURE — 99214 OFFICE O/P EST MOD 30 MIN: CPT | Performed by: INTERNAL MEDICINE

## 2020-07-06 DIAGNOSIS — M15.9 PRIMARY OSTEOARTHRITIS INVOLVING MULTIPLE JOINTS: ICD-10-CM

## 2020-07-06 DIAGNOSIS — R76.8 RHEUMATOID FACTOR POSITIVE: ICD-10-CM

## 2020-07-06 RX ORDER — HYDROXYCHLOROQUINE SULFATE 200 MG/1
200 TABLET, FILM COATED ORAL
Qty: 30 TABLET | Refills: 2 | Status: SHIPPED | OUTPATIENT
Start: 2020-07-06 | End: 2022-04-07

## 2020-07-27 ENCOUNTER — OFFICE VISIT (OUTPATIENT)
Dept: PODIATRY | Facility: CLINIC | Age: 80
End: 2020-07-27
Payer: COMMERCIAL

## 2020-07-27 VITALS
WEIGHT: 208 LBS | HEIGHT: 65 IN | HEART RATE: 88 BPM | DIASTOLIC BLOOD PRESSURE: 72 MMHG | RESPIRATION RATE: 17 BRPM | BODY MASS INDEX: 34.66 KG/M2 | SYSTOLIC BLOOD PRESSURE: 134 MMHG

## 2020-07-27 DIAGNOSIS — M79.671 PAIN IN BOTH FEET: ICD-10-CM

## 2020-07-27 DIAGNOSIS — M79.672 PAIN IN BOTH FEET: ICD-10-CM

## 2020-07-27 DIAGNOSIS — I70.209 PERIPHERAL ARTERIOSCLEROSIS (HCC): Primary | ICD-10-CM

## 2020-07-27 DIAGNOSIS — L84 CORNS: ICD-10-CM

## 2020-07-27 PROCEDURE — 11056 PARNG/CUTG B9 HYPRKR LES 2-4: CPT | Performed by: PODIATRIST

## 2020-07-27 NOTE — PROGRESS NOTES
Assessment/Plan:  Pain   Radiculopathy   Callus   Mycotic toenail   Peripheral artery disease         Plan   Foot exam performed   All nails debrided   Calluses debrided   Procedures performed without pain or complication   Patient remain on gabapentin as directed        Discussion/Summary   The patient was counseled regarding instructions for management,-- patient and family education,-- risks and benefits of treatment options     Patient is able to Self-Care     Possible side effects of new medications were reviewed with the patient/guardian today  The treatment plan was reviewed with the patient/guardian  The patient/guardian understands and agrees with the treatment plan      Chief Complaint   Patient has foot pain   She has pain when she wears shoes   No history of trauma          History of Present Illness   HPI:  Patient complains of pain in feet with ambulation  She has pain around the toes  She is also concerned with pain in her right heel  This is been ongoing for several weeks  She has no history of trauma   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   Vijaya Parsons was unable tolerate gabapentin     Review of Systems           Cardiac: chest pain,-- rhythm problems,-- AM fatigue-- and-- witnessed apnea episodes       Skin: No complaints of nonhealing sores or skin rash       Genitourinary: loss of bladder control      Psychological: No complaints of feeling depressed, anxiety, panic attacks, or difficulty concentrating       General: trouble sleeping-- and-- lack of energy/fatigue       Respiratory: shortness of breath       HEENT: snoring      Gastrointestinal: heartburn      Hematologic: anemia      Neurological: daytime sleepiness      Musculoskeletal: arthritis-- and-- back pain      Active Problems   1  Allergic rhinitis (477 9) (J30 9)   2  Anxiety disorder (300 00) (F41 9)   3  Arthropathy (716 90) (M12 9)   4  Atherosclerosis of arteries of extremities (440 20) (I70 209)   5  Atrial fibrillation (427 31) (I48 91)   6  Backache (724 5) (M54 9)   7  Callus (700) (L84)   8  Cervicalgia (723 1) (M54 2)   9  Depression (311) (F32 9)   10  Difficulty in walking (719 7) (R26 2)   11  Encounter for screening for malignant neoplasm of colon (V76 51) (Z12 11)   12  Esophagitis, reflux (530 11) (K21 0)   13  Essential hypertension (401 9) (I10)   14  Foot pain, bilateral (729 5) (M79 671,M79 672)   15  Herpes zoster (053 9) (B02 9)   16  Hospital discharge follow-up (V67 59) (Z09)   17  Hyperlipidemia (272 4) (E78 5)   18  Impaired fasting glucose (790 21) (R73 01)   19  Internal Hemorrhoids (455 0)   20  Leg swelling (729 81) (W68 64)   21  Lichen planus (020 6) (L43 9)   22  Limb pain (729 5) (M79 609)   23  Lumbar radiculopathy (724 4) (M54 16)   24  Multiple joint pain (719 49) (M25 50)   25  Need for influenza vaccination (V04 81) (Z23)   26  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)   27  Onychomycosis (110 1) (B35 1)   28  MIGUEL (obstructive sleep apnea) (327 23) (G47 33)   29  Other abnormal finding of urine (791 9) (R82 99)   30  Pes planus, congenital (754 61) (Q66 50)   31  Plantar fascial fibromatosis (728 71) (M72 2)   32  Rectal/anal hemorrhage (569 3) (K62 5)   33  Screening for malignant neoplasm of cervix (V76 2) (Z12 4)   34  Shoulder joint pain, unspecified laterality   35  Thrombocytopenia (287 5) (D69 6)   36  Urticaria (708 9) (L50 9)   37  Vulvovaginitis candida albicans (112 1) (B37 3)     Past Medical History    · History of Acute maxillary sinusitis (461 0) (J01 00)   · History of Acute upper respiratory infection (465 9) (J06 9)   · History of Cellulitis (682 9) (L03 90)   · History of Cough (786 2) (R05)   · History of Dysuria (788 1) (R30 0)   · History of High cholesterol (272 0) (E78 00)   · History of abdominal pain (V13 89) (J11 471)   · History of acute bronchitis (V12 69) (Z87 09)   · History of acute sinusitis (V12 69) (Z87 09)   · History of arthritis (V13 4) (Z87 39)   · History of atrial fibrillation (V12 59) (Z86 79)   · History of cataract (V12 49) (Z86 69)   · History of gastroesophageal reflux (GERD) (V12 79) (Z87 19)   · History of hypertension (V12 59) (Z86 79)   · History of Skin rash (782 1) (R21)   · History of Vulvovaginitis (616 10) (N76 0)     The active problems and past medical history were reviewed and updated today       Surgical History    · History of Cataract Surgery   · History of Colonoscopy (Fiberoptic) Screening   · History of Gallbladder Surgery   · History of Knee Replacement   · History of Pacemaker Placement     The surgical history was reviewed and updated today        Family History   Father    · Family history of Coronary Artery Disease (V17 49)  Sister    · Family history of Diabetes Mellitus (V18 0)   · Family history of High cholesterol  Family History    · Family history of arthritis (V17 7) (Z82 61)   · Family history of hypertension (V17 49) (Z82 49)     The family history was reviewed and updated today        Social History    · Exercise: Walking   · 2 x/week   · Former smoker (V15 82) (Z87 891)   · No alcohol use   ·   The social history was reviewed and updated today       Physical Exam   Left Foot: Appearance: Normal except as noted: excessive pronation-- and-- pes planus  Great toe deformities include a bunion  Tenderness: None except the great toe-- and-- distal first metatarsal     Right Foot: Appearance: Normal except as noted: excessive pronation-- and-- pes planus  Great toe deformities include a bunion  Tenderness: None except the great toe,-- distal first metatarsal,-- medial calcaneous-- and-- insertion of the plantar fascia     Left Ankle: ROM: limited ROM in all planes    Right Ankle: ROM: limited ROM in all planes    Neurological Exam: Light touch was decreased bilaterally  Vibratory sensation was decreased in both first metatarsophalangeal joints     Vascular Exam: performed Dorsalis pedis pulses were diminished bilaterally   Posterior tibial pulses were diminished bilaterally  Elevation Pallor: present bilaterally  Dependence rubor was present bilaterally  Capillary refill time was greater than 3 seconds bilaterally-- and-- Q  9, findings bilateral  Edema: moderate bilaterally     Toenails: All of the toenails were elongated,-- hypertrophied,-- discolored-- and-- Right ptotic     Hyperkeratosis: present on both first toes,-- present on both first sub metatarsals-- and-- Positive xerosis of skin noted     Shoe Gear Evaluation: performed ()   Recommendation(s): SAS style-- and-- OTC inlays

## 2020-08-17 ENCOUNTER — HOSPITAL ENCOUNTER (OUTPATIENT)
Dept: NON INVASIVE DIAGNOSTICS | Facility: CLINIC | Age: 80
Discharge: HOME | End: 2020-08-17
Payer: COMMERCIAL

## 2020-08-17 DIAGNOSIS — I72.3 ANEURYSM OF ILIAC ARTERY (HCC): ICD-10-CM

## 2020-08-17 DIAGNOSIS — G47.33 OSA (OBSTRUCTIVE SLEEP APNEA): ICD-10-CM

## 2020-08-17 DIAGNOSIS — E66.9 OBESITY (BMI 30-39.9): ICD-10-CM

## 2020-08-17 DIAGNOSIS — R73.03 PREDIABETES: ICD-10-CM

## 2020-08-17 DIAGNOSIS — I70.209 PERIPHERAL ARTERIOSCLEROSIS (HCC): ICD-10-CM

## 2020-08-17 DIAGNOSIS — I10 ESSENTIAL HYPERTENSION: Chronic | ICD-10-CM

## 2020-08-17 PROCEDURE — 93978 VASCULAR STUDY: CPT

## 2020-08-17 PROCEDURE — 93925 LOWER EXTREMITY STUDY: CPT | Performed by: SURGERY

## 2020-08-17 PROCEDURE — 93923 UPR/LXTR ART STDY 3+ LVLS: CPT

## 2020-08-17 PROCEDURE — 93922 UPR/L XTREMITY ART 2 LEVELS: CPT | Performed by: SURGERY

## 2020-08-17 PROCEDURE — 93925 LOWER EXTREMITY STUDY: CPT

## 2020-08-17 PROCEDURE — 93978 VASCULAR STUDY: CPT | Performed by: SURGERY

## 2020-08-18 ENCOUNTER — OFFICE VISIT (OUTPATIENT)
Dept: FAMILY MEDICINE CLINIC | Facility: CLINIC | Age: 80
End: 2020-08-18
Payer: COMMERCIAL

## 2020-08-18 VITALS
DIASTOLIC BLOOD PRESSURE: 90 MMHG | HEIGHT: 65 IN | RESPIRATION RATE: 16 BRPM | HEART RATE: 84 BPM | BODY MASS INDEX: 34.32 KG/M2 | WEIGHT: 206 LBS | TEMPERATURE: 98.6 F | SYSTOLIC BLOOD PRESSURE: 122 MMHG

## 2020-08-18 DIAGNOSIS — I48.0 PAROXYSMAL ATRIAL FIBRILLATION (HCC): ICD-10-CM

## 2020-08-18 DIAGNOSIS — I10 ESSENTIAL HYPERTENSION: Primary | Chronic | ICD-10-CM

## 2020-08-18 DIAGNOSIS — E78.2 MIXED HYPERLIPIDEMIA: ICD-10-CM

## 2020-08-18 DIAGNOSIS — J01.00 ACUTE NON-RECURRENT MAXILLARY SINUSITIS: ICD-10-CM

## 2020-08-18 DIAGNOSIS — Z12.39 BREAST CANCER SCREENING: ICD-10-CM

## 2020-08-18 PROBLEM — D69.1 PLATELET DYSFUNCTION (HCC): Status: RESOLVED | Noted: 2020-02-07 | Resolved: 2020-08-18

## 2020-08-18 PROBLEM — M25.512 ACUTE PAIN OF LEFT SHOULDER: Status: RESOLVED | Noted: 2018-11-08 | Resolved: 2020-08-18

## 2020-08-18 PROBLEM — R53.83 FATIGUE: Status: RESOLVED | Noted: 2018-12-01 | Resolved: 2020-08-18

## 2020-08-18 PROBLEM — R73.03 PREDIABETES: Status: RESOLVED | Noted: 2019-10-02 | Resolved: 2020-08-18

## 2020-08-18 PROCEDURE — 3080F DIAST BP >= 90 MM HG: CPT | Performed by: FAMILY MEDICINE

## 2020-08-18 PROCEDURE — 1036F TOBACCO NON-USER: CPT | Performed by: FAMILY MEDICINE

## 2020-08-18 PROCEDURE — 4040F PNEUMOC VAC/ADMIN/RCVD: CPT | Performed by: FAMILY MEDICINE

## 2020-08-18 PROCEDURE — 99214 OFFICE O/P EST MOD 30 MIN: CPT | Performed by: FAMILY MEDICINE

## 2020-08-18 PROCEDURE — 3008F BODY MASS INDEX DOCD: CPT | Performed by: FAMILY MEDICINE

## 2020-08-18 PROCEDURE — 3074F SYST BP LT 130 MM HG: CPT | Performed by: FAMILY MEDICINE

## 2020-08-18 PROCEDURE — 1160F RVW MEDS BY RX/DR IN RCRD: CPT | Performed by: FAMILY MEDICINE

## 2020-08-18 RX ORDER — AMOXICILLIN AND CLAVULANATE POTASSIUM 875; 125 MG/1; MG/1
1 TABLET, FILM COATED ORAL EVERY 12 HOURS SCHEDULED
Qty: 10 TABLET | Refills: 0 | Status: SHIPPED | OUTPATIENT
Start: 2020-08-18 | End: 2020-08-23

## 2020-08-18 RX ORDER — PANTOPRAZOLE SODIUM 20 MG/1
20 TABLET, DELAYED RELEASE ORAL DAILY
COMMUNITY
End: 2021-03-24

## 2020-08-18 NOTE — PROGRESS NOTES
Assessment/Plan:    1  Essential hypertension  Assessment & Plan:  BP stable   Continue with medication   Low salt and exercising recommended  Orders:  -     Comprehensive metabolic panel; Future  -     Lipid Panel with Direct LDL reflex; Future    2  Mixed hyperlipidemia  Assessment & Plan:  Continue with statin  Repeat bloodwork  Orders:  -     Comprehensive metabolic panel; Future  -     Lipid Panel with Direct LDL reflex; Future    3  Breast cancer screening  -     Mammo screening bilateral w 3d & cad; Future; Expected date: 08/18/2020    4  Acute non-recurrent maxillary sinusitis  -     amoxicillin-clavulanate (Augmentin) 875-125 mg per tablet; Take 1 tablet by mouth every 12 (twelve) hours for 5 days    5  Paroxysmal atrial fibrillation (HCC)  Assessment & Plan:  Continue with sotalol, eliquis  Follow up with cardiology  Treated for sinus infection: side effects and precautions reviewed with patient  Return in about 6 months (around 2/18/2021)  Subjective:      Patient ID: Ry Jurado is a 79 y o  female  Chief Complaint   Patient presents with    Blood Pressure Check       HPI  25-year-old female presents for follow-up  Patient states her blood pressure is stable home she is monitoring it and in the 110's over 80s to 90s  Patient is currently on pravastatin for hyperlipidemia  Patient denies any chest pain, shortness of breath, or abdominal pain  Has kept getting mammogram due to abnormal seen on prior mammogram by ob/gyn  Patient complain sinus pain, sinus pressure, postnasal drip for the past 1 week  No drainage  No fevers or chills  Headaches inconsistent  The following portions of the patient's history were reviewed and updated as appropriate: allergies, current medications, past family history, past medical history, past social history, past surgical history and problem list     Review of Systems   Constitutional: Negative for appetite change, fatigue and fever  HENT: Positive for congestion, postnasal drip, sinus pressure and sinus pain  Negative for ear pain and sore throat  Eyes: Negative for visual disturbance  Respiratory: Negative for shortness of breath  Cardiovascular: Negative for chest pain and leg swelling  Gastrointestinal: Negative for abdominal pain, diarrhea, nausea and vomiting  Genitourinary: Negative for dysuria  Musculoskeletal: Negative for arthralgias  Skin: Negative for color change  Neurological: Positive for headaches  Negative for dizziness, tremors and light-headedness  Psychiatric/Behavioral: Negative for agitation and behavioral problems  Current Outpatient Medications   Medication Sig Dispense Refill    acetaminophen (TYLENOL ARTHRITIS PAIN) 650 mg CR tablet Take by mouth      apixaban (ELIQUIS) 5 mg Take 1 tablet (5 mg total) by mouth 2 (two) times a day 180 tablet 3    betamethasone dipropionate (DIPROSONE) 0 05 % cream Apply topically 2 (two) times a day 45 g 2    Calcium Carbonate-Vitamin D 600-200 MG-UNIT CAPS Take by mouth 2 (two) times a day      cholecalciferol (VITAMIN D3) 1,000 units tablet Take 1,000 Units by mouth daily      clobetasol (TEMOVATE) 0 05 % cream       Coenzyme Q10 (COQ-10) 200 MG CAPS Take 2 capsules by mouth daily      famotidine (PEPCID) 40 MG tablet take 1 tablet by mouth twice a day 60 tablet 2    furosemide (LASIX) 20 mg tablet Take 1 tablet by mouth daily      hydroxychloroquine (PLAQUENIL) 200 mg tablet Take 1 tablet (200 mg total) by mouth daily with breakfast Administer with food or milk   30 tablet 2    Lactobacillus (ACIDOPHILUS PO) Take by mouth      metoprolol succinate (TOPROL-XL) 25 mg 24 hr tablet Take 1 tablet (25 mg total) by mouth daily 90 tablet 3    Multiple Vitamins-Minerals (DAILY MULTIVITAMIN PO) Take 1 tablet by mouth daily      nitroglycerin (NITROSTAT) 0 4 mg SL tablet place 1 tablet under the tongue if needed every 5 minutes for chest pain for 3 doses IF NO RELIEF AFTER FIRST DOSE CALL PRESCRIBER   25 tablet 0    omega-3-acid ethyl esters (LOVAZA) 1 g capsule Take 2 capsules (2 g total) by mouth 2 (two) times a day 120 capsule 0    pantoprazole (PROTONIX) 20 mg tablet Take 20 mg by mouth daily      pravastatin (PRAVACHOL) 10 mg tablet Take 1 tablet (10 mg total) by mouth 3 (three) times a week Monday Wednesday and Friday 60 tablet 3    ramipril (ALTACE) 5 mg capsule Take 1 capsule (5 mg total) by mouth 2 (two) times a day 180 capsule 3    sotalol (BETAPACE) 80 mg tablet Take 1 tablet (80 mg total) by mouth every 12 (twelve) hours 180 tablet 3    amoxicillin-clavulanate (Augmentin) 875-125 mg per tablet Take 1 tablet by mouth every 12 (twelve) hours for 5 days 10 tablet 0    polyethylene glycol-electrolytes (NULYTELY) 4000 mL solution take by mouth as directed  FOR COLONOSCOPY  0     No current facility-administered medications for this visit  Objective:    /90 (BP Location: Left arm, Patient Position: Sitting, Cuff Size: Adult)   Pulse 84   Temp 98 6 °F (37 °C) (Tympanic)   Resp 16   Ht 5' 5" (1 651 m)   Wt 93 4 kg (206 lb)   BMI 34 28 kg/m²        Physical Exam  Constitutional:       General: She is not in acute distress  Appearance: She is well-developed  HENT:      Head: Normocephalic and atraumatic  Right Ear: Tympanic membrane and external ear normal       Left Ear: Tympanic membrane and external ear normal       Nose: Nose normal       Mouth/Throat:      Pharynx: No oropharyngeal exudate  Eyes:      General:         Right eye: No discharge  Left eye: No discharge  Conjunctiva/sclera: Conjunctivae normal    Neck:      Musculoskeletal: Normal range of motion and neck supple  Cardiovascular:      Rate and Rhythm: Normal rate and regular rhythm  Pulses: Normal pulses  Heart sounds: Normal heart sounds  No murmur     Pulmonary:      Effort: Pulmonary effort is normal  No respiratory distress  Breath sounds: Normal breath sounds  Abdominal:      General: Bowel sounds are normal  There is no distension  Palpations: Abdomen is soft  Tenderness: There is no abdominal tenderness  Musculoskeletal: Normal range of motion  Skin:     General: Skin is warm  Neurological:      Mental Status: She is alert and oriented to person, place, and time     Psychiatric:         Mood and Affect: Mood normal          Behavior: Behavior normal                 Claudine Campos MD

## 2020-09-10 ENCOUNTER — REMOTE DEVICE CLINIC VISIT (OUTPATIENT)
Dept: CARDIOLOGY CLINIC | Facility: CLINIC | Age: 80
End: 2020-09-10
Payer: COMMERCIAL

## 2020-09-10 DIAGNOSIS — Z95.0 PRESENCE OF PERMANENT CARDIAC PACEMAKER: Primary | ICD-10-CM

## 2020-09-10 PROCEDURE — 93294 REM INTERROG EVL PM/LDLS PM: CPT | Performed by: INTERNAL MEDICINE

## 2020-09-10 PROCEDURE — 93296 REM INTERROG EVL PM/IDS: CPT | Performed by: INTERNAL MEDICINE

## 2020-09-10 NOTE — PROGRESS NOTES
Results for orders placed or performed in visit on 09/10/20   Cardiac EP device report    Narrative    MDT DUAL PM  CARELINK TRANSMISSION: BATTERY VOLTAGE ADEQUATE  (3 YRS) AP 99%  <1%  ALL AVAILABLE LEAD PARAMETERS WITHIN NORMAL LIMITS  14 AT/AF EPISODES DETECTED  LONGEST 49 MIN  PATIENT IS ON ELIQUIS, SOTALOL AND METOPROLOL SUCC  NORMAL DEVICE FUNCTION  ----VENEGAS

## 2020-09-28 ENCOUNTER — OFFICE VISIT (OUTPATIENT)
Dept: PODIATRY | Facility: CLINIC | Age: 80
End: 2020-09-28
Payer: COMMERCIAL

## 2020-09-28 VITALS
HEIGHT: 65 IN | HEART RATE: 84 BPM | BODY MASS INDEX: 34.32 KG/M2 | WEIGHT: 206 LBS | RESPIRATION RATE: 17 BRPM | SYSTOLIC BLOOD PRESSURE: 122 MMHG | DIASTOLIC BLOOD PRESSURE: 90 MMHG

## 2020-09-28 DIAGNOSIS — L84 CORNS: ICD-10-CM

## 2020-09-28 DIAGNOSIS — I70.209 PERIPHERAL ARTERIOSCLEROSIS (HCC): Primary | ICD-10-CM

## 2020-09-28 DIAGNOSIS — M79.671 PAIN IN BOTH FEET: ICD-10-CM

## 2020-09-28 DIAGNOSIS — M79.672 PAIN IN BOTH FEET: ICD-10-CM

## 2020-09-28 PROCEDURE — 11056 PARNG/CUTG B9 HYPRKR LES 2-4: CPT | Performed by: PODIATRIST

## 2020-09-28 NOTE — PROGRESS NOTES
Assessment/Plan:  Pain   Radiculopathy   Callus   Mycotic toenail   Peripheral artery disease         Plan   Foot exam performed   All nails debrided   Calluses debrided   Procedures performed without pain or complication   Patient remain on gabapentin as directed        Discussion/Summary   The patient was counseled regarding instructions for management,-- patient and family education,-- risks and benefits of treatment options     Patient is able to Self-Care     Possible side effects of new medications were reviewed with the patient/guardian today  The treatment plan was reviewed with the patient/guardian  The patient/guardian understands and agrees with the treatment plan      Chief Complaint   Patient has foot pain   She has pain when she wears shoes   No history of trauma          History of Present Illness   HPI:  Patient complains of pain in feet with ambulation  She has pain around the toes  She is also concerned with pain in her right heel  This is been ongoing for several weeks  She has no history of trauma   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   Rodantonieta Kendrick was unable tolerate gabapentin     Review of Systems           Cardiac: chest pain,-- rhythm problems,-- AM fatigue-- and-- witnessed apnea episodes       Skin: No complaints of nonhealing sores or skin rash       Genitourinary: loss of bladder control      Psychological: No complaints of feeling depressed, anxiety, panic attacks, or difficulty concentrating       General: trouble sleeping-- and-- lack of energy/fatigue       Respiratory: shortness of breath       HEENT: snoring      Gastrointestinal: heartburn      Hematologic: anemia      Neurological: daytime sleepiness      Musculoskeletal: arthritis-- and-- back pain      Active Problems   1  Allergic rhinitis (477 9) (J30 9)   2  Anxiety disorder (300 00) (F41 9)   3  Arthropathy (716 90) (M12 9)   4  Atherosclerosis of arteries of extremities (440 20) (I70 209)   5  Atrial fibrillation (427 31) (I48 91)   6  Backache (724 5) (M54 9)   7  Callus (700) (L84)   8  Cervicalgia (723 1) (M54 2)   9  Depression (311) (F32 9)   10  Difficulty in walking (719 7) (R26 2)   11  Encounter for screening for malignant neoplasm of colon (V76 51) (Z12 11)   12  Esophagitis, reflux (530 11) (K21 0)   13  Essential hypertension (401 9) (I10)   14  Foot pain, bilateral (729 5) (M79 671,M79 672)   15  Herpes zoster (053 9) (B02 9)   16  Hospital discharge follow-up (V67 59) (Z09)   17  Hyperlipidemia (272 4) (E78 5)   18  Impaired fasting glucose (790 21) (R73 01)   19  Internal Hemorrhoids (455 0)   20  Leg swelling (729 81) (O17 54)   21  Lichen planus (135 0) (L43 9)   22  Limb pain (729 5) (M79 609)   23  Lumbar radiculopathy (724 4) (M54 16)   24  Multiple joint pain (719 49) (M25 50)   25  Need for influenza vaccination (V04 81) (Z23)   26  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)   27  Onychomycosis (110 1) (B35 1)   28  MIGUEL (obstructive sleep apnea) (327 23) (G47 33)   29  Other abnormal finding of urine (791 9) (R82 99)   30  Pes planus, congenital (754 61) (Q66 50)   31  Plantar fascial fibromatosis (728 71) (M72 2)   32  Rectal/anal hemorrhage (569 3) (K62 5)   33  Screening for malignant neoplasm of cervix (V76 2) (Z12 4)   34  Shoulder joint pain, unspecified laterality   35  Thrombocytopenia (287 5) (D69 6)   36  Urticaria (708 9) (L50 9)   37  Vulvovaginitis candida albicans (112 1) (B37 3)     Past Medical History    · History of Acute maxillary sinusitis (461 0) (J01 00)   · History of Acute upper respiratory infection (465 9) (J06 9)   · History of Cellulitis (682 9) (L03 90)   · History of Cough (786 2) (R05)   · History of Dysuria (788 1) (R30 0)   · History of High cholesterol (272 0) (E78 00)   · History of abdominal pain (V13 89) (Z53 220)   · History of acute bronchitis (V12 69) (Z87 09)   · History of acute sinusitis (V12 69) (Z87 09)   · History of arthritis (V13 4) (Z87 39)   · History of atrial fibrillation (V12 59) (Z86 79)   · History of cataract (V12 49) (Z86 69)   · History of gastroesophageal reflux (GERD) (V12 79) (Z87 19)   · History of hypertension (V12 59) (Z86 79)   · History of Skin rash (782 1) (R21)   · History of Vulvovaginitis (616 10) (N76 0)     The active problems and past medical history were reviewed and updated today       Surgical History    · History of Cataract Surgery   · History of Colonoscopy (Fiberoptic) Screening   · History of Gallbladder Surgery   · History of Knee Replacement   · History of Pacemaker Placement     The surgical history was reviewed and updated today        Family History   Father    · Family history of Coronary Artery Disease (V17 49)  Sister    · Family history of Diabetes Mellitus (V18 0)   · Family history of High cholesterol  Family History    · Family history of arthritis (V17 7) (Z82 61)   · Family history of hypertension (V17 49) (Z82 49)     The family history was reviewed and updated today        Social History    · Exercise: Walking   · 2 x/week   · Former smoker (V15 82) (Z87 891)   · No alcohol use   ·   The social history was reviewed and updated today       Physical Exam   Left Foot: Appearance: Normal except as noted: excessive pronation-- and-- pes planus  Great toe deformities include a bunion  Tenderness: None except the great toe-- and-- distal first metatarsal     Right Foot: Appearance: Normal except as noted: excessive pronation-- and-- pes planus  Great toe deformities include a bunion  Tenderness: None except the great toe,-- distal first metatarsal,-- medial calcaneous-- and-- insertion of the plantar fascia     Left Ankle: ROM: limited ROM in all planes    Right Ankle: ROM: limited ROM in all planes    Neurological Exam: Light touch was decreased bilaterally  Vibratory sensation was decreased in both first metatarsophalangeal joints     Vascular Exam: performed Dorsalis pedis pulses were diminished bilaterally   Posterior tibial pulses were diminished bilaterally  Elevation Pallor: present bilaterally  Dependence rubor was present bilaterally  Capillary refill time was greater than 3 seconds bilaterally-- and-- Q  9, findings bilateral  Edema: moderate bilaterally     Toenails: All of the toenails were elongated,-- hypertrophied,-- discolored-- and-- Right ptotic     Hyperkeratosis: present on both first toes,-- present on both first sub metatarsals-- and-- Positive xerosis of skin noted     Shoe Gear Evaluation: performed ()   Recommendation(s): SAS style-- and-- OTC inlays

## 2020-10-07 ENCOUNTER — APPOINTMENT (OUTPATIENT)
Dept: LAB | Facility: CLINIC | Age: 80
End: 2020-10-07
Payer: COMMERCIAL

## 2020-10-07 DIAGNOSIS — E78.2 MIXED HYPERLIPIDEMIA: ICD-10-CM

## 2020-10-07 DIAGNOSIS — D69.1 PLATELET DYSFUNCTION (HCC): ICD-10-CM

## 2020-10-07 DIAGNOSIS — I10 ESSENTIAL HYPERTENSION: Chronic | ICD-10-CM

## 2020-10-07 LAB
ALBUMIN SERPL BCP-MCNC: 3.8 G/DL (ref 3.5–5)
ALP SERPL-CCNC: 54 U/L (ref 46–116)
ALT SERPL W P-5'-P-CCNC: 24 U/L (ref 12–78)
ANION GAP SERPL CALCULATED.3IONS-SCNC: 3 MMOL/L (ref 4–13)
AST SERPL W P-5'-P-CCNC: 16 U/L (ref 5–45)
BASOPHILS # BLD AUTO: 0.01 THOUSANDS/ΜL (ref 0–0.1)
BASOPHILS NFR BLD AUTO: 0 % (ref 0–1)
BILIRUB SERPL-MCNC: 0.52 MG/DL (ref 0.2–1)
BUN SERPL-MCNC: 26 MG/DL (ref 5–25)
CALCIUM SERPL-MCNC: 9.3 MG/DL (ref 8.3–10.1)
CHLORIDE SERPL-SCNC: 108 MMOL/L (ref 100–108)
CHOLEST SERPL-MCNC: 141 MG/DL (ref 50–200)
CO2 SERPL-SCNC: 29 MMOL/L (ref 21–32)
CREAT SERPL-MCNC: 1.04 MG/DL (ref 0.6–1.3)
EOSINOPHIL # BLD AUTO: 0.09 THOUSAND/ΜL (ref 0–0.61)
EOSINOPHIL NFR BLD AUTO: 2 % (ref 0–6)
ERYTHROCYTE [DISTWIDTH] IN BLOOD BY AUTOMATED COUNT: 14.2 % (ref 11.6–15.1)
GFR SERPL CREATININE-BSD FRML MDRD: 51 ML/MIN/1.73SQ M
GLUCOSE P FAST SERPL-MCNC: 109 MG/DL (ref 65–99)
HCT VFR BLD AUTO: 34.5 % (ref 34.8–46.1)
HDLC SERPL-MCNC: 41 MG/DL
HGB BLD-MCNC: 11.2 G/DL (ref 11.5–15.4)
IMM GRANULOCYTES # BLD AUTO: 0.01 THOUSAND/UL (ref 0–0.2)
IMM GRANULOCYTES NFR BLD AUTO: 0 % (ref 0–2)
LDLC SERPL CALC-MCNC: 78 MG/DL (ref 0–100)
LYMPHOCYTES # BLD AUTO: 1.1 THOUSANDS/ΜL (ref 0.6–4.47)
LYMPHOCYTES NFR BLD AUTO: 28 % (ref 14–44)
MCH RBC QN AUTO: 29.4 PG (ref 26.8–34.3)
MCHC RBC AUTO-ENTMCNC: 32.5 G/DL (ref 31.4–37.4)
MCV RBC AUTO: 91 FL (ref 82–98)
MONOCYTES # BLD AUTO: 0.41 THOUSAND/ΜL (ref 0.17–1.22)
MONOCYTES NFR BLD AUTO: 10 % (ref 4–12)
NEUTROPHILS # BLD AUTO: 2.35 THOUSANDS/ΜL (ref 1.85–7.62)
NEUTS SEG NFR BLD AUTO: 60 % (ref 43–75)
NRBC BLD AUTO-RTO: 0 /100 WBCS
PLATELET # BLD AUTO: 134 THOUSANDS/UL (ref 149–390)
PMV BLD AUTO: 9.4 FL (ref 8.9–12.7)
POTASSIUM SERPL-SCNC: 4.1 MMOL/L (ref 3.5–5.3)
PROT SERPL-MCNC: 7.2 G/DL (ref 6.4–8.2)
RBC # BLD AUTO: 3.81 MILLION/UL (ref 3.81–5.12)
SODIUM SERPL-SCNC: 140 MMOL/L (ref 136–145)
TRIGL SERPL-MCNC: 112 MG/DL
WBC # BLD AUTO: 3.97 THOUSAND/UL (ref 4.31–10.16)

## 2020-10-07 PROCEDURE — 85025 COMPLETE CBC W/AUTO DIFF WBC: CPT

## 2020-10-07 PROCEDURE — 80053 COMPREHEN METABOLIC PANEL: CPT

## 2020-10-07 PROCEDURE — 36415 COLL VENOUS BLD VENIPUNCTURE: CPT

## 2020-10-07 PROCEDURE — 80061 LIPID PANEL: CPT

## 2020-10-14 ENCOUNTER — OFFICE VISIT (OUTPATIENT)
Dept: CARDIOLOGY CLINIC | Facility: CLINIC | Age: 80
End: 2020-10-14
Payer: COMMERCIAL

## 2020-10-14 VITALS
BODY MASS INDEX: 34.82 KG/M2 | HEART RATE: 81 BPM | OXYGEN SATURATION: 96 % | HEIGHT: 65 IN | TEMPERATURE: 99.2 F | DIASTOLIC BLOOD PRESSURE: 78 MMHG | WEIGHT: 209 LBS | SYSTOLIC BLOOD PRESSURE: 122 MMHG

## 2020-10-14 DIAGNOSIS — E78.5 DYSLIPIDEMIA: ICD-10-CM

## 2020-10-14 DIAGNOSIS — I49.5 SSS (SICK SINUS SYNDROME) (HCC): ICD-10-CM

## 2020-10-14 DIAGNOSIS — Z95.0 PRESENCE OF PERMANENT CARDIAC PACEMAKER: ICD-10-CM

## 2020-10-14 DIAGNOSIS — I48.0 PAROXYSMAL ATRIAL FIBRILLATION (HCC): Primary | ICD-10-CM

## 2020-10-14 DIAGNOSIS — I50.32 CHRONIC DIASTOLIC CONGESTIVE HEART FAILURE (HCC): ICD-10-CM

## 2020-10-14 DIAGNOSIS — I35.1 NONRHEUMATIC AORTIC VALVE INSUFFICIENCY: ICD-10-CM

## 2020-10-14 DIAGNOSIS — I10 ESSENTIAL HYPERTENSION: ICD-10-CM

## 2020-10-14 DIAGNOSIS — G47.33 OSA (OBSTRUCTIVE SLEEP APNEA): ICD-10-CM

## 2020-10-14 PROCEDURE — 1160F RVW MEDS BY RX/DR IN RCRD: CPT | Performed by: INTERNAL MEDICINE

## 2020-10-14 PROCEDURE — 1036F TOBACCO NON-USER: CPT | Performed by: INTERNAL MEDICINE

## 2020-10-14 PROCEDURE — 3074F SYST BP LT 130 MM HG: CPT | Performed by: INTERNAL MEDICINE

## 2020-10-14 PROCEDURE — 99214 OFFICE O/P EST MOD 30 MIN: CPT | Performed by: INTERNAL MEDICINE

## 2020-10-14 PROCEDURE — 3078F DIAST BP <80 MM HG: CPT | Performed by: INTERNAL MEDICINE

## 2020-10-14 PROCEDURE — 93000 ELECTROCARDIOGRAM COMPLETE: CPT | Performed by: INTERNAL MEDICINE

## 2020-10-15 ENCOUNTER — TELEPHONE (OUTPATIENT)
Dept: FAMILY MEDICINE CLINIC | Facility: CLINIC | Age: 80
End: 2020-10-15

## 2020-10-15 DIAGNOSIS — R92.8 ABNORMAL MAMMOGRAM: Primary | ICD-10-CM

## 2020-10-26 DIAGNOSIS — Z12.39 BREAST CANCER SCREENING: ICD-10-CM

## 2020-11-30 ENCOUNTER — OFFICE VISIT (OUTPATIENT)
Dept: PODIATRY | Facility: CLINIC | Age: 80
End: 2020-11-30
Payer: COMMERCIAL

## 2020-11-30 VITALS
WEIGHT: 209 LBS | HEART RATE: 78 BPM | RESPIRATION RATE: 17 BRPM | DIASTOLIC BLOOD PRESSURE: 84 MMHG | SYSTOLIC BLOOD PRESSURE: 122 MMHG | HEIGHT: 65 IN | BODY MASS INDEX: 34.82 KG/M2

## 2020-11-30 DIAGNOSIS — M79.671 PAIN IN BOTH FEET: ICD-10-CM

## 2020-11-30 DIAGNOSIS — I70.209 PERIPHERAL ARTERIOSCLEROSIS (HCC): Primary | ICD-10-CM

## 2020-11-30 DIAGNOSIS — L84 CORNS: ICD-10-CM

## 2020-11-30 DIAGNOSIS — M79.672 PAIN IN BOTH FEET: ICD-10-CM

## 2020-11-30 PROCEDURE — 11056 PARNG/CUTG B9 HYPRKR LES 2-4: CPT | Performed by: PODIATRIST

## 2020-12-14 DIAGNOSIS — E78.5 DYSLIPIDEMIA: ICD-10-CM

## 2020-12-14 RX ORDER — OMEGA-3-ACID ETHYL ESTERS 1 G/1
2 CAPSULE, LIQUID FILLED ORAL 2 TIMES DAILY
Qty: 120 CAPSULE | Refills: 5 | Status: SHIPPED | OUTPATIENT
Start: 2020-12-14 | End: 2021-04-22

## 2021-01-06 ENCOUNTER — OFFICE VISIT (OUTPATIENT)
Dept: FAMILY MEDICINE CLINIC | Facility: CLINIC | Age: 81
End: 2021-01-06
Payer: COMMERCIAL

## 2021-01-06 VITALS
BODY MASS INDEX: 34.99 KG/M2 | HEIGHT: 65 IN | SYSTOLIC BLOOD PRESSURE: 156 MMHG | OXYGEN SATURATION: 99 % | DIASTOLIC BLOOD PRESSURE: 88 MMHG | HEART RATE: 73 BPM | TEMPERATURE: 98.1 F | WEIGHT: 210 LBS | RESPIRATION RATE: 16 BRPM

## 2021-01-06 DIAGNOSIS — Z00.00 MEDICARE ANNUAL WELLNESS VISIT, SUBSEQUENT: ICD-10-CM

## 2021-01-06 DIAGNOSIS — E66.9 OBESITY (BMI 30-39.9): ICD-10-CM

## 2021-01-06 DIAGNOSIS — I10 ESSENTIAL HYPERTENSION: Primary | Chronic | ICD-10-CM

## 2021-01-06 PROCEDURE — 3288F FALL RISK ASSESSMENT DOCD: CPT | Performed by: FAMILY MEDICINE

## 2021-01-06 PROCEDURE — 1101F PT FALLS ASSESS-DOCD LE1/YR: CPT | Performed by: FAMILY MEDICINE

## 2021-01-06 PROCEDURE — 3077F SYST BP >= 140 MM HG: CPT | Performed by: FAMILY MEDICINE

## 2021-01-06 PROCEDURE — 99213 OFFICE O/P EST LOW 20 MIN: CPT | Performed by: FAMILY MEDICINE

## 2021-01-06 PROCEDURE — 3079F DIAST BP 80-89 MM HG: CPT | Performed by: FAMILY MEDICINE

## 2021-01-06 PROCEDURE — 1170F FXNL STATUS ASSESSED: CPT | Performed by: FAMILY MEDICINE

## 2021-01-06 PROCEDURE — 1125F AMNT PAIN NOTED PAIN PRSNT: CPT | Performed by: FAMILY MEDICINE

## 2021-01-06 PROCEDURE — 3725F SCREEN DEPRESSION PERFORMED: CPT | Performed by: FAMILY MEDICINE

## 2021-01-06 PROCEDURE — G0439 PPPS, SUBSEQ VISIT: HCPCS | Performed by: FAMILY MEDICINE

## 2021-01-06 PROCEDURE — 1160F RVW MEDS BY RX/DR IN RCRD: CPT | Performed by: FAMILY MEDICINE

## 2021-01-06 PROCEDURE — 1036F TOBACCO NON-USER: CPT | Performed by: FAMILY MEDICINE

## 2021-01-06 RX ORDER — ACETAMINOPHEN,DIPHENHYDRAMINE HCL 500; 25 MG/1; MG/1
1 TABLET, FILM COATED ORAL
COMMUNITY

## 2021-01-06 NOTE — ASSESSMENT & PLAN NOTE
Patient's blood pressure is 156/88 with BP of 144/86  Patient states that her blood pressure does get elevated when she is walking long distances or she gets anxious coming to the doctor  Patient given a blood pressure log to monitor blood pressure at this time  She will bring in 1 week  Patient is already on ramipril 5 mg  Precautions reviewed with patient

## 2021-01-06 NOTE — PROGRESS NOTES
Assessment/Plan:    1  Essential hypertension  Assessment & Plan:  Patient's blood pressure is 156/88 with BP of 144/86  Patient states that her blood pressure does get elevated when she is walking long distances or she gets anxious coming to the doctor  Patient given a blood pressure log to monitor blood pressure at this time  She will bring in 1 week  Patient is already on ramipril 5 mg  Precautions reviewed with patient  2  Medicare annual wellness visit, subsequent    3  Obesity (BMI 30-39  9)              Subjective:      Patient ID: Linda Boyer is a 79 y o  female  Chief Complaint   Patient presents with    Medicare Wellness Visit     AWV       HPI  34-year-old female presents for follow-up  Patient was noted to have an elevated blood pressure of 156/88  She states that she does check it at home this week it was around 140/80  She does not check it consistently and last week she noted that it was in the 076 range systolic over 92H diastolic  She denies any chest pain, shortness of breath, abdominal pain, dizziness, or visual disturbance  The following portions of the patient's history were reviewed and updated as appropriate: allergies, current medications, past family history, past medical history, past social history, past surgical history and problem list     Review of Systems   Constitutional: Negative for appetite change and fever  HENT: Negative for ear pain and sore throat  Eyes: Negative for visual disturbance  Respiratory: Negative for shortness of breath  Cardiovascular: Negative for chest pain and leg swelling  Gastrointestinal: Negative for abdominal pain, diarrhea, nausea and vomiting  Musculoskeletal: Positive for arthralgias  Skin: Negative for color change  Neurological: Negative for dizziness, light-headedness and headaches  Psychiatric/Behavioral: Negative for agitation and behavioral problems           Current Outpatient Medications   Medication Sig Dispense Refill    acetaminophen (TYLENOL ARTHRITIS PAIN) 650 mg CR tablet Take by mouth      apixaban (ELIQUIS) 5 mg Take 1 tablet (5 mg total) by mouth 2 (two) times a day 180 tablet 3    betamethasone dipropionate (DIPROSONE) 0 05 % cream Apply topically 2 (two) times a day 45 g 2    Calcium Carbonate-Vitamin D 600-200 MG-UNIT CAPS Take by mouth 2 (two) times a day      cholecalciferol (VITAMIN D3) 1,000 units tablet Take 1,000 Units by mouth daily      clobetasol (TEMOVATE) 0 05 % cream       Coenzyme Q10 (COQ-10) 200 MG CAPS Take 2 capsules by mouth daily      diphenhydrAMINE-acetaminophen (TYLENOL PM)  MG TABS Take 1 tablet by mouth daily at bedtime as needed for sleep      famotidine (PEPCID) 40 MG tablet take 1 tablet by mouth twice a day 60 tablet 2    furosemide (LASIX) 20 mg tablet Take 1 tablet by mouth daily      Lactobacillus (ACIDOPHILUS PO) Take by mouth      metoprolol succinate (TOPROL-XL) 25 mg 24 hr tablet Take 1 tablet (25 mg total) by mouth daily 90 tablet 3    Multiple Vitamins-Minerals (DAILY MULTIVITAMIN PO) Take 1 tablet by mouth daily      omega-3-acid ethyl esters (LOVAZA) 1 g capsule Take 2 capsules (2 g total) by mouth 2 (two) times a day 120 capsule 5    pantoprazole (PROTONIX) 20 mg tablet Take 20 mg by mouth daily      pravastatin (PRAVACHOL) 10 mg tablet Take 1 tablet (10 mg total) by mouth 3 (three) times a week Monday Wednesday and Friday 60 tablet 3    ramipril (ALTACE) 5 mg capsule Take 1 capsule (5 mg total) by mouth 2 (two) times a day 180 capsule 3    sotalol (BETAPACE) 80 mg tablet Take 1 tablet (80 mg total) by mouth every 12 (twelve) hours 180 tablet 3    hydroxychloroquine (PLAQUENIL) 200 mg tablet Take 1 tablet (200 mg total) by mouth daily with breakfast Administer with food or milk   30 tablet 2    nitroglycerin (NITROSTAT) 0 4 mg SL tablet place 1 tablet under the tongue if needed every 5 minutes for chest pain for 3 doses IF NO RELIEF AFTER FIRST DOSE CALL PRESCRIBER   25 tablet 0    polyethylene glycol-electrolytes (NULYTELY) 4000 mL solution take by mouth as directed  FOR COLONOSCOPY  0     No current facility-administered medications for this visit  Objective:    /88 (BP Location: Left arm, Patient Position: Sitting, Cuff Size: Standard)   Pulse 73   Temp 98 1 °F (36 7 °C) (Temporal)   Resp 16   Ht 5' 5" (1 651 m)   Wt 95 3 kg (210 lb)   SpO2 99%   BMI 34 95 kg/m²        Physical Exam  Constitutional:       General: She is not in acute distress  Appearance: She is well-developed  She is obese  HENT:      Head: Normocephalic and atraumatic  Eyes:      General:         Right eye: No discharge  Left eye: No discharge  Cardiovascular:      Rate and Rhythm: Normal rate and regular rhythm  Heart sounds: Normal heart sounds  No murmur  Pulmonary:      Effort: Pulmonary effort is normal  No respiratory distress  Breath sounds: Normal breath sounds  Abdominal:      General: Bowel sounds are normal  There is no distension  Palpations: Abdomen is soft  Tenderness: There is no abdominal tenderness  Musculoskeletal: Normal range of motion  Skin:     General: Skin is warm  Neurological:      Mental Status: She is alert and oriented to person, place, and time  Mental status is at baseline     Psychiatric:         Behavior: Behavior normal                 Beau Corral MD

## 2021-01-06 NOTE — PROGRESS NOTES
Assessment and Plan:     Problem List Items Addressed This Visit        Other    Obesity (BMI 30-39  9)      Other Visit Diagnoses     Medicare annual wellness visit, subsequent    -  Primary        BMI Counseling: Body mass index is 34 95 kg/m²  The BMI is above normal  Nutrition recommendations include encouraging healthy choices of fruits and vegetables, consuming healthier snacks and limiting drinks that contain sugar  Exercise recommendations include exercising 3-5 times per week  Preventive health issues were discussed with patient, and age appropriate screening tests were ordered as noted in patient's After Visit Summary  Personalized health advice and appropriate referrals for health education or preventive services given if needed, as noted in patient's After Visit Summary  History of Present Illness:     Patient presents for Medicare Annual Wellness visit    Patient Care Team:  Akilah Mello MD as PCP - General (Family Medicine)  Sarwat Cardenas MD (Family Medicine)  Tomasa Anton DO (Cardiology)  Cristi Canela PA-C (Vascular Surgery)  Sena Alonso DPM (Podiatry)     Problem List:     Patient Active Problem List   Diagnosis    Hypertension    Cardiac disease    Hyperlipidemia    Paroxysmal atrial fibrillation (HCC)    Chronic diastolic congestive heart failure ([address] )    Peripheral arteriosclerosis ([address] )    Pain in both feet    Corns    Onychomycosis    Radiculopathy of lumbar region    Nonrheumatic aortic valve insufficiency    Joint pain    Other insomnia    Gastroesophageal reflux disease without esophagitis    MIGUEL (obstructive sleep apnea)    Claustrophobia    Mood disturbance    Obesity (BMI 30-39  9)    Aneurysm of iliac artery (HCC)    Family history of intracranial aneurysms    Family history of abdominal aortic aneurysm      Past Medical and Surgical History:     Past Medical History:   Diagnosis Date    Arthritis     Atrial fibrillation [facility])     Cardiac disease     Cataract     Gastro-esophageal reflux     GERD    Hypertension      Past Surgical History:   Procedure Laterality Date    CARDIAC PACEMAKER PLACEMENT Left 2014    CATARACT EXTRACTION      CHOLECYSTECTOMY      resolved: 2009    COLONOSCOPY      Fiberoptic, resolved 2015    EYE SURGERY      KNEE SURGERY Left     left kknee replacement in 2009      Family History:     Family History   Problem Relation Age of Onset    Coronary artery disease Father     Diabetes Sister     Hyperlipidemia Sister     Arthritis Family     Hypertension Family       Social History:     E-Cigarette/Vaping    E-Cigarette Use Never User      E-Cigarette/Vaping Substances    Nicotine No     THC No     CBD No      Social History     Socioeconomic History    Marital status:       Spouse name: None    Number of children: None    Years of education: None    Highest education level: None   Occupational History    None   Social Needs    Financial resource strain: None    Food insecurity     Worry: None     Inability: None    Transportation needs     Medical: None     Non-medical: None   Tobacco Use    Smoking status: Former Smoker     Packs/day: 1 00     Years: 25 00     Pack years: 25 00     Types: Cigarettes    Smokeless tobacco: Never Used    Tobacco comment: quit 22 yeears ago   Substance and Sexual Activity    Alcohol use: Yes     Comment: occasional, No alcohol use,per [employer]    Drug use: No    Sexual activity: None   Lifestyle    Physical activity     Days per week: None     Minutes per session: None    Stress: None   Relationships    Social connections     Talks on phone: None     Gets together: None     Attends Voodoo service: None     Active member of club or organization: None     Attends meetings of clubs or organizations: None     Relationship status: None    Intimate partner violence     Fear of current or ex partner: None     Emotionally abused: None     Physically abused: None     Forced sexual activity: None   Other Topics Concern    None   Social History Narrative    Exercise: Walking, 2x/week      Medications and Allergies:     Current Outpatient Medications   Medication Sig Dispense Refill    acetaminophen (TYLENOL ARTHRITIS PAIN) 650 mg CR tablet Take by mouth      apixaban (ELIQUIS) 5 mg Take 1 tablet (5 mg total) by mouth 2 (two) times a day 180 tablet 3    betamethasone dipropionate (DIPROSONE) 0 05 % cream Apply topically 2 (two) times a day 45 g 2    Calcium Carbonate-Vitamin D 600-200 MG-UNIT CAPS Take by mouth 2 (two) times a day      cholecalciferol (VITAMIN D3) 1,000 units tablet Take 1,000 Units by mouth daily      clobetasol (TEMOVATE) 0 05 % cream       Coenzyme Q10 (COQ-10) 200 MG CAPS Take 2 capsules by mouth daily      diphenhydrAMINE-acetaminophen (TYLENOL PM)  MG TABS Take 1 tablet by mouth daily at bedtime as needed for sleep      famotidine (PEPCID) 40 MG tablet take 1 tablet by mouth twice a day 60 tablet 2    furosemide (LASIX) 20 mg tablet Take 1 tablet by mouth daily      Lactobacillus (ACIDOPHILUS PO) Take by mouth      metoprolol succinate (TOPROL-XL) 25 mg 24 hr tablet Take 1 tablet (25 mg total) by mouth daily 90 tablet 3    Multiple Vitamins-Minerals (DAILY MULTIVITAMIN PO) Take 1 tablet by mouth daily      omega-3-acid ethyl esters (LOVAZA) 1 g capsule Take 2 capsules (2 g total) by mouth 2 (two) times a day 120 capsule 5    pantoprazole (PROTONIX) 20 mg tablet Take 20 mg by mouth daily      pravastatin (PRAVACHOL) 10 mg tablet Take 1 tablet (10 mg total) by mouth 3 (three) times a week Monday Wednesday and Friday 60 tablet 3    ramipril (ALTACE) 5 mg capsule Take 1 capsule (5 mg total) by mouth 2 (two) times a day 180 capsule 3    sotalol (BETAPACE) 80 mg tablet Take 1 tablet (80 mg total) by mouth every 12 (twelve) hours 180 tablet 3    hydroxychloroquine (PLAQUENIL) 200 mg tablet Take 1 tablet (200 mg total) by mouth daily with breakfast Administer with food or milk  30 tablet 2    nitroglycerin (NITROSTAT) 0 4 mg SL tablet place 1 tablet under the tongue if needed every 5 minutes for chest pain for 3 doses IF NO RELIEF AFTER FIRST DOSE CALL PRESCRIBER   25 tablet 0    polyethylene glycol-electrolytes (NULYTELY) 4000 mL solution take by mouth as directed  FOR COLONOSCOPY  0     No current facility-administered medications for this visit  Allergies   Allergen Reactions    Ciprofloxacin Rash    Sulfa Antibiotics Rash    Synvisc [Hylan G-F 20] Rash      Immunizations:     Immunization History   Administered Date(s) Administered    INFLUENZA 09/14/2020    Influenza Split High Dose Preservative Free IM 09/18/2013, 10/15/2015    Influenza, high dose seasonal 0 7 mL 10/02/2019    Pneumococcal Conjugate 13-Valent 11/17/2019    Pneumococcal Polysaccharide PPV23 11/17/2005, 09/18/2013, 06/16/2018      Health Maintenance:         Topic Date Due    Colonoscopy Surveillance  02/05/2022         Topic Date Due    DTaP,Tdap,and Td Vaccines (1 - Tdap) 03/31/1961      Medicare Health Risk Assessment:     /88 (BP Location: Left arm, Patient Position: Sitting, Cuff Size: Standard)   Pulse 73   Temp 98 1 °F (36 7 °C) (Temporal)   Resp 16   Ht 5' 5" (1 651 m)   Wt 95 3 kg (210 lb)   SpO2 99%   BMI 34 95 kg/m²      Yumiko is here for her Subsequent Wellness visit  Last Medicare Wellness visit information reviewed, patient interviewed and updates made to the record today  Health Risk Assessment:   Patient rates overall health as good  Patient feels that their physical health rating is same  Eyesight was rated as slightly worse  Hearing was rated as same  Patient feels that their emotional and mental health rating is same  Pain experienced in the last 7 days has been some  Patient's pain rating has been 5/10  Patient states that she has experienced no weight loss or gain in last 6 months       Depression Screening:   PHQ-2 Score: 1      Fall Risk Screening: In the past year, patient has experienced: no history of falling in past year      Urinary Incontinence Screening:   Patient has leaked urine accidently in the last six months  Using pads    Home Safety:  Patient has trouble with stairs inside or outside of their home  Patient has working smoke alarms and has working carbon monoxide detector  Home safety hazards include: none  Nutrition:   Current diet is Regular  Medications:   Patient is currently taking over-the-counter supplements  OTC medications include: see medication list      Activities of Daily Living (ADLs)/Instrumental Activities of Daily Living (IADLs):   Walk and transfer into and out of bed and chair?: Yes  Dress and groom yourself?: Yes    Bathe or shower yourself?: Yes    Feed yourself? Yes  Do your laundry/housekeeping?: Yes  Manage your money, pay your bills and track your expenses?: Yes  Make your own meals?: Yes    Do your own shopping?: Yes    Previous Hospitalizations:   Any hospitalizations or ED visits within the last 12 months?: No      Advance Care Planning:   Living will: Yes    Durable POA for healthcare: Yes    Advanced directive: Yes      PREVENTIVE SCREENINGS      Cardiovascular Screening:    General: Screening Not Indicated and History Lipid Disorder      Diabetes Screening:     General: Screening Current      Colorectal Cancer Screening:     General: Risks and Benefits Discussed and Screening Not Indicated      Breast Cancer Screening:     General: Screening Current      Cervical Cancer Screening:    General: Screening Not Indicated      Osteoporosis Screening:    General: Risks and Benefits Discussed      Abdominal Aortic Aneurysm (AAA) Screening:    Risk factors include: family history of AAA        Lung Cancer Screening:     General: Screening Not Indicated    Other Counseling Topics:   Car/seat belt/driving safety and calcium and vitamin D intake         Sylvester Siemens, MD

## 2021-01-06 NOTE — PATIENT INSTRUCTIONS
Medicare Preventive Visit Patient Instructions  Thank you for completing your Welcome to Medicare Visit or Medicare Annual Wellness Visit today  Your next wellness visit will be due in one year (1/6/2022)  The screening/preventive services that you may require over the next 5-10 years are detailed below  Some tests may not apply to you based off risk factors and/or age  Screening tests ordered at today's visit but not completed yet may show as past due  Also, please note that scanned in results may not display below  Preventive Screenings:  Service Recommendations Previous Testing/Comments   Colorectal Cancer Screening  * Colonoscopy    * Fecal Occult Blood Test (FOBT)/Fecal Immunochemical Test (FIT)  * Fecal DNA/Cologuard Test  * Flexible Sigmoidoscopy Age: 54-65 years old   Colonoscopy: every 10 years (may be performed more frequently if at higher risk)  OR  FOBT/FIT: every 1 year  OR  Cologuard: every 3 years  OR  Sigmoidoscopy: every 5 years  Screening may be recommended earlier than age 48 if at higher risk for colorectal cancer  Also, an individualized decision between you and your healthcare provider will decide whether screening between the ages of 74-80 would be appropriate  Colonoscopy: 02/05/2019  FOBT/FIT: Not on file  Cologuard: Not on file  Sigmoidoscopy: Not on file         Breast Cancer Screening Age: 36 years old  Frequency: every 1-2 years  Not required if history of left and right mastectomy Mammogram: 10/23/2020    Screening Current   Cervical Cancer Screening Between the ages of 21-29, pap smear recommended once every 3 years  Between the ages of 33-67, can perform pap smear with HPV co-testing every 5 years     Recommendations may differ for women with a history of total hysterectomy, cervical cancer, or abnormal pap smears in past  Pap Smear: Not on file    Screening Not Indicated   Hepatitis C Screening Once for adults born between [facility]  More frequently in patients at high risk for Hepatitis C Hep C Antibody: Not on file       Diabetes Screening 1-2 times per year if you're at risk for diabetes or have pre-diabetes Fasting glucose: 109 mg/dL   A1C: 5 2 %    Screening Current   Cholesterol Screening Once every 5 years if you don't have a lipid disorder  May order more often based on risk factors  Lipid panel: 10/07/2020    Screening Not Indicated  History Lipid Disorder     Other Preventive Screenings Covered by Medicare:  1  Abdominal Aortic Aneurysm (AAA) Screening: covered once if your at risk  You're considered to be at risk if you have a family history of AAA  2  Lung Cancer Screening: covers low dose CT scan once per year if you meet all of the following conditions: (1) Age 50-69; (2) No signs or symptoms of lung cancer; (3) Current smoker or have quit smoking within the last 15 years; (4) You have a tobacco smoking history of at least 30 pack years (packs per day multiplied by number of years you smoked); (5) You get a written order from a healthcare provider  3  Glaucoma Screening: covered annually if you're considered high risk: (1) You have diabetes OR (2) Family history of glaucoma OR (3)  aged 48 and older OR (3)  American aged 72 and older  3  Osteoporosis Screening: covered every 2 years if you meet one of the following conditions: (1) You're estrogen deficient and at risk for osteoporosis based off medical history and other findings; (2) Have a vertebral abnormality; (3) On glucocorticoid therapy for more than 3 months; (4) Have primary hyperparathyroidism; (5) On osteoporosis medications and need to assess response to drug therapy  · Last bone density test (DXA Scan): 11/01/2019   5  HIV Screening: covered annually if you're between the age of 15-65  Also covered annually if you are younger than 13 and older than 72 with risk factors for HIV infection   For pregnant patients, it is covered up to 3 times per pregnancy  Immunizations:  Immunization Recommendations   Influenza Vaccine Annual influenza vaccination during flu season is recommended for all persons aged >= 6 months who do not have contraindications   Pneumococcal Vaccine (Prevnar and Pneumovax)  * Prevnar = PCV13  * Pneumovax = PPSV23   Adults 25-60 years old: 1-3 doses may be recommended based on certain risk factors  Adults 72 years old: Prevnar (PCV13) vaccine recommended followed by Pneumovax (PPSV23) vaccine  If already received PPSV23 since turning 65, then PCV13 recommended at least one year after PPSV23 dose  Hepatitis B Vaccine 3 dose series if at intermediate or high risk (ex: diabetes, end stage renal disease, liver disease)   Tetanus (Td) Vaccine - COST NOT COVERED BY MEDICARE PART B Following completion of primary series, a booster dose should be given every 10 years to maintain immunity against tetanus  Td may also be given as tetanus wound prophylaxis  Tdap Vaccine - COST NOT COVERED BY MEDICARE PART B Recommended at least once for all adults  For pregnant patients, recommended with each pregnancy  Shingles Vaccine (Shingrix) - COST NOT COVERED BY MEDICARE PART B  2 shot series recommended in those aged 48 and above     Health Maintenance Due:      Topic Date Due    Colonoscopy Surveillance  02/05/2022     Immunizations Due:      Topic Date Due    DTaP,Tdap,and Td Vaccines (1 - Tdap) 03/31/1961     Advance Directives   What are advance directives? Advance directives are legal documents that state your wishes and plans for medical care  These plans are made ahead of time in case you lose your ability to make decisions for yourself  Advance directives can apply to any medical decision, such as the treatments you want, and if you want to donate organs  What are the types of advance directives? There are many types of advance directives, and each state has rules about how to use them   You may choose a combination of any of the following:  · Living will: This is a written record of the treatment you want  You can also choose which treatments you do not want, which to limit, and which to stop at a certain time  This includes surgery, medicine, IV fluid, and tube feedings  · Durable power of  for healthcare [facility]): This is a written record that states who you want to make healthcare choices for you when you are unable to make them for yourself  This person, called a proxy, is usually a family member or a friend  You may choose more than 1 proxy  · Do not resuscitate (DNR) order:  A DNR order is used in case your heart stops beating or you stop breathing  It is a request not to have certain forms of treatment, such as CPR  A DNR order may be included in other types of advance directives  · Medical directive: This covers the care that you want if you are in a coma, near death, or unable to make decisions for yourself  You can list the treatments you want for each condition  Treatment may include pain medicine, surgery, blood transfusions, dialysis, IV or tube feedings, and a ventilator (breathing machine)  · Values history: This document has questions about your views, beliefs, and how you feel and think about life  This information can help others choose the care that you would choose  Why are advance directives important? An advance directive helps you control your care  Although spoken wishes may be used, it is better to have your wishes written down  Spoken wishes can be misunderstood, or not followed  Treatments may be given even if you do not want them  An advance directive may make it easier for your family to make difficult choices about your care  Urinary Incontinence   Urinary incontinence (UI)  is when you lose control of your bladder  UI develops because your bladder cannot store or empty urine properly  The 3 most common types of UI are stress incontinence, urge incontinence, or both    Medicines:   · May be given to help strengthen your bladder control  Report any side effects of medication to your healthcare provider  Do pelvic muscle exercises often:  Your pelvic muscles help you stop urinating  Squeeze these muscles tight for 5 seconds, then relax for 5 seconds  Gradually work up to squeezing for 10 seconds  Do 3 sets of 15 repetitions a day, or as directed  This will help strengthen your pelvic muscles and improve bladder control  Train your bladder:  Go to the bathroom at set times, such as every 2 hours, even if you do not feel the urge to go  You can also try to hold your urine when you feel the urge to go  For example, hold your urine for 5 minutes when you feel the urge to go  As that becomes easier, hold your urine for 10 minutes  Self-care:   · Keep a UI record  Write down how often you leak urine and how much you leak  Make a note of what you were doing when you leaked urine  · Drink liquids as directed  You may need to limit the amount of liquid you drink to help control your urine leakage  Do not drink any liquid right before you go to bed  Limit or do not have drinks that contain caffeine or alcohol  · Prevent constipation  Eat a variety of high-fiber foods  Good examples are high-fiber cereals, beans, vegetables, and whole-grain breads  Walking is the best way to trigger your intestines to have a bowel movement  · Exercise regularly and maintain a healthy weight  Weight loss and exercise will decrease pressure on your bladder and help you control your leakage  · Use a catheter as directed  to help empty your bladder  A catheter is a tiny, plastic tube that is put into your bladder to drain your urine  · Go to behavior therapy as directed  Behavior therapy may be used to help you learn to control your urge to urinate      Weight Management   Why it is important to manage your weight:  Being overweight increases your risk of health conditions such as heart disease, high blood pressure, type 2 diabetes, and certain types of cancer  It can also increase your risk for osteoarthritis, sleep apnea, and other respiratory problems  Aim for a slow, steady weight loss  Even a small amount of weight loss can lower your risk of health problems  How to lose weight safely:  A safe and healthy way to lose weight is to eat fewer calories and get regular exercise  You can lose up about 1 pound a week by decreasing the number of calories you eat by 500 calories each day  Healthy meal plan for weight management:  A healthy meal plan includes a variety of foods, contains fewer calories, and helps you stay healthy  A healthy meal plan includes the following:  · Eat whole-grain foods more often  A healthy meal plan should contain fiber  Fiber is the part of grains, fruits, and vegetables that is not broken down by your body  Whole-grain foods are healthy and provide extra fiber in your diet  Some examples of whole-grain foods are whole-wheat breads and pastas, oatmeal, brown rice, and bulgur  · Eat a variety of vegetables every day  Include dark, leafy greens such as spinach, kale, jesús greens, and mustard greens  Eat yellow and orange vegetables such as carrots, sweet potatoes, and winter squash  · Eat a variety of fruits every day  Choose fresh or canned fruit (canned in its own juice or light syrup) instead of juice  Fruit juice has very little or no fiber  · Eat low-fat dairy foods  Drink fat-free (skim) milk or 1% milk  Eat fat-free yogurt and low-fat cottage cheese  Try low-fat cheeses such as mozzarella and other reduced-fat cheeses  · Choose meat and other protein foods that are low in fat  Choose beans or other legumes such as split peas or lentils  Choose fish, skinless poultry (chicken or turkey), or lean cuts of red meat (beef or pork)  Before you cook meat or poultry, cut off any visible fat  · Use less fat and oil  Try baking foods instead of frying them   Add less fat, such as margarine, sour cream, regular salad dressing and mayonnaise to foods  Eat fewer high-fat foods  Some examples of high-fat foods include french fries, doughnuts, ice cream, and cakes  · Eat fewer sweets  Limit foods and drinks that are high in sugar  This includes candy, cookies, regular soda, and sweetened drinks  Exercise:  Exercise at least 30 minutes per day on most days of the week  Some examples of exercise include walking, biking, dancing, and swimming  You can also fit in more physical activity by taking the stairs instead of the elevator or parking farther away from stores  Ask your healthcare provider about the best exercise plan for you  © Copyright [employer] 2018 Information is for End User's use only and may not be sold, redistributed or otherwise used for commercial purposes   All illustrations and images included in CareNotes® are the copyrighted property of [employer]  or [address] [place] [id number]

## 2021-01-25 DIAGNOSIS — Z23 ENCOUNTER FOR IMMUNIZATION: ICD-10-CM

## 2021-02-11 ENCOUNTER — IMMUNIZATIONS (OUTPATIENT)
Dept: FAMILY MEDICINE CLINIC | Facility: HOSPITAL | Age: 81
End: 2021-02-11

## 2021-02-11 DIAGNOSIS — Z23 ENCOUNTER FOR IMMUNIZATION: Primary | ICD-10-CM

## 2021-02-11 PROCEDURE — 91300 SARS-COV-2 / COVID-19 MRNA VACCINE (PFIZER-BIONTECH) 30 MCG: CPT

## 2021-02-11 PROCEDURE — 0001A SARS-COV-2 / COVID-19 MRNA VACCINE (PFIZER-BIONTECH) 30 MCG: CPT

## 2021-03-02 ENCOUNTER — IMMUNIZATIONS (OUTPATIENT)
Dept: FAMILY MEDICINE CLINIC | Facility: HOSPITAL | Age: 81
End: 2021-03-02

## 2021-03-02 DIAGNOSIS — Z23 ENCOUNTER FOR IMMUNIZATION: Primary | ICD-10-CM

## 2021-03-02 PROCEDURE — 0002A SARS-COV-2 / COVID-19 MRNA VACCINE (PFIZER-BIONTECH) 30 MCG: CPT

## 2021-03-02 PROCEDURE — 91300 SARS-COV-2 / COVID-19 MRNA VACCINE (PFIZER-BIONTECH) 30 MCG: CPT

## 2021-03-04 ENCOUNTER — OFFICE VISIT (OUTPATIENT)
Dept: PODIATRY | Facility: CLINIC | Age: 81
End: 2021-03-04
Payer: COMMERCIAL

## 2021-03-04 VITALS
HEIGHT: 65 IN | RESPIRATION RATE: 16 BRPM | DIASTOLIC BLOOD PRESSURE: 88 MMHG | HEART RATE: 73 BPM | WEIGHT: 210 LBS | BODY MASS INDEX: 34.99 KG/M2 | SYSTOLIC BLOOD PRESSURE: 156 MMHG

## 2021-03-04 DIAGNOSIS — M79.671 PAIN IN BOTH FEET: ICD-10-CM

## 2021-03-04 DIAGNOSIS — L84 CORNS: ICD-10-CM

## 2021-03-04 DIAGNOSIS — I70.209 PERIPHERAL ARTERIOSCLEROSIS (HCC): Primary | ICD-10-CM

## 2021-03-04 DIAGNOSIS — M79.672 PAIN IN BOTH FEET: ICD-10-CM

## 2021-03-04 PROCEDURE — 11056 PARNG/CUTG B9 HYPRKR LES 2-4: CPT | Performed by: PODIATRIST

## 2021-03-04 NOTE — PROGRESS NOTES
Assessment/Plan:  Pain   Radiculopathy   Callus   Mycotic toenail   Peripheral artery disease         Plan   Foot exam performed   All nails debrided   Calluses debrided   Procedures performed without pain or complication   Patient remain on gabapentin as directed      Subjective  Patient has pain in her feet and toes with ambulation  No history of trauma        Discussion/Summary   The patient was counseled regarding instructions for management,-- patient and family education,-- risks and benefits of treatment options     Patient is able to Self-Care     Possible side effects of new medications were reviewed with the patient/guardian today  The treatment plan was reviewed with the patient/guardian  The patient/guardian understands and agrees with the treatment plan      Chief Complaint   Patient has foot pain   She has pain when she wears shoes   No history of trauma          History of Present Illness   HPI:  Patient complains of pain in feet with ambulation  She has pain around the toes  She is also concerned with pain in her right heel  This is been ongoing for several weeks  She has no history of trauma   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   Dawit Cruzito was unable tolerate gabapentin     Review of Systems           Cardiac: chest pain,-- rhythm problems,-- AM fatigue-- and-- witnessed apnea episodes       Skin: No complaints of nonhealing sores or skin rash       Genitourinary: loss of bladder control      Psychological: No complaints of feeling depressed, anxiety, panic attacks, or difficulty concentrating       General: trouble sleeping-- and-- lack of energy/fatigue       Respiratory: shortness of breath       HEENT: snoring      Gastrointestinal: heartburn      Hematologic: anemia      Neurological: daytime sleepiness      Musculoskeletal: arthritis-- and-- back pain      Active Problems   1  Allergic rhinitis (477 9) (J30 9)   2  Anxiety disorder (300 00) (F41 9)   3  Arthropathy (264 90) (M12 9)   4  Atherosclerosis of arteries of extremities (440 20) (I70 209)   5  Atrial fibrillation (427 31) (I48 91)   6  Backache (724 5) (M54 9)   7  Callus (700) (L84)   8  Cervicalgia (723 1) (M54 2)   9  Depression (311) (F32 9)   10  Difficulty in walking (719 7) (R26 2)   11  Encounter for screening for malignant neoplasm of colon (V76 51) (Z12 11)   12  Esophagitis, reflux (530 11) (K21 0)   13  Essential hypertension (401 9) (I10)   14  Foot pain, bilateral (729 5) (M79 671,M79 672)   15  Herpes zoster (053 9) (B02 9)   16  Hospital discharge follow-up (V67 59) (Z09)   17  Hyperlipidemia (272 4) (E78 5)   18  Impaired fasting glucose (790 21) (R73 01)   19  Internal Hemorrhoids (455 0)   20  Leg swelling (729 81) (L30 42)   21  Lichen planus (814 1) (L43 9)   22  Limb pain (729 5) (M79 609)   23  Lumbar radiculopathy (724 4) (M54 16)   24  Multiple joint pain (719 49) (M25 50)   25  Need for influenza vaccination (V04 81) (Z23)   26  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)   27  Onychomycosis (110 1) (B35 1)   28  MIGUEL (obstructive sleep apnea) (327 23) (G47 33)   29  Other abnormal finding of urine (791 9) (R82 99)   30  Pes planus, congenital (754 61) (Q66 50)   31  Plantar fascial fibromatosis (728 71) (M72 2)   32  Rectal/anal hemorrhage (569 3) (K62 5)   33  Screening for malignant neoplasm of cervix (V76 2) (Z12 4)   34  Shoulder joint pain, unspecified laterality   35  Thrombocytopenia (287 5) (D69 6)   36  Urticaria (708 9) (L50 9)   37  Vulvovaginitis candida albicans (112 1) (B37 3)     Past Medical History    · History of Acute maxillary sinusitis (461 0) (J01 00)   · History of Acute upper respiratory infection (465 9) (J06 9)   · History of Cellulitis (682 9) (L03 90)   · History of Cough (786 2) (R05)   · History of Dysuria (788 1) (R30 0)   · History of High cholesterol (272 0) (E78 00)   · History of abdominal pain (V13 89) (W95 537)   · History of acute bronchitis (V12 69) (Z87 09)   · History of acute sinusitis (V12 69) (Z87 09)   · History of arthritis (V13 4) (Z87 39)   · History of atrial fibrillation (V12 59) (Z86 79)   · History of cataract (V12 49) (Z86 69)   · History of gastroesophageal reflux (GERD) (V12 79) (Z87 19)   · History of hypertension (V12 59) (Z86 79)   · History of Skin rash (782 1) (R21)   · History of Vulvovaginitis (616 10) (N76 0)     The active problems and past medical history were reviewed and updated today       Surgical History    · History of Cataract Surgery   · History of Colonoscopy (Fiberoptic) Screening   · History of Gallbladder Surgery   · History of Knee Replacement   · History of Pacemaker Placement     The surgical history was reviewed and updated today        Family History   Father    · Family history of Coronary Artery Disease (V17 49)  Sister    · Family history of Diabetes Mellitus (V18 0)   · Family history of High cholesterol  Family History    · Family history of arthritis (V17 7) (Z82 61)   · Family history of hypertension (V17 49) (Z82 49)     The family history was reviewed and updated today        Social History    · Exercise: Walking   · 2 x/week   · Former smoker (V15 82) (Z87 891)   · No alcohol use   ·   The social history was reviewed and updated today       Physical Exam   Left Foot: Appearance: Normal except as noted: excessive pronation-- and-- pes planus  Great toe deformities include a bunion  Tenderness: None except the great toe-- and-- distal first metatarsal     Right Foot: Appearance: Normal except as noted: excessive pronation-- and-- pes planus  Great toe deformities include a bunion  Tenderness: None except the great toe,-- distal first metatarsal,-- medial calcaneous-- and-- insertion of the plantar fascia     Left Ankle: ROM: limited ROM in all planes    Right Ankle: ROM: limited ROM in all planes    Neurological Exam: Light touch was decreased bilaterally  Vibratory sensation was decreased in both first metatarsophalangeal joints     Vascular Exam: performed Dorsalis pedis pulses were diminished bilaterally  Posterior tibial pulses were diminished bilaterally  Elevation Pallor: present bilaterally  Dependence rubor was present bilaterally  Capillary refill time was greater than 3 seconds bilaterally-- and-- Q  9, findings bilateral  Edema: moderate bilaterally     Toenails: All of the toenails were elongated,-- hypertrophied,-- discolored-- and-- Right ptotic     Hyperkeratosis: present on both first toes,-- present on both first sub metatarsals-- and-- Positive xerosis of skin noted     Shoe Gear Evaluation: performed ()   Recommendation(s): SAS style-- and-- OTC inlays

## 2021-03-23 DIAGNOSIS — K21.9 GASTROESOPHAGEAL REFLUX DISEASE WITHOUT ESOPHAGITIS: Primary | ICD-10-CM

## 2021-03-24 DIAGNOSIS — E78.5 DYSLIPIDEMIA: ICD-10-CM

## 2021-03-24 RX ORDER — PRAVASTATIN SODIUM 10 MG
TABLET ORAL
Qty: 60 TABLET | Refills: 3 | Status: SHIPPED | OUTPATIENT
Start: 2021-03-24 | End: 2022-05-27

## 2021-03-24 RX ORDER — PANTOPRAZOLE SODIUM 20 MG/1
TABLET, DELAYED RELEASE ORAL
Qty: 90 TABLET | Refills: 0 | Status: SHIPPED | OUTPATIENT
Start: 2021-03-24 | End: 2021-05-06

## 2021-03-30 DIAGNOSIS — I48.91 ATRIAL FIBRILLATION, UNSPECIFIED TYPE (HCC): ICD-10-CM

## 2021-03-31 RX ORDER — SOTALOL HYDROCHLORIDE 80 MG/1
TABLET ORAL
Qty: 180 TABLET | Refills: 3 | Status: SHIPPED | OUTPATIENT
Start: 2021-03-31 | End: 2022-03-25

## 2021-03-31 RX ORDER — METOPROLOL SUCCINATE 25 MG/1
TABLET, EXTENDED RELEASE ORAL
Qty: 90 TABLET | Refills: 3 | Status: SHIPPED | OUTPATIENT
Start: 2021-03-31 | End: 2022-03-28

## 2021-03-31 RX ORDER — APIXABAN 5 MG/1
TABLET, FILM COATED ORAL
Qty: 180 TABLET | Refills: 3 | Status: SHIPPED | OUTPATIENT
Start: 2021-03-31 | End: 2022-03-28

## 2021-04-01 DIAGNOSIS — I10 ESSENTIAL HYPERTENSION: Chronic | ICD-10-CM

## 2021-04-02 RX ORDER — RAMIPRIL 5 MG/1
CAPSULE ORAL
Qty: 180 CAPSULE | Refills: 3 | Status: SHIPPED | OUTPATIENT
Start: 2021-04-02 | End: 2022-03-24

## 2021-04-05 ENCOUNTER — IN-CLINIC DEVICE VISIT (OUTPATIENT)
Dept: CARDIOLOGY CLINIC | Facility: CLINIC | Age: 81
End: 2021-04-05
Payer: COMMERCIAL

## 2021-04-05 ENCOUNTER — TELEPHONE (OUTPATIENT)
Dept: CARDIOLOGY CLINIC | Facility: CLINIC | Age: 81
End: 2021-04-05

## 2021-04-05 DIAGNOSIS — Z95.0 PRESENCE OF PERMANENT CARDIAC PACEMAKER: Primary | ICD-10-CM

## 2021-04-05 PROCEDURE — 93280 PM DEVICE PROGR EVAL DUAL: CPT | Performed by: INTERNAL MEDICINE

## 2021-04-05 NOTE — TELEPHONE ENCOUNTER
Dental office called and would like to know the direction for eliquis hold  Pt is having a tooth extraction  Thank you

## 2021-04-05 NOTE — PROGRESS NOTES
MDT DUAL CHAMBER PM/ACTIVE SYSTEM IS MRI CONDITIONAL   DEVICE INTERROGATED IN THE [place] OFFICE:  BATTERY VOLTAGE ADEQUATE (3 YR)   AP 99 4%  0 2%   ALL LEAD PARAMETERS WITHIN NORMAL LIMITS   1 EPISODE OF NSVT (10 @ 167 BPM)   580 AT/AF EPISODES WITH LONGEST 104 MIN     EF 55-60% (ECHO 10/2019)   PT TAKES ELIQUIS AND METOPROLOL   TASK TO DR PLATA   NO PROGRAMMING CHANGES MADE TO DEVICE PARAMETERS   NORMAL DEVICE FUNCTION   RG

## 2021-04-22 ENCOUNTER — OFFICE VISIT (OUTPATIENT)
Dept: CARDIOLOGY CLINIC | Facility: CLINIC | Age: 81
End: 2021-04-22
Payer: COMMERCIAL

## 2021-04-22 VITALS
OXYGEN SATURATION: 97 % | TEMPERATURE: 98.8 F | DIASTOLIC BLOOD PRESSURE: 86 MMHG | BODY MASS INDEX: 34.82 KG/M2 | HEIGHT: 65 IN | HEART RATE: 72 BPM | WEIGHT: 209 LBS | SYSTOLIC BLOOD PRESSURE: 128 MMHG

## 2021-04-22 DIAGNOSIS — I48.0 PAROXYSMAL ATRIAL FIBRILLATION (HCC): Primary | ICD-10-CM

## 2021-04-22 DIAGNOSIS — I35.1 NONRHEUMATIC AORTIC VALVE INSUFFICIENCY: ICD-10-CM

## 2021-04-22 DIAGNOSIS — I50.32 CHRONIC DIASTOLIC CONGESTIVE HEART FAILURE (HCC): ICD-10-CM

## 2021-04-22 DIAGNOSIS — G47.33 OSA (OBSTRUCTIVE SLEEP APNEA): ICD-10-CM

## 2021-04-22 DIAGNOSIS — R06.02 SHORTNESS OF BREATH: ICD-10-CM

## 2021-04-22 DIAGNOSIS — I51.9 CARDIAC DISEASE: Chronic | ICD-10-CM

## 2021-04-22 DIAGNOSIS — E78.5 DYSLIPIDEMIA: ICD-10-CM

## 2021-04-22 DIAGNOSIS — I10 ESSENTIAL HYPERTENSION: Chronic | ICD-10-CM

## 2021-04-22 PROCEDURE — 99214 OFFICE O/P EST MOD 30 MIN: CPT | Performed by: INTERNAL MEDICINE

## 2021-04-22 PROCEDURE — 1036F TOBACCO NON-USER: CPT | Performed by: INTERNAL MEDICINE

## 2021-04-22 PROCEDURE — 1160F RVW MEDS BY RX/DR IN RCRD: CPT | Performed by: INTERNAL MEDICINE

## 2021-04-22 PROCEDURE — 3079F DIAST BP 80-89 MM HG: CPT | Performed by: INTERNAL MEDICINE

## 2021-04-22 PROCEDURE — 3074F SYST BP LT 130 MM HG: CPT | Performed by: INTERNAL MEDICINE

## 2021-04-22 RX ORDER — NITROGLYCERIN 0.4 MG/1
0.4 TABLET SUBLINGUAL
Qty: 25 TABLET | Refills: 0 | Status: SHIPPED | OUTPATIENT
Start: 2021-04-22

## 2021-04-22 NOTE — PROGRESS NOTES
Cardiology   Aramis Chambers DO, Gabrielle Beck MD, Eliu Boles MD, Amanda Bailey MD, [facility] - [place]  -------------------------------------------------------------------  [facility]  [address], [address], [address] [place] [address], [facility]  [phone]    Cardiology Follow Up  Max Snow  1940  [id number]          Assessment/Plan:    1  Chest pain - patient with atypical pain but has had multiple episodes and has mild edema  Will obtain stress test for further evaluation  Will be done with pharmacological agent as she cannot walk on treadmill  2  Paroxysmal atrial fibrillation (HCC) - currently in sinus  Continue Eliquis and routine pacemaker clinic follow up  - Continue sotalol  QT interval normal   - She has intermittent episodes on pacemaker interrogation  3  Essential hypertension  - BP well controlled on current Rx  4  Mixed hyperlipidemia  - Last LDL was 78  -  Continue pravastatin  - Repeat lipid panel ordered  - discussed diet and increasing intake of fruits and vegetables  5  Chronic diastolic CHF - stable on lasix 20 mg daily   - Repeat echocardiogram given new edema    - Discussed risk factor reduction including refraining from smoking, eating a diet high in fruits and vegetables, maintaining a healthy weight, limiting screen time along with controlling BP and cholesterol  Encouraged to exercise 150 minutes a week at a moderate level such as a fast walk or 75 minutes of high intensity      6  Exertional dyspnea/fatigue - stress test and echocardiogram were done in 2019  No ischemia present and EF was normal        7  Moderate aortic regurgitation - 2D echocardiogram will be repeated         Interval History:     Max Snow is 79 y o  female here for followup of atrial fibrillation and hypertension  She had 3 episodes of chest pain radiating to her back    Can occur with activity and with rest  She also feels shortness of breath with exertion which may be worse than previous  Improves with rest   Can go grocery shopping  She has trace LE edema but denies any orthopnea or PND  She has intermittent episodes of palpitations which last for a few minutes  Her pacemaker interrogation reveals episodes of atrial fibrillation  The patient has a history of atrial fibrillation along with sick sinus syndrome and had a pacemaker inserted at [facility] after developing multiple pauses  She denies any previous syncope or near syncope  She is on treatment with sotalol 80 mg b i d  And Eliquis 5 mg b i d  Past Medical History:   Diagnosis Date    Arthritis     Atrial fibrillation ([address] )     Cardiac disease     Cataract     Gastro-esophageal reflux     GERD    Hypertension      Social History     Socioeconomic History    Marital status:       Spouse name: Not on file    Number of children: Not on file    Years of education: Not on file    Highest education level: Not on file   Occupational History    Not on file   Social Needs    Financial resource strain: Not on file    Food insecurity     Worry: Not on file     Inability: Not on file    Transportation needs     Medical: Not on file     Non-medical: Not on file   Tobacco Use    Smoking status: Former Smoker     Packs/day: 1 00     Years: 25 00     Pack years: 25 00     Types: Cigarettes    Smokeless tobacco: Never Used    Tobacco comment: quit 22 yeears ago   Substance and Sexual Activity    Alcohol use: Yes     Comment: occasional, No alcohol use,per [employer]    Drug use: No    Sexual activity: Not on file   Lifestyle    Physical activity     Days per week: Not on file     Minutes per session: Not on file    Stress: Not on file   Relationships    Social connections     Talks on phone: Not on file     Gets together: Not on file     Attends Yazidism service: Not on file     Active member of club or organization: Not on file Attends meetings of clubs or organizations: Not on file     Relationship status: Not on file    Intimate partner violence     Fear of current or ex partner: Not on file     Emotionally abused: Not on file     Physically abused: Not on file     Forced sexual activity: Not on file   Other Topics Concern    Not on file   Social History Narrative    Exercise: Walking, 2x/week      Family History   Problem Relation Age of Onset    Coronary artery disease Father     Diabetes Sister     Hyperlipidemia Sister     Arthritis Family     Hypertension Family      Past Surgical History:   Procedure Laterality Date    CARDIAC PACEMAKER PLACEMENT Left 2014    CATARACT EXTRACTION      CHOLECYSTECTOMY      resolved: 2009    COLONOSCOPY      Fiberoptic, resolved 2015    EYE SURGERY      KNEE SURGERY Left     left kknee replacement in 2009       Current Outpatient Medications:     acetaminophen (TYLENOL ARTHRITIS PAIN) 650 mg CR tablet, Take by mouth, Disp: , Rfl:     betamethasone dipropionate (DIPROSONE) 0 05 % cream, Apply topically 2 (two) times a day, Disp: 45 g, Rfl: 2    Calcium Carbonate-Vitamin D 600-200 MG-UNIT CAPS, Take by mouth 2 (two) times a day, Disp: , Rfl:     cholecalciferol (VITAMIN D3) 1,000 units tablet, Take 1,000 Units by mouth daily, Disp: , Rfl:     clobetasol (TEMOVATE) 0 05 % cream, , Disp: , Rfl:     Coenzyme Q10 (COQ-10) 200 MG CAPS, Take 2 capsules by mouth daily, Disp: , Rfl:     diphenhydrAMINE-acetaminophen (TYLENOL PM)  MG TABS, Take 1 tablet by mouth daily at bedtime as needed for sleep, Disp: , Rfl:     Eliquis 5 MG, take 1 tablet by mouth twice a day, Disp: 180 tablet, Rfl: 3    famotidine (PEPCID) 40 MG tablet, take 1 tablet by mouth twice a day, Disp: 60 tablet, Rfl: 2    Lactobacillus (ACIDOPHILUS PO), Take by mouth, Disp: , Rfl:     metoprolol succinate (TOPROL-XL) 25 mg 24 hr tablet, take 1 tablet by mouth once daily, Disp: 90 tablet, Rfl: 3    Multiple Vitamins-Minerals (DAILY MULTIVITAMIN PO), Take 1 tablet by mouth daily, Disp: , Rfl:     nitroglycerin (NITROSTAT) 0 4 mg SL tablet, place 1 tablet under the tongue if needed every 5 minutes for chest pain for 3 doses IF NO RELIEF AFTER FIRST DOSE CALL PRESCRIBER  , Disp: 25 tablet, Rfl: 0    pantoprazole (PROTONIX) 20 mg tablet, take 1 tablet by mouth daily 1/2 HOUR BEFORE BREAKFAST, Disp: 90 tablet, Rfl: 0    polyethylene glycol-electrolytes (NULYTELY) 4000 mL solution, take by mouth as directed  FOR COLONOSCOPY, Disp: , Rfl: 0    pravastatin (PRAVACHOL) 10 mg tablet, take 1 tablet by mouth daily 3 TIMES A WEEK ON MONDAYS WEDNESDAYS AND FRIDAYS, Disp: 60 tablet, Rfl: 3    ramipril (ALTACE) 5 mg capsule, take 1 capsule by mouth twice a day, Disp: 180 capsule, Rfl: 3    sotalol (BETAPACE) 80 mg tablet, take 1 tablet by mouth every 12 hours, Disp: 180 tablet, Rfl: 3    furosemide (LASIX) 20 mg tablet, Take 1 tablet by mouth daily, Disp: , Rfl:     hydroxychloroquine (PLAQUENIL) 200 mg tablet, Take 1 tablet (200 mg total) by mouth daily with breakfast Administer with food or milk , Disp: 30 tablet, Rfl: 2    omega-3-acid ethyl esters (LOVAZA) 1 g capsule, Take 2 capsules (2 g total) by mouth 2 (two) times a day (Patient not taking: Reported on 4/22/2021), Disp: 120 capsule, Rfl: 5        Review of Systems:  Review of Systems   Respiratory: Positive for shortness of breath  Cardiovascular: Positive for chest pain, palpitations and leg swelling  Musculoskeletal: Positive for arthralgias, gait problem and myalgias  Physical Exam:  Vitals:  Vitals:    04/22/21 1248   BP: 128/86   BP Location: Left arm   Patient Position: Sitting   Cuff Size: Large   Pulse: 72   Temp: 98 8 °F (37 1 °C)   TempSrc: Temporal   SpO2: 97%   Weight: 94 8 kg (209 lb)   Height: 5' 5" (1 651 m)     Physical Exam   Constitutional: She appears healthy  No distress  Eyes: Pupils are equal, round, and reactive to light  Conjunctivae are normal    Neck: Normal range of motion  Neck supple  No JVD present  Cardiovascular: Normal rate, regular rhythm and normal heart sounds  Exam reveals no gallop and no friction rub  No murmur heard  Pulmonary/Chest: Effort normal and breath sounds normal  She has no wheezes  She has no rales  Musculoskeletal:         General: Edema (trace) present  No tenderness or deformity  Neurological: She is alert and oriented to person, place, and time  Skin: Skin is warm and dry  Cardiographics:  EKG: Personally reviewed atrial paced rhythm with rate 75 bpm  Last known EF: 55-60%    This note was completed in part utilizing [employer] Direct Software  Grammatical errors, random word insertions, spelling mistakes, and incomplete sentences can be an occasional consequence of this system secondary to software limitations, ambient noise, and hardware issues  If you have any questions or concerns about the content, text, or information contained within the body of this dictation, please contact the provider for clarification

## 2021-04-23 PROCEDURE — 93000 ELECTROCARDIOGRAM COMPLETE: CPT | Performed by: INTERNAL MEDICINE

## 2021-05-03 DIAGNOSIS — I50.32 CHRONIC DIASTOLIC CONGESTIVE HEART FAILURE (HCC): Primary | ICD-10-CM

## 2021-05-04 RX ORDER — FUROSEMIDE 20 MG/1
20 TABLET ORAL DAILY
Qty: 30 TABLET | Refills: 5 | Status: SHIPPED | OUTPATIENT
Start: 2021-05-04 | End: 2021-11-17

## 2021-05-06 ENCOUNTER — OFFICE VISIT (OUTPATIENT)
Dept: GASTROENTEROLOGY | Facility: CLINIC | Age: 81
End: 2021-05-06
Payer: COMMERCIAL

## 2021-05-06 VITALS
HEART RATE: 84 BPM | DIASTOLIC BLOOD PRESSURE: 91 MMHG | SYSTOLIC BLOOD PRESSURE: 163 MMHG | WEIGHT: 208 LBS | BODY MASS INDEX: 34.66 KG/M2 | HEIGHT: 65 IN

## 2021-05-06 DIAGNOSIS — K21.9 GASTROESOPHAGEAL REFLUX DISEASE WITHOUT ESOPHAGITIS: Primary | ICD-10-CM

## 2021-05-06 DIAGNOSIS — K22.2 ESOPHAGEAL STRICTURE: ICD-10-CM

## 2021-05-06 DIAGNOSIS — K63.5 POLYP OF COLON, UNSPECIFIED PART OF COLON, UNSPECIFIED TYPE: ICD-10-CM

## 2021-05-06 PROCEDURE — 99214 OFFICE O/P EST MOD 30 MIN: CPT | Performed by: INTERNAL MEDICINE

## 2021-05-06 RX ORDER — PANTOPRAZOLE SODIUM 40 MG/1
40 TABLET, DELAYED RELEASE ORAL DAILY
Qty: 90 TABLET | Refills: 2 | Status: SHIPPED | OUTPATIENT
Start: 2021-05-06 | End: 2021-05-06

## 2021-05-06 RX ORDER — FAMOTIDINE 20 MG/1
20 TABLET, FILM COATED ORAL
Qty: 90 TABLET | Refills: 2 | Status: SHIPPED | OUTPATIENT
Start: 2021-05-06 | End: 2021-05-06

## 2021-05-06 RX ORDER — FAMOTIDINE 20 MG/1
20 TABLET, FILM COATED ORAL
Qty: 90 TABLET | Refills: 2 | Status: SHIPPED | OUTPATIENT
Start: 2021-05-06 | End: 2021-05-10

## 2021-05-06 RX ORDER — FAMOTIDINE 20 MG/1
20 TABLET, FILM COATED ORAL DAILY
Qty: 90 TABLET | Refills: 2 | Status: SHIPPED | OUTPATIENT
Start: 2021-05-06 | End: 2021-05-06

## 2021-05-06 RX ORDER — PANTOPRAZOLE SODIUM 40 MG/1
40 TABLET, DELAYED RELEASE ORAL DAILY
Qty: 90 TABLET | Refills: 2 | Status: SHIPPED | OUTPATIENT
Start: 2021-05-06 | End: 2021-05-10

## 2021-05-06 NOTE — PROGRESS NOTES
[facility] - Outpatient Follow-up Note  Everton Mathew 79 y o  female MRN: [id number]  Encounter: [id number]          ASSESSMENT AND PLAN:      1  Gastroesophageal reflux disease without esophagitis   patient with history of GERD and has had difficulty swallowing in the past and underwent dilatation of lower esophageal stricture and she has been feeling improved since EGD which was performed in November of 2019  We will repeat EGD once cardiac evaluation has been completed due to increasing shortness of breath  We will increase the pantoprazole to 40 mg in the morning and she will take 20 mg of Pepcid at night   - pantoprazole (PROTONIX) 40 mg tablet; Take 1 tablet (40 mg total) by mouth daily  Dispense: 90 tablet; Refill: 2  - famotidine (PEPCID) 20 mg tablet; Take 1 tablet (20 mg total) by mouth daily at bedtime  Dispense: 90 tablet; Refill: 2  2  Esophageal stricture     status post dilatation in 2019 and she will need an EGD as above once cardiac evaluation is completed and denies any significant difficulty swallowing at this time      2  Hx of colon polyps     patient with sessile serrated polyps and last colonoscopy was 2019 and she will need colonoscopy in 2022    The patient was seen and examined with Dr Santos Nguyen  We will see her in 3 months for follow-up       ______________________________________________________________________    SUBJECTIVE:      This is an 65-year-old female who presents for follow-up visit  Patient did have an EGD in 2019 and at that time underwent dilatation and her swallowing is much improved  She states that she is only having difficulty swallowing coconut  Plan was for repeat EGD in 1 year  Her pantoprazole was decreased last year to 20 mg and then she was taking  Pepcid at night  Patient states that she is having occasional nausea and she was also having hyperactive bowel sounds and gas but denies any significant abdominal pain    She otherwise states that she has been short of breath which has progressed over the past month and she is going undergoing cardiac evaluation  Patient is due for colonoscopy in 2022 an EGD is due at this time but patient would prefer to wait till cardiac workup has been completed  She has had intermittent nausea but states this has occurred since she had the decreased dose  She did try to cut lactose out of her diet but she states that she may not have done this for a long enough  REVIEW OF SYSTEMS IS OTHERWISE NEGATIVE        Historical Information   Past Medical History:   Diagnosis Date    Arthritis     Atrial fibrillation ([address] )     Cardiac disease     Cataract     Gastro-esophageal reflux     GERD    Hypertension      Past Surgical History:   Procedure Laterality Date    CARDIAC PACEMAKER PLACEMENT Left 2014    CATARACT EXTRACTION      CHOLECYSTECTOMY      resolved: 2009    COLONOSCOPY      Fiberoptic, resolved 2015    EYE SURGERY      KNEE SURGERY Left     left kknee replacement in 2009     Social History   Social History     Substance and Sexual Activity   Alcohol Use Yes    Comment: occasional, No alcohol use,per [employer]     Social History     Substance and Sexual Activity   Drug Use No     Social History     Tobacco Use   Smoking Status Former Smoker    Packs/day: 1 00    Years: 25 00    Pack years: 25 00    Types: Cigarettes   Smokeless Tobacco Never Used   Tobacco Comment    quit 25 yeears ago     Family History   Problem Relation Age of Onset    Coronary artery disease Father     Diabetes Sister     Hyperlipidemia Sister     Arthritis Family     Hypertension Family        Meds/Allergies       Current Outpatient Medications:     acetaminophen (TYLENOL ARTHRITIS PAIN) 650 mg CR tablet    betamethasone dipropionate (DIPROSONE) 0 05 % cream    Calcium Carbonate-Vitamin D 600-200 MG-UNIT CAPS    cholecalciferol (VITAMIN D3) 1,000 units tablet    clobetasol (TEMOVATE) 0 05 % cream    Coenzyme Q10 (COQ-10) 200 MG CAPS    diphenhydrAMINE-acetaminophen (TYLENOL PM)  MG TABS    Eliquis 5 MG    furosemide (LASIX) 20 mg tablet    Lactobacillus (ACIDOPHILUS PO)    metoprolol succinate (TOPROL-XL) 25 mg 24 hr tablet    Multiple Vitamins-Minerals (DAILY MULTIVITAMIN PO)    nitroglycerin (NITROSTAT) 0 4 mg SL tablet    polyethylene glycol-electrolytes (NULYTELY) 4000 mL solution    pravastatin (PRAVACHOL) 10 mg tablet    ramipril (ALTACE) 5 mg capsule    sotalol (BETAPACE) 80 mg tablet    famotidine (PEPCID) 20 mg tablet    hydroxychloroquine (PLAQUENIL) 200 mg tablet    pantoprazole (PROTONIX) 40 mg tablet    Allergies   Allergen Reactions    Ciprofloxacin Rash    Sulfa Antibiotics Rash    Synvisc [Hylan G-F 20] Rash           Objective     Blood pressure 163/91, pulse 84, height 5' 5" (1 651 m), weight 94 3 kg (208 lb)  Body mass index is 34 61 kg/m²  PHYSICAL EXAM:      General Appearance:   Alert, cooperative, no distress   HEENT:   Normocephalic, atraumatic, anicteric      Neck:  Supple, symmetrical, trachea midline   Lungs:   Clear to auscultation bilaterally; no rales, rhonchi or wheezing; respirations unlabored    Heart[de-identified]   Regular rate and rhythm; no murmur, rub, or gallop  Abdomen:   Soft, non-tender, non-distended; normal bowel sounds; no masses, no organomegaly    Genitalia:   Deferred    Rectal:   Deferred    Extremities:  No cyanosis, clubbing or edema    Pulses:  2+ and symmetric    Skin:  No jaundice, rashes, or lesions    Lymph nodes:  No palpable cervical lymphadenopathy        Lab Results:   No visits with results within 1 Day(s) from this visit     Latest known visit with results is:   Appointment on 10/07/2020   Component Date Value    Sodium 10/07/2020 140     Potassium 10/07/2020 4 1     Chloride 10/07/2020 108     CO2 10/07/2020 29     ANION GAP 10/07/2020 3*    BUN 10/07/2020 26*    Creatinine 10/07/2020 1 04     Glucose, Fasting 10/07/2020 109*    Calcium 10/07/2020 9 3     AST 10/07/2020 16     ALT 10/07/2020 24     Alkaline Phosphatase 10/07/2020 54     Total Protein 10/07/2020 7 2     Albumin 10/07/2020 3 8     Total Bilirubin 10/07/2020 0 52     eGFR 10/07/2020 51     Cholesterol 10/07/2020 141     Triglycerides 10/07/2020 112     HDL, Direct 10/07/2020 41     LDL Calculated 10/07/2020 78     WBC 10/07/2020 3 97*    RBC 10/07/2020 3 81     Hemoglobin 10/07/2020 11 2*    Hematocrit 10/07/2020 34 5*    MCV 10/07/2020 91     MCH 10/07/2020 29 4     MCHC 10/07/2020 32 5     RDW 10/07/2020 14 2     MPV 10/07/2020 9 4     Platelets 89/46/6535 134*    nRBC 10/07/2020 0     Neutrophils Relative 10/07/2020 60     Immat GRANS % 10/07/2020 0     Lymphocytes Relative 10/07/2020 28     Monocytes Relative 10/07/2020 10     Eosinophils Relative 10/07/2020 2     Basophils Relative 10/07/2020 0     Neutrophils Absolute 10/07/2020 2 35     Immature Grans Absolute 10/07/2020 0 01     Lymphocytes Absolute 10/07/2020 1 10     Monocytes Absolute 10/07/2020 0 41     Eosinophils Absolute 10/07/2020 0 09     Basophils Absolute 10/07/2020 0 01          Radiology Results:   No results found

## 2021-05-10 ENCOUNTER — TELEPHONE (OUTPATIENT)
Dept: GASTROENTEROLOGY | Facility: CLINIC | Age: 81
End: 2021-05-10

## 2021-05-10 DIAGNOSIS — K21.9 GASTROESOPHAGEAL REFLUX DISEASE WITHOUT ESOPHAGITIS: Primary | ICD-10-CM

## 2021-05-10 RX ORDER — FAMOTIDINE 20 MG/1
20 TABLET, FILM COATED ORAL
Qty: 90 TABLET | Refills: 2 | Status: SHIPPED | OUTPATIENT
Start: 2021-05-10 | End: 2022-02-01

## 2021-05-10 RX ORDER — PANTOPRAZOLE SODIUM 40 MG/1
40 TABLET, DELAYED RELEASE ORAL DAILY
Qty: 90 TABLET | Refills: 2 | Status: SHIPPED | OUTPATIENT
Start: 2021-05-10 | End: 2021-06-22 | Stop reason: SDUPTHER

## 2021-05-10 NOTE — TELEPHONE ENCOUNTER
Pt states her Famotidine and Pantoprazole were not sent in to the pharmacy  It was attempted by Karen Negron but did not get sent

## 2021-05-10 NOTE — TELEPHONE ENCOUNTER
RX resent  Do not see confirmation by pharmacy last time RX failed to be received, hopefully no problems with transmission of RX, if so will need to call RX to pharmacy   Thank you

## 2021-05-14 ENCOUNTER — OFFICE VISIT (OUTPATIENT)
Dept: FAMILY MEDICINE CLINIC | Facility: CLINIC | Age: 81
End: 2021-05-14
Payer: COMMERCIAL

## 2021-05-14 VITALS
BODY MASS INDEX: 35.12 KG/M2 | TEMPERATURE: 98.6 F | OXYGEN SATURATION: 98 % | SYSTOLIC BLOOD PRESSURE: 166 MMHG | HEIGHT: 65 IN | HEART RATE: 73 BPM | WEIGHT: 210.8 LBS | RESPIRATION RATE: 16 BRPM | DIASTOLIC BLOOD PRESSURE: 90 MMHG

## 2021-05-14 DIAGNOSIS — B02.9 HERPES ZOSTER WITHOUT COMPLICATION: Primary | ICD-10-CM

## 2021-05-14 DIAGNOSIS — J02.9 SORE THROAT: ICD-10-CM

## 2021-05-14 PROCEDURE — 99213 OFFICE O/P EST LOW 20 MIN: CPT | Performed by: FAMILY MEDICINE

## 2021-05-14 RX ORDER — VALACYCLOVIR HYDROCHLORIDE 500 MG/1
500 TABLET, FILM COATED ORAL 3 TIMES DAILY
Qty: 21 TABLET | Refills: 0 | Status: SHIPPED | OUTPATIENT
Start: 2021-05-14 | End: 2021-05-21

## 2021-05-14 NOTE — PROGRESS NOTES
Assessment/Plan:    1  Herpes zoster without complication  -     hydrocortisone 2 5 % cream; Apply topically 2 (two) times a day as needed for rash  -     valACYclovir (VALTREX) 500 mg tablet; Take 1 tablet (500 mg total) by mouth 3 (three) times a day for 7 days    2  Sore throat        Will have patient apply hydrocortisone and take valtrex  Could be spider bite vs  Shingles  Advised side effects and precautions  Patient should monitor for any worsening symptoms  Hygiene discussed  Return in about 4 days (around 5/18/2021)  Subjective:      Patient ID: Mariah Thomas is a 79 y o  female  Chief Complaint   Patient presents with    Sore Throat     ears hurt, shoulders hurt, sinus pressure       HPI  78 y/o female presents with concerns with sinus pressure, rash, and ear pain  Patient states she had a sore throat yesterday and noticed a rash around her neck  She noticed a sore spot on her head and behind her ear  Patient states she does not know how the rash started  Rash is painful  Denies any drainage from the area  Denies any fevers or chills  No chest pain  Patient is having a work up with cardiology at this time  The following portions of the patient's history were reviewed and updated as appropriate: allergies, current medications, past family history, past medical history, past social history, past surgical history and problem list     Review of Systems   Constitutional: Negative for appetite change and fever  HENT: Positive for ear pain, sinus pressure and sore throat  Negative for congestion and sinus pain  Eyes: Negative for visual disturbance  Respiratory: Negative for shortness of breath  Cardiovascular: Negative for chest pain and leg swelling  Gastrointestinal: Negative for abdominal pain, diarrhea, nausea and vomiting  Skin: Positive for rash  Negative for color change  Neurological: Negative for dizziness, light-headedness and headaches     Psychiatric/Behavioral: Negative for agitation and behavioral problems           Current Outpatient Medications   Medication Sig Dispense Refill    acetaminophen (TYLENOL ARTHRITIS PAIN) 650 mg CR tablet Take by mouth      betamethasone dipropionate (DIPROSONE) 0 05 % cream Apply topically 2 (two) times a day 45 g 2    Calcium Carbonate-Vitamin D 600-200 MG-UNIT CAPS Take by mouth 2 (two) times a day      cholecalciferol (VITAMIN D3) 1,000 units tablet Take 1,000 Units by mouth daily      clobetasol (TEMOVATE) 0 05 % cream       Coenzyme Q10 (COQ-10) 200 MG CAPS Take 2 capsules by mouth daily      diphenhydrAMINE-acetaminophen (TYLENOL PM)  MG TABS Take 1 tablet by mouth daily at bedtime as needed for sleep      Eliquis 5 MG take 1 tablet by mouth twice a day 180 tablet 3    famotidine (PEPCID) 20 mg tablet Take 1 tablet (20 mg total) by mouth daily at bedtime 90 tablet 2    furosemide (LASIX) 20 mg tablet Take 1 tablet (20 mg total) by mouth daily 30 tablet 5    Lactobacillus (ACIDOPHILUS PO) Take by mouth      metoprolol succinate (TOPROL-XL) 25 mg 24 hr tablet take 1 tablet by mouth once daily 90 tablet 3    Multiple Vitamins-Minerals (DAILY MULTIVITAMIN PO) Take 1 tablet by mouth daily      nitroglycerin (NITROSTAT) 0 4 mg SL tablet Place 1 tablet (0 4 mg total) under the tongue every 5 (five) minutes as needed for chest pain If no relief after first dose call prescriber or 911 25 tablet 0    pantoprazole (PROTONIX) 40 mg tablet Take 1 tablet (40 mg total) by mouth daily Take 1/2 hour prior to breakfast daily 90 tablet 2    pravastatin (PRAVACHOL) 10 mg tablet take 1 tablet by mouth daily 3 TIMES A WEEK ON MONDAYS WEDNESDAYS AND FRIDAYS 60 tablet 3    ramipril (ALTACE) 5 mg capsule take 1 capsule by mouth twice a day 180 capsule 3    sotalol (BETAPACE) 80 mg tablet take 1 tablet by mouth every 12 hours 180 tablet 3    hydrocortisone 2 5 % cream Apply topically 2 (two) times a day as needed for rash 30 g 0    hydroxychloroquine (PLAQUENIL) 200 mg tablet Take 1 tablet (200 mg total) by mouth daily with breakfast Administer with food or milk  30 tablet 2    polyethylene glycol-electrolytes (NULYTELY) 4000 mL solution take by mouth as directed  FOR COLONOSCOPY  0    valACYclovir (VALTREX) 500 mg tablet Take 1 tablet (500 mg total) by mouth 3 (three) times a day for 7 days 21 tablet 0     No current facility-administered medications for this visit  Objective:    /90 (BP Location: Right arm, Patient Position: Sitting, Cuff Size: Large)   Pulse 73   Temp 98 6 °F (37 °C) (Temporal)   Resp 16   Ht 5' 5" (1 651 m)   Wt 95 6 kg (210 lb 12 8 oz)   SpO2 98%   BMI 35 08 kg/m²        Physical Exam  HENT:      Head: Normocephalic and atraumatic  Right Ear: Tympanic membrane and external ear normal       Left Ear: Tympanic membrane and external ear normal       Nose: Nose normal       Mouth/Throat:      Pharynx: Oropharynx is clear  No oropharyngeal exudate  Eyes:      General:         Right eye: No discharge  Left eye: No discharge  Conjunctiva/sclera: Conjunctivae normal    Neck:      Musculoskeletal: Normal range of motion  Cardiovascular:      Rate and Rhythm: Normal rate and regular rhythm  Pulses: Normal pulses  Pulmonary:      Effort: Pulmonary effort is normal       Breath sounds: Normal breath sounds  Lymphadenopathy:      Cervical: No cervical adenopathy  Skin:     Findings: Rash present  Comments: Linear rash noted on neck and along dermatome behind ear  Neurological:      Mental Status: She is alert  Mental status is at baseline     Psychiatric:         Mood and Affect: Mood normal          Behavior: Behavior normal                 Alhaji Wayne MD

## 2021-05-18 ENCOUNTER — OFFICE VISIT (OUTPATIENT)
Dept: PODIATRY | Facility: CLINIC | Age: 81
End: 2021-05-18
Payer: COMMERCIAL

## 2021-05-18 ENCOUNTER — OFFICE VISIT (OUTPATIENT)
Dept: FAMILY MEDICINE CLINIC | Facility: CLINIC | Age: 81
End: 2021-05-18
Payer: COMMERCIAL

## 2021-05-18 VITALS
HEART RATE: 88 BPM | BODY MASS INDEX: 34.99 KG/M2 | HEIGHT: 65 IN | RESPIRATION RATE: 16 BRPM | WEIGHT: 210 LBS | SYSTOLIC BLOOD PRESSURE: 128 MMHG | OXYGEN SATURATION: 98 % | DIASTOLIC BLOOD PRESSURE: 88 MMHG | TEMPERATURE: 98.3 F

## 2021-05-18 VITALS
HEIGHT: 65 IN | HEART RATE: 88 BPM | RESPIRATION RATE: 17 BRPM | BODY MASS INDEX: 34.99 KG/M2 | SYSTOLIC BLOOD PRESSURE: 128 MMHG | WEIGHT: 210 LBS | DIASTOLIC BLOOD PRESSURE: 88 MMHG

## 2021-05-18 DIAGNOSIS — M79.671 PAIN IN BOTH FEET: ICD-10-CM

## 2021-05-18 DIAGNOSIS — B02.9 HERPES ZOSTER WITHOUT COMPLICATION: Primary | ICD-10-CM

## 2021-05-18 DIAGNOSIS — L84 CORNS: ICD-10-CM

## 2021-05-18 DIAGNOSIS — I70.209 PERIPHERAL ARTERIOSCLEROSIS (HCC): Primary | ICD-10-CM

## 2021-05-18 DIAGNOSIS — M79.672 PAIN IN BOTH FEET: ICD-10-CM

## 2021-05-18 PROCEDURE — 1160F RVW MEDS BY RX/DR IN RCRD: CPT | Performed by: FAMILY MEDICINE

## 2021-05-18 PROCEDURE — 11056 PARNG/CUTG B9 HYPRKR LES 2-4: CPT | Performed by: PODIATRIST

## 2021-05-18 PROCEDURE — 99213 OFFICE O/P EST LOW 20 MIN: CPT | Performed by: FAMILY MEDICINE

## 2021-05-18 PROCEDURE — 1036F TOBACCO NON-USER: CPT | Performed by: FAMILY MEDICINE

## 2021-05-18 NOTE — PROGRESS NOTES
Assessment/Plan:  Pain   Radiculopathy   Callus   Mycotic toenail   Peripheral artery disease         Plan   Foot exam performed   All nails debrided   Calluses debrided   Procedures performed without pain or complication       Subjective   Patient has pain in her feet and toes with ambulation   No history of trauma        Discussion/Summary   The patient was counseled regarding instructions for management,-- patient and family education,-- risks and benefits of treatment options     Patient is able to Self-Care     Possible side effects of new medications were reviewed with the patient/guardian today  The treatment plan was reviewed with the patient/guardian  The patient/guardian understands and agrees with the treatment plan      Chief Complaint   Patient has foot pain   She has pain when she wears shoes   No history of trauma          History of Present Illness   HPI:  Patient complains of pain in feet with ambulation  She has pain around the toes  She is also concerned with pain in her right heel  This is been ongoing for several weeks  She has no history of trauma   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   Trellis Galas was unable tolerate gabapentin     Review of Systems           Cardiac: chest pain,-- rhythm problems,-- AM fatigue-- and-- witnessed apnea episodes       Skin: No complaints of nonhealing sores or skin rash       Genitourinary: loss of bladder control      Psychological: No complaints of feeling depressed, anxiety, panic attacks, or difficulty concentrating       General: trouble sleeping-- and-- lack of energy/fatigue       Respiratory: shortness of breath       HEENT: snoring      Gastrointestinal: heartburn      Hematologic: anemia      Neurological: daytime sleepiness      Musculoskeletal: arthritis-- and-- back pain      Active Problems   1  Allergic rhinitis (477 9) (J30 9)   2  Anxiety disorder (300 00) (F41 9)   3  Arthropathy (716 90) (M12 9)   4  Atherosclerosis of arteries of extremities (440 20) (I70 209)   5  Atrial fibrillation (427 31) (I48 91)   6  Backache (724 5) (M54 9)   7  Callus (700) (L84)   8  Cervicalgia (723 1) (M54 2)   9  Depression (311) (F32 9)   10  Difficulty in walking (719 7) (R26 2)   11  Encounter for screening for malignant neoplasm of colon (V76 51) (Z12 11)   12  Esophagitis, reflux (530 11) (K21 0)   13  Essential hypertension (401 9) (I10)   14  Foot pain, bilateral (729 5) (M79 671,M79 672)   15  Herpes zoster (053 9) (B02 9)   16  Hospital discharge follow-up (V67 59) (Z09)   17  Hyperlipidemia (272 4) (E78 5)   18  Impaired fasting glucose (790 21) (R73 01)   19  Internal Hemorrhoids (455 0)   20  Leg swelling (729 81) (N89 42)   21  Lichen planus (423 4) (L43 9)   22  Limb pain (729 5) (M79 609)   23  Lumbar radiculopathy (724 4) (M54 16)   24  Multiple joint pain (719 49) (M25 50)   25  Need for influenza vaccination (V04 81) (Z23)   26  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)   27  Onychomycosis (110 1) (B35 1)   28  MIGUEL (obstructive sleep apnea) (327 23) (G47 33)   29  Other abnormal finding of urine (791 9) (R82 99)   30  Pes planus, congenital (754 61) (Q66 50)   31  Plantar fascial fibromatosis (728 71) (M72 2)   32  Rectal/anal hemorrhage (569 3) (K62 5)   33  Screening for malignant neoplasm of cervix (V76 2) (Z12 4)   34  Shoulder joint pain, unspecified laterality   35  Thrombocytopenia (287 5) (D69 6)   36  Urticaria (708 9) (L50 9)   37  Vulvovaginitis candida albicans (112 1) (B37 3)     Past Medical History    · History of Acute maxillary sinusitis (461 0) (J01 00)   · History of Acute upper respiratory infection (465 9) (J06 9)   · History of Cellulitis (682 9) (L03 90)   · History of Cough (786 2) (R05)   · History of Dysuria (788 1) (R30 0)   · History of High cholesterol (272 0) (E78 00)   · History of abdominal pain (V13 89) (D16 997)   · History of acute bronchitis (V12 69) (Z87 09)   · History of acute sinusitis (V12 69) (Z87 09)   · History of arthritis (V13 4) (Z87 39)   · History of atrial fibrillation (V12 59) (Z86 79)   · History of cataract (V12 49) (Z86 69)   · History of gastroesophageal reflux (GERD) (V12 79) (Z87 19)   · History of hypertension (V12 59) (Z86 79)   · History of Skin rash (782 1) (R21)   · History of Vulvovaginitis (616 10) (N76 0)     The active problems and past medical history were reviewed and updated today       Surgical History    · History of Cataract Surgery   · History of Colonoscopy (Fiberoptic) Screening   · History of Gallbladder Surgery   · History of Knee Replacement   · History of Pacemaker Placement     The surgical history was reviewed and updated today        Family History   Father    · Family history of Coronary Artery Disease (V17 49)  Sister    · Family history of Diabetes Mellitus (V18 0)   · Family history of High cholesterol  Family History    · Family history of arthritis (V17 7) (Z82 61)   · Family history of hypertension (V17 49) (Z82 49)     The family history was reviewed and updated today        Social History    · Exercise: Walking   · 2 x/week   · Former smoker (V15 82) (Z87 891)   · No alcohol use   ·   The social history was reviewed and updated today       Physical Exam   Left Foot: Appearance: Normal except as noted: excessive pronation-- and-- pes planus  Great toe deformities include a bunion  Tenderness: None except the great toe-- and-- distal first metatarsal     Right Foot: Appearance: Normal except as noted: excessive pronation-- and-- pes planus  Great toe deformities include a bunion  Tenderness: None except the great toe,-- distal first metatarsal,-- medial calcaneous-- and-- insertion of the plantar fascia     Left Ankle: ROM: limited ROM in all planes    Right Ankle: ROM: limited ROM in all planes    Neurological Exam: Light touch was decreased bilaterally   Vibratory sensation was decreased in both first metatarsophalangeal joints     Vascular Exam: performed Dorsalis pedis pulses were diminished bilaterally  Posterior tibial pulses were diminished bilaterally  Elevation Pallor: present bilaterally  Dependence rubor was present bilaterally  Capillary refill time was greater than 3 seconds bilaterally-- and-- Q  9, findings bilateral  Edema: moderate bilaterally     Toenails: All of the toenails were elongated,-- hypertrophied,-- discolored-- and-- Right ptotic     Hyperkeratosis: present on both first toes,-- present on both first sub metatarsals-- and-- Positive xerosis of skin noted     Shoe Gear Evaluation: performed ()   Recommendation(s): SAS style-- and-- OTC inlays

## 2021-05-19 NOTE — PROGRESS NOTES
Assessment/Plan:    1  Herpes zoster without complication    Continue with medication  Allow crusting  Hygiene given contagious  Patient precautions reviewed  Monitor for spreading or worsening  Return if symptoms worsen or fail to improve  Subjective:      Patient ID: Dolly Gong is a 79 y o  female  Chief Complaint   Patient presents with    Follow-up     shingles       HPI  80 y/o presents with follow up for rash  She states she has been taking valtrex  She noticed the rash is burning  Now is crusting over  No open wounds or drainage  No fevers or chills  The following portions of the patient's history were reviewed and updated as appropriate: allergies, current medications, past family history, past medical history, past social history, past surgical history and problem list     Review of Systems   Constitutional: Negative for fever  HENT: Negative for sore throat  Eyes: Negative for visual disturbance  Respiratory: Negative for cough and shortness of breath  Cardiovascular: Negative for chest pain and leg swelling  Gastrointestinal: Negative for abdominal pain, constipation, nausea and vomiting  Musculoskeletal: Negative for back pain  Skin: Positive for rash  Neurological: Negative for dizziness, weakness, light-headedness and headaches  Psychiatric/Behavioral: Negative for agitation           Current Outpatient Medications   Medication Sig Dispense Refill    acetaminophen (TYLENOL ARTHRITIS PAIN) 650 mg CR tablet Take by mouth      betamethasone dipropionate (DIPROSONE) 0 05 % cream Apply topically 2 (two) times a day 45 g 2    Calcium Carbonate-Vitamin D 600-200 MG-UNIT CAPS Take by mouth 2 (two) times a day      cholecalciferol (VITAMIN D3) 1,000 units tablet Take 1,000 Units by mouth daily      clobetasol (TEMOVATE) 0 05 % cream       Coenzyme Q10 (COQ-10) 200 MG CAPS Take 2 capsules by mouth daily      diphenhydrAMINE-acetaminophen (TYLENOL PM)  MG TABS Take 1 tablet by mouth daily at bedtime as needed for sleep      Eliquis 5 MG take 1 tablet by mouth twice a day 180 tablet 3    famotidine (PEPCID) 20 mg tablet Take 1 tablet (20 mg total) by mouth daily at bedtime (Patient taking differently: Take 20 mg by mouth daily at bedtime Taking as needed) 90 tablet 2    furosemide (LASIX) 20 mg tablet Take 1 tablet (20 mg total) by mouth daily 30 tablet 5    hydrocortisone 2 5 % cream Apply topically 2 (two) times a day as needed for rash 30 g 0    Lactobacillus (ACIDOPHILUS PO) Take by mouth      metoprolol succinate (TOPROL-XL) 25 mg 24 hr tablet take 1 tablet by mouth once daily 90 tablet 3    Multiple Vitamins-Minerals (DAILY MULTIVITAMIN PO) Take 1 tablet by mouth daily      nitroglycerin (NITROSTAT) 0 4 mg SL tablet Place 1 tablet (0 4 mg total) under the tongue every 5 (five) minutes as needed for chest pain If no relief after first dose call prescriber or 911 25 tablet 0    pantoprazole (PROTONIX) 40 mg tablet Take 1 tablet (40 mg total) by mouth daily Take 1/2 hour prior to breakfast daily 90 tablet 2    pravastatin (PRAVACHOL) 10 mg tablet take 1 tablet by mouth daily 3 TIMES A WEEK ON MONDAYS WEDNESDAYS AND FRIDAYS 60 tablet 3    ramipril (ALTACE) 5 mg capsule take 1 capsule by mouth twice a day 180 capsule 3    sotalol (BETAPACE) 80 mg tablet take 1 tablet by mouth every 12 hours 180 tablet 3    valACYclovir (VALTREX) 500 mg tablet Take 1 tablet (500 mg total) by mouth 3 (three) times a day for 7 days 21 tablet 0    hydroxychloroquine (PLAQUENIL) 200 mg tablet Take 1 tablet (200 mg total) by mouth daily with breakfast Administer with food or milk  30 tablet 2    polyethylene glycol-electrolytes (NULYTELY) 4000 mL solution take by mouth as directed  FOR COLONOSCOPY  0     No current facility-administered medications for this visit          Objective:    /88 (BP Location: Right arm, Patient Position: Sitting, Cuff Size: Large)   Pulse 88 Temp 98 3 °F (36 8 °C) (Temporal)   Resp 16   Ht 5' 5" (1 651 m)   Wt 95 3 kg (210 lb)   SpO2 98%   BMI 34 95 kg/m²        Physical Exam  HENT:      Head: Normocephalic and atraumatic  Right Ear: Tympanic membrane normal  There is no impacted cerumen  Left Ear: Tympanic membrane normal  There is no impacted cerumen  Nose: No congestion  Mouth/Throat:      Pharynx: Oropharynx is clear  No oropharyngeal exudate  Eyes:      General:         Right eye: No discharge  Left eye: No discharge  Conjunctiva/sclera: Conjunctivae normal    Cardiovascular:      Rate and Rhythm: Normal rate and regular rhythm  Pulses: Normal pulses  Heart sounds: Normal heart sounds  No murmur  Pulmonary:      Effort: Pulmonary effort is normal       Breath sounds: Normal breath sounds  Musculoskeletal:      Right lower leg: No edema  Skin:     Findings: Rash present  Comments: Dermatomal rash on the neck noted  Some vesicles with crusting    Neurological:      Mental Status: She is alert  Mental status is at baseline     Psychiatric:         Mood and Affect: Mood normal          Behavior: Behavior normal                 Pablo Hood MD

## 2021-06-21 ENCOUNTER — TELEPHONE (OUTPATIENT)
Dept: CARDIOLOGY CLINIC | Facility: CLINIC | Age: 81
End: 2021-06-21

## 2021-07-06 NOTE — TELEPHONE ENCOUNTER
Called pt who states she would like to talk to Dr Clint Henson when she comes back in  She has had shingles and has not been well for over a month and will be making an apt to see Dr Clint Henson in the next week or so to discuss some questions prior to rescheduling the tests

## 2021-07-08 ENCOUNTER — REMOTE DEVICE CLINIC VISIT (OUTPATIENT)
Dept: CARDIOLOGY CLINIC | Facility: CLINIC | Age: 81
End: 2021-07-08
Payer: COMMERCIAL

## 2021-07-08 DIAGNOSIS — Z95.0 PRESENCE OF PERMANENT CARDIAC PACEMAKER: Primary | ICD-10-CM

## 2021-07-08 PROCEDURE — 93294 REM INTERROG EVL PM/LDLS PM: CPT | Performed by: INTERNAL MEDICINE

## 2021-07-08 PROCEDURE — 93296 REM INTERROG EVL PM/IDS: CPT | Performed by: INTERNAL MEDICINE

## 2021-07-08 NOTE — PROGRESS NOTES
Results for orders placed or performed in visit on 07/08/21   Cardiac EP device report    Narrative    MDT DUAL CHAMBER PM/ACTIVE SYSTEM IS MRI CONDITIONAL  CARELINK TRANSMISSION: BATTERY VOLTAGE ADEQUATE  (3 YRS) AP 99%  <1%  ALL AVAILABLE LEAD PARAMETERS WITHIN NORMAL LIMITS  186 AT/AF EPISODES DETECTED  LONGEST 38 MIN  5 VHR EPISODES DETECTED 9 BEATS @ 340ms  EF 55% (2019)  PATIENT IS ON ELIQUIS, SOTALOL AND METOPROLOL SUCC  NORMAL DEVICE FUNCTION  ----VENEGAS

## 2021-07-20 ENCOUNTER — OFFICE VISIT (OUTPATIENT)
Dept: PODIATRY | Facility: CLINIC | Age: 81
End: 2021-07-20
Payer: COMMERCIAL

## 2021-07-20 VITALS
SYSTOLIC BLOOD PRESSURE: 135 MMHG | WEIGHT: 210 LBS | DIASTOLIC BLOOD PRESSURE: 88 MMHG | HEIGHT: 65 IN | BODY MASS INDEX: 34.99 KG/M2 | RESPIRATION RATE: 17 BRPM

## 2021-07-20 DIAGNOSIS — M79.672 PAIN IN BOTH FEET: ICD-10-CM

## 2021-07-20 DIAGNOSIS — M79.671 PAIN IN BOTH FEET: ICD-10-CM

## 2021-07-20 DIAGNOSIS — I70.209 PERIPHERAL ARTERIOSCLEROSIS (HCC): Primary | ICD-10-CM

## 2021-07-20 DIAGNOSIS — L84 CORNS: ICD-10-CM

## 2021-07-20 PROCEDURE — 11056 PARNG/CUTG B9 HYPRKR LES 2-4: CPT | Performed by: PODIATRIST

## 2021-07-20 NOTE — PROGRESS NOTES
Assessment/Plan:  Pain   Radiculopathy   Callus   Mycotic toenail   Peripheral artery disease         Plan   Foot exam performed   All nails debrided   Calluses debrided   Procedures performed without pain or complication       Subjective   Patient has pain in her feet and toes with ambulation   No history of trauma        Discussion/Summary   The patient was counseled regarding instructions for management,-- patient and family education,-- risks and benefits of treatment options     Patient is able to Self-Care     Possible side effects of new medications were reviewed with the patient/guardian today  The treatment plan was reviewed with the patient/guardian  The patient/guardian understands and agrees with the treatment plan      Chief Complaint   Patient has foot pain   She has pain when she wears shoes   No history of trauma          History of Present Illness   HPI:  Patient complains of pain in feet with ambulation  She has pain around the toes  She is also concerned with pain in her right heel  This is been ongoing for several weeks  She has no history of trauma   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   Genice Mao was unable tolerate gabapentin     Review of Systems           Cardiac: chest pain,-- rhythm problems,-- AM fatigue-- and-- witnessed apnea episodes       Skin: No complaints of nonhealing sores or skin rash       Genitourinary: loss of bladder control      Psychological: No complaints of feeling depressed, anxiety, panic attacks, or difficulty concentrating       General: trouble sleeping-- and-- lack of energy/fatigue       Respiratory: shortness of breath       HEENT: snoring      Gastrointestinal: heartburn      Hematologic: anemia      Neurological: daytime sleepiness      Musculoskeletal: arthritis-- and-- back pain      Active Problems   1  Allergic rhinitis (477 9) (J30 9)   2  Anxiety disorder (300 00) (F41 9)   3  Arthropathy (716 90) (M12 9)   4  Atherosclerosis of arteries of extremities (440 20) (I70 209)   5  Atrial fibrillation (427 31) (I48 91)   6  Backache (724 5) (M54 9)   7  Callus (700) (L84)   8  Cervicalgia (723 1) (M54 2)   9  Depression (311) (F32 9)   10  Difficulty in walking (719 7) (R26 2)   11  Encounter for screening for malignant neoplasm of colon (V76 51) (Z12 11)   12  Esophagitis, reflux (530 11) (K21 0)   13  Essential hypertension (401 9) (I10)   14  Foot pain, bilateral (729 5) (M79 671,M79 672)   15  Herpes zoster (053 9) (B02 9)   16  Hospital discharge follow-up (V67 59) (Z09)   17  Hyperlipidemia (272 4) (E78 5)   18  Impaired fasting glucose (790 21) (R73 01)   19  Internal Hemorrhoids (455 0)   20  Leg swelling (729 81) (C20 89)   21  Lichen planus (301 5) (L43 9)   22  Limb pain (729 5) (M79 609)   23  Lumbar radiculopathy (724 4) (M54 16)   24  Multiple joint pain (719 49) (M25 50)   25  Need for influenza vaccination (V04 81) (Z23)   26  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)   27  Onychomycosis (110 1) (B35 1)   28  MIGUEL (obstructive sleep apnea) (327 23) (G47 33)   29  Other abnormal finding of urine (791 9) (R82 99)   30  Pes planus, congenital (754 61) (Q66 50)   31  Plantar fascial fibromatosis (728 71) (M72 2)   32  Rectal/anal hemorrhage (569 3) (K62 5)   33  Screening for malignant neoplasm of cervix (V76 2) (Z12 4)   34  Shoulder joint pain, unspecified laterality   35  Thrombocytopenia (287 5) (D69 6)   36  Urticaria (708 9) (L50 9)   37  Vulvovaginitis candida albicans (112 1) (B37 3)     Past Medical History    · History of Acute maxillary sinusitis (461 0) (J01 00)   · History of Acute upper respiratory infection (465 9) (J06 9)   · History of Cellulitis (682 9) (L03 90)   · History of Cough (786 2) (R05)   · History of Dysuria (788 1) (R30 0)   · History of High cholesterol (272 0) (E78 00)   · History of abdominal pain (V13 89) (B10 008)   · History of acute bronchitis (V12 69) (Z87 09)   · History of acute sinusitis (V12 69) (Z87 09)   · History of arthritis (V13 4) (Z87 39)   · History of atrial fibrillation (V12 59) (Z86 79)   · History of cataract (V12 49) (Z86 69)   · History of gastroesophageal reflux (GERD) (V12 79) (Z87 19)   · History of hypertension (V12 59) (Z86 79)   · History of Skin rash (782 1) (R21)   · History of Vulvovaginitis (616 10) (N76 0)     The active problems and past medical history were reviewed and updated today       Surgical History    · History of Cataract Surgery   · History of Colonoscopy (Fiberoptic) Screening   · History of Gallbladder Surgery   · History of Knee Replacement   · History of Pacemaker Placement     The surgical history was reviewed and updated today        Family History   Father    · Family history of Coronary Artery Disease (V17 49)  Sister    · Family history of Diabetes Mellitus (V18 0)   · Family history of High cholesterol  Family History    · Family history of arthritis (V17 7) (Z82 61)   · Family history of hypertension (V17 49) (Z82 49)     The family history was reviewed and updated today        Social History    · Exercise: Walking   · 2 x/week   · Former smoker (V15 82) (Z87 891)   · No alcohol use   ·   The social history was reviewed and updated today       Physical Exam   Left Foot: Appearance: Normal except as noted: excessive pronation-- and-- pes planus  Great toe deformities include a bunion  Tenderness: None except the great toe-- and-- distal first metatarsal     Right Foot: Appearance: Normal except as noted: excessive pronation-- and-- pes planus  Great toe deformities include a bunion  Tenderness: None except the great toe,-- distal first metatarsal,-- medial calcaneous-- and-- insertion of the plantar fascia     Left Ankle: ROM: limited ROM in all planes    Right Ankle: ROM: limited ROM in all planes    Neurological Exam: Light touch was decreased bilaterally   Vibratory sensation was decreased in both first metatarsophalangeal joints     Vascular Exam: performed Dorsalis pedis pulses were diminished bilaterally  Posterior tibial pulses were diminished bilaterally  Elevation Pallor: present bilaterally  Dependence rubor was present bilaterally  Capillary refill time was greater than 3 seconds bilaterally-- and-- Q  9, findings bilateral  Edema: moderate bilaterally     Toenails: All of the toenails were elongated,-- hypertrophied,-- discolored-- and-- Right ptotic     Hyperkeratosis: present on both first toes,-- present on both first sub metatarsals-- and-- Positive xerosis of skin noted     Shoe Gear Evaluation: performed ()   Recommendation(s): SAS style-- and-- OTC inlays

## 2021-07-21 ENCOUNTER — TELEPHONE (OUTPATIENT)
Dept: CARDIOLOGY CLINIC | Facility: CLINIC | Age: 81
End: 2021-07-21

## 2021-07-21 ENCOUNTER — TELEPHONE (OUTPATIENT)
Dept: VASCULAR SURGERY | Facility: CLINIC | Age: 81
End: 2021-07-21

## 2021-07-21 DIAGNOSIS — I70.209 PERIPHERAL ARTERIOSCLEROSIS (HCC): ICD-10-CM

## 2021-07-21 DIAGNOSIS — Z82.49 FAMILY HISTORY OF ABDOMINAL AORTIC ANEURYSM: ICD-10-CM

## 2021-07-21 DIAGNOSIS — I10 ESSENTIAL HYPERTENSION: ICD-10-CM

## 2021-07-21 DIAGNOSIS — E78.2 MIXED HYPERLIPIDEMIA: ICD-10-CM

## 2021-07-21 DIAGNOSIS — I72.3 ANEURYSM OF ILIAC ARTERY (HCC): Primary | ICD-10-CM

## 2021-07-21 NOTE — TELEPHONE ENCOUNTER
Called pt to see if she was planning to still have echo and nuclear stress test completed  She stated she had shingles and was waiting to recover   She wants to see Dr Darwin Cardoza prior to having the tests done so I scheduled her an apt for 8/11/21 at 2:40pm

## 2021-07-21 NOTE — TELEPHONE ENCOUNTER
Spoke with patient  Patient expressed they would not like to schedule the appointment(s) listed below  Per patient, they will reach out to our office when they are ready to schedule  Provided patient with call back number ([phone] option 3, should they need to reschedule  Provided patient with call back number ([phone] option 3, should they need to reschedule  Patient's appointment(s) are due on or after 8/18/2021  Dopplers  [x] Abdominal Aorta Iliac (AOIL)  [] Carotid (CV)   [] Celiac and/or Mesenteric  [] Endovascular Aortic Repair (EVAR)   [] Hemodialysis Access (HD)   [] Lower Limb Arterial (JENNIFER)  [] Lower Limb Venous Duplex with Reflux (LEVDR)  [] Renal Artery  [] Upper Limb (UEA)    Advanced Imaging   [] CTA abdomen    [] CTA abdomen & pelvis    [] CT abdomen with/ without contrast  [] CT abdomen with contrast  [] CT abdomen without contrast    [] CT abdomen & pelvis with/ without contrast  [] CT abdomen & pelvis with contrast  [] CT abdomen & pelvis without contrast    Office Visit   [] New patient, patient last seen over 3 years ago  [x] Risk factor modification     Patient wants to talk to her heart doctor before making any decisions for testing  She will call the office if she wants to schedule the test and OV

## 2021-08-18 ENCOUNTER — OFFICE VISIT (OUTPATIENT)
Dept: CARDIOLOGY CLINIC | Facility: CLINIC | Age: 81
End: 2021-08-18
Payer: COMMERCIAL

## 2021-08-18 VITALS
HEART RATE: 78 BPM | SYSTOLIC BLOOD PRESSURE: 140 MMHG | OXYGEN SATURATION: 98 % | BODY MASS INDEX: 34.49 KG/M2 | HEIGHT: 65 IN | DIASTOLIC BLOOD PRESSURE: 78 MMHG | TEMPERATURE: 98.7 F | WEIGHT: 207 LBS

## 2021-08-18 DIAGNOSIS — R06.02 SHORTNESS OF BREATH: ICD-10-CM

## 2021-08-18 DIAGNOSIS — I10 ESSENTIAL HYPERTENSION: Primary | ICD-10-CM

## 2021-08-18 DIAGNOSIS — I50.32 CHRONIC DIASTOLIC CONGESTIVE HEART FAILURE (HCC): ICD-10-CM

## 2021-08-18 DIAGNOSIS — I35.1 NONRHEUMATIC AORTIC VALVE INSUFFICIENCY: ICD-10-CM

## 2021-08-18 DIAGNOSIS — Z95.0 PRESENCE OF PERMANENT CARDIAC PACEMAKER: ICD-10-CM

## 2021-08-18 DIAGNOSIS — I48.0 PAROXYSMAL ATRIAL FIBRILLATION (HCC): ICD-10-CM

## 2021-08-18 PROCEDURE — 3077F SYST BP >= 140 MM HG: CPT | Performed by: INTERNAL MEDICINE

## 2021-08-18 PROCEDURE — 1036F TOBACCO NON-USER: CPT | Performed by: INTERNAL MEDICINE

## 2021-08-18 PROCEDURE — 1160F RVW MEDS BY RX/DR IN RCRD: CPT | Performed by: INTERNAL MEDICINE

## 2021-08-18 PROCEDURE — 3078F DIAST BP <80 MM HG: CPT | Performed by: INTERNAL MEDICINE

## 2021-08-18 PROCEDURE — 99214 OFFICE O/P EST MOD 30 MIN: CPT | Performed by: INTERNAL MEDICINE

## 2021-08-18 RX ORDER — CALCIUM CARBONATE 300MG(750)
TABLET,CHEWABLE ORAL DAILY
COMMUNITY

## 2021-08-18 RX ORDER — AMOXICILLIN AND CLAVULANATE POTASSIUM 875; 125 MG/1; MG/1
TABLET, FILM COATED ORAL
COMMUNITY
Start: 2021-08-13 | End: 2021-11-24

## 2021-08-18 RX ORDER — CETIRIZINE HYDROCHLORIDE 10 MG/1
10 TABLET ORAL DAILY
COMMUNITY
End: 2022-03-24

## 2021-08-18 NOTE — PROGRESS NOTES
Cardiology   Lakeland Dose, DO, Franck Torres MD, Trixie Garcia MD, Elisa Hall MD, [facility] - [place]  -------------------------------------------------------------------  [facility] ORTHOPEDIC \[facility]\"" and V[facility]  [address], [address] [facility], [address] [place] [address], [address] [facility]  [phone]    Cardiology Follow Up  Sae Rodriguez  1940  [id number]          Assessment/Plan:    1  Chest pain/shortness of breath - patient with atypical pain that has resolved  She does not wish to have stress test but will proceed with echocardiogram as she has some shortness of breath as well  2  Paroxysmal atrial fibrillation (HCC) - currently in sinus  Continue Eliquis and routine pacemaker clinic follow up  - Continue sotalol  QT interval normal   - She has intermittent episodes on pacemaker interrogation  3  Essential hypertension  - BP well controlled on current Rx  4  Mixed hyperlipidemia  - Last LDL was 78  -  Continue pravastatin  - Repeat lipid panel ordered  - discussed diet and increasing intake of fruits and vegetables  5  Chronic diastolic CHF - stable on lasix 20 mg daily   - Repeat echocardiogram given new edema    - Discussed risk factor reduction including refraining from smoking, eating a diet high in fruits and vegetables, maintaining a healthy weight, limiting screen time along with controlling BP and cholesterol  Encouraged to exercise 150 minutes a week at a moderate level such as a fast walk or 75 minutes of high intensity      6  Exertional dyspnea/fatigue - stress test and echocardiogram were done in 2019  No ischemia present and EF was normal        7  Moderate aortic regurgitation - 2D echocardiogram will be repeated         Interval History:     Sae Rodriguez is 79 y o  female here for followup of atrial fibrillation and hypertension  During her last visit, she complained of chest pain radiating to her back along with exertional dyspnea      Stress test and echocardiogram were ordered but she did not have it done because she had a shingles outbreak of her face  Since her last visit, she feels some shortness of breath with exertion  She denies any further chest pain  She has trace LE edema but denies any orthopnea or PND  She has intermittent episodes of palpitations which last for a few minutes  Her pacemaker interrogation reveals episodes of atrial fibrillation  The patient has a history of atrial fibrillation along with sick sinus syndrome and had a pacemaker inserted at [facility] after developing multiple pauses  She denies any previous syncope or near syncope  She is on treatment with sotalol 80 mg b i d  And Eliquis 5 mg b i d  Past Medical History:   Diagnosis Date    Arthritis     Atrial fibrillation ([facility] [address] )     Cardiac disease     Cataract     Gastro-esophageal reflux     GERD    Hypertension     Shingles      Social History     Socioeconomic History    Marital status:       Spouse name: Not on file    Number of children: Not on file    Years of education: Not on file    Highest education level: Not on file   Occupational History    Not on file   Tobacco Use    Smoking status: Former Smoker     Packs/day: 1 00     Years: 25 00     Pack years: 25 00     Types: Cigarettes    Smokeless tobacco: Never Used    Tobacco comment: quit 22 yeears ago   Vaping Use    Vaping Use: Never used   Substance and Sexual Activity    Alcohol use: Yes     Comment: occasional, No alcohol use,per [employer]    Drug use: No    Sexual activity: Not on file   Other Topics Concern    Not on file   Social History Narrative    Exercise: Walking, 2x/week     Social Determinants of Health     Financial Resource Strain:     Difficulty of Paying Living Expenses:    Food Insecurity:     Worried About Running Out of Food in the Last Year:     Edwin of Food in the Last Year:    Transportation Needs:     Lack of Transportation (Medical):      Lack of Transportation (Non-Medical):    Physical Activity:     Days of Exercise per Week:     Minutes of Exercise per Session:    Stress:     Feeling of Stress :    Social Connections:     Frequency of Communication with Friends and Family:     Frequency of Social Gatherings with Friends and Family:     Attends Yazdanism Services:     Active Member of Clubs or Organizations:     Attends Club or Organization Meetings:     Marital Status:    Intimate Partner Violence:     Fear of Current or Ex-Partner:     Emotionally Abused:     Physically Abused:     Sexually Abused:       Family History   Problem Relation Age of Onset    Coronary artery disease Father     Diabetes Sister     Hyperlipidemia Sister     Arthritis Family     Hypertension Family      Past Surgical History:   Procedure Laterality Date    CARDIAC PACEMAKER PLACEMENT Left 2014    CATARACT EXTRACTION      CHOLECYSTECTOMY      resolved: 2009    COLONOSCOPY      Fiberoptic, resolved 2015    EYE SURGERY      KNEE SURGERY Left     left kknee replacement in 2009       Current Outpatient Medications:     acetaminophen (TYLENOL ARTHRITIS PAIN) 650 mg CR tablet, Take by mouth, Disp: , Rfl:     amoxicillin-clavulanate (AUGMENTIN) 875-125 mg per tablet, take 1 tablet by mouth every 12 hours for 10 days, Disp: , Rfl:     betamethasone dipropionate (DIPROSONE) 0 05 % cream, Apply topically 2 (two) times a day, Disp: 45 g, Rfl: 2    Calcium Carbonate-Vitamin D 600-200 MG-UNIT CAPS, Take by mouth 2 (two) times a day, Disp: , Rfl:     cholecalciferol (VITAMIN D3) 1,000 units tablet, Take 1,000 Units by mouth daily, Disp: , Rfl:     clobetasol (TEMOVATE) 0 05 % cream, , Disp: , Rfl:     Coenzyme Q10 (COQ-10) 200 MG CAPS, Take 2 capsules by mouth daily, Disp: , Rfl:     diphenhydrAMINE-acetaminophen (TYLENOL PM)  MG TABS, Take 1 tablet by mouth daily at bedtime as needed for sleep, Disp: , Rfl:     Eliquis 5 MG, take 1 tablet by mouth twice a day, Disp: 180 tablet, Rfl: 3    famotidine (PEPCID) 20 mg tablet, Take 1 tablet (20 mg total) by mouth daily at bedtime (Patient taking differently: Take 20 mg by mouth daily at bedtime Taking as needed), Disp: 90 tablet, Rfl: 2    furosemide (LASIX) 20 mg tablet, Take 1 tablet (20 mg total) by mouth daily, Disp: 30 tablet, Rfl: 5    hydrocortisone 2 5 % cream, Apply topically 2 (two) times a day as needed for rash, Disp: 30 g, Rfl: 0    hydroxychloroquine (PLAQUENIL) 200 mg tablet, Take 1 tablet (200 mg total) by mouth daily with breakfast Administer with food or milk , Disp: 30 tablet, Rfl: 2    Lactobacillus (ACIDOPHILUS PO), Take by mouth, Disp: , Rfl:     metoprolol succinate (TOPROL-XL) 25 mg 24 hr tablet, take 1 tablet by mouth once daily, Disp: 90 tablet, Rfl: 3    Multiple Vitamins-Minerals (DAILY MULTIVITAMIN PO), Take 1 tablet by mouth daily, Disp: , Rfl:     nitroglycerin (NITROSTAT) 0 4 mg SL tablet, Place 1 tablet (0 4 mg total) under the tongue every 5 (five) minutes as needed for chest pain If no relief after first dose call prescriber or 911, Disp: 25 tablet, Rfl: 0    pantoprazole (PROTONIX) 40 mg tablet, Take 1 tablet (40 mg total) by mouth daily Take 1/2 hour prior to breakfast daily, Disp: 90 tablet, Rfl: 2    polyethylene glycol-electrolytes (NULYTELY) 4000 mL solution, take by mouth as directed  FOR COLONOSCOPY, Disp: , Rfl: 0    pravastatin (PRAVACHOL) 10 mg tablet, take 1 tablet by mouth daily 3 TIMES A WEEK ON MONDAYS WEDNESDAYS AND FRIDAYS, Disp: 60 tablet, Rfl: 3    ramipril (ALTACE) 5 mg capsule, take 1 capsule by mouth twice a day, Disp: 180 capsule, Rfl: 3    sotalol (BETAPACE) 80 mg tablet, take 1 tablet by mouth every 12 hours, Disp: 180 tablet, Rfl: 3    valACYclovir (VALTREX) 500 mg tablet, Take 1 tablet (500 mg total) by mouth 3 (three) times a day for 7 days, Disp: 21 tablet, Rfl: 0        Review of Systems:  Review of Systems   Respiratory: Positive for shortness of breath  Cardiovascular: Positive for palpitations  Negative for chest pain and leg swelling  Musculoskeletal: Positive for arthralgias and myalgias  All other systems reviewed and are negative  Physical Exam:  Vitals:  Vitals:    08/18/21 1115   BP: 140/78   BP Location: Left arm   Patient Position: Sitting   Cuff Size: Large   Pulse: 78   Temp: 98 7 °F (37 1 °C)   SpO2: 98%   Weight: 93 9 kg (207 lb)   Height: 5' 5" (1 651 m)     Physical Exam   Constitutional: She appears healthy  No distress  Eyes: Pupils are equal, round, and reactive to light  Conjunctivae are normal    Neck: No JVD present  Cardiovascular: Normal rate, regular rhythm and normal heart sounds  Exam reveals no gallop and no friction rub  No murmur heard  Pulmonary/Chest: Effort normal and breath sounds normal  She has no wheezes  She has no rales  Musculoskeletal:         General: Edema present  No tenderness or deformity  Cervical back: Normal range of motion and neck supple  Neurological: She is alert and oriented to person, place, and time  Skin: Skin is warm and dry  Cardiographics:  EKG: Personally reviewed atrial paced rhythm with rate 75 bpm  Last known EF: 55-60%    This note was completed in part utilizing [employer] Fluency Direct Software  Grammatical errors, random word insertions, spelling mistakes, and incomplete sentences can be an occasional consequence of this system secondary to software limitations, ambient noise, and hardware issues  If you have any questions or concerns about the content, text, or information contained within the body of this dictation, please contact the provider for clarification

## 2021-08-19 PROCEDURE — 93000 ELECTROCARDIOGRAM COMPLETE: CPT | Performed by: INTERNAL MEDICINE

## 2021-09-16 ENCOUNTER — OFFICE VISIT (OUTPATIENT)
Dept: SLEEP CENTER | Facility: CLINIC | Age: 81
End: 2021-09-16
Payer: COMMERCIAL

## 2021-09-16 VITALS
WEIGHT: 193 LBS | BODY MASS INDEX: 32.15 KG/M2 | DIASTOLIC BLOOD PRESSURE: 82 MMHG | TEMPERATURE: 96.7 F | HEIGHT: 65 IN | SYSTOLIC BLOOD PRESSURE: 136 MMHG

## 2021-09-16 DIAGNOSIS — G47.34 SLEEP RELATED HYPOXIA: ICD-10-CM

## 2021-09-16 DIAGNOSIS — G47.33 OSA (OBSTRUCTIVE SLEEP APNEA): Primary | ICD-10-CM

## 2021-09-16 DIAGNOSIS — I48.0 PAROXYSMAL ATRIAL FIBRILLATION (HCC): ICD-10-CM

## 2021-09-16 DIAGNOSIS — R06.02 SHORTNESS OF BREATH: ICD-10-CM

## 2021-09-16 DIAGNOSIS — G47.09 OTHER INSOMNIA: ICD-10-CM

## 2021-09-16 DIAGNOSIS — I50.32 CHRONIC DIASTOLIC CONGESTIVE HEART FAILURE (HCC): ICD-10-CM

## 2021-09-16 DIAGNOSIS — E66.9 OBESITY (BMI 30-39.9): ICD-10-CM

## 2021-09-16 DIAGNOSIS — I10 ESSENTIAL HYPERTENSION: ICD-10-CM

## 2021-09-16 PROCEDURE — 1160F RVW MEDS BY RX/DR IN RCRD: CPT | Performed by: INTERNAL MEDICINE

## 2021-09-16 PROCEDURE — 1036F TOBACCO NON-USER: CPT | Performed by: INTERNAL MEDICINE

## 2021-09-16 PROCEDURE — 99214 OFFICE O/P EST MOD 30 MIN: CPT | Performed by: INTERNAL MEDICINE

## 2021-09-16 NOTE — PATIENT INSTRUCTIONS
Continuous Positive Airway Pressure (CPAP) therapy was prescribed to you as a medical necessity and there are risks of discontinuing  use of the device, some of which may be long term  Symptoms you experienced before using CPAP may return such as snoring, apneas, excessive daytime sleepiness, hypertension, cardiac arrhythmias, risk of stroke, congestive heart-failure, exacerbation of COPD and potential respiratory failure  Ultimately, it is a personal decision for you to make if you continue use of an affected device or discontinue until a replacement is provided  Unfortunately, [employer] has not yet provided us with information about available devices  You can visit their website at www  [employer]/scr-update to register your device or www  [employer]  expertinquiry  com to learn more about how Kellee to replace your device  Another option would be to check with your medical equipment provider to determine if you are eligible for a new machine through your insurance, if not you can pay out of pocket for a new machine  According to Pablo Pineda, an in line bacterial filter is not recommended for use with CPAP/BiPAP  If you wish to get a replacement machine, we are able to provide you with a script  P;ease include your mask type  Nursing Support:  When: Monday through Friday 7A-5PM except holidays  Where: Our direct line is [phone]  If you are having a true emergency please call 911  In the event that the line is busy or it is after hours please leave a voice message and we will return your call  Please speak clearly, leaving your full name, birth date, best number to reach you and the reason for your call  Medication refills: We will need the name of the medication, the dosage, the ordering provider, whether you get a 30 or 90 day refill, and the pharmacy name and address  Medications will be ordered by the provider only  Nurses cannot call in prescriptions    Please allow 7 days for medication refills  Physician requested updates: If your provider requested that you call with an update after starting medication, please be ready to provide us the medication and dosage, what time you take your medication, the time you attempt to fall asleep, time you fall asleep, when you wake up, and what time you get out of bed  Sleep Study Results: We will contact you with sleep study results and/or next steps after the physician has reviewed your testing

## 2021-09-16 NOTE — PROGRESS NOTES
Review of Systems      Genitourinary none   Cardiology ankle/leg swelling   Gastrointestinal frequent heartburn/acid reflux   Neurology numbness/tingling of an extremity and balance problems   Constitutional none   Integumentary none   Psychiatry none   Musculoskeletal joint pain and sciatica   Pulmonary shortness of breath with activity and frequent cough   ENT throat clearing and ringing in ears   Endocrine frequent urination   Hematological none

## 2021-09-16 NOTE — PROGRESS NOTES
Follow-Up Note - Sleep Center   Yumiko Gaxiola  79 y o  female  MUK:[id number]  OBX:[id number]  DOS:9/16/2021    CC: I saw this patient for follow-up in clinic today for Sleep disordered breathing, Coexisting Sleep and Medical Problems  She is using a dream Station 1 machine that is approximately 3years old  Had a split study in 2019: The diagnostic portion demonstrated: AHI of 60 per hour,  Intermittent snoring of moderate intensity was noted  Minimum oxygen saturation was 53 % and 62 minutes of total sleep time during this portion of the study was spent with saturations less than 90%  During the therapeutic portion of the study, his sleep disordered breathing was partially remediated with nasal CPAP at 15 cm H2O together with supplemental oxygen at 2 liters/minute  The AHI at this setting was 9 6 per hour  PFSH, Problem List, Medications & Allergies were reviewed in EMR  Interval changes: none reported  She  has a past medical history of Arthritis, Atrial fibrillation ([address] ), Cardiac disease, Cataract, Gastro-esophageal reflux, Hypertension, and Shingles  She has a current medication list which includes the following prescription(s): acetaminophen, amoxicillin-clavulanate, betamethasone dipropionate, calcium carbonate-vitamin d, calcium polycarbophil, cetirizine, cholecalciferol, clobetasol, coq-10, diphenhydramine-acetaminophen, eliquis, furosemide, guaifenesin, hydrocortisone, lactobacillus, magnesium, metoprolol succinate, multiple vitamins-minerals, nitroglycerin, pantoprazole, pravastatin, ramipril, sotalol, vitamin mixture, famotidine, hydroxychloroquine, polyethylene glycol-electrolytes, and valacyclovir  PHYSIOLOGICAL DATA REVIEW AND INTERPRETATION:   using PAP > 4 hours/night 100%  Estimated GELA 1 2/hour with pressure of 16 6cm H2O @90th percentile; Compliance: excellent; Sleep disordered breathing:stable & within target range; Patient has been using ozone based or other devices to sanitize the machine  SUBJECTIVE: Regarding use of PAP, Yumiko reports:   · She is experiencing some adverse effects: dry mouth/throat and mask leaks ; has not noticed any fibres or foreign material in air line  · She is benefiting from use: sleeping better   Sleep Routine: Yumiko reports getting 6 hrs sleep of approximately 9 hours in; she has no difficulty initiating or maintaining sleep   She arises spontaneously and feels refreshed  Yumiko denies Excessive Daytime Sleepiness,   She rated herself at Total score: 1 /24 on the [place] Sleepiness Scale  Habits: reports that she has quit smoking  Her smoking use included cigarettes  She has a 25 00 pack-year smoking history  She has never used smokeless tobacco ,  reports current alcohol use ,  reports no history of drug use , Caffeine use: limited , Exercise routine: none   ROS: reviewed & as attached  Significant for almost 20 lb weight reduction related to smaller portions  She reports nasal symptoms of long-standing that preceded her use of CPAP  She reported no respiratory or cardiac symptoms  Jaylyn Rah EXAM: /82   Temp (!) 96 7 °F (35 9 °C)   Ht 5' 5" (1 651 m)   Wt 87 5 kg (193 lb)   BMI 32 12 kg/m²     Patient is well groomed; well appearing  H&N: EOMI; NC/AT:no facial pressure marks, no rashes  Skin/Extrem: col & hydration normal; no edema  Psych: cooperativeand in no distress  Mental state:appears normal   Resp: Respiratory effort is normal  CNS: Alert, orientated, clear & coherent speech  Ambulant with a cane  Physical findings otherwise essentially unchanged from previous  IMPRESSION: Problem List Items & Comorbidities Addressed this Visit    1  MIGUEL (obstructive sleep apnea)  PAP DME Resupply/Reorder   2  Sleep related hypoxia     3  Other insomnia     4  Shortness of breath     5  Chronic diastolic congestive heart failure ([address] )     6  Essential hypertension     7  Paroxysmal atrial fibrillation (HCC)     8  Obesity (BMI 30-39  9) PLAN:  I reviewed results of prior studies and physiologic data with the patient  Notified regarding recall of Cj devices and their recommendation to discontinue use pending resolution of degradation of the foam    Risks of discontinuing PAP vs continuing use while awaiting resolution of the recall were explained and patient indicates understanding  I discussed treatment options with risks and benefits  Procedure for registering machine with Cj provided  & instructed patients who have, to track progress/updates on Lizbeth Cabrera  Patient elected to weigh the options regarding use & may continue using Pap while awaiting remediation  Care of equipment, methods to improve comfort using PAP and importance of compliance with therapy were discussed  Instructed not to use ozone based or other unapproved  cleaning devices  Pressure setting: 15-18 cmH2O  Rx provided to replace  supplies and Care coordinated with DME provider  Multi component Cognitive behavioral therapy for Insomnia undertaken - Sleep Restriction, Stimulus control, Relaxation techniques and Sleep hygiene were discussed  Discussed strategies for weight reduction  Follow-up is advised in 1 year or sooner if needed to monitor progress, compliance and to adjust therapy  Thank you for allowing me to participate in the care of this patient  Sincerely,    Authenticated electronically by Karla Garcia MD on 29/08/19   Board Certified Specialist     Portions of the record may have been created with voice recognition software  Occasional wrong word or "sound a like" substitutions may have occurred due to the inherent limitations of voice recognition software  There may also be notations and random deletions of words or characters from malfunctioning software  Read the chart carefully and recognize, using context, where substitutions/deletions have occurred

## 2021-09-17 ENCOUNTER — TELEPHONE (OUTPATIENT)
Dept: SLEEP CENTER | Facility: CLINIC | Age: 81
End: 2021-09-17

## 2021-09-28 ENCOUNTER — OFFICE VISIT (OUTPATIENT)
Dept: PODIATRY | Facility: CLINIC | Age: 81
End: 2021-09-28
Payer: COMMERCIAL

## 2021-09-28 VITALS
BODY MASS INDEX: 32.15 KG/M2 | HEIGHT: 65 IN | WEIGHT: 193 LBS | RESPIRATION RATE: 17 BRPM | SYSTOLIC BLOOD PRESSURE: 136 MMHG | DIASTOLIC BLOOD PRESSURE: 84 MMHG

## 2021-09-28 DIAGNOSIS — M79.672 PAIN IN BOTH FEET: ICD-10-CM

## 2021-09-28 DIAGNOSIS — M79.671 PAIN IN BOTH FEET: ICD-10-CM

## 2021-09-28 DIAGNOSIS — L84 CORNS: ICD-10-CM

## 2021-09-28 DIAGNOSIS — I70.209 PERIPHERAL ARTERIOSCLEROSIS (HCC): Primary | ICD-10-CM

## 2021-09-28 PROCEDURE — 11056 PARNG/CUTG B9 HYPRKR LES 2-4: CPT | Performed by: PODIATRIST

## 2021-09-28 NOTE — PROGRESS NOTES
Assessment/Plan:  Pain   Radiculopathy   Callus   Mycotic toenail   Peripheral artery disease         Plan   Foot exam performed   All nails debrided   Calluses debrided   Procedures performed without pain or complication       Subjective   Patient has pain in her feet and toes with ambulation   No history of trauma        Discussion/Summary   The patient was counseled regarding instructions for management,-- patient and family education,-- risks and benefits of treatment options     Patient is able to Self-Care     Possible side effects of new medications were reviewed with the patient/guardian today  The treatment plan was reviewed with the patient/guardian  The patient/guardian understands and agrees with the treatment plan      Chief Complaint   Patient has foot pain   She has pain when she wears shoes   No history of trauma          History of Present Illness   HPI:  Patient complains of pain in feet with ambulation  She has pain around the toes  She is also concerned with pain in her right heel  This is been ongoing for several weeks  She has no history of trauma   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   Govind Getachew was unable tolerate gabapentin     Review of Systems           Cardiac: chest pain,-- rhythm problems,-- AM fatigue-- and-- witnessed apnea episodes       Skin: No complaints of nonhealing sores or skin rash       Genitourinary: loss of bladder control      Psychological: No complaints of feeling depressed, anxiety, panic attacks, or difficulty concentrating       General: trouble sleeping-- and-- lack of energy/fatigue       Respiratory: shortness of breath       HEENT: snoring      Gastrointestinal: heartburn      Hematologic: anemia      Neurological: daytime sleepiness      Musculoskeletal: arthritis-- and-- back pain      Active Problems   1  Allergic rhinitis (477 9) (J30 9)   2  Anxiety disorder (300 00) (F41 9)   3  Arthropathy (716 90) (M12 9)   4  Atherosclerosis of arteries of extremities (440 20) (I70 209)   5  Atrial fibrillation (427 31) (I48 91)   6  Backache (724 5) (M54 9)   7  Callus (700) (L84)   8  Cervicalgia (723 1) (M54 2)   9  Depression (311) (F32 9)   10  Difficulty in walking (719 7) (R26 2)   11  Encounter for screening for malignant neoplasm of colon (V76 51) (Z12 11)   12  Esophagitis, reflux (530 11) (K21 0)   13  Essential hypertension (401 9) (I10)   14  Foot pain, bilateral (729 5) (M79 671,M79 672)   15  Herpes zoster (053 9) (B02 9)   16  Hospital discharge follow-up (V67 59) (Z09)   17  Hyperlipidemia (272 4) (E78 5)   18  Impaired fasting glucose (790 21) (R73 01)   19  Internal Hemorrhoids (455 0)   20  Leg swelling (729 81) (C01 75)   21  Lichen planus (444 3) (L43 9)   22  Limb pain (729 5) (M79 609)   23  Lumbar radiculopathy (724 4) (M54 16)   24  Multiple joint pain (719 49) (M25 50)   25  Need for influenza vaccination (V04 81) (Z23)   26  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)   27  Onychomycosis (110 1) (B35 1)   28  MIGUEL (obstructive sleep apnea) (327 23) (G47 33)   29  Other abnormal finding of urine (791 9) (R82 99)   30  Pes planus, congenital (754 61) (Q66 50)   31  Plantar fascial fibromatosis (728 71) (M72 2)   32  Rectal/anal hemorrhage (569 3) (K62 5)   33  Screening for malignant neoplasm of cervix (V76 2) (Z12 4)   34  Shoulder joint pain, unspecified laterality   35  Thrombocytopenia (287 5) (D69 6)   36  Urticaria (708 9) (L50 9)   37  Vulvovaginitis candida albicans (112 1) (B37 3)     Past Medical History    · History of Acute maxillary sinusitis (461 0) (J01 00)   · History of Acute upper respiratory infection (465 9) (J06 9)   · History of Cellulitis (682 9) (L03 90)   · History of Cough (786 2) (R05)   · History of Dysuria (788 1) (R30 0)   · History of High cholesterol (272 0) (E78 00)   · History of abdominal pain (V13 89) (F84 639)   · History of acute bronchitis (V12 69) (Z87 09)   · History of acute sinusitis (V12 69) (Z87 09)   · History of arthritis (V13 4) (Z87 39)   · History of atrial fibrillation (V12 59) (Z86 79)   · History of cataract (V12 49) (Z86 69)   · History of gastroesophageal reflux (GERD) (V12 79) (Z87 19)   · History of hypertension (V12 59) (Z86 79)   · History of Skin rash (782 1) (R21)   · History of Vulvovaginitis (616 10) (N76 0)     The active problems and past medical history were reviewed and updated today       Surgical History    · History of Cataract Surgery   · History of Colonoscopy (Fiberoptic) Screening   · History of Gallbladder Surgery   · History of Knee Replacement   · History of Pacemaker Placement     The surgical history was reviewed and updated today        Family History   Father    · Family history of Coronary Artery Disease (V17 49)  Sister    · Family history of Diabetes Mellitus (V18 0)   · Family history of High cholesterol  Family History    · Family history of arthritis (V17 7) (Z82 61)   · Family history of hypertension (V17 49) (Z82 49)     The family history was reviewed and updated today        Social History    · Exercise: Walking   · 2 x/week   · Former smoker (V15 82) (Z87 891)   · No alcohol use   ·   The social history was reviewed and updated today       Physical Exam   Left Foot: Appearance: Normal except as noted: excessive pronation-- and-- pes planus  Great toe deformities include a bunion  Tenderness: None except the great toe-- and-- distal first metatarsal     Right Foot: Appearance: Normal except as noted: excessive pronation-- and-- pes planus  Great toe deformities include a bunion  Tenderness: None except the great toe,-- distal first metatarsal,-- medial calcaneous-- and-- insertion of the plantar fascia     Left Ankle: ROM: limited ROM in all planes    Right Ankle: ROM: limited ROM in all planes    Neurological Exam: Light touch was decreased bilaterally   Vibratory sensation was decreased in both first metatarsophalangeal joints     Vascular Exam: performed Dorsalis pedis pulses were diminished bilaterally  Posterior tibial pulses were diminished bilaterally  Elevation Pallor: present bilaterally  Dependence rubor was present bilaterally  Capillary refill time was greater than 3 seconds bilaterally-- and-- Q  9, findings bilateral  Edema: moderate bilaterally     Toenails: All of the toenails were elongated,-- hypertrophied,-- discolored-- and-- Right ptotic     Hyperkeratosis: present on both first toes,-- present on both first sub metatarsals-- and-- Positive xerosis of skin noted     Shoe Gear Evaluation: performed ()   Recommendation(s): SAS style-- and-- OTC inlays

## 2021-10-05 ENCOUNTER — HOSPITAL ENCOUNTER (OUTPATIENT)
Dept: NON INVASIVE DIAGNOSTICS | Facility: HOSPITAL | Age: 81
Discharge: HOME | End: 2021-10-05
Attending: INTERNAL MEDICINE
Payer: COMMERCIAL

## 2021-10-05 ENCOUNTER — HOSPITAL ENCOUNTER (OUTPATIENT)
Dept: RADIOLOGY | Facility: HOSPITAL | Age: 81
Discharge: HOME | End: 2021-10-05
Payer: COMMERCIAL

## 2021-10-05 DIAGNOSIS — I10 ESSENTIAL HYPERTENSION: ICD-10-CM

## 2021-10-05 DIAGNOSIS — Z82.49 FAMILY HISTORY OF ABDOMINAL AORTIC ANEURYSM: ICD-10-CM

## 2021-10-05 DIAGNOSIS — I70.209 PERIPHERAL ARTERIOSCLEROSIS (HCC): ICD-10-CM

## 2021-10-05 DIAGNOSIS — E78.2 MIXED HYPERLIPIDEMIA: ICD-10-CM

## 2021-10-05 DIAGNOSIS — R06.02 SHORTNESS OF BREATH: ICD-10-CM

## 2021-10-05 DIAGNOSIS — I72.3 ANEURYSM OF ILIAC ARTERY (HCC): ICD-10-CM

## 2021-10-05 PROCEDURE — 93306 TTE W/DOPPLER COMPLETE: CPT

## 2021-10-05 PROCEDURE — 93978 VASCULAR STUDY: CPT

## 2021-10-07 ENCOUNTER — REMOTE DEVICE CLINIC VISIT (OUTPATIENT)
Dept: CARDIOLOGY CLINIC | Facility: CLINIC | Age: 81
End: 2021-10-07
Payer: COMMERCIAL

## 2021-10-07 DIAGNOSIS — Z95.0 PRESENCE OF PERMANENT CARDIAC PACEMAKER: Primary | ICD-10-CM

## 2021-10-07 PROCEDURE — 93306 TTE W/DOPPLER COMPLETE: CPT | Performed by: INTERNAL MEDICINE

## 2021-10-07 PROCEDURE — 93294 REM INTERROG EVL PM/LDLS PM: CPT | Performed by: INTERNAL MEDICINE

## 2021-10-07 PROCEDURE — 93296 REM INTERROG EVL PM/IDS: CPT | Performed by: INTERNAL MEDICINE

## 2021-10-07 PROCEDURE — 93978 VASCULAR STUDY: CPT | Performed by: SURGERY

## 2021-10-11 ENCOUNTER — TELEPHONE (OUTPATIENT)
Dept: CARDIOLOGY CLINIC | Facility: CLINIC | Age: 81
End: 2021-10-11

## 2021-11-14 DIAGNOSIS — I50.32 CHRONIC DIASTOLIC CONGESTIVE HEART FAILURE (HCC): ICD-10-CM

## 2021-11-17 RX ORDER — FUROSEMIDE 20 MG/1
TABLET ORAL
Qty: 30 TABLET | Refills: 5 | Status: SHIPPED | OUTPATIENT
Start: 2021-11-17 | End: 2022-05-27

## 2021-11-24 ENCOUNTER — OFFICE VISIT (OUTPATIENT)
Dept: URGENT CARE | Facility: CLINIC | Age: 81
End: 2021-11-24
Payer: COMMERCIAL

## 2021-11-24 DIAGNOSIS — Z20.822 ENCOUNTER FOR SCREENING LABORATORY TESTING FOR COVID-19 VIRUS: ICD-10-CM

## 2021-11-24 DIAGNOSIS — J20.9 ACUTE BRONCHITIS, UNSPECIFIED ORGANISM: Primary | ICD-10-CM

## 2021-11-24 DIAGNOSIS — J06.9 UPPER RESPIRATORY TRACT INFECTION, UNSPECIFIED TYPE: ICD-10-CM

## 2021-11-24 PROCEDURE — S9083 URGENT CARE CENTER GLOBAL: HCPCS | Performed by: NURSE PRACTITIONER

## 2021-11-24 PROCEDURE — 0241U HB NFCT DS VIR RESP RNA 4 TRGT: CPT | Performed by: NURSE PRACTITIONER

## 2021-11-24 PROCEDURE — 99213 OFFICE O/P EST LOW 20 MIN: CPT | Performed by: NURSE PRACTITIONER

## 2021-11-24 RX ORDER — BENZONATATE 200 MG/1
200 CAPSULE ORAL 3 TIMES DAILY PRN
Qty: 20 CAPSULE | Refills: 0 | Status: SHIPPED | OUTPATIENT
Start: 2021-11-24

## 2021-11-24 RX ORDER — AMOXICILLIN AND CLAVULANATE POTASSIUM 875; 125 MG/1; MG/1
1 TABLET, FILM COATED ORAL EVERY 12 HOURS SCHEDULED
Qty: 14 TABLET | Refills: 0 | Status: SHIPPED | OUTPATIENT
Start: 2021-11-24 | End: 2021-12-01

## 2021-11-25 LAB
FLUAV RNA RESP QL NAA+PROBE: NEGATIVE
FLUBV RNA RESP QL NAA+PROBE: NEGATIVE
RSV RNA RESP QL NAA+PROBE: NEGATIVE
SARS-COV-2 RNA RESP QL NAA+PROBE: NEGATIVE

## 2021-12-03 ENCOUNTER — OFFICE VISIT (OUTPATIENT)
Dept: PODIATRY | Facility: CLINIC | Age: 81
End: 2021-12-03
Payer: COMMERCIAL

## 2021-12-03 VITALS — RESPIRATION RATE: 17 BRPM | HEIGHT: 65 IN | BODY MASS INDEX: 32.15 KG/M2 | WEIGHT: 193 LBS

## 2021-12-03 DIAGNOSIS — L84 CORNS: ICD-10-CM

## 2021-12-03 DIAGNOSIS — I70.209 PERIPHERAL ARTERIOSCLEROSIS (HCC): Primary | ICD-10-CM

## 2021-12-03 DIAGNOSIS — M79.671 PAIN IN BOTH FEET: ICD-10-CM

## 2021-12-03 DIAGNOSIS — M79.672 PAIN IN BOTH FEET: ICD-10-CM

## 2021-12-03 PROCEDURE — 11056 PARNG/CUTG B9 HYPRKR LES 2-4: CPT | Performed by: PODIATRIST

## 2021-12-06 ENCOUNTER — HOSPITAL ENCOUNTER (OUTPATIENT)
Dept: RADIOLOGY | Facility: HOSPITAL | Age: 81
Discharge: HOME | End: 2021-12-06
Payer: COMMERCIAL

## 2021-12-06 DIAGNOSIS — J20.9 ACUTE BRONCHITIS, UNSPECIFIED ORGANISM: ICD-10-CM

## 2021-12-06 PROCEDURE — 71046 X-RAY EXAM CHEST 2 VIEWS: CPT

## 2021-12-14 ENCOUNTER — OFFICE VISIT (OUTPATIENT)
Dept: CARDIOLOGY CLINIC | Facility: CLINIC | Age: 81
End: 2021-12-14
Payer: COMMERCIAL

## 2021-12-14 VITALS
DIASTOLIC BLOOD PRESSURE: 76 MMHG | HEART RATE: 85 BPM | SYSTOLIC BLOOD PRESSURE: 128 MMHG | BODY MASS INDEX: 34.09 KG/M2 | OXYGEN SATURATION: 98 % | HEIGHT: 65 IN | WEIGHT: 204.6 LBS | TEMPERATURE: 98.4 F | RESPIRATION RATE: 16 BRPM

## 2021-12-14 DIAGNOSIS — I77.810 ASCENDING AORTA DILATION (HCC): ICD-10-CM

## 2021-12-14 DIAGNOSIS — E66.01 MORBID (SEVERE) OBESITY DUE TO EXCESS CALORIES (HCC): ICD-10-CM

## 2021-12-14 DIAGNOSIS — R06.02 SHORTNESS OF BREATH: Primary | ICD-10-CM

## 2021-12-14 PROCEDURE — 99214 OFFICE O/P EST MOD 30 MIN: CPT | Performed by: INTERNAL MEDICINE

## 2021-12-14 PROCEDURE — 3074F SYST BP LT 130 MM HG: CPT | Performed by: INTERNAL MEDICINE

## 2021-12-14 PROCEDURE — 3078F DIAST BP <80 MM HG: CPT | Performed by: INTERNAL MEDICINE

## 2021-12-14 PROCEDURE — 1036F TOBACCO NON-USER: CPT | Performed by: INTERNAL MEDICINE

## 2021-12-14 PROCEDURE — 1160F RVW MEDS BY RX/DR IN RCRD: CPT | Performed by: INTERNAL MEDICINE

## 2021-12-14 PROCEDURE — 93000 ELECTROCARDIOGRAM COMPLETE: CPT | Performed by: INTERNAL MEDICINE

## 2022-01-03 ENCOUNTER — HOSPITAL ENCOUNTER (OUTPATIENT)
Dept: RADIOLOGY | Facility: HOSPITAL | Age: 82
Discharge: HOME | End: 2022-01-03
Attending: INTERNAL MEDICINE
Payer: COMMERCIAL

## 2022-01-03 DIAGNOSIS — R06.02 SHORTNESS OF BREATH: ICD-10-CM

## 2022-01-03 DIAGNOSIS — I77.810 ASCENDING AORTA DILATION (HCC): ICD-10-CM

## 2022-01-03 PROCEDURE — 71250 CT THORAX DX C-: CPT

## 2022-01-03 PROCEDURE — G1004 CDSM NDSC: HCPCS

## 2022-01-05 ENCOUNTER — TELEPHONE (OUTPATIENT)
Dept: CARDIOLOGY CLINIC | Facility: CLINIC | Age: 82
End: 2022-01-05

## 2022-01-05 ENCOUNTER — TELEPHONE (OUTPATIENT)
Dept: GASTROENTEROLOGY | Facility: CLINIC | Age: 82
End: 2022-01-05

## 2022-01-05 NOTE — TELEPHONE ENCOUNTER
----- Message from Jose Alfredo Nicole, DO sent at 1/5/2022 12:16 PM EST -----  Can you please let the patient know her aneurysm measured 4 5 cm  It is a little bigger than her last CT scan from 2016  Will need to repeat study in 6 months    Thanks

## 2022-01-05 NOTE — TELEPHONE ENCOUNTER
See Dr Sally Cisneros office note dated 5/6/21  Pt was due for recall EGD in 2020 for hx of stricture/GERD and is due for Colon recall in 2/2022 for hx of polyps  I mailed recall letter to pt for both procedures

## 2022-01-06 ENCOUNTER — REMOTE DEVICE CLINIC VISIT (OUTPATIENT)
Dept: CARDIOLOGY CLINIC | Facility: CLINIC | Age: 82
End: 2022-01-06
Payer: COMMERCIAL

## 2022-01-06 DIAGNOSIS — Z95.0 PRESENCE OF PERMANENT CARDIAC PACEMAKER: Primary | ICD-10-CM

## 2022-01-06 PROCEDURE — 93294 REM INTERROG EVL PM/LDLS PM: CPT | Performed by: INTERNAL MEDICINE

## 2022-01-06 PROCEDURE — 93296 REM INTERROG EVL PM/IDS: CPT | Performed by: INTERNAL MEDICINE

## 2022-01-06 NOTE — PROGRESS NOTES
Results for orders placed or performed in visit on 01/06/22   Cardiac EP device report    Narrative    MDT DUAL CHAMBER PM/ACTIVE SYSTEM IS MRI CONDITIONAL  CARELINK TRANSMISSION: BATTERY VOLTAGE ADEQUATE  (2 5 YRS) AP 98%  <1%  ALL AVAILABLE LEAD PARAMETERS WITHIN NORMAL LIMITS  362 AT/AF EPISODES DETECTED  LONGEST <4 HOURS  MULTIPLE VHR EPISODES DETECTED ALL ON 11/25/21 ( PATIENT WAS SICK WITH COVID AT THIS TIME) 15 BEATS @ 370ms  EF 50% (2021)  PATIENT IS ON ELIQUIS, SOTALOL AND METOPROLOL SUCC  NORMAL DEVICE FUNCTION  ----VENEGAS

## 2022-02-01 DIAGNOSIS — K21.9 GASTROESOPHAGEAL REFLUX DISEASE WITHOUT ESOPHAGITIS: ICD-10-CM

## 2022-02-01 RX ORDER — FAMOTIDINE 20 MG/1
TABLET, FILM COATED ORAL
Qty: 90 TABLET | Refills: 2 | Status: SHIPPED | OUTPATIENT
Start: 2022-02-01

## 2022-02-16 ENCOUNTER — OFFICE VISIT (OUTPATIENT)
Dept: PODIATRY | Facility: CLINIC | Age: 82
End: 2022-02-16
Payer: COMMERCIAL

## 2022-02-16 VITALS — BODY MASS INDEX: 33.99 KG/M2 | WEIGHT: 204 LBS | HEIGHT: 65 IN | RESPIRATION RATE: 16 BRPM

## 2022-02-16 DIAGNOSIS — M79.671 PAIN IN BOTH FEET: ICD-10-CM

## 2022-02-16 DIAGNOSIS — L84 CORNS: ICD-10-CM

## 2022-02-16 DIAGNOSIS — M79.672 PAIN IN BOTH FEET: ICD-10-CM

## 2022-02-16 DIAGNOSIS — I70.209 PERIPHERAL ARTERIOSCLEROSIS (HCC): Primary | ICD-10-CM

## 2022-02-16 PROCEDURE — 11056 PARNG/CUTG B9 HYPRKR LES 2-4: CPT | Performed by: PODIATRIST

## 2022-02-16 NOTE — PROGRESS NOTES
Assessment/Plan:  Pain   Radiculopathy   Callus   Mycotic toenail   Peripheral artery disease         Plan   Foot exam performed   All nails debrided   Calluses debrided   Procedures performed without pain or complication       Subjective   Patient has pain in her feet and toes with ambulation   No history of trauma        Discussion/Summary   The patient was counseled regarding instructions for management,-- patient and family education,-- risks and benefits of treatment options     Patient is able to Self-Care     Possible side effects of new medications were reviewed with the patient/guardian today  The treatment plan was reviewed with the patient/guardian  The patient/guardian understands and agrees with the treatment plan      Chief Complaint   Patient has foot pain   She has pain when she wears shoes   No history of trauma          History of Present Illness   HPI:  Patient complains of pain in feet with ambulation  She has pain around the toes  She is also concerned with pain in her right heel  This is been ongoing for several weeks  She has no history of trauma   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   Maldonado Le was unable tolerate gabapentin     Review of Systems           Cardiac: chest pain,-- rhythm problems,-- AM fatigue-- and-- witnessed apnea episodes       Skin: No complaints of nonhealing sores or skin rash       Genitourinary: loss of bladder control      Psychological: No complaints of feeling depressed, anxiety, panic attacks, or difficulty concentrating       General: trouble sleeping-- and-- lack of energy/fatigue       Respiratory: shortness of breath       HEENT: snoring      Gastrointestinal: heartburn      Hematologic: anemia      Neurological: daytime sleepiness      Musculoskeletal: arthritis-- and-- back pain      Active Problems   1  Allergic rhinitis (477 9) (J30 9)   2  Anxiety disorder (300 00) (F41 9)   3  Arthropathy (716 90) (M12 9)   4  Atherosclerosis of arteries of extremities (440 20) (I70 209)   5  Atrial fibrillation (427 31) (I48 91)   6  Backache (724 5) (M54 9)   7  Callus (700) (L84)   8  Cervicalgia (723 1) (M54 2)   9  Depression (311) (F32 9)   10  Difficulty in walking (719 7) (R26 2)   11  Encounter for screening for malignant neoplasm of colon (V76 51) (Z12 11)   12  Esophagitis, reflux (530 11) (K21 0)   13  Essential hypertension (401 9) (I10)   14  Foot pain, bilateral (729 5) (M79 671,M79 672)   15  Herpes zoster (053 9) (B02 9)   16  Hospital discharge follow-up (V67 59) (Z09)   17  Hyperlipidemia (272 4) (E78 5)   18  Impaired fasting glucose (790 21) (R73 01)   19  Internal Hemorrhoids (455 0)   20  Leg swelling (729 81) (B41 80)   21  Lichen planus (489 8) (L43 9)   22  Limb pain (729 5) (M79 609)   23  Lumbar radiculopathy (724 4) (M54 16)   24  Multiple joint pain (719 49) (M25 50)   25  Need for influenza vaccination (V04 81) (Z23)   26  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)   27  Onychomycosis (110 1) (B35 1)   28  MIGUEL (obstructive sleep apnea) (327 23) (G47 33)   29  Other abnormal finding of urine (791 9) (R82 99)   30  Pes planus, congenital (754 61) (Q66 50)   31  Plantar fascial fibromatosis (728 71) (M72 2)   32  Rectal/anal hemorrhage (569 3) (K62 5)   33  Screening for malignant neoplasm of cervix (V76 2) (Z12 4)   34  Shoulder joint pain, unspecified laterality   35  Thrombocytopenia (287 5) (D69 6)   36  Urticaria (708 9) (L50 9)   37  Vulvovaginitis candida albicans (112 1) (B37 3)     Past Medical History    · History of Acute maxillary sinusitis (461 0) (J01 00)   · History of Acute upper respiratory infection (465 9) (J06 9)   · History of Cellulitis (682 9) (L03 90)   · History of Cough (786 2) (R05)   · History of Dysuria (788 1) (R30 0)   · History of High cholesterol (272 0) (E78 00)   · History of abdominal pain (V13 89) (X72 462)   · History of acute bronchitis (V12 69) (Z87 09)   · History of acute sinusitis (V12 69) (Z87 09)   · History of arthritis (V13 4) (Z87 39)   · History of atrial fibrillation (V12 59) (Z86 79)   · History of cataract (V12 49) (Z86 69)   · History of gastroesophageal reflux (GERD) (V12 79) (Z87 19)   · History of hypertension (V12 59) (Z86 79)   · History of Skin rash (782 1) (R21)   · History of Vulvovaginitis (616 10) (N76 0)     The active problems and past medical history were reviewed and updated today       Surgical History    · History of Cataract Surgery   · History of Colonoscopy (Fiberoptic) Screening   · History of Gallbladder Surgery   · History of Knee Replacement   · History of Pacemaker Placement     The surgical history was reviewed and updated today        Family History   Father    · Family history of Coronary Artery Disease (V17 49)  Sister    · Family history of Diabetes Mellitus (V18 0)   · Family history of High cholesterol  Family History    · Family history of arthritis (V17 7) (Z82 61)   · Family history of hypertension (V17 49) (Z82 49)     The family history was reviewed and updated today        Social History    · Exercise: Walking   · 2 x/week   · Former smoker (V15 82) (Z87 891)   · No alcohol use   ·   The social history was reviewed and updated today       Physical Exam   Left Foot: Appearance: Normal except as noted: excessive pronation-- and-- pes planus  Great toe deformities include a bunion  Tenderness: None except the great toe-- and-- distal first metatarsal     Right Foot: Appearance: Normal except as noted: excessive pronation-- and-- pes planus  Great toe deformities include a bunion  Tenderness: None except the great toe,-- distal first metatarsal,-- medial calcaneous-- and-- insertion of the plantar fascia     Left Ankle: ROM: limited ROM in all planes    Right Ankle: ROM: limited ROM in all planes    Neurological Exam: Light touch was decreased bilaterally   Vibratory sensation was decreased in both first metatarsophalangeal joints     Vascular Exam: performed Dorsalis pedis pulses were diminished bilaterally  Posterior tibial pulses were diminished bilaterally  Elevation Pallor: present bilaterally  Dependence rubor was present bilaterally  Capillary refill time was greater than 3 seconds bilaterally-- and-- Q  9, findings bilateral  Edema: moderate bilaterally     Toenails: All of the toenails were elongated,-- hypertrophied,-- discolored-- and-- Right ptotic     Hyperkeratosis: present on both first toes,-- present on both first sub metatarsals-- and-- Positive xerosis of skin noted     Shoe Gear Evaluation: performed ()   Recommendation(s): SAS style-- and-- OTC inlays

## 2022-03-24 ENCOUNTER — OFFICE VISIT (OUTPATIENT)
Dept: CARDIOLOGY CLINIC | Facility: CLINIC | Age: 82
End: 2022-03-24
Payer: COMMERCIAL

## 2022-03-24 ENCOUNTER — TELEPHONE (OUTPATIENT)
Dept: CARDIOLOGY CLINIC | Facility: CLINIC | Age: 82
End: 2022-03-24

## 2022-03-24 VITALS
WEIGHT: 201.6 LBS | HEIGHT: 65 IN | OXYGEN SATURATION: 94 % | SYSTOLIC BLOOD PRESSURE: 140 MMHG | DIASTOLIC BLOOD PRESSURE: 90 MMHG | HEART RATE: 71 BPM | TEMPERATURE: 97.6 F | BODY MASS INDEX: 33.59 KG/M2

## 2022-03-24 DIAGNOSIS — I50.32 CHRONIC DIASTOLIC CONGESTIVE HEART FAILURE (HCC): ICD-10-CM

## 2022-03-24 DIAGNOSIS — I72.3 ANEURYSM OF ILIAC ARTERY (HCC): ICD-10-CM

## 2022-03-24 DIAGNOSIS — I48.0 PAROXYSMAL ATRIAL FIBRILLATION (HCC): Primary | ICD-10-CM

## 2022-03-24 DIAGNOSIS — I10 HYPERTENSION, UNSPECIFIED TYPE: ICD-10-CM

## 2022-03-24 DIAGNOSIS — I51.9 CARDIAC DISEASE: ICD-10-CM

## 2022-03-24 DIAGNOSIS — Z82.49 FAMILY HISTORY OF INTRACRANIAL ANEURYSMS: ICD-10-CM

## 2022-03-24 DIAGNOSIS — I35.1 NONRHEUMATIC AORTIC VALVE INSUFFICIENCY: ICD-10-CM

## 2022-03-24 DIAGNOSIS — I70.209 PERIPHERAL ARTERIOSCLEROSIS (HCC): ICD-10-CM

## 2022-03-24 PROCEDURE — 3077F SYST BP >= 140 MM HG: CPT | Performed by: INTERNAL MEDICINE

## 2022-03-24 PROCEDURE — 93000 ELECTROCARDIOGRAM COMPLETE: CPT | Performed by: INTERNAL MEDICINE

## 2022-03-24 PROCEDURE — 3080F DIAST BP >= 90 MM HG: CPT | Performed by: INTERNAL MEDICINE

## 2022-03-24 PROCEDURE — 1160F RVW MEDS BY RX/DR IN RCRD: CPT | Performed by: INTERNAL MEDICINE

## 2022-03-24 PROCEDURE — 1036F TOBACCO NON-USER: CPT | Performed by: INTERNAL MEDICINE

## 2022-03-24 PROCEDURE — 99214 OFFICE O/P EST MOD 30 MIN: CPT | Performed by: INTERNAL MEDICINE

## 2022-03-24 RX ORDER — LOSARTAN POTASSIUM 50 MG/1
50 TABLET ORAL DAILY
Qty: 30 TABLET | Refills: 2 | Status: SHIPPED | OUTPATIENT
Start: 2022-03-24 | End: 2022-04-25 | Stop reason: SDUPTHER

## 2022-03-24 NOTE — PROGRESS NOTES
Cardiology   Holly Boo DO, Ginger Maradiaga MD, Janes Hunt MD, Belia Tiwari MD, [facility] [place]  -------------------------------------------------------------------  [facility]  [address], [address], [address] [place] [address], [address] [place] [address]  [phone]    Cardiology Follow Up  Miri Thrasher  1940  [id number]          Assessment/Plan:    1  Cough/shortness of breath -  Echocardiogram was normal     - Symptoms did improved but have returned again   - She is scheduled to see pulmonary  - Will stop ramipril and begin Losartan  - Return in 2 weeks for BP check  2  Paroxysmal atrial fibrillation (HCC) - currently in sinus  Continue Eliquis and routine pacemaker clinic follow up  - Continue sotalol  QT interval normal   - She has intermittent episodes on pacemaker interrogation  3  Essential hypertension  - BP well controlled on current Rx  Switching ramipril to losartan because of cough  4  Mixed hyperlipidemia  - Last LDL was 78  -  Continue pravastatin  5  Chronic diastolic CHF - stable on lasix 20 mg daily  6  Thoracic aortic root aneurysm - CT chest showed thoracic aorta size of 4 5 cm  This has increased from her last CT scan which was done in 2016 and from echocardiogram   Will have repeat CT chest in 6 months  Will follow up prior to this and CT will be ordered at that time       7  Moderate aortic regurgitation - 2D echocardiogram showed no significant change on recent study  Interval History:     Miri Thrasher is 79 y o  female here for followup of atrial fibrillation and hypertension  Recently, she developed cough and shortness of breath  She was diagnosed with bronchitis and is currently on prednisone  She denies any chest pain  She had COVID 19 infection in January  CT scan was ordered last visit to follow-up thoracic aortic aneurysm and because of exertional dyspnea    She has had episodes of bronchitis over the past year  Scheduled to see pulmonologist in 1 month  She had pacemaker interrogation done on January 6, 2022 which showed 98 percent atrial pacing and 3 in 62 AFib episodes with the longest lasting less than 4 hours       Current medication regimen includes Eliquis, sotalol and metoprolol  The patient has a history of atrial fibrillation along with sick sinus syndrome and had a pacemaker inserted at [facility] after developing multiple pauses  Past Medical History:   Diagnosis Date    Arthritis     Atrial fibrillation (HCC)     Cardiac disease     Cataract     Gastro-esophageal reflux     GERD    GERD (gastroesophageal reflux disease)     Hypertension     Nasal congestion     Shingles     Sinusitis     Sleep apnea     Sleep difficulties      Social History     Socioeconomic History    Marital status:       Spouse name: Not on file    Number of children: Not on file    Years of education: Not on file    Highest education level: Not on file   Occupational History    Not on file   Tobacco Use    Smoking status: Former Smoker     Packs/day: 1 00     Years: 25 00     Pack years: 25 00     Types: Cigarettes    Smokeless tobacco: Never Used    Tobacco comment: quit 22 yeears ago   Vaping Use    Vaping Use: Never used   Substance and Sexual Activity    Alcohol use: Yes     Comment: occasional, No alcohol use,per [employer]    Drug use: No    Sexual activity: Not on file   Other Topics Concern    Not on file   Social History Narrative    Exercise: Walking, 2x/week     Social Determinants of Health     Financial Resource Strain: Not on file   Food Insecurity: Not on file   Transportation Needs: Not on file   Physical Activity: Not on file   Stress: Not on file   Social Connections: Not on file   Intimate Partner Violence: Not on file   Housing Stability: Not on file      Family History   Problem Relation Age of Onset    Coronary artery disease Father     Diabetes Sister     Hyperlipidemia Sister     Arthritis Family     Hypertension Family      Past Surgical History:   Procedure Laterality Date    CARDIAC PACEMAKER PLACEMENT Left 2014    CATARACT EXTRACTION      CHOLECYSTECTOMY      resolved: 2009    COLONOSCOPY      Fiberoptic, resolved 2015    EYE SURGERY      KNEE SURGERY Left     left kknee replacement in 2009       Current Outpatient Medications:     acetaminophen (TYLENOL ARTHRITIS PAIN) 650 mg CR tablet, Take by mouth, Disp: , Rfl:     betamethasone dipropionate (DIPROSONE) 0 05 % cream, Apply topically 2 (two) times a day, Disp: 45 g, Rfl: 2    Calcium Carbonate-Vitamin D 600-200 MG-UNIT CAPS, Take by mouth 2 (two) times a day, Disp: , Rfl:     Calcium Polycarbophil (FIBERCON PO), Take by mouth 2 (two) times a day, Disp: , Rfl:     cefuroxime (CEFTIN) 500 mg tablet, , Disp: , Rfl:     cholecalciferol (VITAMIN D3) 1,000 units tablet, Take 1,000 Units by mouth daily, Disp: , Rfl:     clobetasol (TEMOVATE) 0 05 % cream, , Disp: , Rfl:     Coenzyme Q10 (COQ-10) 200 MG CAPS, Take 2 capsules by mouth daily, Disp: , Rfl:     diphenhydrAMINE-acetaminophen (TYLENOL PM)  MG TABS, Take 1 tablet by mouth daily at bedtime as needed for sleep, Disp: , Rfl:     Eliquis 5 MG, take 1 tablet by mouth twice a day, Disp: 180 tablet, Rfl: 3    famotidine (PEPCID) 20 mg tablet, TAKE 1 TABLET BY MOUTH DAILY AT BEDTIME, Disp: 90 tablet, Rfl: 2    fluticasone (FLONASE) 50 mcg/act nasal spray, 2 sprays into each nostril daily, Disp: 16 g, Rfl: 6    furosemide (LASIX) 20 mg tablet, take 1 tablet by mouth once daily, Disp: 30 tablet, Rfl: 5    hydrocortisone 2 5 % cream, Apply topically 2 (two) times a day as needed for rash, Disp: 30 g, Rfl: 0    Lactobacillus (ACIDOPHILUS PO), Take by mouth, Disp: , Rfl:     Magnesium 400 MG TABS, Take by mouth daily, Disp: , Rfl:     metoprolol succinate (TOPROL-XL) 25 mg 24 hr tablet, take 1 tablet by mouth once daily, Disp: 90 tablet, Rfl: 3    Multiple Vitamins-Minerals (DAILY MULTIVITAMIN PO), Take 1 tablet by mouth daily, Disp: , Rfl:     pravastatin (PRAVACHOL) 10 mg tablet, take 1 tablet by mouth daily 3 TIMES A WEEK ON MONDAYS WEDNESDAYS AND FRIDAYS, Disp: 60 tablet, Rfl: 3    ramipril (ALTACE) 5 mg capsule, take 1 capsule by mouth twice a day, Disp: 180 capsule, Rfl: 3    sotalol (BETAPACE) 80 mg tablet, take 1 tablet by mouth every 12 hours, Disp: 180 tablet, Rfl: 3    Vitamin Mixture (SHADE-C PO), Take 500 mg by mouth daily, Disp: , Rfl:     benzonatate (TESSALON) 200 MG capsule, Take 1 capsule (200 mg total) by mouth 3 (three) times a day as needed for cough (Patient not taking: Reported on 12/14/2021 ), Disp: 20 capsule, Rfl: 0    cetirizine (ZyrTEC) 10 mg tablet, Take 10 mg by mouth daily   (Patient not taking: Reported on 3/14/2022 ), Disp: , Rfl:     guaiFENesin (ROBITUSSIN) 100 MG/5ML oral liquid, Take 200 mg by mouth daily at bedtime (Patient not taking: Reported on 12/14/2021 ), Disp: , Rfl:     hydroxychloroquine (PLAQUENIL) 200 mg tablet, Take 1 tablet (200 mg total) by mouth daily with breakfast Administer with food or milk , Disp: 30 tablet, Rfl: 2    nitroglycerin (NITROSTAT) 0 4 mg SL tablet, Place 1 tablet (0 4 mg total) under the tongue every 5 (five) minutes as needed for chest pain If no relief after first dose call prescriber or 911 (Patient not taking: Reported on 3/14/2022 ), Disp: 25 tablet, Rfl: 0    pantoprazole (PROTONIX) 40 mg tablet, Take 1 tablet (40 mg total) by mouth daily Take 1/2 hour prior to breakfast daily, Disp: 90 tablet, Rfl: 2    valACYclovir (VALTREX) 500 mg tablet, Take 1 tablet (500 mg total) by mouth 3 (three) times a day for 7 days (Patient not taking: Reported on 12/14/2021 ), Disp: 21 tablet, Rfl: 0        Review of Systems:  Review of Systems   Constitutional: Positive for fatigue  Respiratory: Positive for cough and shortness of breath  Cardiovascular: Negative for chest pain, palpitations and leg swelling  Musculoskeletal: Positive for arthralgias  All other systems reviewed and are negative  Physical Exam:  Vitals:  Vitals:    03/24/22 1032   BP: 140/90   BP Location: Left arm   Patient Position: Sitting   Cuff Size: Large   Pulse: 71   Temp: 97 6 °F (36 4 °C)   SpO2: 94%   Weight: 91 4 kg (201 lb 9 6 oz)   Height: 5' 5" (1 651 m)     Physical Exam   Constitutional: She appears healthy  No distress  Eyes: Pupils are equal, round, and reactive to light  Conjunctivae are normal    Neck: No JVD present  Cardiovascular: Normal rate, regular rhythm and normal heart sounds  Exam reveals no gallop and no friction rub  No murmur heard  Pulmonary/Chest: Effort normal  She has no rales  She has scattered wheezes  Musculoskeletal:         General: No tenderness, deformity or edema  Cervical back: Normal range of motion and neck supple  Neurological: She is alert and oriented to person, place, and time  Skin: Skin is warm and dry  Cardiographics:  EKG: Personally reviewed atrial paced rhythm with rate 70 bpm  Last known EF: 55-60%    This note was completed in part utilizing M-Modal Fluency Direct Software  Grammatical errors, random word insertions, spelling mistakes, and incomplete sentences can be an occasional consequence of this system secondary to software limitations, ambient noise, and hardware issues  If you have any questions or concerns about the content, text, or information contained within the body of this dictation, please contact the provider for clarification

## 2022-03-25 DIAGNOSIS — I48.91 ATRIAL FIBRILLATION, UNSPECIFIED TYPE (HCC): ICD-10-CM

## 2022-03-25 RX ORDER — SOTALOL HYDROCHLORIDE 80 MG/1
TABLET ORAL
Qty: 180 TABLET | Refills: 3 | Status: SHIPPED | OUTPATIENT
Start: 2022-03-25

## 2022-03-28 DIAGNOSIS — I48.91 ATRIAL FIBRILLATION, UNSPECIFIED TYPE (HCC): ICD-10-CM

## 2022-03-28 RX ORDER — APIXABAN 5 MG/1
TABLET, FILM COATED ORAL
Qty: 180 TABLET | Refills: 3 | Status: SHIPPED | OUTPATIENT
Start: 2022-03-28

## 2022-03-28 RX ORDER — METOPROLOL SUCCINATE 25 MG/1
TABLET, EXTENDED RELEASE ORAL
Qty: 90 TABLET | Refills: 3 | Status: SHIPPED | OUTPATIENT
Start: 2022-03-28

## 2022-04-07 ENCOUNTER — CLINICAL SUPPORT (OUTPATIENT)
Dept: CARDIOLOGY CLINIC | Facility: CLINIC | Age: 82
End: 2022-04-07
Payer: COMMERCIAL

## 2022-04-07 VITALS
WEIGHT: 204 LBS | SYSTOLIC BLOOD PRESSURE: 182 MMHG | OXYGEN SATURATION: 98 % | DIASTOLIC BLOOD PRESSURE: 98 MMHG | TEMPERATURE: 98.4 F | HEIGHT: 65 IN | BODY MASS INDEX: 33.99 KG/M2 | HEART RATE: 77 BPM

## 2022-04-07 DIAGNOSIS — I10 PRIMARY HYPERTENSION: Primary | ICD-10-CM

## 2022-04-07 PROCEDURE — 99212 OFFICE O/P EST SF 10 MIN: CPT

## 2022-04-18 ENCOUNTER — CLINICAL SUPPORT (OUTPATIENT)
Dept: CARDIOLOGY CLINIC | Facility: CLINIC | Age: 82
End: 2022-04-18
Payer: COMMERCIAL

## 2022-04-18 ENCOUNTER — IN-CLINIC DEVICE VISIT (OUTPATIENT)
Dept: CARDIOLOGY CLINIC | Facility: CLINIC | Age: 82
End: 2022-04-18
Payer: COMMERCIAL

## 2022-04-18 VITALS
SYSTOLIC BLOOD PRESSURE: 148 MMHG | HEART RATE: 76 BPM | HEIGHT: 65 IN | BODY MASS INDEX: 34.32 KG/M2 | WEIGHT: 206 LBS | OXYGEN SATURATION: 96 % | TEMPERATURE: 98.2 F | DIASTOLIC BLOOD PRESSURE: 64 MMHG

## 2022-04-18 DIAGNOSIS — I10 HYPERTENSION, UNSPECIFIED TYPE: Primary | ICD-10-CM

## 2022-04-18 DIAGNOSIS — Z95.0 PRESENCE OF PERMANENT CARDIAC PACEMAKER: Primary | ICD-10-CM

## 2022-04-18 PROCEDURE — 99212 OFFICE O/P EST SF 10 MIN: CPT

## 2022-04-18 PROCEDURE — 93280 PM DEVICE PROGR EVAL DUAL: CPT | Performed by: INTERNAL MEDICINE

## 2022-04-18 PROCEDURE — 1160F RVW MEDS BY RX/DR IN RCRD: CPT

## 2022-04-18 PROCEDURE — 1036F TOBACCO NON-USER: CPT

## 2022-04-18 NOTE — PROGRESS NOTES
MDT DUAL CHAMBER PM/ACTIVE SYSTEM IS MRI CONDITIONAL   DEVICE INTERROGATED IN THE [place] OFFICE:  BATTERY VOLTAGE ADEQUATE (2 0 YR)   AP 99 2%  0 3%    ALL LEAD PARAMETERS WITHIN NORMAL LIMITS   49 AF EPISODES WITH LONGEST 4 5 MIN   PT TAKES ELIQUIS AND METOPROLOL   NO PROGRAMMING CHANGES MADE TO DEVICE PARAMETERS   NORMAL DEVICE FUNCTION   RG

## 2022-04-20 ENCOUNTER — OFFICE VISIT (OUTPATIENT)
Dept: PODIATRY | Facility: CLINIC | Age: 82
End: 2022-04-20
Payer: COMMERCIAL

## 2022-04-20 VITALS — RESPIRATION RATE: 17 BRPM | BODY MASS INDEX: 34.32 KG/M2 | HEIGHT: 65 IN | WEIGHT: 206 LBS

## 2022-04-20 DIAGNOSIS — L84 CORNS: ICD-10-CM

## 2022-04-20 DIAGNOSIS — I70.209 PERIPHERAL ARTERIOSCLEROSIS (HCC): Primary | ICD-10-CM

## 2022-04-20 DIAGNOSIS — M79.671 PAIN IN BOTH FEET: ICD-10-CM

## 2022-04-20 DIAGNOSIS — M79.672 PAIN IN BOTH FEET: ICD-10-CM

## 2022-04-20 PROCEDURE — 11056 PARNG/CUTG B9 HYPRKR LES 2-4: CPT | Performed by: PODIATRIST

## 2022-04-20 NOTE — PROGRESS NOTES
Assessment/Plan:  Pain   Radiculopathy   Callus   Mycotic toenail   Peripheral artery disease         Plan   Foot exam performed   All nails debrided   Calluses debrided   Procedures performed without pain or complication       Subjective   Patient has pain in her feet and toes with ambulation   No history of trauma        Discussion/Summary   The patient was counseled regarding instructions for management,-- patient and family education,-- risks and benefits of treatment options     Patient is able to Self-Care     Possible side effects of new medications were reviewed with the patient/guardian today  The treatment plan was reviewed with the patient/guardian  The patient/guardian understands and agrees with the treatment plan      Chief Complaint   Patient has foot pain   She has pain when she wears shoes   No history of trauma          History of Present Illness   HPI:  Patient complains of pain in feet with ambulation  She has pain around the toes  She is also concerned with pain in her right heel  This is been ongoing for several weeks  She has no history of trauma   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   Kat Layman was unable tolerate gabapentin     Review of Systems           Cardiac: chest pain,-- rhythm problems,-- AM fatigue-- and-- witnessed apnea episodes       Skin: No complaints of nonhealing sores or skin rash       Genitourinary: loss of bladder control      Psychological: No complaints of feeling depressed, anxiety, panic attacks, or difficulty concentrating       General: trouble sleeping-- and-- lack of energy/fatigue       Respiratory: shortness of breath       HEENT: snoring      Gastrointestinal: heartburn      Hematologic: anemia      Neurological: daytime sleepiness      Musculoskeletal: arthritis-- and-- back pain      Active Problems   1  Allergic rhinitis (477 9) (J30 9)   2  Anxiety disorder (300 00) (F41 9)   3  Arthropathy (716 90) (M12 9)   4  Atherosclerosis of arteries of extremities (440 20) (I70 209)   5  Atrial fibrillation (427 31) (I48 91)   6  Backache (724 5) (M54 9)   7  Callus (700) (L84)   8  Cervicalgia (723 1) (M54 2)   9  Depression (311) (F32 9)   10  Difficulty in walking (719 7) (R26 2)   11  Encounter for screening for malignant neoplasm of colon (V76 51) (Z12 11)   12  Esophagitis, reflux (530 11) (K21 0)   13  Essential hypertension (401 9) (I10)   14  Foot pain, bilateral (729 5) (M79 671,M79 672)   15  Herpes zoster (053 9) (B02 9)   16  Hospital discharge follow-up (V67 59) (Z09)   17  Hyperlipidemia (272 4) (E78 5)   18  Impaired fasting glucose (790 21) (R73 01)   19  Internal Hemorrhoids (455 0)   20  Leg swelling (729 81) (O28 49)   21  Lichen planus (674 6) (L43 9)   22  Limb pain (729 5) (M79 609)   23  Lumbar radiculopathy (724 4) (M54 16)   24  Multiple joint pain (719 49) (M25 50)   25  Need for influenza vaccination (V04 81) (Z23)   26  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)   27  Onychomycosis (110 1) (B35 1)   28  MIGUEL (obstructive sleep apnea) (327 23) (G47 33)   29  Other abnormal finding of urine (791 9) (R82 99)   30  Pes planus, congenital (754 61) (Q66 50)   31  Plantar fascial fibromatosis (728 71) (M72 2)   32  Rectal/anal hemorrhage (569 3) (K62 5)   33  Screening for malignant neoplasm of cervix (V76 2) (Z12 4)   34  Shoulder joint pain, unspecified laterality   35  Thrombocytopenia (287 5) (D69 6)   36  Urticaria (708 9) (L50 9)   37  Vulvovaginitis candida albicans (112 1) (B37 3)     Past Medical History    · History of Acute maxillary sinusitis (461 0) (J01 00)   · History of Acute upper respiratory infection (465 9) (J06 9)   · History of Cellulitis (682 9) (L03 90)   · History of Cough (786 2) (R05)   · History of Dysuria (788 1) (R30 0)   · History of High cholesterol (272 0) (E78 00)   · History of abdominal pain (V13 89) (I80 272)   · History of acute bronchitis (V12 69) (Z87 09)   · History of acute sinusitis (V12 69) (Z87 09)   · History of arthritis (V13 4) (Z87 39)   · History of atrial fibrillation (V12 59) (Z86 79)   · History of cataract (V12 49) (Z86 69)   · History of gastroesophageal reflux (GERD) (V12 79) (Z87 19)   · History of hypertension (V12 59) (Z86 79)   · History of Skin rash (782 1) (R21)   · History of Vulvovaginitis (616 10) (N76 0)     The active problems and past medical history were reviewed and updated today       Surgical History    · History of Cataract Surgery   · History of Colonoscopy (Fiberoptic) Screening   · History of Gallbladder Surgery   · History of Knee Replacement   · History of Pacemaker Placement     The surgical history was reviewed and updated today        Family History   Father    · Family history of Coronary Artery Disease (V17 49)  Sister    · Family history of Diabetes Mellitus (V18 0)   · Family history of High cholesterol  Family History    · Family history of arthritis (V17 7) (Z82 61)   · Family history of hypertension (V17 49) (Z82 49)     The family history was reviewed and updated today        Social History    · Exercise: Walking   · 2 x/week   · Former smoker (V15 82) (Z87 891)   · No alcohol use   ·   The social history was reviewed and updated today       Physical Exam   Left Foot: Appearance: Normal except as noted: excessive pronation-- and-- pes planus  Great toe deformities include a bunion  Tenderness: None except the great toe-- and-- distal first metatarsal     Right Foot: Appearance: Normal except as noted: excessive pronation-- and-- pes planus  Great toe deformities include a bunion  Tenderness: None except the great toe,-- distal first metatarsal,-- medial calcaneous-- and-- insertion of the plantar fascia     Left Ankle: ROM: limited ROM in all planes    Right Ankle: ROM: limited ROM in all planes    Neurological Exam: Light touch was decreased bilaterally   Vibratory sensation was decreased in both first metatarsophalangeal joints     Vascular Exam: performed Dorsalis pedis pulses were diminished bilaterally  Posterior tibial pulses were diminished bilaterally  Elevation Pallor: present bilaterally  Dependence rubor was present bilaterally  Capillary refill time was greater than 3 seconds bilaterally-- and-- Q  9, findings bilateral  Edema: moderate bilaterally     Toenails: All of the toenails were elongated,-- hypertrophied,-- discolored-- and-- Right ptotic     Hyperkeratosis: present on both first toes,-- present on both first sub metatarsals-- and-- Positive xerosis of skin noted     Shoe Gear Evaluation: performed ()   Recommendation(s): SAS style-- and-- OTC inlays

## 2022-04-25 ENCOUNTER — TELEPHONE (OUTPATIENT)
Dept: CARDIOLOGY CLINIC | Facility: CLINIC | Age: 82
End: 2022-04-25

## 2022-04-25 DIAGNOSIS — I10 HYPERTENSION, UNSPECIFIED TYPE: ICD-10-CM

## 2022-04-25 RX ORDER — LOSARTAN POTASSIUM 50 MG/1
50 TABLET ORAL DAILY
Qty: 90 TABLET | Refills: 3 | Status: SHIPPED | OUTPATIENT
Start: 2022-04-25 | End: 2022-06-13 | Stop reason: SDUPTHER

## 2022-04-25 NOTE — TELEPHONE ENCOUNTER
Will need refills of the following meds:     losartan (COZAAR) 50 mg tablet    90-day supply    Would like a 90 day Rx going to  [facility]  @ [address] [place] [address]

## 2022-05-03 ENCOUNTER — CONSULT (OUTPATIENT)
Dept: PULMONOLOGY | Facility: CLINIC | Age: 82
End: 2022-05-03
Payer: COMMERCIAL

## 2022-05-03 VITALS
TEMPERATURE: 98.5 F | WEIGHT: 207 LBS | DIASTOLIC BLOOD PRESSURE: 90 MMHG | SYSTOLIC BLOOD PRESSURE: 178 MMHG | HEIGHT: 65 IN | OXYGEN SATURATION: 98 % | HEART RATE: 78 BPM | RESPIRATION RATE: 18 BRPM | BODY MASS INDEX: 34.49 KG/M2

## 2022-05-03 DIAGNOSIS — R91.1 LUNG NODULE: ICD-10-CM

## 2022-05-03 DIAGNOSIS — R05.3 CHRONIC COUGH: Primary | ICD-10-CM

## 2022-05-03 PROCEDURE — 1160F RVW MEDS BY RX/DR IN RCRD: CPT | Performed by: INTERNAL MEDICINE

## 2022-05-03 PROCEDURE — 3080F DIAST BP >= 90 MM HG: CPT | Performed by: INTERNAL MEDICINE

## 2022-05-03 PROCEDURE — 3077F SYST BP >= 140 MM HG: CPT | Performed by: INTERNAL MEDICINE

## 2022-05-03 PROCEDURE — 1036F TOBACCO NON-USER: CPT | Performed by: INTERNAL MEDICINE

## 2022-05-03 PROCEDURE — 99204 OFFICE O/P NEW MOD 45 MIN: CPT | Performed by: INTERNAL MEDICINE

## 2022-05-03 RX ORDER — ALBUTEROL SULFATE 90 UG/1
2 AEROSOL, METERED RESPIRATORY (INHALATION) EVERY 4 HOURS PRN
Qty: 18 G | Refills: 1 | Status: SHIPPED | OUTPATIENT
Start: 2022-05-03

## 2022-05-03 NOTE — PROGRESS NOTES
Pulmonary Consultation   Jerry Rodriguez 79 y o  female MRN: [id number]  5/3/2022    Assessment:    Lung nodule  I measure this as being closer to 7-8mm in greatest dimension  Therefore, will favor repeating the CT scan in 6 months; this corresponds with when the next CT is anticipated through Cardiology  Chronic cough  Given the findings on CT of micronodules/tree-in-bud opacities, I suspect she may have a chronic infection including potentially MAC  · Collect sputum cultures including AFB cultures  · Trial rescue inhaler    Plan:    Diagnoses and all orders for this visit:    Chronic cough  -     albuterol (Ventolin HFA) 90 mcg/act inhaler; Inhale 2 puffs every 4 (four) hours as needed for wheezing  -     Sputum culture and Gram stain; Future  -     AFB Culture and Stain; Future    Lung nodule  -     CT chest without contrast; Future    Follow up in 2-3 months  History of Present Illness   HPI:  Jerry Rodriguez is a 79 y o  female who presents for evaluation of a cough and mucus production  She reports that these symptoms have been present off-and-on for years  She will have a cough productive of green sputum which is usually responsive to antibiotics alone or sometimes antibiotics and steroids combined  Each course of antibiotics knocks the cough out for a few weeks after completion but it has a tendency to come back often  It is worst in the morning and just prior to going to bed, but before she lays down at night  She reports some shortness of breath in excess of what she considers normal   This is mild in intensity and waxes and wanes as well  She notes the dyspnea when walking rapidly or climbing a flight of stairs  She does not notice wheezing  She has a lung nodule initially measured at 5mm, but may be slightly larger    She does have a smoking history of about 30 pack years (37 years x <1 ppd), some secondhand smoke exposure, and she does have cancer history in her family, but no lung primary malignancies  She does not have significant occupational exposures, 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  She has no B-type symptoms to suggest active malignancy  Review of Systems   Constitutional: Negative for chills, diaphoresis, fatigue and unexpected weight change  Respiratory: Positive for cough and shortness of breath  Negative for wheezing  All other systems reviewed and are negative        Historical Information   Past Medical History:   Diagnosis Date    Arthritis     Atrial fibrillation ([address] )     Cardiac disease     Cataract     Gastro-esophageal reflux     GERD    GERD (gastroesophageal reflux disease)     Hypertension     Nasal congestion     Shingles     Sinusitis     Sleep apnea     Sleep difficulties      Past Surgical History:   Procedure Laterality Date    CARDIAC PACEMAKER PLACEMENT Left 2014    CATARACT EXTRACTION      CHOLECYSTECTOMY      resolved: 2009    COLONOSCOPY      Fiberoptic, resolved 2015    EYE SURGERY      KNEE SURGERY Left     left kknee replacement in 2009     Family History   Problem Relation Age of Onset    Coronary artery disease Father     Diabetes Sister     Hyperlipidemia Sister     Esophageal cancer Sister     Arthritis Family     Hypertension Family     Esophageal cancer Mother     Esophageal cancer Brother        Meds/Allergies     Current Outpatient Medications:     acetaminophen (TYLENOL ARTHRITIS PAIN) 650 mg CR tablet, Take by mouth, Disp: , Rfl:     benzonatate (TESSALON) 200 MG capsule, Take 1 capsule (200 mg total) by mouth 3 (three) times a day as needed for cough, Disp: 20 capsule, Rfl: 0    betamethasone dipropionate (DIPROSONE) 0 05 % cream, Apply topically 2 (two) times a day, Disp: 45 g, Rfl: 2    Calcium Carbonate-Vitamin D 600-200 MG-UNIT CAPS, Take by mouth 2 (two) times a day, Disp: , Rfl:     Calcium Polycarbophil (FIBERCON PO), Take by mouth 2 (two) times a day, Disp: , Rfl:     cefuroxime (CEFTIN) 500 mg tablet,  , Disp: , Rfl:     cholecalciferol (VITAMIN D3) 1,000 units tablet, Take 1,000 Units by mouth daily, Disp: , Rfl:     clobetasol (TEMOVATE) 0 05 % cream, , Disp: , Rfl:     Coenzyme Q10 (COQ-10) 200 MG CAPS, Take 2 capsules by mouth daily, Disp: , Rfl:     diphenhydrAMINE-acetaminophen (TYLENOL PM)  MG TABS, Take 1 tablet by mouth daily at bedtime as needed for sleep, Disp: , Rfl:     Eliquis 5 MG, take 1 tablet by mouth twice a day, Disp: 180 tablet, Rfl: 3    famotidine (PEPCID) 20 mg tablet, TAKE 1 TABLET BY MOUTH DAILY AT BEDTIME, Disp: 90 tablet, Rfl: 2    fluticasone (FLONASE) 50 mcg/act nasal spray, 2 sprays into each nostril daily, Disp: 16 g, Rfl: 6    furosemide (LASIX) 20 mg tablet, take 1 tablet by mouth once daily, Disp: 30 tablet, Rfl: 5    guaiFENesin (ROBITUSSIN) 100 MG/5ML oral liquid, Take 200 mg by mouth daily at bedtime  , Disp: , Rfl:     hydrocortisone 2 5 % cream, Apply topically 2 (two) times a day as needed for rash, Disp: 30 g, Rfl: 0    Lactobacillus (ACIDOPHILUS PO), Take by mouth, Disp: , Rfl:     losartan (COZAAR) 50 mg tablet, Take 1 tablet (50 mg total) by mouth daily, Disp: 90 tablet, Rfl: 3    Magnesium 400 MG TABS, Take by mouth daily, Disp: , Rfl:     metoprolol succinate (TOPROL-XL) 25 mg 24 hr tablet, take 1 tablet by mouth once daily, Disp: 90 tablet, Rfl: 3    Multiple Vitamins-Minerals (DAILY MULTIVITAMIN PO), Take 1 tablet by mouth daily, Disp: , Rfl:     pravastatin (PRAVACHOL) 10 mg tablet, take 1 tablet by mouth daily 3 TIMES A WEEK ON MONDAYS WEDNESDAYS AND FRIDAYS, Disp: 60 tablet, Rfl: 3    sotalol (BETAPACE) 80 mg tablet, take 1 tablet by mouth every 12 hours, Disp: 180 tablet, Rfl: 3    Vitamin Mixture (SHADE-C PO), Take 500 mg by mouth daily, Disp: , Rfl:     albuterol (Ventolin HFA) 90 mcg/act inhaler, Inhale 2 puffs every 4 (four) hours as needed for wheezing, Disp: 18 g, Rfl: 1    hydroxychloroquine (PLAQUENIL) 200 mg tablet, Take 1 tablet (200 mg total) by mouth daily with breakfast Administer with food or milk , Disp: 30 tablet, Rfl: 2    nitroglycerin (NITROSTAT) 0 4 mg SL tablet, Place 1 tablet (0 4 mg total) under the tongue every 5 (five) minutes as needed for chest pain If no relief after first dose call prescriber or 911 (Patient not taking: Reported on 3/14/2022 ), Disp: 25 tablet, Rfl: 0    pantoprazole (PROTONIX) 40 mg tablet, Take 1 tablet (40 mg total) by mouth daily Take 1/2 hour prior to breakfast daily, Disp: 90 tablet, Rfl: 2    valACYclovir (VALTREX) 500 mg tablet, Take 1 tablet (500 mg total) by mouth 3 (three) times a day for 7 days (Patient not taking: Reported on 12/14/2021 ), Disp: 21 tablet, Rfl: 0  Allergies   Allergen Reactions    Ciprofloxacin Rash    Sulfa Antibiotics Rash    Synvisc [Hylan G-F 20] Rash       Vitals: Blood pressure (!) 178/90, pulse 78, temperature 98 5 °F (36 9 °C), temperature source Tympanic, resp  rate 18, height 5' 5" (1 651 m), weight 93 9 kg (207 lb), SpO2 98 %  Body mass index is 34 45 kg/m²  Oxygen Therapy  SpO2: 98 %    Physical Exam  Physical Exam  Vitals reviewed  Constitutional:       General: She is not in acute distress  Appearance: Normal appearance  She is well-developed  She is not ill-appearing  HENT:      Head: Normocephalic and atraumatic  Eyes:      General: No scleral icterus  Conjunctiva/sclera: Conjunctivae normal    Neck:      Vascular: No JVD  Cardiovascular:      Rate and Rhythm: Normal rate and regular rhythm  Heart sounds: Normal heart sounds  No murmur heard  No friction rub  No gallop  Pulmonary:      Effort: Pulmonary effort is normal  No respiratory distress  Breath sounds: Normal breath sounds  No wheezing or rales  Musculoskeletal:      Cervical back: Neck supple  Right lower leg: No edema  Left lower leg: No edema  Skin:     General: Skin is warm and dry  Findings: No rash     Neurological: General: No focal deficit present  Mental Status: She is alert and oriented to person, place, and time  Mental status is at baseline  Psychiatric:         Mood and Affect: Mood normal          Behavior: Behavior normal      Labs: I have personally reviewed pertinent lab results  Lab Results   Component Value Date    WBC 3 97 (L) 10/07/2020    HGB 11 2 (L) 10/07/2020    HCT 34 5 (L) 10/07/2020    MCV 91 10/07/2020     (L) 10/07/2020     Lab Results   Component Value Date    CALCIUM 9 3 10/07/2020     09/10/2013    K 4 1 10/07/2020    CO2 29 10/07/2020     10/07/2020    BUN 26 (H) 10/07/2020    CREATININE 1 04 10/07/2020     No results found for: IGE  Lab Results   Component Value Date    ALT 24 10/07/2020    AST 16 10/07/2020    ALKPHOS 54 10/07/2020    BILITOT 0 3 09/10/2013       Imaging and other studies: I have personally reviewed pertinent films in PACS    EKG, Pathology, and Other Studies: I have personally reviewed pertinent reports  [facility]

## 2022-05-03 NOTE — ASSESSMENT & PLAN NOTE
I measure this as being closer to 7-8mm in greatest dimension  Therefore, will favor repeating the CT scan in 6 months; this corresponds with when the next CT is anticipated through Cardiology

## 2022-05-03 NOTE — ASSESSMENT & PLAN NOTE
Given the findings on CT of micronodules/tree-in-bud opacities, I suspect she may have a chronic infection including potentially MAC        · Collect sputum cultures including AFB cultures  · Trial rescue inhaler

## 2022-05-26 DIAGNOSIS — I50.32 CHRONIC DIASTOLIC CONGESTIVE HEART FAILURE (HCC): ICD-10-CM

## 2022-05-27 DIAGNOSIS — E78.5 DYSLIPIDEMIA: ICD-10-CM

## 2022-05-27 RX ORDER — FUROSEMIDE 20 MG/1
TABLET ORAL
Qty: 30 TABLET | Refills: 5 | Status: SHIPPED | OUTPATIENT
Start: 2022-05-27

## 2022-05-27 RX ORDER — PRAVASTATIN SODIUM 10 MG
TABLET ORAL
Qty: 60 TABLET | Refills: 3 | Status: SHIPPED | OUTPATIENT
Start: 2022-05-27

## 2022-06-13 ENCOUNTER — TELEPHONE (OUTPATIENT)
Dept: CARDIOLOGY CLINIC | Facility: CLINIC | Age: 82
End: 2022-06-13

## 2022-06-13 DIAGNOSIS — I10 HYPERTENSION, UNSPECIFIED TYPE: ICD-10-CM

## 2022-06-13 RX ORDER — LOSARTAN POTASSIUM 50 MG/1
50 TABLET ORAL 2 TIMES DAILY
Qty: 180 TABLET | Refills: 3 | Status: SHIPPED | OUTPATIENT
Start: 2022-06-13 | End: 2022-11-30

## 2022-06-13 NOTE — TELEPHONE ENCOUNTER
Patient called today and needs refill to [facility] for losartan is now taking it twice a day sees dr Yuli Jose only has enough for today and tomorrow please call once refill is done

## 2022-06-30 ENCOUNTER — OFFICE VISIT (OUTPATIENT)
Dept: PODIATRY | Facility: CLINIC | Age: 82
End: 2022-06-30
Payer: COMMERCIAL

## 2022-06-30 VITALS — BODY MASS INDEX: 34.49 KG/M2 | HEIGHT: 65 IN | RESPIRATION RATE: 17 BRPM | WEIGHT: 207 LBS

## 2022-06-30 DIAGNOSIS — M79.672 PAIN IN BOTH FEET: ICD-10-CM

## 2022-06-30 DIAGNOSIS — M79.671 PAIN IN BOTH FEET: ICD-10-CM

## 2022-06-30 DIAGNOSIS — L84 CORNS: ICD-10-CM

## 2022-06-30 DIAGNOSIS — I70.209 PERIPHERAL ARTERIOSCLEROSIS (HCC): Primary | ICD-10-CM

## 2022-06-30 PROCEDURE — 11056 PARNG/CUTG B9 HYPRKR LES 2-4: CPT | Performed by: PODIATRIST

## 2022-06-30 NOTE — PROGRESS NOTES
Assessment/Plan:  Pain   Radiculopathy   Callus   Mycotic toenail   Peripheral artery disease         Plan   Foot exam performed   All nails debrided   Calluses debrided   Procedures performed without pain or complication       Subjective   Patient has pain in her feet and toes with ambulation   No history of trauma        Discussion/Summary   The patient was counseled regarding instructions for management,-- patient and family education,-- risks and benefits of treatment options     Patient is able to Self-Care     Possible side effects of new medications were reviewed with the patient/guardian today  The treatment plan was reviewed with the patient/guardian  The patient/guardian understands and agrees with the treatment plan      Chief Complaint   Patient has foot pain   She has pain when she wears shoes   No history of trauma          History of Present Illness   HPI:  Patient complains of pain in feet with ambulation  She has pain around the toes  She is also concerned with pain in her right heel  This is been ongoing for several weeks  She has no history of trauma   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   Medina Roberts was unable tolerate gabapentin     Review of Systems           Cardiac: chest pain,-- rhythm problems,-- AM fatigue-- and-- witnessed apnea episodes       Skin: No complaints of nonhealing sores or skin rash       Genitourinary: loss of bladder control      Psychological: No complaints of feeling depressed, anxiety, panic attacks, or difficulty concentrating       General: trouble sleeping-- and-- lack of energy/fatigue       Respiratory: shortness of breath       HEENT: snoring      Gastrointestinal: heartburn      Hematologic: anemia      Neurological: daytime sleepiness      Musculoskeletal: arthritis-- and-- back pain      Active Problems   1  Allergic rhinitis (477 9) (J30 9)   2  Anxiety disorder (300 00) (F41 9)   3  Arthropathy (716 90) (M12 9)   4  Atherosclerosis of arteries of extremities (440 20) (I70 209)   5  Atrial fibrillation (427 31) (I48 91)   6  Backache (724 5) (M54 9)   7  Callus (700) (L84)   8  Cervicalgia (723 1) (M54 2)   9  Depression (311) (F32 9)   10  Difficulty in walking (719 7) (R26 2)   11  Encounter for screening for malignant neoplasm of colon (V76 51) (Z12 11)   12  Esophagitis, reflux (530 11) (K21 0)   13  Essential hypertension (401 9) (I10)   14  Foot pain, bilateral (729 5) (M79 671,M79 672)   15  Herpes zoster (053 9) (B02 9)   16  Hospital discharge follow-up (V67 59) (Z09)   17  Hyperlipidemia (272 4) (E78 5)   18  Impaired fasting glucose (790 21) (R73 01)   19  Internal Hemorrhoids (455 0)   20  Leg swelling (729 81) (S73 12)   21  Lichen planus (695 8) (L43 9)   22  Limb pain (729 5) (M79 609)   23  Lumbar radiculopathy (724 4) (M54 16)   24  Multiple joint pain (719 49) (M25 50)   25  Need for influenza vaccination (V04 81) (Z23)   26  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)   27  Onychomycosis (110 1) (B35 1)   28  MIGUEL (obstructive sleep apnea) (327 23) (G47 33)   29  Other abnormal finding of urine (791 9) (R82 99)   30  Pes planus, congenital (754 61) (Q66 50)   31  Plantar fascial fibromatosis (728 71) (M72 2)   32  Rectal/anal hemorrhage (569 3) (K62 5)   33  Screening for malignant neoplasm of cervix (V76 2) (Z12 4)   34  Shoulder joint pain, unspecified laterality   35  Thrombocytopenia (287 5) (D69 6)   36  Urticaria (708 9) (L50 9)   37  Vulvovaginitis candida albicans (112 1) (B37 3)     Past Medical History    · History of Acute maxillary sinusitis (461 0) (J01 00)   · History of Acute upper respiratory infection (465 9) (J06 9)   · History of Cellulitis (682 9) (L03 90)   · History of Cough (786 2) (R05)   · History of Dysuria (788 1) (R30 0)   · History of High cholesterol (272 0) (E78 00)   · History of abdominal pain (V13 89) (Y93 141)   · History of acute bronchitis (V12 69) (Z87 09)   · History of acute sinusitis (V12 69) (Z87 09)   · History of arthritis (V13 4) (Z87 39)   · History of atrial fibrillation (V12 59) (Z86 79)   · History of cataract (V12 49) (Z86 69)   · History of gastroesophageal reflux (GERD) (V12 79) (Z87 19)   · History of hypertension (V12 59) (Z86 79)   · History of Skin rash (782 1) (R21)   · History of Vulvovaginitis (616 10) (N76 0)     The active problems and past medical history were reviewed and updated today       Surgical History    · History of Cataract Surgery   · History of Colonoscopy (Fiberoptic) Screening   · History of Gallbladder Surgery   · History of Knee Replacement   · History of Pacemaker Placement     The surgical history was reviewed and updated today        Family History   Father    · Family history of Coronary Artery Disease (V17 49)  Sister    · Family history of Diabetes Mellitus (V18 0)   · Family history of High cholesterol  Family History    · Family history of arthritis (V17 7) (Z82 61)   · Family history of hypertension (V17 49) (Z82 49)     The family history was reviewed and updated today        Social History    · Exercise: Walking   · 2 x/week   · Former smoker (V15 82) (Z87 891)   · No alcohol use   ·   The social history was reviewed and updated today       Physical Exam   Left Foot: Appearance: Normal except as noted: excessive pronation-- and-- pes planus  Great toe deformities include a bunion  Tenderness: None except the great toe-- and-- distal first metatarsal     Right Foot: Appearance: Normal except as noted: excessive pronation-- and-- pes planus  Great toe deformities include a bunion  Tenderness: None except the great toe,-- distal first metatarsal,-- medial calcaneous-- and-- insertion of the plantar fascia     Left Ankle: ROM: limited ROM in all planes    Right Ankle: ROM: limited ROM in all planes    Neurological Exam: Light touch was decreased bilaterally   Vibratory sensation was decreased in both first metatarsophalangeal joints     Vascular Exam: performed Dorsalis pedis pulses were diminished bilaterally  Posterior tibial pulses were diminished bilaterally  Elevation Pallor: present bilaterally  Dependence rubor was present bilaterally  Capillary refill time was greater than 3 seconds bilaterally-- and-- Q  9, findings bilateral  Edema: moderate bilaterally     Toenails: All of the toenails were elongated,-- hypertrophied,-- discolored-- and-- Right ptotic     Hyperkeratosis: present on both first toes,-- present on both first sub metatarsals-- and-- Positive xerosis of skin noted     Shoe Gear Evaluation: performed ()   Recommendation(s): SAS style-- and-- OTC inlays

## 2022-07-05 ENCOUNTER — HOSPITAL ENCOUNTER (OUTPATIENT)
Dept: RADIOLOGY | Facility: HOSPITAL | Age: 82
Discharge: HOME | End: 2022-07-05
Attending: INTERNAL MEDICINE
Payer: COMMERCIAL

## 2022-07-05 DIAGNOSIS — R91.1 LUNG NODULE: ICD-10-CM

## 2022-07-05 PROCEDURE — 71250 CT THORAX DX C-: CPT

## 2022-07-13 ENCOUNTER — OFFICE VISIT (OUTPATIENT)
Dept: CARDIOLOGY CLINIC | Facility: CLINIC | Age: 82
End: 2022-07-13
Payer: COMMERCIAL

## 2022-07-13 VITALS
SYSTOLIC BLOOD PRESSURE: 124 MMHG | TEMPERATURE: 97.8 F | DIASTOLIC BLOOD PRESSURE: 80 MMHG | HEART RATE: 76 BPM | WEIGHT: 207 LBS | HEIGHT: 65 IN | OXYGEN SATURATION: 95 % | BODY MASS INDEX: 34.49 KG/M2

## 2022-07-13 DIAGNOSIS — I10 HYPERTENSION, UNSPECIFIED TYPE: ICD-10-CM

## 2022-07-13 DIAGNOSIS — I51.9 CARDIAC DISEASE: ICD-10-CM

## 2022-07-13 DIAGNOSIS — I48.0 PAROXYSMAL ATRIAL FIBRILLATION (HCC): Primary | ICD-10-CM

## 2022-07-13 DIAGNOSIS — I50.32 CHRONIC DIASTOLIC CONGESTIVE HEART FAILURE (HCC): ICD-10-CM

## 2022-07-13 PROCEDURE — 3074F SYST BP LT 130 MM HG: CPT | Performed by: INTERNAL MEDICINE

## 2022-07-13 PROCEDURE — 1160F RVW MEDS BY RX/DR IN RCRD: CPT | Performed by: INTERNAL MEDICINE

## 2022-07-13 PROCEDURE — 93000 ELECTROCARDIOGRAM COMPLETE: CPT | Performed by: INTERNAL MEDICINE

## 2022-07-13 PROCEDURE — 3079F DIAST BP 80-89 MM HG: CPT | Performed by: INTERNAL MEDICINE

## 2022-07-13 PROCEDURE — 99214 OFFICE O/P EST MOD 30 MIN: CPT | Performed by: INTERNAL MEDICINE

## 2022-07-13 NOTE — PROGRESS NOTES
Cardiology   Brown Magaña DO, Francisco Todd MD, Jian Sanabria MD, Annmarie Dela Cruz MD, [facility] [place]  -------------------------------------------------------------------  [facility]  [address], [facility],[address]  [place], [address] [place] [address], [address] [place] [address]  [phone]    Cardiology Follow Up  Aleksandr Carcamo  1940  [id number]          Assessment/Plan:    1  Cough/shortness of breath -  Echocardiogram was normal     - CT suggest possible atypical pneumonia  It does fit with pattern of improvement with antibiotics temporarily  There is no change with discontinuation of ACE-inhibitor   - she will have sputum culture done and follow-up with Pulmonary  2  Paroxysmal atrial fibrillation (HCC) - currently in sinus  Continue Eliquis and routine pacemaker clinic follow up  - Continue sotalol  QT interval normal   - She has intermittent episodes on pacemaker interrogation  3  Essential hypertension  - BP well controlled on current Rx  4  Mixed hyperlipidemia  - Last LDL was 78  -  Continue pravastatin  5  Chronic diastolic CHF - stable on lasix 20 mg daily  6  Thoracic aortic root aneurysm - CT chest showed thoracic aorta size of 4 4 cm which is unchanged from previous  Will have repeat CT scan ordered in 1 year       7  Moderate aortic regurgitation - 2D echocardiogram showed no significant change on recent study  Interval History:     Aleksandr Carcamo is 79 y o  female here for followup of atrial fibrillation and hypertension  Since her last visit, she underwent CT chest because of ongoing cough and shortness of breath  She has a known thoracic aortic aneurysm of 4 5 cm which was unchanged on the CT  CT suggested small airway disease which may be consistent with MAC or other atypical pneumonia  She is following with pulmonologist   Has not given sputum sample yet  She denies any chest pain    She was also switched from ramipril to losartan last visit to see if there was any change in cough  She has not felt any significant change  Losartan was increased to b i d  Because blood pressure was elevated  She had pacemaker interrogation done in 2022 which showed 99 percent atrial pacing and multiple short episodes of atrial fibrillation  Current medication regimen includes Eliquis, sotalol and metoprolol  The patient has a history of atrial fibrillation along with sick sinus syndrome and had a pacemaker [facility] at [facility] after developing multiple pauses  Past Medical History:   Diagnosis Date    Arthritis     Atrial fibrillation (HCC)     Cardiac disease     Cataract     Gastro-esophageal reflux     GERD    GERD (gastroesophageal reflux disease)     Hypertension     Nasal congestion     Shingles     Sinusitis     Sleep apnea     Sleep difficulties      Social History     Socioeconomic History    Marital status:       Spouse name: Not on file    Number of children: Not on file    Years of education: Not on file    Highest education level: Not on file   Occupational History    Not on file   Tobacco Use    Smoking status: Former Smoker     Packs/day: 1 00     Years: 25 00     Pack years: 25 00     Types: Cigarettes     Quit date: 0     Years since quittin 5    Smokeless tobacco: Never Used   Vaping Use    Vaping Use: Never used   Substance and Sexual Activity    Alcohol use: Yes     Comment: occasional, No alcohol use,per [employer]    Drug use: No    Sexual activity: Not on file   Other Topics Concern    Not on file   Social History Narrative    Exercise: Walking, 2x/week     Social Determinants of Health     Financial Resource Strain: Not on file   Food Insecurity: Not on file   Transportation Needs: Not on file   Physical Activity: Not on file   Stress: Not on file   Social Connections: Not on file   Intimate Partner Violence: Not on file   Housing Stability: Not on file      Family History   Problem Relation Age of Onset    Coronary artery disease Father     Diabetes Sister     Hyperlipidemia Sister     Esophageal cancer Sister     Arthritis Family     Hypertension Family     Esophageal cancer Mother     Esophageal cancer Brother      Past Surgical History:   Procedure Laterality Date    CARDIAC PACEMAKER PLACEMENT Left 2014    CATARACT EXTRACTION      CHOLECYSTECTOMY      resolved: 2009    COLONOSCOPY      Fiberoptic, resolved 2015    EYE SURGERY      KNEE SURGERY Left     left kknee replacement in 2009       Current Outpatient Medications:     acetaminophen (TYLENOL) 650 mg CR tablet, Take by mouth, Disp: , Rfl:     albuterol (Ventolin HFA) 90 mcg/act inhaler, Inhale 2 puffs every 4 (four) hours as needed for wheezing, Disp: 18 g, Rfl: 1    azelastine (ASTELIN) 0 1 % nasal spray, 1 spray into each nostril in the morning and 1 spray in the evening , Disp: 30 mL, Rfl: 6    betamethasone dipropionate (DIPROSONE) 0 05 % cream, Apply topically 2 (two) times a day, Disp: 45 g, Rfl: 2    Calcium Carbonate-Vitamin D 600-200 MG-UNIT CAPS, Take by mouth 2 (two) times a day, Disp: , Rfl:     Calcium Polycarbophil (FIBERCON PO), Take by mouth 2 (two) times a day, Disp: , Rfl:     cefuroxime (CEFTIN) 500 mg tablet,  , Disp: , Rfl:     cholecalciferol (VITAMIN D3) 1,000 units tablet, Take 1,000 Units by mouth daily, Disp: , Rfl:     clobetasol (TEMOVATE) 0 05 % cream, , Disp: , Rfl:     Coenzyme Q10 (COQ-10) 200 MG CAPS, Take 2 capsules by mouth daily, Disp: , Rfl:     diphenhydrAMINE-acetaminophen (TYLENOL PM)  MG TABS, Take 1 tablet by mouth daily at bedtime as needed for sleep, Disp: , Rfl:     Eliquis 5 MG, take 1 tablet by mouth twice a day, Disp: 180 tablet, Rfl: 3    famotidine (PEPCID) 20 mg tablet, TAKE 1 TABLET BY MOUTH DAILY AT BEDTIME, Disp: 90 tablet, Rfl: 2    fluticasone (FLONASE) 50 mcg/act nasal spray, 2 sprays into each nostril daily, Disp: 16 g, Rfl: 6    furosemide (LASIX) 20 mg tablet, take 1 tablet by mouth once daily, Disp: 30 tablet, Rfl: 5    guaiFENesin (ROBITUSSIN) 100 MG/5ML oral liquid, Take 200 mg by mouth daily at bedtime  , Disp: , Rfl:     hydrocortisone 2 5 % cream, Apply topically 2 (two) times a day as needed for rash, Disp: 30 g, Rfl: 0    Lactobacillus (ACIDOPHILUS PO), Take by mouth, Disp: , Rfl:     losartan (COZAAR) 50 mg tablet, Take 1 tablet (50 mg total) by mouth 2 (two) times a day, Disp: 180 tablet, Rfl: 3    Magnesium 400 MG TABS, Take by mouth daily, Disp: , Rfl:     metoprolol succinate (TOPROL-XL) 25 mg 24 hr tablet, take 1 tablet by mouth once daily, Disp: 90 tablet, Rfl: 3    Multiple Vitamins-Minerals (DAILY MULTIVITAMIN PO), Take 1 tablet by mouth daily, Disp: , Rfl:     pravastatin (PRAVACHOL) 10 mg tablet, take 1 tablet by mouth daily 3 TIMES A WEEK ON MONDAYS WEDNESDAYS AND FRIDAYS, Disp: 60 tablet, Rfl: 3    sotalol (BETAPACE) 80 mg tablet, take 1 tablet by mouth every 12 hours, Disp: 180 tablet, Rfl: 3    Vitamin Mixture (SHADE-C PO), Take 500 mg by mouth daily, Disp: , Rfl:     benzonatate (TESSALON) 200 MG capsule, Take 1 capsule (200 mg total) by mouth 3 (three) times a day as needed for cough (Patient not taking: Reported on 7/13/2022), Disp: 20 capsule, Rfl: 0    hydroxychloroquine (PLAQUENIL) 200 mg tablet, Take 1 tablet (200 mg total) by mouth daily with breakfast Administer with food or milk , Disp: 30 tablet, Rfl: 2    nitroglycerin (NITROSTAT) 0 4 mg SL tablet, Place 1 tablet (0 4 mg total) under the tongue every 5 (five) minutes as needed for chest pain If no relief after first dose call prescriber or 911 (Patient not taking: No sig reported), Disp: 25 tablet, Rfl: 0    pantoprazole (PROTONIX) 40 mg tablet, Take 1 tablet (40 mg total) by mouth daily Take 1/2 hour prior to breakfast daily, Disp: 90 tablet, Rfl: 2    valACYclovir (VALTREX) 500 mg tablet, Take 1 tablet (500 mg total) by mouth 3 (three) times a day for 7 days (Patient not taking: Reported on 12/14/2021 ), Disp: 21 tablet, Rfl: 0        Review of Systems:  Review of Systems   Constitutional: Positive for fatigue  Respiratory: Positive for cough and shortness of breath  Cardiovascular: Negative for chest pain, palpitations and leg swelling  Musculoskeletal: Positive for arthralgias  All other systems reviewed and are negative  Physical Exam:  Vitals:  Vitals:    07/13/22 1352   BP: 124/80   BP Location: Right arm   Patient Position: Sitting   Cuff Size: Large   Pulse: 76   Temp: 97 8 °F (36 6 °C)   SpO2: 95%   Weight: 93 9 kg (207 lb)   Height: 5' 5" (1 651 m)     Physical Exam   Constitutional: She appears healthy  No distress  Eyes: Pupils are equal, round, and reactive to light  Conjunctivae are normal    Neck: No JVD present  Cardiovascular: Normal rate, regular rhythm and normal heart sounds  Exam reveals no gallop and no friction rub  No murmur heard  Pulmonary/Chest: Effort normal  She has no rales  She has scattered wheezes  Musculoskeletal:         General: No tenderness, deformity or edema  Cervical back: Normal range of motion and neck supple  Neurological: She is alert and oriented to person, place, and time  Skin: Skin is warm and dry  Cardiographics:  EKG: Personally reviewed normal sinus rhythm with rate 75 beats per minute  Last known EF: 55-60%    This note was completed in part utilizing M-Modal Fluency Direct Software  Grammatical errors, random word insertions, spelling mistakes, and incomplete sentences can be an occasional consequence of this system secondary to software limitations, ambient noise, and hardware issues  If you have any questions or concerns about the content, text, or information contained within the body of this dictation, please contact the provider for clarification

## 2022-07-22 ENCOUNTER — REMOTE DEVICE CLINIC VISIT (OUTPATIENT)
Dept: CARDIOLOGY CLINIC | Facility: CLINIC | Age: 82
End: 2022-07-22
Payer: COMMERCIAL

## 2022-07-22 DIAGNOSIS — Z95.0 PRESENCE OF PERMANENT CARDIAC PACEMAKER: Primary | ICD-10-CM

## 2022-07-22 PROCEDURE — 93294 REM INTERROG EVL PM/LDLS PM: CPT | Performed by: INTERNAL MEDICINE

## 2022-07-22 PROCEDURE — 93296 REM INTERROG EVL PM/IDS: CPT | Performed by: INTERNAL MEDICINE

## 2022-07-22 NOTE — PROGRESS NOTES
Results for orders placed or performed in visit on 07/22/22   Cardiac EP device report    Narrative    MDT DUAL CHAMBER PM/ACTIVE SYSTEM IS MRI CONDITIONAL  CARELINK TRANSMISSION: BATTERY VOLTAGE ADEQUATE  (2 YRS) AP 98%  <1%  ALL AVAILABLE LEAD PARAMETERS WITHIN NORMAL LIMITS  241 AT/AF EPISODES DETECTED  LONGEST 34 MIN  NO SIGNIFICANT HIGH RATE EPISODES  PATIENT IS ON ELIQUIS, SOTALOL AND METOPROLOL SUCC  NORMAL DEVICE FUNCTION  ----VENEGAS

## 2022-08-18 ENCOUNTER — OFFICE VISIT (OUTPATIENT)
Dept: GASTROENTEROLOGY | Facility: CLINIC | Age: 82
End: 2022-08-18
Payer: COMMERCIAL

## 2022-08-18 VITALS
HEIGHT: 65 IN | SYSTOLIC BLOOD PRESSURE: 146 MMHG | DIASTOLIC BLOOD PRESSURE: 82 MMHG | BODY MASS INDEX: 34.49 KG/M2 | WEIGHT: 207 LBS | HEART RATE: 88 BPM

## 2022-08-18 DIAGNOSIS — K22.2 ESOPHAGEAL STRICTURE: ICD-10-CM

## 2022-08-18 DIAGNOSIS — K21.9 GASTROESOPHAGEAL REFLUX DISEASE WITHOUT ESOPHAGITIS: Primary | ICD-10-CM

## 2022-08-18 DIAGNOSIS — Z86.010 HX OF COLONIC POLYPS: ICD-10-CM

## 2022-08-18 PROBLEM — Z86.0100 HX OF COLONIC POLYPS: Status: ACTIVE | Noted: 2022-08-18

## 2022-08-18 PROCEDURE — 3079F DIAST BP 80-89 MM HG: CPT | Performed by: NURSE PRACTITIONER

## 2022-08-18 PROCEDURE — 3077F SYST BP >= 140 MM HG: CPT | Performed by: NURSE PRACTITIONER

## 2022-08-18 PROCEDURE — 99214 OFFICE O/P EST MOD 30 MIN: CPT | Performed by: NURSE PRACTITIONER

## 2022-08-18 PROCEDURE — 1160F RVW MEDS BY RX/DR IN RCRD: CPT | Performed by: NURSE PRACTITIONER

## 2022-08-18 NOTE — PROGRESS NOTES
[facility] - Outpatient Follow-up Note  Akin Marina 79 y o  female MRN: [id number]  Encounter: [id number]          ASSESSMENT AND PLAN:      1  Gastroesophageal reflux disease without esophagitis  Patient has history of GERD  Patient reports symptoms of GERD are well controlled on current medication  EGD done 11/15/2019 showed stenosis distal esophagus status post dilation, gastric polyps, and duodenal bulb polyps  -Patient refusing further evaluation with EGD at present time   -Will readdress need for EGD on follow-up visit  -Continue anti-reflux diet and measures  -Continue pantoprazole 40 mg daily in a m   -Continue famotidine 20 mg daily at bedtime    2  Hx of colonic polyps  Patient has history of colon polyps  Last colonoscopy done in 2019 showed sessile serrated polyps and tubular adenoma     -Patient stated due to her age she does not want to have any further colonoscopies done     -Will readdress colonoscopy on follow-up visit  3  Esophageal stenosis  Patient has history of esophageal stenosis  Patient had dilation of esophageal stenosis in 2019  Patient denies any dysphagia or difficulty swallowing    -Patient instructed to call GI office if she experiences any GI issues  Follow-up in 1 year  ______________________________________________________________________    SUBJECTIVE:  This is an 16-year-old female who presents to office for follow-up visit  Patient had EGD in 2019 for stenosis distal esophagus status post dilation with 15 5 balloon  Gastric polyps and duodenal polyps removed  Recommendations were for repeat EGD in 1 year  Biopsy showed benign fundic gland polyps in the gastric body  The walled no bald polypoid portions of chronically inflamed duodenum mucosa showing areas of gastric metaplasia 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    No adenoma or malignancy identified Patient continues on PPI and famotidine with control of symptoms from GERD  Patient denies nausea, vomiting, acid reflux, heartburn, dysphagia, epigastric or abdominal pain  Patient reports she only gets rare episodes of heartburn if she eats something spicy  Patient also has a history of colon polyps  Patient is deferring colonoscopy of present time  Patient states due to her age she does not want have any further colonoscopies done  Patient denies any blood in stool, blood from rectal area, or black tarry stool  Patient is a former smoker  Positive family history of esophageal cancer mother and brother  REVIEW OF SYSTEMS IS OTHERWISE NEGATIVE        Historical Information   Past Medical History:   Diagnosis Date    A-fib ([place] [id number] )     Arthritis     Atrial fibrillation (HCC)     Cardiac disease     Cataract     Colon polyp     Gastro-esophageal reflux     GERD    GERD (gastroesophageal reflux disease)     Hyperlipidemia     Hypertension     Nasal congestion     Primary generalized (osteo)arthritis     Rheumatoid arthritis ([place] [address] )     Shingles     Sinusitis     Sleep apnea     Sleep difficulties      Past Surgical History:   Procedure Laterality Date    CARDIAC PACEMAKER PLACEMENT Left     CATARACT EXTRACTION      CHOLECYSTECTOMY      resolved:     COLONOSCOPY      Fiberoptic, resolved     EYE SURGERY      KNEE SURGERY Left     left kknee replacement in     UPPER GASTROINTESTINAL ENDOSCOPY       Social History   Social History     Substance and Sexual Activity   Alcohol Use Yes    Comment: occasional, No alcohol use,per [employer]     Social History     Substance and Sexual Activity   Drug Use Never     Social History     Tobacco Use   Smoking Status Former Smoker    Packs/day: 1 00    Years: 25 00    Pack years: 25 00    Types: Cigarettes    Quit date: 0    Years since quittin 6   Smokeless Tobacco Never Used     Family History   Problem Relation Age of Onset    Esophageal cancer Mother     Coronary artery disease Father     Diabetes Sister     Hyperlipidemia Sister     Pancreatic cancer Sister     Esophageal cancer Brother     Arthritis Family     Hypertension Family        Meds/Allergies       Current Outpatient Medications:     acetaminophen (TYLENOL) 650 mg CR tablet    amitriptyline (ELAVIL) 10 mg tablet    azelastine (ASTELIN) 0 1 % nasal spray    betamethasone dipropionate (DIPROSONE) 0 05 % cream    Calcium Carbonate-Vitamin D 600-200 MG-UNIT CAPS    Calcium Polycarbophil (FIBERCON PO)    cholecalciferol (VITAMIN D3) 1,000 units tablet    clobetasol (TEMOVATE) 0 05 % cream    Coenzyme Q10 (COQ-10) 200 MG CAPS    diphenhydrAMINE-acetaminophen (TYLENOL PM)  MG TABS    Eliquis 5 MG    famotidine (PEPCID) 20 mg tablet    fluticasone (FLONASE) 50 mcg/act nasal spray    Fluticasone Propionate, Inhal, (Flovent Diskus) 100 MCG/BLIST AEPB    guaiFENesin (ROBITUSSIN) 100 MG/5ML oral liquid    hydroxychloroquine (PLAQUENIL) 200 mg tablet    Lactobacillus (ACIDOPHILUS PO)    losartan (COZAAR) 50 mg tablet    Magnesium 400 MG TABS    metoprolol succinate (TOPROL-XL) 25 mg 24 hr tablet    Multiple Vitamins-Minerals (DAILY MULTIVITAMIN PO)    nitroglycerin (NITROSTAT) 0 4 mg SL tablet    pantoprazole (PROTONIX) 40 mg tablet    pravastatin (PRAVACHOL) 10 mg tablet    sotalol (BETAPACE) 80 mg tablet    Vitamin Mixture (SHADE-C PO)    furosemide (LASIX) 20 mg tablet    Allergies   Allergen Reactions    Ciprofloxacin Rash    Sulfa Antibiotics Rash    Synvisc [Hylan G-F 20] Rash           Objective     Blood pressure 146/82, pulse 88, height 5' 5" (1 651 m), weight 93 9 kg (207 lb)  Body mass index is 34 45 kg/m²        PHYSICAL EXAM:      General Appearance:   Alert, cooperative, no distress   HEENT:   Normocephalic, atraumatic, anicteric      Neck:  Supple, symmetrical, trachea midline   Lungs:   Clear to auscultation bilaterally; no rales, rhonchi or wheezing; respirations unlabored    Heart[de-identified]   Regular rate and rhythm; no murmur, rub, or gallop  Abdomen:   Soft, non-tender, non-distended; normal bowel sounds; no masses, no organomegaly    Genitalia:   Deferred    Rectal:   Deferred    Extremities:  No cyanosis, clubbing or edema    Pulses:  2+ and symmetric    Skin:  No jaundice, rashes, or lesions    Lymph nodes:  No palpable cervical lymphadenopathy        Lab Results:   No visits with results within 1 Day(s) from this visit  Latest known visit with results is:   Office Visit on 11/24/2021   Component Date Value    SARS-CoV-2 11/24/2021 Negative     INFLUENZA A PCR 11/24/2021 Negative     INFLUENZA B PCR 11/24/2021 Negative     RSV PCR 11/24/2021 Negative          Radiology Results:   Cardiac EP device report    Result Date: 7/22/2022  Narrative: MDT DUAL CHAMBER PM/ACTIVE SYSTEM IS MRI CONDITIONAL CARELINK TRANSMISSION: BATTERY VOLTAGE ADEQUATE  (2 YRS) AP 98%  <1%  ALL AVAILABLE LEAD PARAMETERS WITHIN NORMAL LIMITS  241 AT/AF EPISODES DETECTED  LONGEST 34 MIN  NO SIGNIFICANT HIGH RATE EPISODES  PATIENT IS ON ELIQUIS, SOTALOL AND METOPROLOL SUCC  NORMAL DEVICE FUNCTION  ----VENEGAS

## 2022-09-01 ENCOUNTER — OFFICE VISIT (OUTPATIENT)
Dept: PODIATRY | Facility: CLINIC | Age: 82
End: 2022-09-01
Payer: COMMERCIAL

## 2022-09-01 VITALS
SYSTOLIC BLOOD PRESSURE: 146 MMHG | WEIGHT: 207 LBS | HEART RATE: 88 BPM | DIASTOLIC BLOOD PRESSURE: 82 MMHG | BODY MASS INDEX: 34.49 KG/M2 | RESPIRATION RATE: 16 BRPM | HEIGHT: 65 IN

## 2022-09-01 DIAGNOSIS — M79.671 PAIN IN BOTH FEET: ICD-10-CM

## 2022-09-01 DIAGNOSIS — I70.209 PERIPHERAL ARTERIOSCLEROSIS (HCC): Primary | ICD-10-CM

## 2022-09-01 DIAGNOSIS — L84 CORNS: ICD-10-CM

## 2022-09-01 DIAGNOSIS — M79.672 PAIN IN BOTH FEET: ICD-10-CM

## 2022-09-01 PROCEDURE — 11056 PARNG/CUTG B9 HYPRKR LES 2-4: CPT | Performed by: PODIATRIST

## 2022-09-01 NOTE — PROGRESS NOTES
Assessment/Plan:  Pain   Radiculopathy   Callus   Mycotic toenail   Peripheral artery disease         Plan   Foot exam performed   All nails debrided   Calluses debrided   Procedures performed without pain or complication       Subjective   Patient has pain in her feet and toes with ambulation   No history of trauma        Discussion/Summary   The patient was counseled regarding instructions for management,-- patient and family education,-- risks and benefits of treatment options     Patient is able to Self-Care     Possible side effects of new medications were reviewed with the patient/guardian today  The treatment plan was reviewed with the patient/guardian  The patient/guardian understands and agrees with the treatment plan      Chief Complaint   Patient has foot pain   She has pain when she wears shoes   No history of trauma          History of Present Illness   HPI:  Patient complains of pain in feet with ambulation  She has pain around the toes  She is also concerned with pain in her right heel  This is been ongoing for several weeks  She has no history of trauma   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   Sheral Mallet was unable tolerate gabapentin     Review of Systems           Cardiac: chest pain,-- rhythm problems,-- AM fatigue-- and-- witnessed apnea episodes       Skin: No complaints of nonhealing sores or skin rash       Genitourinary: loss of bladder control      Psychological: No complaints of feeling depressed, anxiety, panic attacks, or difficulty concentrating       General: trouble sleeping-- and-- lack of energy/fatigue       Respiratory: shortness of breath       HEENT: snoring      Gastrointestinal: heartburn      Hematologic: anemia      Neurological: daytime sleepiness      Musculoskeletal: arthritis-- and-- back pain      Active Problems   1  Allergic rhinitis (477 9) (J30 9)   2  Anxiety disorder (300 00) (F41 9)   3  Arthropathy (716 90) (M12 9)   4  Atherosclerosis of arteries of extremities (440 20) (I70 209)   5  Atrial fibrillation (427 31) (I48 91)   6  Backache (724 5) (M54 9)   7  Callus (700) (L84)   8  Cervicalgia (723 1) (M54 2)   9  Depression (311) (F32 9)   10  Difficulty in walking (719 7) (R26 2)   11  Encounter for screening for malignant neoplasm of colon (V76 51) (Z12 11)   12  Esophagitis, reflux (530 11) (K21 0)   13  Essential hypertension (401 9) (I10)   14  Foot pain, bilateral (729 5) (M79 671,M79 672)   15  Herpes zoster (053 9) (B02 9)   16  Hospital discharge follow-up (V67 59) (Z09)   17  Hyperlipidemia (272 4) (E78 5)   18  Impaired fasting glucose (790 21) (R73 01)   19  Internal Hemorrhoids (455 0)   20  Leg swelling (729 81) (I51 67)   21  Lichen planus (384 2) (L43 9)   22  Limb pain (729 5) (M79 609)   23  Lumbar radiculopathy (724 4) (M54 16)   24  Multiple joint pain (719 49) (M25 50)   25  Need for influenza vaccination (V04 81) (Z23)   26  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)   27  Onychomycosis (110 1) (B35 1)   28  MIGUEL (obstructive sleep apnea) (327 23) (G47 33)   29  Other abnormal finding of urine (791 9) (R82 99)   30  Pes planus, congenital (754 61) (Q66 50)   31  Plantar fascial fibromatosis (728 71) (M72 2)   32  Rectal/anal hemorrhage (569 3) (K62 5)   33  Screening for malignant neoplasm of cervix (V76 2) (Z12 4)   34  Shoulder joint pain, unspecified laterality   35  Thrombocytopenia (287 5) (D69 6)   36  Urticaria (708 9) (L50 9)   37  Vulvovaginitis candida albicans (112 1) (B37 3)     Past Medical History    · History of Acute maxillary sinusitis (461 0) (J01 00)   · History of Acute upper respiratory infection (465 9) (J06 9)   · History of Cellulitis (682 9) (L03 90)   · History of Cough (786 2) (R05)   · History of Dysuria (788 1) (R30 0)   · History of High cholesterol (272 0) (E78 00)   · History of abdominal pain (V13 89) (A97 533)   · History of acute bronchitis (V12 69) (Z87 09)   · History of acute sinusitis (V12 69) (Z87 09)   · History of arthritis (V13 4) (Z87 39)   · History of atrial fibrillation (V12 59) (Z86 79)   · History of cataract (V12 49) (Z86 69)   · History of gastroesophageal reflux (GERD) (V12 79) (Z87 19)   · History of hypertension (V12 59) (Z86 79)   · History of Skin rash (782 1) (R21)   · History of Vulvovaginitis (616 10) (N76 0)     The active problems and past medical history were reviewed and updated today       Surgical History    · History of Cataract Surgery   · History of Colonoscopy (Fiberoptic) Screening   · History of Gallbladder Surgery   · History of Knee Replacement   · History of Pacemaker Placement     The surgical history was reviewed and updated today        Family History   Father    · Family history of Coronary Artery Disease (V17 49)  Sister    · Family history of Diabetes Mellitus (V18 0)   · Family history of High cholesterol  Family History    · Family history of arthritis (V17 7) (Z82 61)   · Family history of hypertension (V17 49) (Z82 49)     The family history was reviewed and updated today        Social History    · Exercise: Walking   · 2 x/week   · Former smoker (V15 82) (Z87 891)   · No alcohol use   ·   The social history was reviewed and updated today       Physical Exam   Left Foot: Appearance: Normal except as noted: excessive pronation-- and-- pes planus  Great toe deformities include a bunion  Tenderness: None except the great toe-- and-- distal first metatarsal     Right Foot: Appearance: Normal except as noted: excessive pronation-- and-- pes planus  Great toe deformities include a bunion  Tenderness: None except the great toe,-- distal first metatarsal,-- medial calcaneous-- and-- insertion of the plantar fascia     Left Ankle: ROM: limited ROM in all planes    Right Ankle: ROM: limited ROM in all planes    Neurological Exam: Light touch was decreased bilaterally   Vibratory sensation was decreased in both first metatarsophalangeal joints     Vascular Exam: performed Dorsalis pedis pulses were diminished bilaterally  Posterior tibial pulses were diminished bilaterally  Elevation Pallor: present bilaterally  Dependence rubor was present bilaterally  Capillary refill time was greater than 3 seconds bilaterally-- and-- Q  9, findings bilateral  Edema: moderate bilaterally     Toenails: All of the toenails were elongated,-- hypertrophied,-- discolored-- and-- Right ptotic     Hyperkeratosis: present on both first toes,-- present on both first sub metatarsals-- and-- Positive xerosis of skin noted     Shoe Gear Evaluation: performed ()   Recommendation(s): SAS style-- and-- OTC inlays

## 2022-09-15 ENCOUNTER — OFFICE VISIT (OUTPATIENT)
Dept: SLEEP CENTER | Facility: CLINIC | Age: 82
End: 2022-09-15
Payer: COMMERCIAL

## 2022-09-15 VITALS
HEIGHT: 65 IN | SYSTOLIC BLOOD PRESSURE: 132 MMHG | DIASTOLIC BLOOD PRESSURE: 82 MMHG | BODY MASS INDEX: 34.05 KG/M2 | WEIGHT: 204.4 LBS

## 2022-09-15 DIAGNOSIS — I48.0 PAROXYSMAL ATRIAL FIBRILLATION (HCC): ICD-10-CM

## 2022-09-15 DIAGNOSIS — G47.33 OSA (OBSTRUCTIVE SLEEP APNEA): Primary | ICD-10-CM

## 2022-09-15 DIAGNOSIS — I50.32 CHRONIC DIASTOLIC CONGESTIVE HEART FAILURE (HCC): ICD-10-CM

## 2022-09-15 DIAGNOSIS — R06.02 SHORTNESS OF BREATH: ICD-10-CM

## 2022-09-15 DIAGNOSIS — G47.09 OTHER INSOMNIA: ICD-10-CM

## 2022-09-15 DIAGNOSIS — G47.34 SLEEP RELATED HYPOXIA: ICD-10-CM

## 2022-09-15 DIAGNOSIS — E66.9 OBESITY (BMI 30-39.9): ICD-10-CM

## 2022-09-15 DIAGNOSIS — I10 ESSENTIAL HYPERTENSION: ICD-10-CM

## 2022-09-15 PROCEDURE — 99214 OFFICE O/P EST MOD 30 MIN: CPT | Performed by: INTERNAL MEDICINE

## 2022-09-15 PROCEDURE — 1160F RVW MEDS BY RX/DR IN RCRD: CPT | Performed by: INTERNAL MEDICINE

## 2022-09-15 NOTE — PATIENT INSTRUCTIONS

## 2022-09-15 NOTE — PROGRESS NOTES
Follow-Up Note - Sleep Center   Yumiko Gaxiola  79 y o  female  SFB:[id number]  ALEX:[id number]  DOS:9/15/2022    CC: I saw this patient for follow-up in clinic today for Sleep disordered breathing, Coexisting Sleep and Medical Problems  Using a dream Station version 2 machine that she got several months ago  Results of a split study in 2019: The diagnostic portion demonstrated: AHI of 60 per hour,  Intermittent snoring of moderate intensity was noted  Minimum oxygen saturation was 53 % and 62 minutes of total sleep time during this portion of the study was spent with saturations less than 90%  During the therapeutic portion of the study, his sleep disordered breathing was partially remediated with nasal CPAP at 15 cm H2O together with supplemental oxygen at 2 liters/minute  The AHI at this setting was 9 6 per hour  PFSH, Problem List, Medications & Allergies were reviewed in EMR  Interval changes: none reported  She  has a past medical history of A-fib ([facility] [address] ), Arthritis, Atrial fibrillation ([facility] [address] ), Cardiac disease, Cataract, Colon polyp, Gastro-esophageal reflux, GERD (gastroesophageal reflux disease), Hyperlipidemia, Hypertension, Nasal congestion, Primary generalized (osteo)arthritis, Rheumatoid arthritis ([facility] [address] ), Shingles, Sinusitis, Sleep apnea, and Sleep difficulties  She has a current medication list which includes the following prescription(s): acetaminophen, amitriptyline, azelastine, betamethasone dipropionate, calcium carbonate-vitamin d, calcium polycarbophil, cholecalciferol, clobetasol, coq-10, diphenhydramine-acetaminophen, eliquis, famotidine, fluticasone, furosemide, guaifenesin, lactobacillus, losartan, magnesium, metoprolol succinate, multiple vitamins-minerals, nitroglycerin, pantoprazole, pravastatin, sotalol, bioflavonoid products, flovent diskus, and hydroxychloroquine      PHYSIOLOGICAL DATA REVIEW AND INTERPRETATION:    No data was available, but she reports regular use of the device for > 4 hours/night  DME provider notes data is not being transmitted because apparently she is not transferred the modem based on previous data download, sleep disordered breathing was adequately controlled with auto titrating Pap 15-18 cm H2O; Patient has not been using ozone based devices to sanitize the machine  SUBJECTIVE: Regarding use of PAP, Yumiko reports:   · some adverse effects: dry mouth/throat and mask leaks ; has not noticed any fibres or foreign material in air line  · She is benefiting from use: sleeping better   Sleep Routine: Yumiko reports getting 8 hrs sleep  ; she has no difficulty initiating or maintaining sleep   She arises spontaneously and feels refreshed  Katerina Settle denies Excessive Daytime Sleepiness, dozes off when sedentary at home  She rated herself at Total score: 3 /24 on the [place] Sleepiness Scale  Habits: reports that she quit smoking about 24 years ago  Her smoking use included cigarettes  She has a 25 00 pack-year smoking history  She has never used smokeless tobacco ,  reports current alcohol use ,  reports no history of drug use , Caffeine use:limited ; Exercise routine: sometimes       ROS: reviewed & as attached  [Significant for few lb weight reduction  She has nasal symptoms due to allergies, occasional wheezing, frequent cough and some shortness of breath  She has occasional palpitations  EXAM: /82   Ht 5' 5" (1 651 m)   Wt 92 7 kg (204 lb 6 4 oz)   BMI 34 01 kg/m²     Wt Readings from Last 3 Encounters:   09/15/22 92 7 kg (204 lb 6 4 oz)   09/01/22 93 9 kg (207 lb)   08/18/22 93 9 kg (207 lb)      Patient is well groomed; well appearing  CNS: Alert, orientated, clear & coherent speech  Psych: cooperativeand in no distress  Mental state:appears normal   H&N: EOMI; NC/AT:no facial pressure marks, no rashes      Skin/Extrem: col & hydration normal; no edema  Resp: Respiratory effort is normal  Physical findings otherwise essentially unchanged from previous  IMPRESSION: Problem List Items & Comorbidities Addressed this Visit    1  MIGUEL (obstructive sleep apnea)  PAP DME Resupply/Reorder   2  Sleep related hypoxia     3  Other insomnia     4  Shortness of breath     5  Chronic diastolic congestive heart failure ([address] )     6  Essential hypertension     7  Paroxysmal atrial fibrillation (HCC)     8  Obesity (BMI 30-39  9)         PLAN:  1  I reviewed results of prior studies and physiologic data with the patient  2  I discussed treatment options with risks and benefits  3  Treatment with  PAP is medically necessary and Yumiko is agreable to continue use  4  Care of equipment, methods to improve comfort using PAP and importance of compliance with therapy were discussed  5  Pressure setting: continue 15-18 cmH2O     6  Rx provided to replace  supplies and Care coordinated with DME provider to check her machine for data transmission  7  She is under care of Cardiology and Pulmonary  8  Discussed strategies for weight reduction  9  Follow-up is advised in 1 year or sooner if needed to monitor progress, compliance and to adjust therapy  Thank you for allowing me to participate in the care of this patient  Sincerely,     Authenticated electronically on 11/49/68   Board Certified Specialist     Portions of the record may have been created with voice recognition software  Occasional wrong word or "sound a like" substitutions may have occurred due to the inherent limitations of voice recognition software  There may also be notations and random deletions of words or characters from malfunctioning software  Read the chart carefully and recognize, using context, where substitutions/deletions have occurred

## 2022-09-15 NOTE — PROGRESS NOTES
Review of Systems      Genitourinary none   Cardiology palpitations/fluttering feeling in the chest   Gastrointestinal none   Neurology numbness/tingling of an extremity   Constitutional none   Integumentary none   Psychiatry none   Musculoskeletal joint pain, back pain and sciatica   Pulmonary shortness of breath with activity and wheezing   ENT throat clearing and ringing in ears   Endocrine none   Hematological none

## 2022-09-16 ENCOUNTER — TELEPHONE (OUTPATIENT)
Dept: SLEEP CENTER | Facility: CLINIC | Age: 82
End: 2022-09-16

## 2022-10-21 ENCOUNTER — REMOTE DEVICE CLINIC VISIT (OUTPATIENT)
Dept: CARDIOLOGY CLINIC | Facility: CLINIC | Age: 82
End: 2022-10-21
Payer: COMMERCIAL

## 2022-10-21 DIAGNOSIS — Z95.0 PRESENCE OF PERMANENT CARDIAC PACEMAKER: Primary | ICD-10-CM

## 2022-10-21 PROCEDURE — 93294 REM INTERROG EVL PM/LDLS PM: CPT | Performed by: INTERNAL MEDICINE

## 2022-10-21 PROCEDURE — 93296 REM INTERROG EVL PM/IDS: CPT | Performed by: INTERNAL MEDICINE

## 2022-10-21 NOTE — PROGRESS NOTES
Results for orders placed or performed in visit on 10/21/22   Cardiac EP device report    Narrative    MDT DUAL CHAMBER PM/ACTIVE SYSTEM IS MRI CONDITIONAL  CARELINK TRANSMISSION: BATTERY VOLTAGE ADEQUATE  (1 5 YRS) AP 99%  <1%  ALL AVAILABLE LEAD PARAMETERS WITHIN NORMAL LIMITS  45 AT/AF EPISODES DETECTED  LONGEST 63 MIN  NO SIGNIFICANT HIGH RATE EPISODES  PATIENT IS ON ELIQUIS, SOTALOL AND METOPROLOL SUCC  NORMAL DEVICE FUNCTION  ----VENEGAS

## 2022-10-30 DIAGNOSIS — K21.9 GASTROESOPHAGEAL REFLUX DISEASE WITHOUT ESOPHAGITIS: ICD-10-CM

## 2022-10-30 RX ORDER — FAMOTIDINE 20 MG/1
TABLET, FILM COATED ORAL
Qty: 90 TABLET | Refills: 2 | Status: SHIPPED | OUTPATIENT
Start: 2022-10-30

## 2022-11-03 ENCOUNTER — OFFICE VISIT (OUTPATIENT)
Dept: PODIATRY | Facility: CLINIC | Age: 82
End: 2022-11-03

## 2022-11-03 VITALS
SYSTOLIC BLOOD PRESSURE: 132 MMHG | RESPIRATION RATE: 17 BRPM | WEIGHT: 204 LBS | BODY MASS INDEX: 33.99 KG/M2 | HEIGHT: 65 IN | DIASTOLIC BLOOD PRESSURE: 82 MMHG

## 2022-11-03 DIAGNOSIS — M79.672 PAIN IN BOTH FEET: ICD-10-CM

## 2022-11-03 DIAGNOSIS — L84 CORNS: ICD-10-CM

## 2022-11-03 DIAGNOSIS — M79.671 PAIN IN BOTH FEET: ICD-10-CM

## 2022-11-03 DIAGNOSIS — I70.209 PERIPHERAL ARTERIOSCLEROSIS (HCC): Primary | ICD-10-CM

## 2022-11-03 NOTE — PROGRESS NOTES
Assessment/Plan:  Pain   Radiculopathy   Callus   Mycotic toenail   Peripheral artery disease         Plan   Foot exam performed   All nails debrided   Calluses debrided   Procedures performed without pain or complication       Subjective   Patient has pain in her feet and toes with ambulation   No history of trauma        Discussion/Summary   The patient was counseled regarding instructions for management,-- patient and family education,-- risks and benefits of treatment options     Patient is able to Self-Care     Possible side effects of new medications were reviewed with the patient/guardian today  The treatment plan was reviewed with the patient/guardian  The patient/guardian understands and agrees with the treatment plan      Chief Complaint   Patient has foot pain   She has pain when she wears shoes   No history of trauma          History of Present Illness   HPI:  Patient complains of pain in feet with ambulation  She has pain around the toes  She is also concerned with pain in her right heel  This is been ongoing for several weeks  She has no history of trauma   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   Everrett Gerald was unable tolerate gabapentin     Review of Systems           Cardiac: chest pain,-- rhythm problems,-- AM fatigue-- and-- witnessed apnea episodes       Skin: No complaints of nonhealing sores or skin rash       Genitourinary: loss of bladder control      Psychological: No complaints of feeling depressed, anxiety, panic attacks, or difficulty concentrating       General: trouble sleeping-- and-- lack of energy/fatigue       Respiratory: shortness of breath       HEENT: snoring      Gastrointestinal: heartburn      Hematologic: anemia      Neurological: daytime sleepiness      Musculoskeletal: arthritis-- and-- back pain      Active Problems   1  Allergic rhinitis (477 9) (J30 9)   2  Anxiety disorder (300 00) (F41 9)   3  Arthropathy (716 90) (M12 9)   4  Atherosclerosis of arteries of extremities (440 20) (I70 209)   5  Atrial fibrillation (427 31) (I48 91)   6  Backache (724 5) (M54 9)   7  Callus (700) (L84)   8  Cervicalgia (723 1) (M54 2)   9  Depression (311) (F32 9)   10  Difficulty in walking (719 7) (R26 2)   11  Encounter for screening for malignant neoplasm of colon (V76 51) (Z12 11)   12  Esophagitis, reflux (530 11) (K21 0)   13  Essential hypertension (401 9) (I10)   14  Foot pain, bilateral (729 5) (M79 671,M79 672)   15  Herpes zoster (053 9) (B02 9)   16  Hospital discharge follow-up (V67 59) (Z09)   17  Hyperlipidemia (272 4) (E78 5)   18  Impaired fasting glucose (790 21) (R73 01)   19  Internal Hemorrhoids (455 0)   20  Leg swelling (729 81) (M78 49)   21  Lichen planus (304 5) (L43 9)   22  Limb pain (729 5) (M79 609)   23  Lumbar radiculopathy (724 4) (M54 16)   24  Multiple joint pain (719 49) (M25 50)   25  Need for influenza vaccination (V04 81) (Z23)   26  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)   27  Onychomycosis (110 1) (B35 1)   28  MIGUEL (obstructive sleep apnea) (327 23) (G47 33)   29  Other abnormal finding of urine (791 9) (R82 99)   30  Pes planus, congenital (754 61) (Q66 50)   31  Plantar fascial fibromatosis (728 71) (M72 2)   32  Rectal/anal hemorrhage (569 3) (K62 5)   33  Screening for malignant neoplasm of cervix (V76 2) (Z12 4)   34  Shoulder joint pain, unspecified laterality   35  Thrombocytopenia (287 5) (D69 6)   36  Urticaria (708 9) (L50 9)   37  Vulvovaginitis candida albicans (112 1) (B37 3)     Past Medical History    · History of Acute maxillary sinusitis (461 0) (J01 00)   · History of Acute upper respiratory infection (465 9) (J06 9)   · History of Cellulitis (682 9) (L03 90)   · History of Cough (786 2) (R05)   · History of Dysuria (788 1) (R30 0)   · History of High cholesterol (272 0) (E78 00)   · History of abdominal pain (V13 89) (I55 715)   · History of acute bronchitis (V12 69) (Z87 09)   · History of acute sinusitis (V12 69) (Z87 09)   · History of arthritis (V13 4) (Z87 39)   · History of atrial fibrillation (V12 59) (Z86 79)   · History of cataract (V12 49) (Z86 69)   · History of gastroesophageal reflux (GERD) (V12 79) (Z87 19)   · History of hypertension (V12 59) (Z86 79)   · History of Skin rash (782 1) (R21)   · History of Vulvovaginitis (616 10) (N76 0)     The active problems and past medical history were reviewed and updated today       Surgical History    · History of Cataract Surgery   · History of Colonoscopy (Fiberoptic) Screening   · History of Gallbladder Surgery   · History of Knee Replacement   · History of Pacemaker Placement     The surgical history was reviewed and updated today        Family History   Father    · Family history of Coronary Artery Disease (V17 49)  Sister    · Family history of Diabetes Mellitus (V18 0)   · Family history of High cholesterol  Family History    · Family history of arthritis (V17 7) (Z82 61)   · Family history of hypertension (V17 49) (Z82 49)     The family history was reviewed and updated today        Social History    · Exercise: Walking   · 2 x/week   · Former smoker (V15 82) (Z87 891)   · No alcohol use   ·   The social history was reviewed and updated today       Physical Exam   Left Foot: Appearance: Normal except as noted: excessive pronation-- and-- pes planus  Great toe deformities include a bunion  Tenderness: None except the great toe-- and-- distal first metatarsal     Right Foot: Appearance: Normal except as noted: excessive pronation-- and-- pes planus  Great toe deformities include a bunion  Tenderness: None except the great toe,-- distal first metatarsal,-- medial calcaneous-- and-- insertion of the plantar fascia     Left Ankle: ROM: limited ROM in all planes    Right Ankle: ROM: limited ROM in all planes    Neurological Exam: Light touch was decreased bilaterally   Vibratory sensation was decreased in both first metatarsophalangeal joints     Vascular Exam: performed Dorsalis pedis pulses were diminished bilaterally  Posterior tibial pulses were diminished bilaterally  Elevation Pallor: present bilaterally  Dependence rubor was present bilaterally  Capillary refill time was greater than 3 seconds bilaterally-- and-- Q  9, findings bilateral  Edema: moderate bilaterally     Toenails: All of the toenails were elongated,-- hypertrophied,-- discolored-- and-- Right ptotic     Hyperkeratosis: present on both first toes,-- present on both first sub metatarsals-- and-- Positive xerosis of skin noted     Shoe Gear Evaluation: performed ()   Recommendation(s): SAS style-- and-- OTC inlays

## 2022-11-07 DIAGNOSIS — K21.9 GASTROESOPHAGEAL REFLUX DISEASE WITHOUT ESOPHAGITIS: ICD-10-CM

## 2022-11-07 RX ORDER — PANTOPRAZOLE SODIUM 40 MG/1
TABLET, DELAYED RELEASE ORAL
Qty: 90 TABLET | Refills: 0 | Status: SHIPPED | OUTPATIENT
Start: 2022-11-07

## 2022-11-08 ENCOUNTER — APPOINTMENT (OUTPATIENT)
Dept: LAB | Facility: CLINIC | Age: 82
End: 2022-11-08

## 2022-11-08 DIAGNOSIS — R05.3 CHRONIC COUGH: ICD-10-CM

## 2022-11-12 LAB
BACTERIA SPT RESP CULT: ABNORMAL
GRAM STN SPEC: ABNORMAL

## 2022-11-17 LAB
MYCOBACTERIUM SPEC CULT: NORMAL
RHODAMINE-AURAMINE STN SPEC: NORMAL

## 2022-11-18 ENCOUNTER — OFFICE VISIT (OUTPATIENT)
Dept: PULMONOLOGY | Facility: CLINIC | Age: 82
End: 2022-11-18

## 2022-11-18 VITALS
HEIGHT: 65 IN | TEMPERATURE: 97.3 F | BODY MASS INDEX: 33.19 KG/M2 | HEART RATE: 76 BPM | DIASTOLIC BLOOD PRESSURE: 70 MMHG | SYSTOLIC BLOOD PRESSURE: 118 MMHG | WEIGHT: 199.2 LBS | OXYGEN SATURATION: 95 %

## 2022-11-18 DIAGNOSIS — R05.3 CHRONIC COUGH: Primary | ICD-10-CM

## 2022-11-18 RX ORDER — CEFUROXIME AXETIL 500 MG/1
500 TABLET ORAL EVERY 12 HOURS SCHEDULED
Qty: 28 TABLET | Refills: 0 | Status: SHIPPED | OUTPATIENT
Start: 2022-11-18 | End: 2022-12-02

## 2022-11-19 NOTE — ASSESSMENT & PLAN NOTE
Her cough is really no different than baseline  Her sputum culture did grow multiple bacteria, and she does note that she has had improvement in years past when receiving courses of antibiotics  This combination should be susceptible to Ceftin, which I prescribed for her  We are still waiting on atypical mycobacterial cultures, which will take up to six weeks to come back  I do not recommend treatment if identified, but ultimately that is a decision she will have to make

## 2022-11-19 NOTE — PROGRESS NOTES
Pulmonary Follow Up Note   Merlin Chi 79 y o  female MRN: [id number]  11/19/2022    Assessment:    Chronic cough  Her cough is really no different than baseline  Her sputum culture did grow multiple bacteria, and she does note that she has had improvement in years past when receiving courses of antibiotics  This combination should be susceptible to Ceftin, which I prescribed for her  We are still waiting on atypical mycobacterial cultures, which will take up to six weeks to come back  I do not recommend treatment if identified, but ultimately that is a decision she will have to make  Plan:    Diagnoses and all orders for this visit:    Chronic cough  -     cefuroxime (CEFTIN) 500 mg tablet; Take 1 tablet (500 mg total) by mouth every 12 (twelve) hours for 28 doses      Return in about 3 months (around 2/18/2023)  History of Present Illness   HPI:  Merlin Chi is a 79 y o  female who presents for routine follow-up  Her cough is unchanged, still a persistent complaint, and remains typically worse 1st thing in the morning  She again confirms that she has had response to courses of antibiotics in the past which is not durable, unfortunately recurring a few weeks after completion of therapy  She has seen ear nose and throat who felt that she had some non seasonal or allergic rhinitis and started her on Astelin nasal spray  She has not reported significant benefit from this so far  She was then later put on amitriptyline at bedtime, but this caused significant side effects and she discontinued that medication immediately  The addition of Flovent by her Ear Nose and Throat specialist also provided no benefit  Review of Systems   Respiratory: Positive for cough and shortness of breath  All other systems reviewed and are negative      Historical Information   Past Medical History:   Diagnosis Date   • A-fib [facility])    • Arthritis    • Atrial fibrillation [facility])    • Cardiac disease    • Cataract    • Colon polyp    • Gastro-esophageal reflux     GERD   • GERD (gastroesophageal reflux disease)    • Hyperlipidemia    • Hypertension    • Nasal congestion    • Primary generalized (osteo)arthritis    • Rheumatoid arthritis ([address] )    • Shingles    • Sinusitis    • Sleep apnea    • Sleep difficulties      Past Surgical History:   Procedure Laterality Date   • CARDIAC PACEMAKER PLACEMENT Left 2014   • CATARACT EXTRACTION     • CHOLECYSTECTOMY      resolved: 2009   • COLONOSCOPY      Fiberoptic, resolved 2015   • EYE SURGERY     • KNEE SURGERY Left     left kknee replacement in 2009   • UPPER GASTROINTESTINAL ENDOSCOPY       Family History   Problem Relation Age of Onset   • Esophageal cancer Mother    • Coronary artery disease Father    • Diabetes Sister    • Hyperlipidemia Sister    • Pancreatic cancer Sister    • Esophageal cancer Brother    • Arthritis Family    • Hypertension Family        Meds/Allergies     Current Outpatient Medications:   •  acetaminophen (TYLENOL) 650 mg CR tablet, Take by mouth, Disp: , Rfl:   •  azelastine (ASTELIN) 0 1 % nasal spray, 1 spray into each nostril in the morning and 1 spray in the evening , Disp: 30 mL, Rfl: 6  •  betamethasone dipropionate (DIPROSONE) 0 05 % cream, Apply topically 2 (two) times a day, Disp: 45 g, Rfl: 2  •  Calcium Carbonate-Vitamin D 600-200 MG-UNIT CAPS, Take by mouth 2 (two) times a day, Disp: , Rfl:   •  Calcium Polycarbophil (FIBERCON PO), Take by mouth 2 (two) times a day, Disp: , Rfl:   •  cefuroxime (CEFTIN) 500 mg tablet, Take 1 tablet (500 mg total) by mouth every 12 (twelve) hours for 28 doses, Disp: 28 tablet, Rfl: 0  •  cholecalciferol (VITAMIN D3) 1,000 units tablet, Take 1,000 Units by mouth daily, Disp: , Rfl:   •  clobetasol (TEMOVATE) 0 05 % cream, , Disp: , Rfl:   •  Coenzyme Q10 (COQ-10) 200 MG CAPS, Take 2 capsules by mouth daily, Disp: , Rfl:   •  diphenhydrAMINE-acetaminophen (TYLENOL PM)  MG TABS, Take 1 tablet by mouth daily at bedtime as needed for sleep, Disp: , Rfl:   •  Eliquis 5 MG, take 1 tablet by mouth twice a day, Disp: 180 tablet, Rfl: 3  •  famotidine (PEPCID) 20 mg tablet, TAKE 1 TABLET BY MOUTH DAILY AT BEDTIME, Disp: 90 tablet, Rfl: 2  •  fluticasone (FLONASE) 50 mcg/act nasal spray, 2 sprays into each nostril daily, Disp: 16 g, Rfl: 6  •  furosemide (LASIX) 20 mg tablet, take 1 tablet by mouth once daily, Disp: 30 tablet, Rfl: 5  •  guaiFENesin (ROBITUSSIN) 100 MG/5ML oral liquid, Take 200 mg by mouth daily at bedtime  , Disp: , Rfl:   •  Lactobacillus (ACIDOPHILUS PO), Take by mouth, Disp: , Rfl:   •  losartan (COZAAR) 50 mg tablet, Take 1 tablet (50 mg total) by mouth 2 (two) times a day, Disp: 180 tablet, Rfl: 3  •  Magnesium 400 MG TABS, Take by mouth daily, Disp: , Rfl:   •  metoprolol succinate (TOPROL-XL) 25 mg 24 hr tablet, take 1 tablet by mouth once daily, Disp: 90 tablet, Rfl: 3  •  Multiple Vitamins-Minerals (DAILY MULTIVITAMIN PO), Take 1 tablet by mouth daily, Disp: , Rfl:   •  nitroglycerin (NITROSTAT) 0 4 mg SL tablet, Place 1 tablet (0 4 mg total) under the tongue every 5 (five) minutes as needed for chest pain If no relief after first dose call prescriber or 911, Disp: 25 tablet, Rfl: 0  •  pantoprazole (PROTONIX) 40 mg tablet, take 1 tablet by mouth once daily 30 MINUTES PRIOR TO BREAKFAST, Disp: 90 tablet, Rfl: 0  •  pravastatin (PRAVACHOL) 10 mg tablet, take 1 tablet by mouth daily 3 TIMES A WEEK ON MONDAYS WEDNESDAYS AND FRIDAYS, Disp: 60 tablet, Rfl: 3  •  sotalol (BETAPACE) 80 mg tablet, take 1 tablet by mouth every 12 hours, Disp: 180 tablet, Rfl: 3  •  Vitamin Mixture (SHADE-C PO), Take 500 mg by mouth daily, Disp: , Rfl:   •  hydroxychloroquine (PLAQUENIL) 200 mg tablet, Take 1 tablet (200 mg total) by mouth daily with breakfast Administer with food or milk , Disp: 30 tablet, Rfl: 2  Allergies   Allergen Reactions   • Ciprofloxacin Rash   • Sulfa Antibiotics Rash   • Synvisc [Hylan G-F 20] Rash Vitals: Blood pressure 118/70, pulse 76, temperature (!) 97 3 °F (36 3 °C), height 5' 5" (1 651 m), weight 90 4 kg (199 lb 3 2 oz), SpO2 95 %  Body mass index is 33 15 kg/m²  Oxygen Therapy  SpO2: 95 %  Oxygen Therapy: None (Room air)    Physical Exam  Physical Exam  Vitals reviewed  Constitutional:       General: She is not in acute distress  Appearance: Normal appearance  She is well-developed  She is not ill-appearing  HENT:      Head: Normocephalic and atraumatic  Eyes:      General: No scleral icterus  Conjunctiva/sclera: Conjunctivae normal    Neck:      Vascular: No JVD  Cardiovascular:      Rate and Rhythm: Normal rate and regular rhythm  Heart sounds: Normal heart sounds  No murmur heard  No friction rub  No gallop  Pulmonary:      Effort: Pulmonary effort is normal  No respiratory distress  Breath sounds: Normal breath sounds  No wheezing or rales  Musculoskeletal:      Cervical back: Neck supple  Right lower leg: No edema  Left lower leg: No edema  Skin:     General: Skin is warm and dry  Findings: No rash  Neurological:      General: No focal deficit present  Mental Status: She is alert and oriented to person, place, and time  Mental status is at baseline  Psychiatric:         Mood and Affect: Mood normal          Behavior: Behavior normal          Labs: I have personally reviewed pertinent lab results    Lab Results   Component Value Date    WBC 3 97 (L) 10/07/2020    HGB 11 2 (L) 10/07/2020    HCT 34 5 (L) 10/07/2020    MCV 91 10/07/2020     (L) 10/07/2020     Lab Results   Component Value Date    CALCIUM 9 3 10/07/2020     09/10/2013    K 4 1 10/07/2020    CO2 29 10/07/2020     10/07/2020    BUN 26 (H) 10/07/2020    CREATININE 1 04 10/07/2020     No results found for: IGE  Lab Results   Component Value Date    ALT 24 10/07/2020    AST 16 10/07/2020    ALKPHOS 54 10/07/2020    BILITOT 0 3 09/10/2013       Imaging and other studies: I have personally reviewed relevant films in PACS  EKG, Pathology, and Other Studies: I have personally reviewed relevant reports in [facility]

## 2022-11-22 LAB — MYCOBACTERIUM SPEC CULT: NORMAL

## 2022-11-28 ENCOUNTER — TELEPHONE (OUTPATIENT)
Dept: CARDIOLOGY CLINIC | Facility: CLINIC | Age: 82
End: 2022-11-28

## 2022-11-28 NOTE — TELEPHONE ENCOUNTER
Patient c/o awful taste in her mouth when she takes Losartan  Makes her feel nauseous  Wants to know what she can do or if the medication can be switched to something else  Please advise

## 2022-11-29 LAB — MYCOBACTERIUM SPEC CULT: NORMAL

## 2022-11-30 DIAGNOSIS — I10 HYPERTENSION, UNSPECIFIED TYPE: Primary | ICD-10-CM

## 2022-11-30 RX ORDER — VALSARTAN 80 MG/1
80 TABLET ORAL 2 TIMES DAILY
Qty: 60 TABLET | Refills: 5 | Status: SHIPPED | OUTPATIENT
Start: 2022-11-30 | End: 2023-06-01

## 2022-12-06 LAB — MYCOBACTERIUM SPEC CULT: NORMAL

## 2022-12-11 DIAGNOSIS — I50.32 CHRONIC DIASTOLIC CONGESTIVE HEART FAILURE (HCC): ICD-10-CM

## 2022-12-12 RX ORDER — FUROSEMIDE 20 MG/1
TABLET ORAL
Qty: 30 TABLET | Refills: 5 | Status: SHIPPED | OUTPATIENT
Start: 2022-12-12

## 2022-12-13 LAB — MYCOBACTERIUM SPEC CULT: NORMAL

## 2022-12-20 LAB — MYCOBACTERIUM SPEC CULT: NORMAL

## 2022-12-27 LAB — MYCOBACTERIUM SPEC CULT: NORMAL

## 2023-01-03 LAB — MYCOBACTERIUM SPEC CULT: NORMAL

## 2023-01-17 ENCOUNTER — OFFICE VISIT (OUTPATIENT)
Dept: PODIATRY | Facility: CLINIC | Age: 83
End: 2023-01-17

## 2023-01-17 VITALS
DIASTOLIC BLOOD PRESSURE: 70 MMHG | RESPIRATION RATE: 17 BRPM | HEIGHT: 65 IN | WEIGHT: 199 LBS | BODY MASS INDEX: 33.15 KG/M2 | SYSTOLIC BLOOD PRESSURE: 118 MMHG

## 2023-01-17 DIAGNOSIS — M79.672 PAIN IN BOTH FEET: ICD-10-CM

## 2023-01-17 DIAGNOSIS — I70.209 PERIPHERAL ARTERIOSCLEROSIS (HCC): Primary | ICD-10-CM

## 2023-01-17 DIAGNOSIS — M79.671 PAIN IN BOTH FEET: ICD-10-CM

## 2023-01-17 DIAGNOSIS — L84 CORNS: ICD-10-CM

## 2023-01-17 NOTE — PROGRESS NOTES
Assessment/Plan:  Pain   Radiculopathy   Callus   Mycotic toenail   Peripheral artery disease         Plan   Foot exam performed   All nails debrided   Calluses debrided   Procedures performed without pain or complication       Subjective   Patient has pain in her feet and toes with ambulation   No history of trauma        Discussion/Summary   The patient was counseled regarding instructions for management,-- patient and family education,-- risks and benefits of treatment options     Patient is able to Self-Care     Possible side effects of new medications were reviewed with the patient/guardian today  The treatment plan was reviewed with the patient/guardian  The patient/guardian understands and agrees with the treatment plan      Chief Complaint   Patient has foot pain   She has pain when she wears shoes   No history of trauma          History of Present Illness   HPI:  Patient complains of pain in feet with ambulation  She has pain around the toes  She is also concerned with pain in her right heel  This is been ongoing for several weeks  She has no history of trauma   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   Rozanna Frankel was unable tolerate gabapentin     Review of Systems           Cardiac: chest pain,-- rhythm problems,-- AM fatigue-- and-- witnessed apnea episodes       Skin: No complaints of nonhealing sores or skin rash       Genitourinary: loss of bladder control      Psychological: No complaints of feeling depressed, anxiety, panic attacks, or difficulty concentrating       General: trouble sleeping-- and-- lack of energy/fatigue       Respiratory: shortness of breath       HEENT: snoring      Gastrointestinal: heartburn      Hematologic: anemia      Neurological: daytime sleepiness      Musculoskeletal: arthritis-- and-- back pain      Active Problems   1  Allergic rhinitis (477 9) (J30 9)   2  Anxiety disorder (300 00) (F41 9)   3  Arthropathy (716 90) (M12 9)   4  Atherosclerosis of arteries of extremities (440 20) (I70 209)   5  Atrial fibrillation (427 31) (I48 91)   6  Backache (724 5) (M54 9)   7  Callus (700) (L84)   8  Cervicalgia (723 1) (M54 2)   9  Depression (311) (F32 9)   10  Difficulty in walking (719 7) (R26 2)   11  Encounter for screening for malignant neoplasm of colon (V76 51) (Z12 11)   12  Esophagitis, reflux (530 11) (K21 0)   13  Essential hypertension (401 9) (I10)   14  Foot pain, bilateral (729 5) (M79 671,M79 672)   15  Herpes zoster (053 9) (B02 9)   16  Hospital discharge follow-up (V67 59) (Z09)   17  Hyperlipidemia (272 4) (E78 5)   18  Impaired fasting glucose (790 21) (R73 01)   19  Internal Hemorrhoids (455 0)   20  Leg swelling (729 81) (N30 09)   21  Lichen planus (893 7) (L43 9)   22  Limb pain (729 5) (M79 609)   23  Lumbar radiculopathy (724 4) (M54 16)   24  Multiple joint pain (719 49) (M25 50)   25  Need for influenza vaccination (V04 81) (Z23)   26  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)   27  Onychomycosis (110 1) (B35 1)   28  MIGUEL (obstructive sleep apnea) (327 23) (G47 33)   29  Other abnormal finding of urine (791 9) (R82 99)   30  Pes planus, congenital (754 61) (Q66 50)   31  Plantar fascial fibromatosis (728 71) (M72 2)   32  Rectal/anal hemorrhage (569 3) (K62 5)   33  Screening for malignant neoplasm of cervix (V76 2) (Z12 4)   34  Shoulder joint pain, unspecified laterality   35  Thrombocytopenia (287 5) (D69 6)   36  Urticaria (708 9) (L50 9)   37  Vulvovaginitis candida albicans (112 1) (B37 3)     Past Medical History    · History of Acute maxillary sinusitis (461 0) (J01 00)   · History of Acute upper respiratory infection (465 9) (J06 9)   · History of Cellulitis (682 9) (L03 90)   · History of Cough (786 2) (R05)   · History of Dysuria (788 1) (R30 0)   · History of High cholesterol (272 0) (E78 00)   · History of abdominal pain (V13 89) (T71 431)   · History of acute bronchitis (V12 69) (Z87 09)   · History of acute sinusitis (V12 69) (Z87 09)   · History of arthritis (V13 4) (Z87 39)   · History of atrial fibrillation (V12 59) (Z86 79)   · History of cataract (V12 49) (Z86 69)   · History of gastroesophageal reflux (GERD) (V12 79) (Z87 19)   · History of hypertension (V12 59) (Z86 79)   · History of Skin rash (782 1) (R21)   · History of Vulvovaginitis (616 10) (N76 0)     The active problems and past medical history were reviewed and updated today       Surgical History    · History of Cataract Surgery   · History of Colonoscopy (Fiberoptic) Screening   · History of Gallbladder Surgery   · History of Knee Replacement   · History of Pacemaker Placement     The surgical history was reviewed and updated today        Family History   Father    · Family history of Coronary Artery Disease (V17 49)  Sister    · Family history of Diabetes Mellitus (V18 0)   · Family history of High cholesterol  Family History    · Family history of arthritis (V17 7) (Z82 61)   · Family history of hypertension (V17 49) (Z82 49)     The family history was reviewed and updated today        Social History    · Exercise: Walking   · 2 x/week   · Former smoker (V15 82) (Z87 891)   · No alcohol use   ·   The social history was reviewed and updated today       Physical Exam   Left Foot: Appearance: Normal except as noted: excessive pronation-- and-- pes planus  Great toe deformities include a bunion  Tenderness: None except the great toe-- and-- distal first metatarsal     Right Foot: Appearance: Normal except as noted: excessive pronation-- and-- pes planus  Great toe deformities include a bunion  Tenderness: None except the great toe,-- distal first metatarsal,-- medial calcaneous-- and-- insertion of the plantar fascia     Left Ankle: ROM: limited ROM in all planes    Right Ankle: ROM: limited ROM in all planes    Neurological Exam: Light touch was decreased bilaterally   Vibratory sensation was decreased in both first metatarsophalangeal joints     Vascular Exam: performed Dorsalis pedis pulses were diminished bilaterally  Posterior tibial pulses were diminished bilaterally  Elevation Pallor: present bilaterally  Dependence rubor was present bilaterally  Capillary refill time was greater than 3 seconds bilaterally-- and-- Q  9, findings bilateral  Edema: moderate bilaterally     Toenails: All of the toenails were elongated,-- hypertrophied,-- discolored-- and-- Right ptotic     Hyperkeratosis: present on both first toes,-- present on both first sub metatarsals-- and-- Positive xerosis of skin noted     Shoe Gear Evaluation: performed ()   Recommendation(s): SAS style-- and-- OTC inlays

## 2023-01-23 ENCOUNTER — OFFICE VISIT (OUTPATIENT)
Dept: CARDIOLOGY CLINIC | Facility: CLINIC | Age: 83
End: 2023-01-23

## 2023-01-23 VITALS
TEMPERATURE: 98 F | HEART RATE: 76 BPM | HEIGHT: 65 IN | OXYGEN SATURATION: 98 % | SYSTOLIC BLOOD PRESSURE: 120 MMHG | DIASTOLIC BLOOD PRESSURE: 70 MMHG | WEIGHT: 197 LBS | BODY MASS INDEX: 32.82 KG/M2

## 2023-01-23 DIAGNOSIS — Z95.0 PRESENCE OF PERMANENT CARDIAC PACEMAKER: ICD-10-CM

## 2023-01-23 DIAGNOSIS — I77.810 ASCENDING AORTA DILATION (HCC): ICD-10-CM

## 2023-01-23 DIAGNOSIS — I51.9 CARDIAC DISEASE: Chronic | ICD-10-CM

## 2023-01-23 DIAGNOSIS — I48.0 PAROXYSMAL ATRIAL FIBRILLATION (HCC): ICD-10-CM

## 2023-01-23 DIAGNOSIS — I50.32 CHRONIC DIASTOLIC CONGESTIVE HEART FAILURE (HCC): Primary | ICD-10-CM

## 2023-01-23 DIAGNOSIS — I10 PRIMARY HYPERTENSION: ICD-10-CM

## 2023-01-23 RX ORDER — NITROGLYCERIN 0.4 MG/1
0.4 TABLET SUBLINGUAL
Qty: 25 TABLET | Refills: 0 | Status: SHIPPED | OUTPATIENT
Start: 2023-01-23

## 2023-01-23 NOTE — PROGRESS NOTES
Cardiology   Winn Pallas, DO, Fanta Escoto MD, Penny Fleming MD, Michael Saunders MD, [facility] [place]  -------------------------------------------------------------------  [facility] ORTHOPEDIC [facility]  [address], [address] [facility], [address] [place] [address], [address] [facility] [place]  [phone]    Cardiology Follow Up  Vero Espinosa  1940  [id number]          Assessment/Plan:    1  Cough/shortness of breath -  Echocardiogram was normal     - CT suggest possible atypical pneumonia  It does fit with pattern of improvement with antibiotics temporarily  There was no change with discontinuation of ACE-inhibitor   - she will continue to follow-up with Pulmonary  2  Paroxysmal atrial fibrillation (HCC) - currently in sinus rhythm  Continue Eliquis and routine pacemaker clinic follow up  - Continue sotalol  QT interval normal   - She has intermittent episodes on pacemaker interrogation  3  Essential hypertension  - BP well controlled on current Rx  4  Mixed hyperlipidemia  - Last LDL was 78  -  Continue pravastatin  5  Chronic diastolic CHF - stable on lasix 20 mg daily  6  Thoracic aortic root aneurysm - CT chest showed thoracic aorta size of 4 4 cm which is unchanged from previous  Will have repeat CT scan in August 2023       7  Moderate aortic regurgitation - 2D echocardiogram showed no significant change on recent study  Interval History:     Vero Espinosa is 79 y o  female here for followup of atrial fibrillation and hypertension  Since her last visit, she continues to undergo work-up for atypical pneumonia  She has been treated with additional course of antibiotics  She did feel better but then symptoms returned afterwards  She denies any chest pain, lower extremity edema, orthopnea or paroxysmal nocturnal dyspnea  Her last CT scan was in July 2022  She has a known thoracic aortic aneurysm of 4 5 cm which was unchanged on the CT    CT suggested small airway disease which may be consistent with MAC or other atypical pneumonia  She was also switched from ramipril to valsartan to see if there was any change in cough  She has not felt any significant change  She had pacemaker interrogation done in 2022 which showed 99 percent atrial pacing and multiple short episodes of atrial fibrillation  Current medication regimen includes Eliquis, sotalol and metoprolol  The patient has a history of atrial fibrillation along with sick sinus syndrome and had a pacemaker [facility] at [facility] after developing multiple pauses  Past Medical History:   Diagnosis Date   • A-fib [facility])    • Arthritis    • Atrial fibrillation [facility])    • Cardiac disease    • Cataract    • Colon polyp    • Gastro-esophageal reflux     GERD   • GERD (gastroesophageal reflux disease)    • Hyperlipidemia    • Hypertension    • Nasal congestion    • Primary generalized (osteo)arthritis    • Rheumatoid arthritis (HCC)    • Shingles    • Sinusitis    • Sleep apnea    • Sleep difficulties      Social History     Socioeconomic History   • Marital status:       Spouse name: Not on file   • Number of children: Not on file   • Years of education: Not on file   • Highest education level: Not on file   Occupational History   • Not on file   Tobacco Use   • Smoking status: Former     Packs/day: 1 00     Years: 25 00     Pack years: 25 00     Types: Cigarettes     Quit date: 0     Years since quittin 0   • Smokeless tobacco: Never   Vaping Use   • Vaping Use: Never used   Substance and Sexual Activity   • Alcohol use: Yes     Comment: occasional, No alcohol use,per [employer]   • Drug use: Never   • Sexual activity: Not on file   Other Topics Concern   • Not on file   Social History Narrative    Exercise: Walking, 2x/week     Social Determinants of Health     Financial Resource Strain: Not on file   Food Insecurity: Not on file   Transportation Needs: Not on file   Physical Activity: Not on file   Stress: Not on file   Social Connections: Not on file   Intimate Partner Violence: Not on file   Housing Stability: Not on file      Family History   Problem Relation Age of Onset   • Esophageal cancer Mother    • Coronary artery disease Father    • Diabetes Sister    • Hyperlipidemia Sister    • Pancreatic cancer Sister    • Esophageal cancer Brother    • Arthritis Family    • Hypertension Family      Past Surgical History:   Procedure Laterality Date   • CARDIAC PACEMAKER PLACEMENT Left 2014   • CATARACT EXTRACTION     • CHOLECYSTECTOMY      resolved: 2009   • COLONOSCOPY      Fiberoptic, resolved 2015   • EYE SURGERY     • KNEE SURGERY Left     left kknee replacement in 2009   • UPPER GASTROINTESTINAL ENDOSCOPY         Current Outpatient Medications:   •  acetaminophen (TYLENOL) 650 mg CR tablet, Take by mouth, Disp: , Rfl:   •  azelastine (ASTELIN) 0 1 % nasal spray, 1 spray into each nostril in the morning and 1 spray in the evening , Disp: 30 mL, Rfl: 6  •  Calcium Polycarbophil (FIBERCON PO), Take by mouth 2 (two) times a day, Disp: , Rfl:   •  cholecalciferol (VITAMIN D3) 1,000 units tablet, Take 1,000 Units by mouth daily, Disp: , Rfl:   •  Coenzyme Q10 (COQ-10) 200 MG CAPS, Take 2 capsules by mouth daily, Disp: , Rfl:   •  diphenhydrAMINE-acetaminophen (TYLENOL PM)  MG TABS, Take 1 tablet by mouth daily at bedtime as needed for sleep, Disp: , Rfl:   •  Eliquis 5 MG, take 1 tablet by mouth twice a day, Disp: 180 tablet, Rfl: 3  •  famotidine (PEPCID) 20 mg tablet, TAKE 1 TABLET BY MOUTH DAILY AT BEDTIME, Disp: 90 tablet, Rfl: 2  •  furosemide (LASIX) 20 mg tablet, take 1 tablet by mouth once daily, Disp: 30 tablet, Rfl: 5  •  guaiFENesin (ROBITUSSIN) 100 MG/5ML oral liquid, Take 200 mg by mouth daily at bedtime  , Disp: , Rfl:   •  Lactobacillus (ACIDOPHILUS PO), Take by mouth, Disp: , Rfl:   •  Magnesium 400 MG TABS, Take by mouth daily, Disp: , Rfl:   • metoprolol succinate (TOPROL-XL) 25 mg 24 hr tablet, take 1 tablet by mouth once daily, Disp: 90 tablet, Rfl: 3  •  Multiple Vitamins-Minerals (DAILY MULTIVITAMIN PO), Take 1 tablet by mouth daily, Disp: , Rfl:   •  pantoprazole (PROTONIX) 40 mg tablet, take 1 tablet by mouth once daily 30 MINUTES PRIOR TO BREAKFAST, Disp: 90 tablet, Rfl: 0  •  pravastatin (PRAVACHOL) 10 mg tablet, take 1 tablet by mouth daily 3 TIMES A WEEK ON MONDAYS WEDNESDAYS AND FRIDAYS, Disp: 60 tablet, Rfl: 3  •  sotalol (BETAPACE) 80 mg tablet, take 1 tablet by mouth every 12 hours, Disp: 180 tablet, Rfl: 3  •  valsartan (DIOVAN) 80 mg tablet, Take 1 tablet (80 mg total) by mouth 2 (two) times a day, Disp: 60 tablet, Rfl: 5  •  Vitamin Mixture (SHADE-C PO), Take 500 mg by mouth daily, Disp: , Rfl:   •  betamethasone dipropionate (DIPROSONE) 0 05 % cream, Apply topically 2 (two) times a day (Patient not taking: Reported on 1/23/2023), Disp: 45 g, Rfl: 2  •  Calcium Carbonate-Vitamin D 600-200 MG-UNIT CAPS, Take by mouth 2 (two) times a day (Patient not taking: Reported on 1/23/2023), Disp: , Rfl:   •  clobetasol (TEMOVATE) 0 05 % cream, , Disp: , Rfl:   •  fluticasone (FLONASE) 50 mcg/act nasal spray, 2 sprays into each nostril daily (Patient not taking: Reported on 1/23/2023), Disp: 16 g, Rfl: 6  •  hydroxychloroquine (PLAQUENIL) 200 mg tablet, Take 1 tablet (200 mg total) by mouth daily with breakfast Administer with food or milk , Disp: 30 tablet, Rfl: 2  •  nitroglycerin (NITROSTAT) 0 4 mg SL tablet, Place 1 tablet (0 4 mg total) under the tongue every 5 (five) minutes as needed for chest pain If no relief after first dose call prescriber or 911 (Patient not taking: Reported on 1/23/2023), Disp: 25 tablet, Rfl: 0        Review of Systems:  Review of Systems   Constitutional: Positive for fatigue  Respiratory: Positive for cough  Negative for shortness of breath  Cardiovascular: Negative for chest pain and leg swelling  Musculoskeletal: Positive for arthralgias  All other systems reviewed and are negative  Physical Exam:  Vitals:  Vitals:    01/23/23 1312   BP: 120/70   BP Location: Left arm   Patient Position: Sitting   Cuff Size: Large   Pulse: 76   Temp: 98 °F (36 7 °C)   SpO2: 98%   Weight: 89 4 kg (197 lb)   Height: 5' 5" (1 651 m)     Physical Exam   Constitutional: She appears healthy  No distress  Eyes: Pupils are equal, round, and reactive to light  Conjunctivae are normal    Neck: No JVD present  Cardiovascular: Normal rate and regular rhythm  Exam reveals no gallop and no friction rub  Murmur heard  Pulmonary/Chest: Effort normal and breath sounds normal  She has no wheezes  She has no rales  Musculoskeletal:         General: No tenderness, deformity or edema  Cervical back: Normal range of motion and neck supple  Neurological: She is alert and oriented to person, place, and time  Skin: Skin is warm and dry  Cardiographics:  EKG: Personally reviewed normal sinus rhythm with rate 75 beats per minute  Last known EF: 55-60%    This note was completed in part utilizing M-Modal Fluency Direct Software  Grammatical errors, random word insertions, spelling mistakes, and incomplete sentences can be an occasional consequence of this system secondary to software limitations, ambient noise, and hardware issues  If you have any questions or concerns about the content, text, or information contained within the body of this dictation, please contact the provider for clarification

## 2023-01-26 ENCOUNTER — REMOTE DEVICE CLINIC VISIT (OUTPATIENT)
Dept: CARDIOLOGY CLINIC | Facility: CLINIC | Age: 83
End: 2023-01-26

## 2023-01-26 DIAGNOSIS — Z95.0 PRESENCE OF PERMANENT CARDIAC PACEMAKER: Primary | ICD-10-CM

## 2023-01-26 NOTE — PROGRESS NOTES
Results for orders placed or performed in visit on 01/26/23   Cardiac EP device report    Narrative    MDT DUAL CHAMBER PM/ACTIVE SYSTEM IS MRI CONDITIONAL  CARELINK TRANSMISSION: BATTERY VOLTAGE ADEQUATE  (1 5 YRS) AP 99%  <1%  ALL AVAILABLE LEAD PARAMETERS WITHIN NORMAL LIMITS  73 AT/AF EPISODES DETECTED  LONGEST 63 MIN  NO SIGNIFICANT HIGH RATE EPISODES  PATIENT IS ON ELIQUIS, SOTALOL AND METOPROLOL SUCC  NORMAL DEVICE FUNCTION  ----VENEGAS

## 2023-02-02 ENCOUNTER — OFFICE VISIT (OUTPATIENT)
Dept: PULMONOLOGY | Facility: CLINIC | Age: 83
End: 2023-02-02

## 2023-02-02 ENCOUNTER — APPOINTMENT (OUTPATIENT)
Dept: LAB | Facility: CLINIC | Age: 83
End: 2023-02-02

## 2023-02-02 VITALS
RESPIRATION RATE: 18 BRPM | TEMPERATURE: 98 F | DIASTOLIC BLOOD PRESSURE: 68 MMHG | SYSTOLIC BLOOD PRESSURE: 124 MMHG | HEIGHT: 65 IN | HEART RATE: 81 BPM | BODY MASS INDEX: 32.82 KG/M2 | OXYGEN SATURATION: 98 % | WEIGHT: 197 LBS

## 2023-02-02 DIAGNOSIS — J18.9 RECURRENT PNEUMONIA: Primary | ICD-10-CM

## 2023-02-02 DIAGNOSIS — J18.9 RECURRENT PNEUMONIA: ICD-10-CM

## 2023-02-02 LAB
BASOPHILS # BLD AUTO: 0.01 THOUSANDS/ÂΜL (ref 0–0.1)
BASOPHILS NFR BLD AUTO: 0 % (ref 0–1)
EOSINOPHIL # BLD AUTO: 0.13 THOUSAND/ÂΜL (ref 0–0.61)
EOSINOPHIL NFR BLD AUTO: 3 % (ref 0–6)
ERYTHROCYTE [DISTWIDTH] IN BLOOD BY AUTOMATED COUNT: 13.5 % (ref 11.6–15.1)
FOLATE SERPL-MCNC: >20 NG/ML (ref 3.1–17.5)
HCT VFR BLD AUTO: 27.9 % (ref 34.8–46.1)
HGB BLD-MCNC: 8.8 G/DL (ref 11.5–15.4)
IGA SERPL-MCNC: 56 MG/DL (ref 70–400)
IGG SERPL-MCNC: 712 MG/DL (ref 700–1600)
IGM SERPL-MCNC: 44 MG/DL (ref 40–230)
IMM GRANULOCYTES # BLD AUTO: 0.02 THOUSAND/UL (ref 0–0.2)
IMM GRANULOCYTES NFR BLD AUTO: 0 % (ref 0–2)
LYMPHOCYTES # BLD AUTO: 1.39 THOUSANDS/ÂΜL (ref 0.6–4.47)
LYMPHOCYTES NFR BLD AUTO: 27 % (ref 14–44)
MCH RBC QN AUTO: 30.3 PG (ref 26.8–34.3)
MCHC RBC AUTO-ENTMCNC: 31.5 G/DL (ref 31.4–37.4)
MCV RBC AUTO: 96 FL (ref 82–98)
MONOCYTES # BLD AUTO: 0.5 THOUSAND/ÂΜL (ref 0.17–1.22)
MONOCYTES NFR BLD AUTO: 10 % (ref 4–12)
NEUTROPHILS # BLD AUTO: 3.11 THOUSANDS/ÂΜL (ref 1.85–7.62)
NEUTS SEG NFR BLD AUTO: 60 % (ref 43–75)
NRBC BLD AUTO-RTO: 0 /100 WBCS
PLATELET # BLD AUTO: 161 THOUSANDS/UL (ref 149–390)
PMV BLD AUTO: 9.3 FL (ref 8.9–12.7)
RBC # BLD AUTO: 2.9 MILLION/UL (ref 3.81–5.12)
VIT B12 SERPL-MCNC: 670 PG/ML (ref 100–900)
WBC # BLD AUTO: 5.16 THOUSAND/UL (ref 4.31–10.16)

## 2023-02-02 RX ORDER — CEFUROXIME AXETIL 500 MG/1
500 TABLET ORAL EVERY 12 HOURS SCHEDULED
Qty: 28 TABLET | Refills: 0 | Status: SHIPPED | OUTPATIENT
Start: 2023-02-02 | End: 2023-02-16

## 2023-02-02 NOTE — PROGRESS NOTES
Pulmonary Follow Up Note   Juan Simms 79 y o  female MRN: [id number]  2/2/2023    Assessment:    Recurrent pneumonia  Unfortunately, sometime after discontinuing antibiotics, she had resumption of her cough and excessive mucus production  Recollect sputum culture  Perform basic immunodeficiency work-up  Check B12/folate levels given persistent leukopenia  Follow-up in 3 months    Plan:    Diagnoses and all orders for this visit:    Recurrent pneumonia  -     CBC and differential; Future  -     IgG 1, 2, 3, and 4; Future  -     IgE; Future  -     IgG, IgA, IgM; Future  -     Sputum culture and Gram stain; Future  -     Vitamin B12/Folate, Serum Panel; Future  -     cefuroxime (CEFTIN) 500 mg tablet; Take 1 tablet (500 mg total) by mouth every 12 (twelve) hours for 14 days      Return in about 3 months (around 5/2/2023)  History of Present Illness   HPI:  Juan Simms is a 79 y o  female who presents to follow-up on her recurrent pneumonias  She reports that after her prior 2-week course of antibiotics, she had several weeks where she did not really have a cough, but slowly the symptoms have recurred  They seem to have plateaued at a milder degree of severity than they were previously  She does not endorse significant shortness of breath associated with this cough  She does report that she previously has been told that she has low blood counts and has been referred to a hematologist who performed interventions including a bone marrow biopsy without obvious cause  She does have rheumatoid arthritis, but she is not on disease modifying antirheumatic drugs  Review of Systems   Constitutional: Negative for fever  Respiratory: Positive for cough  All other systems reviewed and are negative      Historical Information   Past Medical History:   Diagnosis Date   • A-fib [facility])    • Arthritis    • Atrial fibrillation [facility])    • Cardiac disease    • Cataract    • Colon polyp    • Gastro-esophageal reflux GERD   • GERD (gastroesophageal reflux disease)    • Hyperlipidemia    • Hypertension    • Nasal congestion    • Primary generalized (osteo)arthritis    • Rheumatoid arthritis ([facility] [address] )    • Shingles    • Sinusitis    • Sleep apnea    • Sleep difficulties      Past Surgical History:   Procedure Laterality Date   • CARDIAC PACEMAKER PLACEMENT Left 2014   • CATARACT EXTRACTION     • CHOLECYSTECTOMY      resolved: 2009   • COLONOSCOPY      Fiberoptic, resolved 2015   • EYE SURGERY     • KNEE SURGERY Left     left kknee replacement in 2009   • UPPER GASTROINTESTINAL ENDOSCOPY       Family History   Problem Relation Age of Onset   • Esophageal cancer Mother    • Coronary artery disease Father    • Diabetes Sister    • Hyperlipidemia Sister    • Pancreatic cancer Sister    • Esophageal cancer Brother    • Arthritis Family    • Hypertension Family        Meds/Allergies     Current Outpatient Medications:   •  acetaminophen (TYLENOL) 650 mg CR tablet, Take by mouth, Disp: , Rfl:   •  azelastine (ASTELIN) 0 1 % nasal spray, 1 spray into each nostril in the morning and 1 spray in the evening , Disp: 30 mL, Rfl: 6  •  Calcium Carbonate-Vitamin D 600-200 MG-UNIT CAPS, Take by mouth 2 (two) times a day, Disp: , Rfl:   •  Calcium Polycarbophil (FIBERCON PO), Take by mouth 2 (two) times a day, Disp: , Rfl:   •  cefuroxime (CEFTIN) 500 mg tablet, Take 1 tablet (500 mg total) by mouth every 12 (twelve) hours for 14 days, Disp: 28 tablet, Rfl: 0  •  cholecalciferol (VITAMIN D3) 1,000 units tablet, Take 1,000 Units by mouth daily, Disp: , Rfl:   •  Coenzyme Q10 (COQ-10) 200 MG CAPS, Take 2 capsules by mouth daily, Disp: , Rfl:   •  diphenhydrAMINE-acetaminophen (TYLENOL PM)  MG TABS, Take 1 tablet by mouth daily at bedtime as needed for sleep, Disp: , Rfl:   •  Eliquis 5 MG, take 1 tablet by mouth twice a day, Disp: 180 tablet, Rfl: 3  •  famotidine (PEPCID) 20 mg tablet, TAKE 1 TABLET BY MOUTH DAILY AT BEDTIME, Disp: 90 tablet, Rfl: 2  •  furosemide (LASIX) 20 mg tablet, take 1 tablet by mouth once daily, Disp: 30 tablet, Rfl: 5  •  guaiFENesin (ROBITUSSIN) 100 MG/5ML oral liquid, Take 200 mg by mouth daily at bedtime  , Disp: , Rfl:   •  Lactobacillus (ACIDOPHILUS PO), Take by mouth, Disp: , Rfl:   •  Magnesium 400 MG TABS, Take by mouth daily, Disp: , Rfl:   •  metoprolol succinate (TOPROL-XL) 25 mg 24 hr tablet, take 1 tablet by mouth once daily, Disp: 90 tablet, Rfl: 3  •  Multiple Vitamins-Minerals (DAILY MULTIVITAMIN PO), Take 1 tablet by mouth daily, Disp: , Rfl:   •  nitroglycerin (NITROSTAT) 0 4 mg SL tablet, Place 1 tablet (0 4 mg total) under the tongue every 5 (five) minutes as needed for chest pain If no relief after first dose call prescriber or 911, Disp: 25 tablet, Rfl: 0  •  pantoprazole (PROTONIX) 40 mg tablet, take 1 tablet by mouth once daily 30 MINUTES PRIOR TO BREAKFAST, Disp: 90 tablet, Rfl: 0  •  pravastatin (PRAVACHOL) 10 mg tablet, take 1 tablet by mouth daily 3 TIMES A WEEK ON MONDAYS WEDNESDAYS AND FRIDAYS, Disp: 60 tablet, Rfl: 3  •  sotalol (BETAPACE) 80 mg tablet, take 1 tablet by mouth every 12 hours, Disp: 180 tablet, Rfl: 3  •  valsartan (DIOVAN) 80 mg tablet, Take 1 tablet (80 mg total) by mouth 2 (two) times a day, Disp: 60 tablet, Rfl: 5  •  Vitamin Mixture (SHADE-C PO), Take 500 mg by mouth daily, Disp: , Rfl:   •  betamethasone dipropionate (DIPROSONE) 0 05 % cream, Apply topically 2 (two) times a day (Patient not taking: Reported on 1/23/2023), Disp: 45 g, Rfl: 2  •  clobetasol (TEMOVATE) 0 05 % cream, , Disp: , Rfl:   •  fluticasone (FLONASE) 50 mcg/act nasal spray, 2 sprays into each nostril daily (Patient not taking: Reported on 1/23/2023), Disp: 16 g, Rfl: 6  •  hydroxychloroquine (PLAQUENIL) 200 mg tablet, Take 1 tablet (200 mg total) by mouth daily with breakfast Administer with food or milk , Disp: 30 tablet, Rfl: 2  Allergies   Allergen Reactions   • Ciprofloxacin Rash   • Sulfa Antibiotics Rash   • Synvisc [Hylan G-F 20] Rash       Vitals: Blood pressure 124/68, pulse 81, temperature 98 °F (36 7 °C), temperature source Tympanic, resp  rate 18, height 5' 5" (1 651 m), weight 89 4 kg (197 lb), SpO2 98 %  Body mass index is 32 78 kg/m²  Oxygen Therapy  SpO2: 98 %  Oxygen Therapy: None (Room air)    Physical Exam  Physical Exam  Vitals reviewed  Constitutional:       General: She is not in acute distress  Appearance: Normal appearance  She is well-developed  She is not ill-appearing  HENT:      Head: Normocephalic and atraumatic  Eyes:      General: No scleral icterus  Conjunctiva/sclera: Conjunctivae normal    Neck:      Vascular: No JVD  Cardiovascular:      Rate and Rhythm: Normal rate and regular rhythm  Heart sounds: Normal heart sounds  No murmur heard  No friction rub  No gallop  Pulmonary:      Effort: Pulmonary effort is normal  No respiratory distress  Breath sounds: Normal breath sounds  No wheezing or rales  Musculoskeletal:      Cervical back: Neck supple  Right lower leg: No edema  Left lower leg: No edema  Skin:     General: Skin is warm and dry  Findings: No rash  Neurological:      General: No focal deficit present  Mental Status: She is alert and oriented to person, place, and time  Mental status is at baseline  Psychiatric:         Mood and Affect: Mood normal          Behavior: Behavior normal          Labs: I have personally reviewed pertinent lab results    Lab Results   Component Value Date    WBC 5 16 02/02/2023    HGB 8 8 (L) 02/02/2023    HCT 27 9 (L) 02/02/2023    MCV 96 02/02/2023     02/02/2023     Lab Results   Component Value Date    CALCIUM 9 3 10/07/2020     09/10/2013    K 4 1 10/07/2020    CO2 29 10/07/2020     10/07/2020    BUN 26 (H) 10/07/2020    CREATININE 1 04 10/07/2020     No results found for: IGE  Lab Results   Component Value Date    ALT 24 10/07/2020    AST 16 10/07/2020    ALKPHOS 54 10/07/2020    BILITOT 0 3 09/10/2013       Imaging and other studies: I have personally reviewed relevant films in PACS  EKG, Pathology, and Other Studies: I have personally reviewed relevant reports in Epic  [facility]

## 2023-02-02 NOTE — ASSESSMENT & PLAN NOTE
Unfortunately, sometime after discontinuing antibiotics, she had resumption of her cough and excessive mucus production      · Recollect sputum culture  · Perform basic immunodeficiency work-up  · Check B12/folate levels given persistent leukopenia  · Follow-up in 3 months

## 2023-02-04 LAB
IGG SERPL-MCNC: 702 MG/DL (ref 586–1602)
IGG1 SER-MCNC: 391 MG/DL (ref 248–810)
IGG2 SER-MCNC: 218 MG/DL (ref 130–555)
IGG3 SER-MCNC: 70 MG/DL (ref 15–102)
IGG4 SER-MCNC: 21 MG/DL (ref 2–96)
TOTAL IGE SMQN RAST: <2 KU/L (ref 0–113)

## 2023-02-09 DIAGNOSIS — K21.9 GASTROESOPHAGEAL REFLUX DISEASE WITHOUT ESOPHAGITIS: ICD-10-CM

## 2023-02-09 RX ORDER — PANTOPRAZOLE SODIUM 40 MG/1
TABLET, DELAYED RELEASE ORAL
Qty: 90 TABLET | Refills: 0 | Status: SHIPPED | OUTPATIENT
Start: 2023-02-09

## 2023-03-28 ENCOUNTER — OFFICE VISIT (OUTPATIENT)
Dept: PODIATRY | Facility: CLINIC | Age: 83
End: 2023-03-28

## 2023-03-28 DIAGNOSIS — I48.91 ATRIAL FIBRILLATION, UNSPECIFIED TYPE (HCC): ICD-10-CM

## 2023-03-28 DIAGNOSIS — M79.671 PAIN IN BOTH FEET: ICD-10-CM

## 2023-03-28 DIAGNOSIS — L84 CORNS: ICD-10-CM

## 2023-03-28 DIAGNOSIS — M79.672 PAIN IN BOTH FEET: ICD-10-CM

## 2023-03-28 DIAGNOSIS — I70.209 PERIPHERAL ARTERIOSCLEROSIS (HCC): Primary | ICD-10-CM

## 2023-03-28 RX ORDER — APIXABAN 5 MG/1
TABLET, FILM COATED ORAL
Qty: 180 TABLET | Refills: 3 | Status: SHIPPED | OUTPATIENT
Start: 2023-03-28

## 2023-03-28 RX ORDER — METOPROLOL SUCCINATE 25 MG/1
TABLET, EXTENDED RELEASE ORAL
Qty: 90 TABLET | Refills: 3 | Status: SHIPPED | OUTPATIENT
Start: 2023-03-28

## 2023-03-28 NOTE — PROGRESS NOTES
Assessment/Plan:  Pain   Radiculopathy   Callus   Mycotic toenail   Peripheral artery disease         Plan  Chart reviewed   Foot exam performed   All nails debrided   Calluses debrided   Procedures performed without pain or complication       Subjective   Patient has pain in her feet and toes with ambulation   No history of trauma        Discussion/Summary   The patient was counseled regarding instructions for management,-- patient and family education,-- risks and benefits of treatment options     Patient is able to Self-Care     Possible side effects of new medications were reviewed with the patient/guardian today  The treatment plan was reviewed with the patient/guardian  The patient/guardian understands and agrees with the treatment plan      Chief Complaint   Patient has foot pain   She has pain when she wears shoes   No history of trauma          History of Present Illness   HPI:  Patient complains of pain in feet with ambulation  She has pain around the toes  She is also concerned with pain in her right heel  This is been ongoing for several weeks  She has no history of trauma   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   Dallie Overcast was unable tolerate gabapentin     Review of Systems           Cardiac: chest pain,-- rhythm problems,-- AM fatigue-- and-- witnessed apnea episodes       Skin: No complaints of nonhealing sores or skin rash       Genitourinary: loss of bladder control      Psychological: No complaints of feeling depressed, anxiety, panic attacks, or difficulty concentrating       General: trouble sleeping-- and-- lack of energy/fatigue       Respiratory: shortness of breath       HEENT: snoring      Gastrointestinal: heartburn      Hematologic: anemia      Neurological: daytime sleepiness      Musculoskeletal: arthritis-- and-- back pain      Active Problems   1  Allergic rhinitis (477 9) (J30 9)   2  Anxiety disorder (300 00) (F41 9)   3  Arthropathy (716 90) (M12 9)   4  Atherosclerosis of arteries of extremities (440 20) (I70 209)   5  Atrial fibrillation (427 31) (I48 91)   6  Backache (724 5) (M54 9)   7  Callus (700) (L84)   8  Cervicalgia (723 1) (M54 2)   9  Depression (311) (F32 9)   10  Difficulty in walking (719 7) (R26 2)   11  Encounter for screening for malignant neoplasm of colon (V76 51) (Z12 11)   12  Esophagitis, reflux (530 11) (K21 0)   13  Essential hypertension (401 9) (I10)   14  Foot pain, bilateral (729 5) (M79 671,M79 672)   15  Herpes zoster (053 9) (B02 9)   16  Hospital discharge follow-up (V67 59) (Z09)   17  Hyperlipidemia (272 4) (E78 5)   18  Impaired fasting glucose (790 21) (R73 01)   19  Internal Hemorrhoids (455 0)   20  Leg swelling (729 81) (X02 26)   21  Lichen planus (331 2) (L43 9)   22  Limb pain (729 5) (M79 609)   23  Lumbar radiculopathy (724 4) (M54 16)   24  Multiple joint pain (719 49) (M25 50)   25  Need for influenza vaccination (V04 81) (Z23)   26  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)   27  Onychomycosis (110 1) (B35 1)   28  MIGUEL (obstructive sleep apnea) (327 23) (G47 33)   29  Other abnormal finding of urine (791 9) (R82 99)   30  Pes planus, congenital (754 61) (Q66 50)   31  Plantar fascial fibromatosis (728 71) (M72 2)   32  Rectal/anal hemorrhage (569 3) (K62 5)   33  Screening for malignant neoplasm of cervix (V76 2) (Z12 4)   34  Shoulder joint pain, unspecified laterality   35  Thrombocytopenia (287 5) (D69 6)   36  Urticaria (708 9) (L50 9)   37  Vulvovaginitis candida albicans (112 1) (B37 3)     Past Medical History    · History of Acute maxillary sinusitis (461 0) (J01 00)   · History of Acute upper respiratory infection (465 9) (J06 9)   · History of Cellulitis (682 9) (L03 90)   · History of Cough (786 2) (R05)   · History of Dysuria (788 1) (R30 0)   · History of High cholesterol (272 0) (E78 00)   · History of abdominal pain (V13 89) (R81 727)   · History of acute bronchitis (V12 69) (Z87 09)   · History of acute sinusitis (V12 69) (Z87 09)   · History of arthritis (V13 4) (Z87 39)   · History of atrial fibrillation (V12 59) (Z86 79)   · History of cataract (V12 49) (Z86 69)   · History of gastroesophageal reflux (GERD) (V12 79) (Z87 19)   · History of hypertension (V12 59) (Z86 79)   · History of Skin rash (782 1) (R21)   · History of Vulvovaginitis (616 10) (N76 0)     The active problems and past medical history were reviewed and updated today       Surgical History    · History of Cataract Surgery   · History of Colonoscopy (Fiberoptic) Screening   · History of Gallbladder Surgery   · History of Knee Replacement   · History of Pacemaker Placement     The surgical history was reviewed and updated today        Family History   Father    · Family history of Coronary Artery Disease (V17 49)  Sister    · Family history of Diabetes Mellitus (V18 0)   · Family history of High cholesterol  Family History    · Family history of arthritis (V17 7) (Z82 61)   · Family history of hypertension (V17 49) (Z82 49)     The family history was reviewed and updated today        Social History    · Exercise: Walking   · 2 x/week   · Former smoker (V15 82) (Z87 891)   · No alcohol use   ·   The social history was reviewed and updated today       Physical Exam   Left Foot: Appearance: Normal except as noted: excessive pronation-- and-- pes planus  Great toe deformities include a bunion  Tenderness: None except the great toe-- and-- distal first metatarsal     Right Foot: Appearance: Normal except as noted: excessive pronation-- and-- pes planus  Great toe deformities include a bunion  Tenderness: None except the great toe,-- distal first metatarsal,-- medial calcaneous-- and-- insertion of the plantar fascia     Left Ankle: ROM: limited ROM in all planes    Right Ankle: ROM: limited ROM in all planes    Neurological Exam: Light touch was decreased bilaterally   Vibratory sensation was decreased in both first metatarsophalangeal joints     Vascular Exam: performed Dorsalis pedis pulses were diminished bilaterally  Posterior tibial pulses were diminished bilaterally  Elevation Pallor: present bilaterally  Dependence rubor was present bilaterally  Capillary refill time was greater than 3 seconds bilaterally-- and-- Q  9, findings bilateral  Edema: moderate bilaterally     Toenails: All of the toenails were elongated,-- hypertrophied,-- discolored-- and-- Right ptotic     Hyperkeratosis: present on both first toes,-- present on both first sub metatarsals-- and-- Positive xerosis of skin noted     Shoe Gear Evaluation: performed ()   Recommendation(s): SAS style-- and-- OTC inlays

## 2023-03-30 DIAGNOSIS — I48.91 ATRIAL FIBRILLATION, UNSPECIFIED TYPE (HCC): ICD-10-CM

## 2023-03-31 RX ORDER — SOTALOL HYDROCHLORIDE 80 MG/1
TABLET ORAL
Qty: 180 TABLET | Refills: 3 | Status: SHIPPED | OUTPATIENT
Start: 2023-03-31

## 2023-04-28 ENCOUNTER — REMOTE DEVICE CLINIC VISIT (OUTPATIENT)
Dept: CARDIOLOGY CLINIC | Facility: CLINIC | Age: 83
End: 2023-04-28

## 2023-04-28 DIAGNOSIS — Z95.0 PRESENCE OF PERMANENT CARDIAC PACEMAKER: Primary | ICD-10-CM

## 2023-04-28 NOTE — PROGRESS NOTES
Results for orders placed or performed in visit on 04/28/23   Cardiac EP device report    Narrative    MDT DUAL CHAMBER PM/ACTIVE SYSTEM IS MRI CONDITIONAL  CARELINK TRANSMISSION: BATTERY VOLTAGE ADEQUATE  (1 YRS) AP 99%  <1%  ALL AVAILABLE LEAD PARAMETERS WITHIN NORMAL LIMITS  134 AT/AF EPISODES DETECTED  LONGEST 51 MIN  NO SIGNIFICANT HIGH RATE EPISODES  PATIENT IS ON ELIQUIS, SOTALOL AND METOPROLOL SUCC  NORMAL DEVICE FUNCTION  ----VENEGAS

## 2023-05-04 ENCOUNTER — TELEPHONE (OUTPATIENT)
Dept: SLEEP CENTER | Facility: CLINIC | Age: 83
End: 2023-05-04

## 2023-05-04 NOTE — TELEPHONE ENCOUNTER
Returned call from patient who states she has been using a CPAP mask with magnets for years and now [facility] cannot supply them to her because she has a pacemaker  States she has tried 2 other styles of headgear but she didn't like them  Advised patient that the masks with magnets have been recalled and should no longer be provided to patients by [facility]  Scheduled patient for mask fitting 5/16/23 in [place] so she can try to find a different style of headgear that works for her

## 2023-05-05 ENCOUNTER — TELEPHONE (OUTPATIENT)
Dept: CARDIOLOGY CLINIC | Facility: CLINIC | Age: 83
End: 2023-05-05

## 2023-05-05 NOTE — TELEPHONE ENCOUNTER
----- Message from Yuri Abdi DO sent at 5/4/2023  6:04 PM EDT -----  Normal device function  Occasional episode of atrial fibrillation noted

## 2023-05-11 DIAGNOSIS — K21.9 GASTROESOPHAGEAL REFLUX DISEASE WITHOUT ESOPHAGITIS: ICD-10-CM

## 2023-05-11 RX ORDER — PANTOPRAZOLE SODIUM 40 MG/1
TABLET, DELAYED RELEASE ORAL
Qty: 90 TABLET | Refills: 0 | Status: SHIPPED | OUTPATIENT
Start: 2023-05-11

## 2023-06-01 DIAGNOSIS — I10 HYPERTENSION, UNSPECIFIED TYPE: ICD-10-CM

## 2023-06-01 RX ORDER — VALSARTAN 80 MG/1
TABLET ORAL
Qty: 60 TABLET | Refills: 5 | Status: SHIPPED | OUTPATIENT
Start: 2023-06-01

## 2023-06-13 ENCOUNTER — OFFICE VISIT (OUTPATIENT)
Dept: PODIATRY | Facility: CLINIC | Age: 83
End: 2023-06-13
Payer: COMMERCIAL

## 2023-06-13 VITALS
DIASTOLIC BLOOD PRESSURE: 68 MMHG | BODY MASS INDEX: 32.82 KG/M2 | HEIGHT: 65 IN | SYSTOLIC BLOOD PRESSURE: 124 MMHG | RESPIRATION RATE: 16 BRPM | WEIGHT: 197 LBS

## 2023-06-13 DIAGNOSIS — M79.672 PAIN IN BOTH FEET: ICD-10-CM

## 2023-06-13 DIAGNOSIS — M79.671 PAIN IN BOTH FEET: ICD-10-CM

## 2023-06-13 DIAGNOSIS — L84 CORNS: ICD-10-CM

## 2023-06-13 DIAGNOSIS — I70.209 PERIPHERAL ARTERIOSCLEROSIS (HCC): Primary | ICD-10-CM

## 2023-06-13 PROCEDURE — 11056 PARNG/CUTG B9 HYPRKR LES 2-4: CPT | Performed by: PODIATRIST

## 2023-06-13 NOTE — PROGRESS NOTES
Assessment/Plan:  Pain   Radiculopathy   Callus   Mycotic toenail   Peripheral artery disease         Plan  Chart reviewed   Foot exam performed   All nails debrided   Calluses debrided   Procedures performed without pain or complication       Subjective   Patient has pain in her feet and toes with ambulation   No history of trauma        Discussion/Summary   The patient was counseled regarding instructions for management,-- patient and family education,-- risks and benefits of treatment options     Patient is able to Self-Care     Possible side effects of new medications were reviewed with the patient/guardian today  The treatment plan was reviewed with the patient/guardian  The patient/guardian understands and agrees with the treatment plan      Chief Complaint   Patient has foot pain   She has pain when she wears shoes   No history of trauma          History of Present Illness   HPI:  Patient complains of pain in feet with ambulation  She has pain around the toes  She is also concerned with pain in her right heel  This is been ongoing for several weeks  She has no history of trauma   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   Aiken Ceredo was unable tolerate gabapentin     Review of Systems           Cardiac: chest pain,-- rhythm problems,-- AM fatigue-- and-- witnessed apnea episodes       Skin: No complaints of nonhealing sores or skin rash       Genitourinary: loss of bladder control      Psychological: No complaints of feeling depressed, anxiety, panic attacks, or difficulty concentrating       General: trouble sleeping-- and-- lack of energy/fatigue       Respiratory: shortness of breath       HEENT: snoring      Gastrointestinal: heartburn      Hematologic: anemia      Neurological: daytime sleepiness      Musculoskeletal: arthritis-- and-- back pain      Active Problems   1  Allergic rhinitis (477 9) (J30 9)   2  Anxiety disorder (300 00) (F41 9)   3  Arthropathy (716 90) (M12 9)   4  Atherosclerosis of arteries of extremities (440 20) (I70 209)   5  Atrial fibrillation (427 31) (I48 91)   6  Backache (724 5) (M54 9)   7  Callus (700) (L84)   8  Cervicalgia (723 1) (M54 2)   9  Depression (311) (F32 9)   10  Difficulty in walking (719 7) (R26 2)   11  Encounter for screening for malignant neoplasm of colon (V76 51) (Z12 11)   12  Esophagitis, reflux (530 11) (K21 0)   13  Essential hypertension (401 9) (I10)   14  Foot pain, bilateral (729 5) (M79 671,M79 672)   15  Herpes zoster (053 9) (B02 9)   16  Hospital discharge follow-up (V67 59) (Z09)   17  Hyperlipidemia (272 4) (E78 5)   18  Impaired fasting glucose (790 21) (R73 01)   19  Internal Hemorrhoids (455 0)   20  Leg swelling (729 81) (J61 23)   21  Lichen planus (576 3) (L43 9)   22  Limb pain (729 5) (M79 609)   23  Lumbar radiculopathy (724 4) (M54 16)   24  Multiple joint pain (719 49) (M25 50)   25  Need for influenza vaccination (V04 81) (Z23)   26  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)   27  Onychomycosis (110 1) (B35 1)   28  MIGUEL (obstructive sleep apnea) (327 23) (G47 33)   29  Other abnormal finding of urine (791 9) (R82 99)   30  Pes planus, congenital (754 61) (Q66 50)   31  Plantar fascial fibromatosis (728 71) (M72 2)   32  Rectal/anal hemorrhage (569 3) (K62 5)   33  Screening for malignant neoplasm of cervix (V76 2) (Z12 4)   34  Shoulder joint pain, unspecified laterality   35  Thrombocytopenia (287 5) (D69 6)   36  Urticaria (708 9) (L50 9)   37  Vulvovaginitis candida albicans (112 1) (B37 3)     Past Medical History    · History of Acute maxillary sinusitis (461 0) (J01 00)   · History of Acute upper respiratory infection (465 9) (J06 9)   · History of Cellulitis (682 9) (L03 90)   · History of Cough (786 2) (R05)   · History of Dysuria (788 1) (R30 0)   · History of High cholesterol (272 0) (E78 00)   · History of abdominal pain (V13 89) (M26 115)   · History of acute bronchitis (V12 69) (Z87 09)   · History of acute sinusitis (V12 69) (Z87 09)   · History of arthritis (V13 4) (Z87 39)   · History of atrial fibrillation (V12 59) (Z86 79)   · History of cataract (V12 49) (Z86 69)   · History of gastroesophageal reflux (GERD) (V12 79) (Z87 19)   · History of hypertension (V12 59) (Z86 79)   · History of Skin rash (782 1) (R21)   · History of Vulvovaginitis (616 10) (N76 0)     The active problems and past medical history were reviewed and updated today       Surgical History    · History of Cataract Surgery   · History of Colonoscopy (Fiberoptic) Screening   · History of Gallbladder Surgery   · History of Knee Replacement   · History of Pacemaker Placement     The surgical history was reviewed and updated today        Family History   Father    · Family history of Coronary Artery Disease (V17 49)  Sister    · Family history of Diabetes Mellitus (V18 0)   · Family history of High cholesterol  Family History    · Family history of arthritis (V17 7) (Z82 61)   · Family history of hypertension (V17 49) (Z82 49)     The family history was reviewed and updated today        Social History    · Exercise: Walking   · 2 x/week   · Former smoker (V15 82) (Z87 891)   · No alcohol use   ·   The social history was reviewed and updated today       Physical Exam   Left Foot: Appearance: Normal except as noted: excessive pronation-- and-- pes planus  Great toe deformities include a bunion  Tenderness: None except the great toe-- and-- distal first metatarsal     Right Foot: Appearance: Normal except as noted: excessive pronation-- and-- pes planus  Great toe deformities include a bunion  Tenderness: None except the great toe,-- distal first metatarsal,-- medial calcaneous-- and-- insertion of the plantar fascia     Left Ankle: ROM: limited ROM in all planes    Right Ankle: ROM: limited ROM in all planes    Neurological Exam: Light touch was decreased bilaterally   Vibratory sensation was decreased in both first metatarsophalangeal joints     Vascular Exam: performed Dorsalis pedis pulses were diminished bilaterally  Posterior tibial pulses were diminished bilaterally  Elevation Pallor: present bilaterally  Dependence rubor was present bilaterally  Capillary refill time was greater than 3 seconds bilaterally-- and-- Q  9, findings bilateral  Edema: moderate bilaterally     Toenails: All of the toenails were elongated,-- hypertrophied,-- discolored-- and-- Right ptotic     Hyperkeratosis: present on both first toes,-- present on both first sub metatarsals-- and-- Positive xerosis of skin noted     Shoe Gear Evaluation: performed ()   Recommendation(s): SAS style-- and-- OTC inlays

## 2023-07-03 DIAGNOSIS — L25.3 CONTACT DERMATITIS DUE TO OTHER CHEMICAL PRODUCT, UNSPECIFIED CONTACT DERMATITIS TYPE: ICD-10-CM

## 2023-07-03 RX ORDER — BETAMETHASONE DIPROPIONATE 0.5 MG/G
CREAM TOPICAL 2 TIMES DAILY
Qty: 45 G | Refills: 0 | Status: SHIPPED | OUTPATIENT
Start: 2023-07-03

## 2023-07-12 DIAGNOSIS — I50.32 CHRONIC DIASTOLIC CONGESTIVE HEART FAILURE (HCC): ICD-10-CM

## 2023-07-12 RX ORDER — FUROSEMIDE 20 MG/1
TABLET ORAL
Qty: 30 TABLET | Refills: 5 | Status: SHIPPED | OUTPATIENT
Start: 2023-07-12

## 2023-07-24 ENCOUNTER — HOSPITAL ENCOUNTER (OUTPATIENT)
Dept: RADIOLOGY | Facility: HOSPITAL | Age: 83
Discharge: HOME | End: 2023-07-24
Attending: INTERNAL MEDICINE
Payer: COMMERCIAL

## 2023-07-24 DIAGNOSIS — I77.810 ASCENDING AORTA DILATION (HCC): ICD-10-CM

## 2023-07-24 PROCEDURE — G1004 CDSM NDSC: HCPCS

## 2023-07-24 PROCEDURE — 71250 CT THORAX DX C-: CPT

## 2023-07-31 ENCOUNTER — TELEPHONE (OUTPATIENT)
Dept: CARDIOLOGY CLINIC | Facility: CLINIC | Age: 83
End: 2023-07-31

## 2023-08-01 DIAGNOSIS — E78.5 DYSLIPIDEMIA: ICD-10-CM

## 2023-08-01 RX ORDER — PRAVASTATIN SODIUM 10 MG
TABLET ORAL
Qty: 60 TABLET | Refills: 3 | Status: SHIPPED | OUTPATIENT
Start: 2023-08-01

## 2023-08-04 ENCOUNTER — REMOTE DEVICE CLINIC VISIT (OUTPATIENT)
Dept: CARDIOLOGY CLINIC | Facility: CLINIC | Age: 83
End: 2023-08-04
Payer: COMMERCIAL

## 2023-08-04 DIAGNOSIS — Z95.0 PRESENCE OF PERMANENT CARDIAC PACEMAKER: Primary | ICD-10-CM

## 2023-08-04 PROCEDURE — 93294 REM INTERROG EVL PM/LDLS PM: CPT | Performed by: INTERNAL MEDICINE

## 2023-08-04 PROCEDURE — 93296 REM INTERROG EVL PM/IDS: CPT | Performed by: INTERNAL MEDICINE

## 2023-08-04 NOTE — PROGRESS NOTES
Results for orders placed or performed in visit on 08/04/23   Cardiac EP device report    Narrative    MDT DUAL CHAMBER PM/ACTIVE SYSTEM IS MRI CONDITIONAL  CARELINK TRANSMISSION: BATTERY VOLTAGE ADEQUATE. (13 MONS) AP 99%  <1%. ALL AVAILABLE LEAD PARAMETERS WITHIN NORMAL LIMITS. 1 VHR EPISODES DETECTED 6 BEATS @ 330ms. 12 AT/AF EPISODES DETECTED LONGEST 52 MIN. PATIENT IS ON ELIQUIS, SOTALOL AND METOPROLOL SUCC. NORMAL DEVICE FUNCTION. ----VENEGAS

## 2023-08-07 ENCOUNTER — TELEPHONE (OUTPATIENT)
Dept: CARDIOLOGY CLINIC | Facility: CLINIC | Age: 83
End: 2023-08-07

## 2023-08-07 DIAGNOSIS — K21.9 GASTROESOPHAGEAL REFLUX DISEASE WITHOUT ESOPHAGITIS: ICD-10-CM

## 2023-08-07 RX ORDER — PANTOPRAZOLE SODIUM 40 MG/1
TABLET, DELAYED RELEASE ORAL
Qty: 90 TABLET | Refills: 0 | Status: SHIPPED | OUTPATIENT
Start: 2023-08-07

## 2023-08-07 NOTE — TELEPHONE ENCOUNTER
----- Message from Franc Vanessa DO sent at 8/4/2023  3:55 PM EDT -----  Normal device function  Afib episodes noted

## 2023-08-10 ENCOUNTER — OFFICE VISIT (OUTPATIENT)
Dept: CARDIOLOGY CLINIC | Facility: CLINIC | Age: 83
End: 2023-08-10
Payer: COMMERCIAL

## 2023-08-10 VITALS
DIASTOLIC BLOOD PRESSURE: 74 MMHG | WEIGHT: 187 LBS | HEIGHT: 65 IN | BODY MASS INDEX: 31.16 KG/M2 | HEART RATE: 72 BPM | SYSTOLIC BLOOD PRESSURE: 132 MMHG | OXYGEN SATURATION: 91 %

## 2023-08-10 DIAGNOSIS — I10 HYPERTENSION, UNSPECIFIED TYPE: ICD-10-CM

## 2023-08-10 DIAGNOSIS — I77.810 ASCENDING AORTA DILATION (HCC): ICD-10-CM

## 2023-08-10 DIAGNOSIS — I50.32 CHRONIC DIASTOLIC CONGESTIVE HEART FAILURE (HCC): ICD-10-CM

## 2023-08-10 DIAGNOSIS — Z95.0 PRESENCE OF PERMANENT CARDIAC PACEMAKER: ICD-10-CM

## 2023-08-10 DIAGNOSIS — I35.1 NONRHEUMATIC AORTIC VALVE INSUFFICIENCY: ICD-10-CM

## 2023-08-10 DIAGNOSIS — I48.0 PAROXYSMAL ATRIAL FIBRILLATION (HCC): Primary | ICD-10-CM

## 2023-08-10 DIAGNOSIS — E78.5 DYSLIPIDEMIA: ICD-10-CM

## 2023-08-10 PROCEDURE — 99214 OFFICE O/P EST MOD 30 MIN: CPT | Performed by: INTERNAL MEDICINE

## 2023-08-10 NOTE — PROGRESS NOTES
Cardiology   Erum Cole DO, Providence Krabbe, MD, Teresa Blackman MD, Tj Denise MD, [facility] [place]  -------------------------------------------------------------------  [facility]  [address] [place] [address], [address] [place] [phone]    Cardiology Follow Up  Parish Navarro  1940  [id number]          Assessment/Plan:    1. Cough/shortness of breath -  Echocardiogram was normal.    - CT suggest possible atypical pneumonia. It does fit with pattern of improvement with antibiotics temporarily. There was no change with discontinuation of ACE-inhibitor.  - she will continue to follow-up with Pulmonary. 2. Paroxysmal atrial fibrillation (HCC) - currently in sinus rhythm. Continue Eliquis and routine pacemaker clinic follow up. - Continue sotalol. QT interval normal.  - She has intermittent episodes on pacemaker interrogation. 3. Essential hypertension  - BP well controlled on current Rx. 4. Mixed hyperlipidemia  - Last LDL was 78  -  Continue pravastatin. 5. Chronic diastolic CHF - stable on lasix 20 mg daily. 6. Thoracic aortic root aneurysm - CT chest showed thoracic aorta size of 4.4 cm which is unchanged from previous. - Discussed stopping surveillance CT as aneurysm has not changed in size over the past 3 studies and age is advanced. She does not wish to have surgery even if it increases in size.     7. Moderate aortic regurgitation - Repeat 2D echocardiogram next visit. Interval History:     Parish Navarro is 79 y.o. female here for followup of atrial fibrillation and hypertension. She had a CT chest for follow up of a thoracic aortic aneurysm. There was no change compared to previous study. Aorta measured 4.5 cm at widest diameter which has been unchanged over the past few exams. CT also showed findings consistent with atypical pneumonia ? BOBBY. She has similar findings in the past as well. She denies any chest pain or shortness of breath. Still with chronic cough. Her last pacemaker interrogation was on  which showed 6 beats of ventricular tachycardia with 12 atrial fibrillation episodes. Longest episode lasted for 52 minutes. Current medication regimen includes Eliquis, sotalol and metoprolol. The patient has a history of atrial fibrillation along with sick sinus syndrome and had a pacemaker inserted at [facility] after developing multiple pauses. Past Medical History:   Diagnosis Date   • A-fib [facility])    • Arthritis    • Atrial fibrillation [facility])    • Cardiac disease    • Cataract    • Colon polyp    • Gastro-esophageal reflux     GERD   • GERD (gastroesophageal reflux disease)    • Hyperlipidemia    • Hypertension    • Nasal congestion    • Primary generalized (osteo)arthritis    • Rheumatoid arthritis (HCC)    • Shingles    • Sinusitis    • Sleep apnea    • Sleep difficulties      Social History     Socioeconomic History   • Marital status:       Spouse name: Not on file   • Number of children: Not on file   • Years of education: Not on file   • Highest education level: Not on file   Occupational History   • Not on file   Tobacco Use   • Smoking status: Former     Packs/day: 1.00     Years: 25.00     Total pack years: 25.00     Types: Cigarettes     Quit date: 0     Years since quittin.6   • Smokeless tobacco: Never   Vaping Use   • Vaping Use: Never used   Substance and Sexual Activity   • Alcohol use: Yes     Comment: occasional, No alcohol use,per [employer]   • Drug use: Never   • Sexual activity: Not on file   Other Topics Concern   • Not on file   Social History Narrative    Exercise: Walking, 2x/week     Social Determinants of Health     Financial Resource Strain: Not on file   Food Insecurity: Not on file   Transportation Needs: Not on file   Physical Activity: Not on file   Stress: Not on file   Social Connections: Not on file Intimate Partner Violence: Not on file   Housing Stability: Not on file      Family History   Problem Relation Age of Onset   • Esophageal cancer Mother    • Coronary artery disease Father    • Diabetes Sister    • Hyperlipidemia Sister    • Pancreatic cancer Sister    • Esophageal cancer Brother    • Arthritis Family    • Hypertension Family      Past Surgical History:   Procedure Laterality Date   • CARDIAC PACEMAKER PLACEMENT Left 2014   • CATARACT EXTRACTION     • CHOLECYSTECTOMY      resolved: 2009   • COLONOSCOPY      Fiberoptic, resolved 2015   • EYE SURGERY     • KNEE SURGERY Left     left kknee replacement in 2009   • UPPER GASTROINTESTINAL ENDOSCOPY         Current Outpatient Medications:   •  acetaminophen (TYLENOL) 650 mg CR tablet, Take by mouth, Disp: , Rfl:   •  betamethasone dipropionate (DIPROSONE) 0.05 % cream, Apply topically 2 (two) times a day, Disp: 45 g, Rfl: 0  •  Calcium Polycarbophil (FIBERCON PO), Take by mouth 2 (two) times a day, Disp: , Rfl:   •  cholecalciferol (VITAMIN D3) 1,000 units tablet, Take 1,000 Units by mouth daily, Disp: , Rfl:   •  Coenzyme Q10 (COQ-10) 200 MG CAPS, Take 2 capsules by mouth daily, Disp: , Rfl:   •  diphenhydrAMINE-acetaminophen (TYLENOL PM)  MG TABS, Take 1 tablet by mouth daily at bedtime as needed for sleep, Disp: , Rfl:   •  Eliquis 5 MG, take 1 tablet by mouth twice a day, Disp: 180 tablet, Rfl: 3  •  famotidine (PEPCID) 20 mg tablet, TAKE 1 TABLET BY MOUTH DAILY AT BEDTIME, Disp: 90 tablet, Rfl: 2  •  furosemide (LASIX) 20 mg tablet, take 1 tablet by mouth once daily, Disp: 30 tablet, Rfl: 5  •  guaiFENesin (ROBITUSSIN) 100 MG/5ML oral liquid, Take 200 mg by mouth daily at bedtime  , Disp: , Rfl:   •  Lactobacillus (ACIDOPHILUS PO), Take by mouth, Disp: , Rfl:   •  metoprolol succinate (TOPROL-XL) 25 mg 24 hr tablet, take 1 tablet by mouth once daily, Disp: 90 tablet, Rfl: 3  •  Multiple Vitamins-Minerals (DAILY MULTIVITAMIN PO), Take 1 tablet by mouth daily, Disp: , Rfl:   •  pantoprazole (PROTONIX) 40 mg tablet, take 1 tablet by mouth once daily 30 MINUTES PRIOR TO BREAKFAST, Disp: 90 tablet, Rfl: 0  •  pravastatin (PRAVACHOL) 10 mg tablet, take 1 tablet by mouth daily 3 TIMES A WEEK ON MONDAYS WEDNESDAYS AND FRIDAYS, Disp: 60 tablet, Rfl: 3  •  sotalol (BETAPACE) 80 mg tablet, take 1 tablet by mouth every 12 hours, Disp: 180 tablet, Rfl: 3  •  valsartan (DIOVAN) 80 mg tablet, take 1 tablet by mouth twice a day, Disp: 60 tablet, Rfl: 5  •  Vitamin Mixture (SHADE-C PO), Take 500 mg by mouth daily, Disp: , Rfl:   •  azelastine (ASTELIN) 0.1 % nasal spray, 1 spray into each nostril in the morning and 1 spray in the evening. (Patient not taking: Reported on 8/10/2023), Disp: 30 mL, Rfl: 6  •  Calcium Carbonate-Vitamin D 600-200 MG-UNIT CAPS, Take by mouth 2 (two) times a day (Patient not taking: Reported on 8/10/2023), Disp: , Rfl:   •  clobetasol (TEMOVATE) 0.05 % cream, , Disp: , Rfl:   •  fluticasone (FLONASE) 50 mcg/act nasal spray, 2 sprays into each nostril daily (Patient not taking: Reported on 1/23/2023), Disp: 16 g, Rfl: 6  •  hydroxychloroquine (PLAQUENIL) 200 mg tablet, Take 1 tablet (200 mg total) by mouth daily with breakfast Administer with food or milk. (Patient not taking: Reported on 8/10/2023), Disp: 30 tablet, Rfl: 2  •  Magnesium 400 MG TABS, Take by mouth daily (Patient not taking: Reported on 8/10/2023), Disp: , Rfl:   •  nitroglycerin (NITROSTAT) 0.4 mg SL tablet, Place 1 tablet (0.4 mg total) under the tongue every 5 (five) minutes as needed for chest pain If no relief after first dose call prescriber or 911 (Patient not taking: Reported on 8/10/2023), Disp: 25 tablet, Rfl: 0        Review of Systems:  Review of Systems   Constitutional: Positive for fatigue. Respiratory: Positive for cough and shortness of breath. Cardiovascular: Negative for chest pain, palpitations and leg swelling.    Musculoskeletal: Positive for arthralgias and myalgias. All other systems reviewed and are negative. Physical Exam:  Vitals:  Vitals:    08/10/23 1347   BP: 132/74   BP Location: Right arm   Patient Position: Sitting   Cuff Size: Large   Pulse: 72   SpO2: 91%   Weight: 84.8 kg (187 lb)   Height: 5' 5" (1.651 m)     Physical Exam   Constitutional: She appears healthy. No distress. Eyes: Pupils are equal, round, and reactive to light. Conjunctivae are normal.   Neck: No JVD present. Cardiovascular: Normal rate and regular rhythm. Exam reveals no gallop and no friction rub. Murmur heard. Pulmonary/Chest: Effort normal and breath sounds normal. She has no wheezes. She has no rales. Musculoskeletal:         General: No tenderness, deformity or edema. Cervical back: Normal range of motion and neck supple. Neurological: She is alert and oriented to person, place, and time. Skin: Skin is warm and dry. Cardiographics:  EKG: Personally reviewed normal sinus rhythm with rate 75 beats per minute  Last known EF: 55-60%    This note was completed in part utilizing [employer] Direct Software. Grammatical errors, random word insertions, spelling mistakes, and incomplete sentences can be an occasional consequence of this system secondary to software limitations, ambient noise, and hardware issues. If you have any questions or concerns about the content, text, or information contained within the body of this dictation, please contact the provider for clarification.

## 2023-08-11 PROCEDURE — 93000 ELECTROCARDIOGRAM COMPLETE: CPT | Performed by: INTERNAL MEDICINE

## 2023-08-14 DIAGNOSIS — K21.9 GASTROESOPHAGEAL REFLUX DISEASE WITHOUT ESOPHAGITIS: ICD-10-CM

## 2023-08-14 RX ORDER — FAMOTIDINE 20 MG/1
TABLET, FILM COATED ORAL
Qty: 90 TABLET | Refills: 0 | Status: SHIPPED | OUTPATIENT
Start: 2023-08-14

## 2023-08-22 ENCOUNTER — OFFICE VISIT (OUTPATIENT)
Dept: PODIATRY | Facility: CLINIC | Age: 83
End: 2023-08-22
Payer: COMMERCIAL

## 2023-08-22 VITALS
DIASTOLIC BLOOD PRESSURE: 74 MMHG | RESPIRATION RATE: 17 BRPM | HEIGHT: 65 IN | BODY MASS INDEX: 31.16 KG/M2 | SYSTOLIC BLOOD PRESSURE: 132 MMHG | WEIGHT: 187 LBS

## 2023-08-22 DIAGNOSIS — L84 CORNS: ICD-10-CM

## 2023-08-22 DIAGNOSIS — M79.672 PAIN IN BOTH FEET: ICD-10-CM

## 2023-08-22 DIAGNOSIS — M79.671 PAIN IN BOTH FEET: ICD-10-CM

## 2023-08-22 DIAGNOSIS — I70.209 PERIPHERAL ARTERIOSCLEROSIS (HCC): Primary | ICD-10-CM

## 2023-08-22 PROCEDURE — 11056 PARNG/CUTG B9 HYPRKR LES 2-4: CPT | Performed by: PODIATRIST

## 2023-08-22 NOTE — PROGRESS NOTES
Assessment/Plan:  Pain.  Radiculopathy.  Callus.  Mycotic toenail.  Peripheral artery disease.        Plan.  Chart reviewed.  Foot exam performed.  All nails debrided.  Calluses debrided.  Procedures performed without pain or complication.      Subjective.  Patient has pain in her feet and toes with ambulation.  No history of trauma        Discussion/Summary   The patient was counseled regarding instructions for management,-- patient and family education,-- risks and benefits of treatment options.    Patient is able to Self-Care.    Possible side effects of new medications were reviewed with the patient/guardian today. The treatment plan was reviewed with the patient/guardian. The patient/guardian understands and agrees with the treatment plan      Chief Complaint   Patient has foot pain.  She has pain when she wears shoes.  No history of trauma.         History of Present Illness   HPI:  Patient complains of pain in feet with ambulation. She has pain around the toes. She is also concerned with pain in her right heel. This is been ongoing for several weeks. She has no history of trauma.  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 Anju Pappas was unable tolerate gabapentin     Review of Systems           Cardiac: chest pain,-- rhythm problems,-- AM fatigue-- and-- witnessed apnea episodes.      Skin: No complaints of nonhealing sores or skin rash.      Genitourinary: loss of bladder control      Psychological: No complaints of feeling depressed, anxiety, panic attacks, or difficulty concentrating.      General: trouble sleeping-- and-- lack of energy/fatigue.      Respiratory: shortness of breath.      HEENT: snoring      Gastrointestinal: heartburn      Hematologic: anemia      Neurological: daytime sleepiness      Musculoskeletal: arthritis-- and-- back pain      Active Problems   1. Allergic rhinitis (477.9) (J30.9)   2. Anxiety disorder (300.00) (F41.9)   3. Arthropathy (716.90) (M12.9)   4. Atherosclerosis of arteries of extremities (440.20) (I70.209)   5. Atrial fibrillation (427.31) (I48.91)   6. Backache (724.5) (M54.9)   7. Callus (700) (L84)   8. Cervicalgia (723.1) (M54.2)   9. Depression (311) (F32.9)   10. Difficulty in walking (719.7) (R26.2)   11. Encounter for screening for malignant neoplasm of colon (V76.51) (Z12.11)   12. Esophagitis, reflux (530.11) (K21.0)   13. Essential hypertension (401.9) (I10)   14. Foot pain, bilateral (729.5) (M79.671,M79.672)   15. Herpes zoster (053.9) (B02.9)   16. Hospital discharge follow-up (V67.59) (Z09)   17. Hyperlipidemia (272.4) (E78.5)   18. Impaired fasting glucose (790.21) (R73.01)   19. Internal Hemorrhoids (455.0)   20. Leg swelling (729.81) (E87.97)   21. Lichen planus (847.3) (L43.9)   22. Limb pain (729.5) (M79.609)   23. Lumbar radiculopathy (724.4) (M54.16)   24. Multiple joint pain (719.49) (M25.50)   25. Need for influenza vaccination (V04.81) (Z23)   26. Onychogryphosis (703.8)   27. Onychomycosis (110.1) (B35.1)   28. MIGUEL (obstructive sleep apnea) (327.23) (G47.33)   29. Other abnormal finding of urine (791.9) (R82.99)   30. Pes planus, congenital (754.61) (Q66.50)   31. 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 Rectal/anal hemorrhage (569.3) (K62.5)   33. Screening for malignant neoplasm of cervix (V76.2) (Z12.4)   34. Shoulder joint pain, unspecified laterality   35. Thrombocytopenia (287.5) (D69.6)   36. Urticaria (708.9) (L50.9)   37. Vulvovaginitis candida albicans (112.1) (B37.3)     Past Medical History    · History of Acute maxillary sinusitis (461.0) (J01.00)   · History of Acute upper respiratory infection (465.9) (J06.9)   · History of Cellulitis (682.9) (L03.90)   · History of Cough (786.2) (R05)   · History of Dysuria (788.1) (R30.0)   · History of High cholesterol (272.0) (E78.00)   · History of abdominal pain (V13.89) (T87.367)   · History of acute bronchitis (V12.69) (Z87.09)   · History of acute sinusitis (V12.69) (Z87.09)   · History of arthritis (V13.4) (Z87.39)   · History of atrial fibrillation (V12.59) (Z86.79)   · History of cataract (V12.49) (Z86.69)   · History of gastroesophageal reflux (GERD) (V12.79) (Z87.19)   · History of hypertension (V12.59) (Z86.79)   · History of Skin rash (782.1) (R21)   · History of Vulvovaginitis (616.10) (N76.0)     The active problems and past medical history were reviewed and updated today.      Surgical History    · History of Cataract Surgery   · History of Colonoscopy (Fiberoptic) Screening   · History of Gallbladder Surgery   · History of Knee Replacement   · History of Pacemaker Placement     The surgical history was reviewed and updated today.       Family History   Father    · Family history of Coronary Artery Disease (V17.49)  Sister    · Family history of Diabetes Mellitus (V18.0)   · Family history of High cholesterol  Family History    · Family history of arthritis (V17.7) (Z82.61)   · Family history of hypertension (V17.49) (Z82.49)     The family history was reviewed and updated today.       Social History    · Exercise: Walking   · 2 x/week   · Former smoker (V15.82) (Z87.891)   · No alcohol use   ·   The social history was reviewed and updated today.      Physical Exam   Left Foot: Appearance: Normal except as noted: excessive pronation-- and-- pes planus. Great toe deformities include a bunion. Tenderness: None except the great toe-- and-- distal first metatarsal.    Right Foot: Appearance: Normal except as noted: excessive pronation-- and-- pes planus. Great toe deformities include a bunion. Tenderness: None except the great toe,-- distal first metatarsal,-- medial calcaneous-- and-- insertion of the plantar fascia.    Left Ankle: ROM: limited ROM in all planes    Right Ankle: ROM: limited ROM in all planes    Neurological Exam: Light touch was decreased bilaterally.  Vibratory sensation was decreased in both first metatarsophalangeal joints.    Vascular Exam: performed Dorsalis pedis pulses were diminished bilaterally. Posterior tibial pulses were diminished bilaterally. Elevation Pallor: present bilaterally. Dependence rubor was present bilaterally. Capillary refill time was greater than 3 seconds bilaterally-- and-- Q. 9, findings bilateral. Edema: moderate bilaterally.    Toenails: All of the toenails were elongated,-- hypertrophied,-- discolored-- and-- Right ptotic.    Hyperkeratosis: present on both first toes,-- present on both first sub metatarsals-- and-- Positive xerosis of skin noted.    Shoe Gear Evaluation: performed ().  Recommendation(s): SAS style-- and-- OTC inlays.

## 2023-09-02 NOTE — PROGRESS NOTES
"  Discussion/Summary  Normal device function      Results/Data  Cardiac Device Remote [id number] 03:15PM Blaze Shell     Test Name Result Flag Reference   MISCELLANEOUS COMMENT (Report)     CARELINK TRANSMISSION: L2OHMVOTDC VOLTAGE ADEQUATE  (7 5 YRS)  AP 93%  <1%  ALL AVAILABLE LEAD PARAMETERS WITHIN NORMAL LIMITS  1 VHR EPISODE DETECTED 11 BEATS @ 350ms  72 AT/AF EPISODES DETECTED 3 HOUR LONG  PATIENT IS ON ELIQUIS AND METOPROLOL SUCC  NORMAL DEVICE FUNCTION --VENEGAS   Cardiac Electrophysiology Report      [id number]  pdf   DEVICE TYPE Pacemaker       Cardiac Electrophysiology Report [id number] 03:15PM Luwana Ditch     Test Name Result Flag Reference   Cardiac Electrophysiology Report      [id number]  pdf     Signatures   Electronically signed by : Joanna Ruiz, ; Apr 25 2017  1:44PM EST                       (Author)    Electronically signed by : Benoit Live DO;  Apr 30 2017  1:52PM EST                       (Author)    " MD ATTESTATION NOTE    The DON and I have discussed this patient's history, physical exam, and treatment plan.  I have reviewed the documentation and personally had a face to face interaction with the patient. I affirm the documentation and agree with the treatment and plan.  The attached note describes my personal findings.    I provided a substantive portion of the care of this patient. I personally performed the physical exam, in its entirety.    Independent Historians: Patient    A complete HPI/ROS/PMH/PSH/SH/FH are unobtainable due to: None    Chronic or social conditions impacting patient care (social determinants of health): None    Flako Coreas is a 55 y.o. male who presents to the ED c/o acute onset of recurrent dizziness.  Patient does have a history of stroke, ataxia, and hypertension.  He has had 2 distinct episodes of dizziness today where he felt off balance and could not walk.  He did get lightheaded sweaty and nauseous with vomiting.  In the interim he returned to normal.  Patient currently feels well other than being anxious about being here again.  Patient has had cerebellar strokes.  Patient also just had a recent admission from August 16 through August 24 for ataxia and hypertensive urgency or emergency with blood pressure 220/140.  He did have 2 small cerebellar strokes demonstrated on imaging then.  Further imaging revealed numerous small foci of acute infarct in left cerebellar hemisphere in the left PICA distribution.  He returns today with similar symptoms.          On exam:  GENERAL: Pleasant but anxious cooperative and conversant  male, alert, no acute distress  SKIN: Warm, dry  HENT: Normocephalic, atraumatic  EYES: no scleral icterus  RESPIRATORY: Relaxed breathing  NEURO: alert, moves all extremities, follows commands, face symmetric, speech normal, grossly intact sensation and strength throughout all 4 extremities, gait not tested due to stated complaint                                                              Labs  Recent Results (from the past 24 hour(s))   Comprehensive Metabolic Panel    Collection Time: 09/02/23 11:59 AM    Specimen: Blood   Result Value Ref Range    Glucose 112 (H) 65 - 99 mg/dL    BUN 19 6 - 20 mg/dL    Creatinine 0.89 0.76 - 1.27 mg/dL    Sodium 143 136 - 145 mmol/L    Potassium 4.2 3.5 - 5.2 mmol/L    Chloride 105 98 - 107 mmol/L    CO2 23.0 22.0 - 29.0 mmol/L    Calcium 9.9 8.6 - 10.5 mg/dL    Total Protein 7.6 6.0 - 8.5 g/dL    Albumin 4.8 3.5 - 5.2 g/dL    ALT (SGPT) 27 1 - 41 U/L    AST (SGOT) 18 1 - 40 U/L    Alkaline Phosphatase 71 39 - 117 U/L    Total Bilirubin 0.4 0.0 - 1.2 mg/dL    Globulin 2.8 gm/dL    A/G Ratio 1.7 g/dL    BUN/Creatinine Ratio 21.3 7.0 - 25.0    Anion Gap 15.0 5.0 - 15.0 mmol/L    eGFR 100.6 >60.0 mL/min/1.73   CBC Auto Differential    Collection Time: 09/02/23 11:59 AM    Specimen: Blood   Result Value Ref Range    WBC 10.04 3.40 - 10.80 10*3/mm3    RBC 5.68 4.14 - 5.80 10*6/mm3    Hemoglobin 15.3 13.0 - 17.7 g/dL    Hematocrit 45.9 37.5 - 51.0 %    MCV 80.8 79.0 - 97.0 fL    MCH 26.9 26.6 - 33.0 pg    MCHC 33.3 31.5 - 35.7 g/dL    RDW 15.0 12.3 - 15.4 %    RDW-SD 43.7 37.0 - 54.0 fl    MPV 10.3 6.0 - 12.0 fL    Platelets 344 140 - 450 10*3/mm3    Neutrophil % 75.1 42.7 - 76.0 %    Lymphocyte % 12.9 (L) 19.6 - 45.3 %    Monocyte % 6.1 5.0 - 12.0 %    Eosinophil % 5.3 0.3 - 6.2 %    Basophil % 0.4 0.0 - 1.5 %    Immature Grans % 0.2 0.0 - 0.5 %    Neutrophils, Absolute 7.54 (H) 1.70 - 7.00 10*3/mm3    Lymphocytes, Absolute 1.30 0.70 - 3.10 10*3/mm3    Monocytes, Absolute 0.61 0.10 - 0.90 10*3/mm3    Eosinophils, Absolute 0.53 (H) 0.00 - 0.40 10*3/mm3    Basophils, Absolute 0.04 0.00 - 0.20 10*3/mm3    Immature Grans, Absolute 0.02 0.00 - 0.05 10*3/mm3    nRBC 0.0 0.0 - 0.2 /100 WBC   ECG 12 Lead Other; dizzy    Collection Time: 09/02/23  1:13 PM   Result Value Ref Range    QT Interval 428 ms    QTC Interval 439 ms        Radiology  No Radiology Exams Resulted Within Past 24 Hours    Medical Decision Making:  ED Course as of 09/02/23 1702   Sat Sep 02, 2023   1201 I discussed the patient with Dr. Kraus, stroke neurology.  Based on patient's recent history, she recommends admission to observation for repeat evaluation. [KA]   1315 I discussed the patient with Lashell Perez PA-C for the ED observation unit including history and presentation and she agrees to admit to the ED observation unit on behalf of Dr. Thakkar [KA]   1403 I discussed the patient with Dr. Leon, neurology who is familiar with patient from recent admissions and has evaluated the patient at the bedside and recommends MRI.  [KA]      ED Course User Index  [KA] Kat Dailey PA       MDM: Patient with dizziness and ataxia in the setting of recent hospitalizations for similar.  Patient is on aspirin and Plavix as well as a statin.  Plan to contact neurology for further guidance given patient's recent admission and lack of symptoms currently.    Procedures:  Procedures        PPE: I followed hospital protocols for proper PPE based on patient presentation including use of N95 mask for suspected infectious respiratory conditions.  Proper hand hygiene was performed both before and after the patient encounter.          Diagnosis  Final diagnoses:   Dizziness   Uncontrolled hypertension       Note Disclaimer: At [facility], we believe that sharing information builds trust and better relationships. You are receiving this note because you recently visited [facility]. It is possible you will see health information before a provider has talked with you about it. This kind of information can be easy to misunderstand. To help you fully understand what it means for your health, we urge you to discuss this note with your provider.       Ijeoma Devries MD  09/02/23 1702

## 2023-09-18 ENCOUNTER — IN-CLINIC DEVICE VISIT (OUTPATIENT)
Dept: CARDIOLOGY CLINIC | Facility: CLINIC | Age: 83
End: 2023-09-18
Payer: COMMERCIAL

## 2023-09-18 DIAGNOSIS — I10 HYPERTENSION, UNSPECIFIED TYPE: ICD-10-CM

## 2023-09-18 DIAGNOSIS — Z95.0 CARDIAC PACEMAKER IN SITU: Primary | ICD-10-CM

## 2023-09-18 PROCEDURE — 93280 PM DEVICE PROGR EVAL DUAL: CPT | Performed by: INTERNAL MEDICINE

## 2023-09-18 NOTE — TELEPHONE ENCOUNTER
Patient of Dr Celeste Kaye needs refill to [facility] on [address] for Valsartan would like 90 days

## 2023-09-18 NOTE — PROGRESS NOTES
Results for orders placed or performed in visit on 09/18/23   Cardiac EP device report    Narrative    MARISSA Padron IS MRI CONDITIONAL  DEVICE INTERROGATED IN THE [place] OFFICE: BATTERY VOLTAGE NEARING ISMA-11 MONS. WILL SCHEDULE MONTHLY BATTERY CHECKS. %  0%. ALL AVAILABLE LEAD PARAMETERS WITHIN NORMAL LIMITS. 1 NSVT NOTED; APPROX 6 BEATS@ 194 BPM. MULTPLE AHRS NOTED; LONGEST >1 HRS; 0% BURDEN. EGRAMS SHOWING AFIB. PT ON ELIQUIS, SOTALOL, & METO SUCC. EF 50% (ECHO 2021). NO PROGRAMMING CHANGES MADE TO DEVICE PARAMETERS. NORMAL DEVICE FUNCTION.  NC

## 2023-09-19 RX ORDER — VALSARTAN 80 MG/1
80 TABLET ORAL 2 TIMES DAILY
Qty: 60 TABLET | Refills: 5 | Status: SHIPPED | OUTPATIENT
Start: 2023-09-19

## 2023-09-21 ENCOUNTER — OFFICE VISIT (OUTPATIENT)
Dept: SLEEP CENTER | Facility: CLINIC | Age: 83
End: 2023-09-21
Payer: COMMERCIAL

## 2023-09-21 VITALS
WEIGHT: 189.6 LBS | BODY MASS INDEX: 31.59 KG/M2 | OXYGEN SATURATION: 97 % | DIASTOLIC BLOOD PRESSURE: 68 MMHG | HEART RATE: 86 BPM | SYSTOLIC BLOOD PRESSURE: 130 MMHG | HEIGHT: 65 IN

## 2023-09-21 DIAGNOSIS — I48.0 PAROXYSMAL ATRIAL FIBRILLATION (HCC): ICD-10-CM

## 2023-09-21 DIAGNOSIS — G47.09 OTHER INSOMNIA: ICD-10-CM

## 2023-09-21 DIAGNOSIS — K21.9 GASTROESOPHAGEAL REFLUX DISEASE, UNSPECIFIED WHETHER ESOPHAGITIS PRESENT: ICD-10-CM

## 2023-09-21 DIAGNOSIS — I10 ESSENTIAL HYPERTENSION: ICD-10-CM

## 2023-09-21 DIAGNOSIS — R06.02 SHORTNESS OF BREATH: ICD-10-CM

## 2023-09-21 DIAGNOSIS — I50.32 CHRONIC DIASTOLIC CONGESTIVE HEART FAILURE (HCC): ICD-10-CM

## 2023-09-21 DIAGNOSIS — Z91.09 ENVIRONMENTAL ALLERGIES: ICD-10-CM

## 2023-09-21 DIAGNOSIS — G47.33 OSA (OBSTRUCTIVE SLEEP APNEA): Primary | ICD-10-CM

## 2023-09-21 DIAGNOSIS — E66.9 OBESITY (BMI 30-39.9): ICD-10-CM

## 2023-09-21 DIAGNOSIS — G47.34 SLEEP RELATED HYPOXIA: ICD-10-CM

## 2023-09-21 PROCEDURE — 99214 OFFICE O/P EST MOD 30 MIN: CPT | Performed by: INTERNAL MEDICINE

## 2023-09-21 NOTE — PROGRESS NOTES
Follow-Up Note - Sleep Center   Yumiko Gaxiola  79 y.o. female  IGP:0/94/1355  A:[id number]  DOS:9/21/2023    CC: I saw this patient for follow-up in clinic today for Sleep disordered breathing, Coexisting Sleep and Medical Problems. Interval changes: Patient received ot a  D[employer] Version 2 machine from [facility] several months ago. Had a split study in 2019: The diagnostic portion demonstrated: AHI of 60 per hour,  Intermittent snoring of moderate intensity was noted. Minimum oxygen saturation was 53 % and 62 minutes of total sleep time during this portion of the study was spent with saturations less than 90%. During the therapeutic portion of the study, his sleep disordered breathing was partially remediated with nasal CPAP at 15 cm H2O together with supplemental oxygen at 2 liters/minute. The AHI at this setting was 9.6 per hour. PFSH, Problem List, Medications & Allergies were reviewed in EMR. She  has a past medical history of A-fib ([address]), Arthritis, Atrial fibrillation ([address]), Cardiac disease, Cataract, Colon polyp, Gastro-esophageal reflux, GERD (gastroesophageal reflux disease), Hyperlipidemia, Hypertension, Nasal congestion, Primary generalized (osteo)arthritis, Rheumatoid arthritis ([address]), Shingles, Sinusitis, Sleep apnea, and Sleep difficulties. She has a current medication list which includes the following prescription(s): acetaminophen, betamethasone dipropionate, calcium polycarbophil, cholecalciferol, coq-10, diphenhydramine-acetaminophen, eliquis, famotidine, furosemide, guaifenesin, lactobacillus, magnesium, metoprolol succinate, multiple vitamins-minerals, pantoprazole, pravastatin, sotalol, valsartan, bioflavonoid products, azelastine, calcium carbonate-vitamin d, clobetasol, fluticasone, hydroxychloroquine, and nitroglycerin. PHYSIOLOGICAL DATA REVIEW : Device has been used 19/30 days. She was unable to use the machine for several days because of an eye problem.   Using PAP > 4 hours/night 63%. Estimated GELA 0.9/hour with pressure of 16cm [email or web address] percentile; patient has not been using non FDA approved devices to sanitize the machine. INTERPRETATION: Compliance is reasonable; Pressure setting is:adequate; ;   SUBJECTIVE: With respect to use of PAP, Yumiko  is experiencing some adverse effects:dry mouth/throat, dry nose and mask leaks . She derives benefit. Is satisfied with sleep and daytime function. Sleep Routine: Yumiko reports getting 7 hrs sleep out of around 8 hours in bed; she has no difficulty initiating, but reports diffculty maintaining sleep . She arises spontaneously and feels refreshed. backstitchs denies excessive daytime sleepiness,. She rated [herself] at Total score: 2 /24 on the [place] Sleepiness Scale. Other issues: None reported. Habits:[ reports that she quit smoking about 25 years ago. Her smoking use included cigarettes. She has a 25.00 pack-year smoking history. She has never used smokeless tobacco.], [ reports current alcohol use.], [ reports no history of drug use.], Caffeine use:moderate; Exercise routine: sometimes. ROS: Significant for weight has been stable. Nasal symptoms are controlled. Acid reflux is controlled. She has some dyspnea on effort related to her cardiac conditions for which she is under care. EXAM: /68   Pulse 86   Ht 5' 5" (1.651 m)   Wt 86 kg (189 lb 9.6 oz)   SpO2 97%   BMI 31.55 kg/m²     Wt Readings from Last 3 Encounters:   09/21/23 86 kg (189 lb 9.6 oz)   08/22/23 84.8 kg (187 lb)   08/10/23 84.8 kg (187 lb)      Patient is well groomed; well appearing. CNS: Alert, orientated, speech clear & coherent  Psych: cooperative and in no distress. Mental state: Appears normal.  H&N: EOMI; NC/AT: No facial pressure marks, no rashes. Skin/Extrem: col & hydration normal; no edema  Resp: Respiratory effort is normal  Physical findings otherwise essentially unchanged from previous.     IMPRESSION: Problem List Items & Comorbidities Addressed this Visit    1. MIGUEL (obstructive sleep apnea)  PAP DME Resupply/Reorder      2. Sleep related hypoxia        3. Other insomnia        4. Shortness of breath        5. Chronic diastolic congestive heart failure ([address])        6. Essential hypertension        7. Paroxysmal atrial fibrillation (HCC)        8. Obesity (BMI 30-39.9)        9. Gastroesophageal reflux disease, unspecified whether esophagitis present        10. Environmental allergies            PLAN:  1. I reviewed results of prior studies and physiologic data with the patient. 2. I discussed treatment options with risks and benefits. 3. Treatment with  PAP is medically necessary and Yumiko is agreable to continue use. 4. Care of equipment, methods to improve comfort using PAP and importance of compliance with therapy were discussed. 5. Pressure setting: continue 15-18 cmH2O.    6. Rx provided to replace supplies and Care coordinated with DME provider. 7. I advised on sleep hygiene specifically limiting time in bed but it is not feasible for her because of her home situation. 8. Discussed strategies for weight reduction. 9. Follow-up is advised in 1 year or sooner if needed to monitor progress, compliance and to adjust therapy. Thank you for allowing me to participate in the care of this patient. Sincerely,     Authenticated electronically on 54/07/75   Board Certified Specialist     Portions of the record may have been created with voice recognition software. Occasional wrong word or "sound a like" substitutions may have occurred due to the inherent limitations of voice recognition software. There may also be notations and random deletions of words or characters from malfunctioning software. Read the chart carefully and recognize, using context, where substitutions/deletions have occurred.

## 2023-09-21 NOTE — PATIENT INSTRUCTIONS

## 2023-09-22 ENCOUNTER — TELEPHONE (OUTPATIENT)
Dept: SLEEP CENTER | Facility: CLINIC | Age: 83
End: 2023-09-22

## 2023-09-22 LAB
DME PARACHUTE DELIVERY DATE REQUESTED: NORMAL
DME PARACHUTE ITEM DESCRIPTION: NORMAL
DME PARACHUTE ORDER STATUS: NORMAL
DME PARACHUTE SUPPLIER NAME: NORMAL
DME PARACHUTE SUPPLIER PHONE: NORMAL

## 2023-09-26 LAB

## 2023-10-02 ENCOUNTER — OFFICE VISIT (OUTPATIENT)
Dept: GASTROENTEROLOGY | Facility: CLINIC | Age: 83
End: 2023-10-02
Payer: COMMERCIAL

## 2023-10-02 VITALS
HEART RATE: 76 BPM | SYSTOLIC BLOOD PRESSURE: 127 MMHG | BODY MASS INDEX: 30.99 KG/M2 | HEIGHT: 65 IN | DIASTOLIC BLOOD PRESSURE: 78 MMHG | WEIGHT: 186 LBS

## 2023-10-02 DIAGNOSIS — Z86.010 HX OF COLONIC POLYPS: ICD-10-CM

## 2023-10-02 DIAGNOSIS — K21.9 GASTROESOPHAGEAL REFLUX DISEASE WITHOUT ESOPHAGITIS: Primary | ICD-10-CM

## 2023-10-02 DIAGNOSIS — K22.2 ESOPHAGEAL STRICTURE: ICD-10-CM

## 2023-10-02 PROCEDURE — 99213 OFFICE O/P EST LOW 20 MIN: CPT | Performed by: PHYSICIAN ASSISTANT

## 2023-10-02 RX ORDER — PANTOPRAZOLE SODIUM 40 MG/1
40 TABLET, DELAYED RELEASE ORAL DAILY
Qty: 90 TABLET | Refills: 3 | Status: SHIPPED | OUTPATIENT
Start: 2023-10-02

## 2023-10-02 RX ORDER — FAMOTIDINE 20 MG/1
20 TABLET, FILM COATED ORAL
Qty: 90 TABLET | Refills: 3 | Status: SHIPPED | OUTPATIENT
Start: 2023-10-02

## 2023-10-02 NOTE — PROGRESS NOTES
[facility] - Outpatient Follow-up Note  Mariann  79 y.o. female MRN: [id number]  Encounter: [id number]          ASSESSMENT AND PLAN:      1. Gastroesophageal reflux disease without esophagitis  Patient with history of GERD and symptoms are controlled on pantoprazole 40 mg daily along with Pepcid at night 20 mg and will continue the same, does not desire any repeat endoscopic evaluation due to multiple other health issues and advanced age    3. Esophageal stricture  No significant difficulty swallowing advised to chew food well and avoid coconut since this seems to be a trigger    3. Hx of colonic polyps  Patient with history of multiple colon polyps but does not desire to have repeat colonoscopy    We will see in 1 year for follow-up to medications    ______________________________________________________________________    SUBJECTIVE:    This is an 14-year-old female who presents to office for follow-up visit for medications. Patient had EGD in 2019 for stenosis distal esophagus status post dilation with 15.5 balloon. Gastric polyps and duodenal polyps removed. Recommendations were for repeat EGD in 1 year. Biopsy showed benign fundic gland polyps in the gastric body. 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 No adenoma or malignancy identified. Patient continues on PPI and famotidine with control of symptoms from GERD. Patient also has a history of colon polyps. Patient was deferring colonoscopy of present time. Patient states due to her age she does not want have any further colonoscopies done.     Patient with history of serrated colon polyps on last colonoscopy in 2019 with tubular adenomas, patient noted to have 10 polyps at that time as well as diverticulosis which was significant in the sigmoid with a lot of mycosis coli. States she does not GI work-up and she only has difficulty swallowing coconut but otherwise denies any significant difficulty with swallowing and she has had multiple issues with aneurysm as well as perhaps a diagnosis of breast cancer so she would like to hold off on EGD or colonoscopy. Reflux symptoms are generally controlled except if she eats spicy foods. REVIEW OF SYSTEMS IS OTHERWISE NEGATIVE.       Historical Information   Past Medical History:   Diagnosis Date   • A-fib [facility])    • Arthritis    • Atrial fibrillation ([address])    • Cardiac disease    • Cataract    • Colon polyp    • Gastro-esophageal reflux     GERD   • GERD (gastroesophageal reflux disease)    • Hyperlipidemia    • Hypertension    • Nasal congestion    • Primary generalized (osteo)arthritis    • Rheumatoid arthritis ([address])    • Shingles    • Sinusitis    • Sleep apnea    • Sleep difficulties      Past Surgical History:   Procedure Laterality Date   • CARDIAC PACEMAKER PLACEMENT Left    • CATARACT EXTRACTION     • CHOLECYSTECTOMY      resolved:    • COLONOSCOPY      Fiberoptic, resolved    • EYE SURGERY     • KNEE SURGERY Left     left kknee replacement in    • UPPER GASTROINTESTINAL ENDOSCOPY       Social History   Social History     Substance and Sexual Activity   Alcohol Use Yes    Comment: occasional, No alcohol use,per [employer]     Social History     Substance and Sexual Activity   Drug Use Never     Social History     Tobacco Use   Smoking Status Former   • Packs/day: 1.00   • Years: 25.00   • Total pack years: 25.00   • Types: Cigarettes   • Quit date:    • Years since quittin.7   Smokeless Tobacco Never     Family History   Problem Relation Age of Onset   • Esophageal cancer Mother    • Coronary artery disease Father    • Diabetes Sister    • Hyperlipidemia Sister    • Pancreatic cancer Sister    • Esophageal cancer Brother    • Arthritis Family    • Hypertension Family        Meds/Allergies Current Outpatient Medications:   •  acetaminophen (TYLENOL) 650 mg CR tablet  •  Calcium Polycarbophil (FIBERCON PO)  •  cholecalciferol (VITAMIN D3) 1,000 units tablet  •  Coenzyme Q10 (COQ-10) 200 MG CAPS  •  diphenhydrAMINE-acetaminophen (TYLENOL PM)  MG TABS  •  Eliquis 5 MG  •  famotidine (PEPCID) 20 mg tablet  •  furosemide (LASIX) 20 mg tablet  •  guaiFENesin (ROBITUSSIN) 100 MG/5ML oral liquid  •  Lactobacillus (ACIDOPHILUS PO)  •  metoprolol succinate (TOPROL-XL) 25 mg 24 hr tablet  •  Multiple Vitamins-Minerals (DAILY MULTIVITAMIN PO)  •  pantoprazole (PROTONIX) 40 mg tablet  •  pravastatin (PRAVACHOL) 10 mg tablet  •  sotalol (BETAPACE) 80 mg tablet  •  valsartan (DIOVAN) 80 mg tablet  •  Vitamin Mixture (SHADE-C PO)  •  azelastine (ASTELIN) 0.1 % nasal spray  •  betamethasone dipropionate (DIPROSONE) 0.05 % cream  •  Calcium Carbonate-Vitamin D 600-200 MG-UNIT CAPS  •  clobetasol (TEMOVATE) 0.05 % cream  •  fluticasone (FLONASE) 50 mcg/act nasal spray  •  hydroxychloroquine (PLAQUENIL) 200 mg tablet  •  Magnesium 400 MG TABS  •  nitroglycerin (NITROSTAT) 0.4 mg SL tablet    Allergies   Allergen Reactions   • Ciprofloxacin Rash   • Sulfa Antibiotics Rash   • Synvisc [Hylan G-F 20] Rash           Objective     Blood pressure 127/78, pulse 76, height 5' 5" (1.651 m), weight 84.4 kg (186 lb). Body mass index is 30.95 kg/m². PHYSICAL EXAM:      General Appearance:   Alert, cooperative, no distress   HEENT:   Normocephalic, atraumatic, anicteric.     Neck:  Supple, symmetrical, trachea midline   Lungs:   Clear to auscultation bilaterally; no rales, rhonchi or wheezing; respirations unlabored    Heart[de-identified]   Regular rate and rhythm; no murmur, rub, or gallop.    Abdomen:   Soft, non-tender, non-distended; normal bowel sounds; no masses, no organomegaly    Genitalia:   Deferred    Rectal:   Deferred    Extremities:  No cyanosis, clubbing or edema    Pulses:  2+ and symmetric    Skin:  No jaundice, rashes, or lesions    Lymph nodes:  No palpable cervical lymphadenopathy        Lab Results:   No visits with results within 1 Day(s) from this visit. Latest known visit with results is:   Telephone on 09/22/2023   Component Date Value   • Supplier Name 09/22/2023 [employer]    • Supplier Phone Number 09/22/2023 [phone]    • Order Status 09/22/2023 Delivery Successful    • Delivery Request Date 09/22/2023 09/22/2023    • Date Delivered  09/22/2023 09/26/2023    • Item Description 09/22/2023 PAP Accessory    • Item Description 09/22/2023 PAP Mask, Full Face, Fit Upon Setup, N/A, 1 per 3 months    • Item Description 09/22/2023 Humidifier Water Chamber, 1 per 6 months    • Item Description 09/22/2023 PAP Headgear, 1 per 6 months    • Item Description 09/22/2023 PAP Chinstrap, 1 per 6 months    • Item Description 09/22/2023 PAP Humidifier, Heated    • Item Description 09/22/2023 PAP Monitoring Modem    • Item Description 09/22/2023 Heated PAP Tubing, 1 per 3 months    • Item Description 09/22/2023 Disposable PAP Filter, 2 per 1 month    • Item Description 09/22/2023 Non-Disposable PAP Filter, 1 per 6 months    • Item Description 09/22/2023 PAP Mask Interface Cushion, Full Face, 1 per 1 month          Radiology Results:   Cardiac EP device report    Result Date: 9/18/2023  Narrative: MDT DUAL CHAMBER PM/ACTIVE SYSTEM IS MRI CONDITIONAL DEVICE INTERROGATED IN THE [place] OFFICE: BATTERY VOLTAGE NEARING ISMA-11 MONS. WILL SCHEDULE MONTHLY BATTERY CHECKS. %  0%. ALL AVAILABLE LEAD PARAMETERS WITHIN NORMAL LIMITS. 1 NSVT NOTED; APPROX 6 BEATS@ 194 BPM. MULTPLE AHRS NOTED; LONGEST >1 HRS; 0% BURDEN. EGRAMS SHOWING AFIB. PT ON ELIQUIS, SOTALOL, & METO SUCC. EF 50% (ECHO 2021). NO PROGRAMMING CHANGES MADE TO DEVICE PARAMETERS. NORMAL DEVICE FUNCTION.  NC

## 2023-10-06 DIAGNOSIS — I48.91 ATRIAL FIBRILLATION, UNSPECIFIED TYPE (HCC): ICD-10-CM

## 2023-10-06 RX ORDER — SOTALOL HYDROCHLORIDE 80 MG/1
80 TABLET ORAL EVERY 12 HOURS
Qty: 180 TABLET | Refills: 0 | Status: SHIPPED | OUTPATIENT
Start: 2023-10-06

## 2023-10-06 RX ORDER — METOPROLOL SUCCINATE 25 MG/1
25 TABLET, EXTENDED RELEASE ORAL DAILY
Qty: 90 TABLET | Refills: 0 | Status: SHIPPED | OUTPATIENT
Start: 2023-10-06

## 2023-10-26 ENCOUNTER — REMOTE DEVICE CLINIC VISIT (OUTPATIENT)
Dept: CARDIOLOGY CLINIC | Facility: CLINIC | Age: 83
End: 2023-10-26

## 2023-10-26 DIAGNOSIS — Z95.0 PRESENCE OF PERMANENT CARDIAC PACEMAKER: Primary | ICD-10-CM

## 2023-10-26 PROCEDURE — RECHECK: Performed by: INTERNAL MEDICINE

## 2023-10-26 NOTE — PROGRESS NOTES
Results for orders placed or performed in visit on 10/26/23   Cardiac EP device report    Narrative    MDT DUAL CHAMBER PM/ACTIVE SYSTEM IS MRI CONDITIONAL  CARELINK TRANSMISSION: N/BBATTERY VOLTAGE ADEQUATE. (9 MONS) AP 99%  <1%. ALL AVAILABLE LEAD PARAMETERS WITHIN NORMAL LIMITS. NO SIGNIFICANT HIGH RATE EPISODES. 16 AT/AF EPISODES DETECTED LONGEST <1 HOUR. PATIENT IS ON ELIQUIS, SOTALOL AND METOPROLOL SUCC. NORMAL DEVICE FUNCTION. ----VENEGAS

## 2023-10-31 ENCOUNTER — OFFICE VISIT (OUTPATIENT)
Age: 83
End: 2023-10-31
Payer: COMMERCIAL

## 2023-10-31 VITALS
SYSTOLIC BLOOD PRESSURE: 127 MMHG | HEIGHT: 65 IN | BODY MASS INDEX: 30.99 KG/M2 | RESPIRATION RATE: 17 BRPM | HEART RATE: 76 BPM | DIASTOLIC BLOOD PRESSURE: 78 MMHG | WEIGHT: 186 LBS

## 2023-10-31 DIAGNOSIS — L84 CORNS: ICD-10-CM

## 2023-10-31 DIAGNOSIS — M79.671 PAIN IN BOTH FEET: ICD-10-CM

## 2023-10-31 DIAGNOSIS — I70.209 PERIPHERAL ARTERIOSCLEROSIS (HCC): Primary | ICD-10-CM

## 2023-10-31 DIAGNOSIS — M79.672 PAIN IN BOTH FEET: ICD-10-CM

## 2023-10-31 PROCEDURE — 11056 PARNG/CUTG B9 HYPRKR LES 2-4: CPT | Performed by: PODIATRIST

## 2023-10-31 NOTE — PROGRESS NOTES
Assessment/Plan:  Pain. Radiculopathy. Callus. Mycotic toenail. Peripheral artery disease. Plan. Chart reviewed. Foot exam performed. All nails debrided. Calluses debrided. Procedures performed without pain or complication. Aftercare instruction given. Return for follow-up. Subjective. Patient has pain in her feet and toes with ambulation. No history of trauma        Discussion/Summary   The patient was counseled regarding instructions for management,-- patient and family education,-- risks and benefits of treatment options. Patient is able to Self-Care. Possible side effects of new medications were reviewed with the patient/guardian today. The treatment plan was reviewed with the patient/guardian. The patient/guardian understands and agrees with the treatment plan      Chief Complaint   Patient has foot pain. She has pain when she wears shoes. No history of trauma. History of Present Illness   HPI:  Patient complains of pain in feet with ambulation. She has pain around the toes. She is also concerned with pain in her right heel. This is been ongoing for several weeks. She has no history of trauma. 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 Patient was unable tolerate gabapentin     Review of Systems           Cardiac: chest pain,-- rhythm problems,-- AM fatigue-- and-- witnessed apnea episodes. Skin: No complaints of nonhealing sores or skin rash. Genitourinary: loss of bladder control      Psychological: No complaints of feeling depressed, anxiety, panic attacks, or difficulty concentrating. General: trouble sleeping-- and-- lack of energy/fatigue. Respiratory: shortness of breath. HEENT: snoring      Gastrointestinal: heartburn      Hematologic: anemia      Neurological: daytime sleepiness      Musculoskeletal: arthritis-- and-- back pain      Active Problems   1. Allergic rhinitis (477.9) (J30.9)   2. Anxiety disorder (300.00) (F41.9)   3. Arthropathy (716.90) (M12.9)   4. Atherosclerosis of arteries of extremities (440.20) (I70.209)   5. Atrial fibrillation (427.31) (I48.91)   6. Backache (724.5) (M54.9)   7. Callus (700) (L84)   8. Cervicalgia (723.1) (M54.2)   9. Depression (311) (F32.9)   10. Difficulty in walking (719.7) (R26.2)   11. Encounter for screening for malignant neoplasm of colon (V76.51) (Z12.11)   12. Esophagitis, reflux (530.11) (K21.0)   13. Essential hypertension (401.9) (I10)   14. Foot pain, bilateral (729.5) (M79.671,M79.672)   15. Herpes zoster (053.9) (B02.9)   16. Hospital discharge follow-up (V67.59) (Z09)   17. Hyperlipidemia (272.4) (E78.5)   18. Impaired fasting glucose (790.21) (R73.01)   19. Internal Hemorrhoids (455.0)   20. Leg swelling (729.81) (M79.89)   21. Lichen planus (449.6) (L43.9)   22. Limb pain (729.5) (M79.609)   23. Lumbar radiculopathy (724.4) (M54.16)   24. Multiple joint pain (719.49) (M25.50)   25. Need for influenza vaccination (V04.81) (Z23)   26. 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 Onychomycosis (110.1) (B35.1)   28. MIGUEL (obstructive sleep apnea) (327.23) (G47.33)   29. Other abnormal finding of urine (791.9) (R82.99)   30. 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 Plantar fascial fibromatosis (728.71) (M72.2)   32. Rectal/anal hemorrhage (569.3) (K62.5)   33. Screening for malignant neoplasm of cervix (V76.2) (Z12.4)   34. Shoulder joint pain, unspecified laterality   35. Thrombocytopenia (287.5) (D69.6)   36. Urticaria (708.9) (L50.9)   37.  Vulvovaginitis candida albicans (112.1) (B37.3)     Past Medical History    · History of Acute maxillary sinusitis (461.0) (J01.00)   · History of Acute upper respiratory infection (465.9) (J06.9)   · History of Cellulitis (682.9) (L03.90)   · History of Cough (786.2) (R05)   · History of Dysuria (788.1) (R30.0)   · History of High cholesterol (272.0) (E78.00)   · History of abdominal pain (V13.89) (L61.962)   · History of acute bronchitis (V12.69) (Z87.09)   · History of acute sinusitis (V12.69) (Z87.09)   · History of arthritis (V13.4) (Z87.39)   · History of atrial fibrillation (V12.59) (Z86.79)   · History of cataract (V12.49) (Z86.69)   · History of gastroesophageal reflux (GERD) (V12.79) (Z87.19)   · History of hypertension (V12.59) (Z86.79)   · History of Skin rash (782.1) (R21)   · History of Vulvovaginitis (616.10) (N76.0)     The active problems and past medical history were reviewed and updated today. Surgical History    · History of Cataract Surgery   · History of Colonoscopy (Fiberoptic) Screening   · History of Gallbladder Surgery   · History of Knee Replacement   · History of Pacemaker Placement     The surgical history was reviewed and updated today. Family History   Father    · Family history of Coronary Artery Disease (V17.49)  Sister    · Family history of Diabetes Mellitus (V18.0)   · Family history of High cholesterol  Family History    · Family history of arthritis (V17.7) (Z82.61)   · Family history of hypertension (V17.49) (Z82.49)     The family history was reviewed and updated today. Social History    · Exercise: Walking   · 2 x/week   · Former smoker (V15.82) (P23.885)   · No alcohol use   ·   The social history was reviewed and updated today. Physical Exam   Left Foot: Appearance: Normal except as noted: excessive pronation-- and-- pes planus. Great toe deformities include a bunion. Tenderness: None except the great toe-- and-- distal first metatarsal.    Right Foot: Appearance: Normal except as noted: excessive pronation-- and-- pes planus. Great toe deformities include a bunion. Tenderness: None except the great toe,-- distal first metatarsal,-- medial calcaneous-- and-- insertion of the plantar fascia. Left Ankle: ROM: limited ROM in all planes    Right Ankle: ROM: limited ROM in all planes    Neurological Exam: Light touch was decreased bilaterally.  Vibratory sensation was decreased in both first metatarsophalangeal joints. Vascular Exam: performed Dorsalis pedis pulses were diminished bilaterally. Posterior tibial pulses were diminished bilaterally. Elevation Pallor: present bilaterally. Dependence rubor was present bilaterally. Capillary refill time was greater than 3 seconds bilaterally-- and-- Q. 9, findings bilateral. Edema: moderate bilaterally. Toenails: All of the toenails were elongated,-- hypertrophied,-- discolored-- and-- Right ptotic. Hyperkeratosis: present on both first toes,-- present on both first sub metatarsals-- and-- Positive xerosis of skin noted. Shoe Gear Evaluation: performed (). Recommendation(s): SAS style-- and-- OTC inlays.

## 2023-12-01 ENCOUNTER — REMOTE DEVICE CLINIC VISIT (OUTPATIENT)
Dept: CARDIOLOGY CLINIC | Facility: CLINIC | Age: 83
End: 2023-12-01

## 2023-12-01 DIAGNOSIS — Z95.0 PRESENCE OF PERMANENT CARDIAC PACEMAKER: Primary | ICD-10-CM

## 2023-12-01 PROCEDURE — RECHECK: Performed by: INTERNAL MEDICINE

## 2023-12-01 NOTE — PROGRESS NOTES
Results for orders placed or performed in visit on 12/01/23   Cardiac EP device report    Narrative    MARISSA Padron IS MRI CONDITIONAL  CARELINK TRANSMISSION: N/B-- BATTERY CHECK--BATTERY VOLTAGE NEARING ISMA. WILL SCHEDULE MONTHLY BATTERY CHECKS. ( 9 MONS) AP 99%  <1%. ALL AVAILABLE LEAD PARAMETERS WITHIN NORMAL LIMITS. 19 AT/AF EPISODES DETECTED. LONGEST <30 MIN. 1 VHR EPISODE DETECTED 6 BEATS @ 350ms. PATIENT IS ON ELIQUIS, SOTALOL AND METOPROLOL SUCC; EF 50% (2021). NORMAL DEVICE FUNCTION. ----VENEGAS

## 2023-12-07 DIAGNOSIS — E78.5 DYSLIPIDEMIA: ICD-10-CM

## 2023-12-07 DIAGNOSIS — K21.9 GASTROESOPHAGEAL REFLUX DISEASE WITHOUT ESOPHAGITIS: ICD-10-CM

## 2023-12-07 RX ORDER — PRAVASTATIN SODIUM 10 MG
TABLET ORAL
Qty: 60 TABLET | Refills: 0 | Status: SHIPPED | OUTPATIENT
Start: 2023-12-07

## 2023-12-07 RX ORDER — FAMOTIDINE 20 MG/1
20 TABLET, FILM COATED ORAL
Qty: 90 TABLET | Refills: 1 | Status: SHIPPED | OUTPATIENT
Start: 2023-12-07

## 2023-12-29 DIAGNOSIS — I48.91 ATRIAL FIBRILLATION, UNSPECIFIED TYPE (HCC): ICD-10-CM

## 2023-12-29 DIAGNOSIS — I10 HYPERTENSION, UNSPECIFIED TYPE: ICD-10-CM

## 2023-12-29 DIAGNOSIS — I50.32 CHRONIC DIASTOLIC CONGESTIVE HEART FAILURE (HCC): ICD-10-CM

## 2024-01-02 ENCOUNTER — OFFICE VISIT (OUTPATIENT)
Age: 84
End: 2024-01-02
Payer: COMMERCIAL

## 2024-01-02 VITALS
SYSTOLIC BLOOD PRESSURE: 131 MMHG | RESPIRATION RATE: 17 BRPM | HEIGHT: 65 IN | WEIGHT: 186 LBS | DIASTOLIC BLOOD PRESSURE: 76 MMHG | BODY MASS INDEX: 30.99 KG/M2

## 2024-01-02 DIAGNOSIS — M79.672 PAIN IN BOTH FEET: ICD-10-CM

## 2024-01-02 DIAGNOSIS — L84 CORNS: ICD-10-CM

## 2024-01-02 DIAGNOSIS — M79.671 PAIN IN BOTH FEET: ICD-10-CM

## 2024-01-02 DIAGNOSIS — I70.209 PERIPHERAL ARTERIOSCLEROSIS (HCC): Primary | ICD-10-CM

## 2024-01-02 PROCEDURE — 11056 PARNG/CUTG B9 HYPRKR LES 2-4: CPT | Performed by: PODIATRIST

## 2024-01-02 RX ORDER — VALSARTAN 80 MG/1
80 TABLET ORAL 2 TIMES DAILY
Qty: 60 TABLET | Refills: 0 | Status: SHIPPED | OUTPATIENT
Start: 2024-01-02

## 2024-01-02 RX ORDER — SOTALOL HYDROCHLORIDE 80 MG/1
80 TABLET ORAL EVERY 12 HOURS
Qty: 180 TABLET | Refills: 0 | Status: SHIPPED | OUTPATIENT
Start: 2024-01-02

## 2024-01-02 RX ORDER — FUROSEMIDE 20 MG/1
20 TABLET ORAL DAILY
Qty: 30 TABLET | Refills: 0 | Status: SHIPPED | OUTPATIENT
Start: 2024-01-02

## 2024-01-02 NOTE — PROGRESS NOTES
Assessment/Plan:  Pain.  Radiculopathy.  Callus.  Mycotic toenail.  Peripheral artery disease.        Plan.  Chart reviewed.  Foot exam performed.  All nails debrided.  Calluses debrided.  Procedures performed without pain or complication.  Aftercare instruction given.  Return for follow-up.     Subjective.  Patient has pain in her feet and toes with ambulation.  No history of trauma.  She has pain when wearing shoes.        Discussion/Summary   The patient was counseled regarding instructions for management,-- patient and family education,-- risks and benefits of treatment options.    Patient is able to Self-Care.    Possible side effects of new medications were reviewed with the patient/guardian today. The treatment plan was reviewed with the patient/guardian. The patient/guardian understands and agrees with the treatment plan      Chief Complaint   Patient has foot pain.  She has pain when she wears shoes.  No history of trauma.         History of Present Illness   HPI:  Patient complains of pain in feet with ambulation. She has pain around the toes. She is also concerned with pain in her right heel. This is been ongoing for several weeks. She has no history of trauma. 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  Patient was unable tolerate gabapentin     Review of Systems           Cardiac: chest pain,-- rhythm problems,-- AM fatigue-- and-- witnessed apnea episodes.      Skin: No complaints of nonhealing sores or skin rash.      Genitourinary: loss of bladder control      Psychological: No complaints of feeling depressed, anxiety, panic attacks, or difficulty concentrating.      General: trouble sleeping-- and-- lack of energy/fatigue.      Respiratory: shortness of breath.      HEENT: snoring      Gastrointestinal: heartburn      Hematologic: anemia      Neurological: daytime sleepiness      Musculoskeletal: arthritis-- and-- back pain      Active Problems   1. Allergic rhinitis (477.9) (J30.9)   2. Anxiety  disorder (300.00) (F41.9)   3. Arthropathy (716.90) (M12.9)   4. Atherosclerosis of arteries of extremities (440.20) (I70.209)   5. Atrial fibrillation (427.31) (I48.91)   6. Backache (724.5) (M54.9)   7. Callus (700) (L84)   8. Cervicalgia (723.1) (M54.2)   9. Depression (311) (F32.9)   10. Difficulty in walking (719.7) (R26.2)   11. Encounter for screening for malignant neoplasm of colon (V76.51) (Z12.11)   12. Esophagitis, reflux (530.11) (K21.0)   13. Essential hypertension (401.9) (I10)   14. Foot pain, bilateral (729.5) (M79.671,M79.672)   15. Herpes zoster (053.9) (B02.9)   16. Hospital discharge follow-up (V67.59) (Z09)   17. Hyperlipidemia (272.4) (E78.5)   18. Impaired fasting glucose (790.21) (R73.01)   19. Internal Hemorrhoids (455.0)   20. Leg swelling (729.81) (M79.89)   21. Lichen planus (697.0) (L43.9)   22. Limb pain (729.5) (M79.609)   23. Lumbar radiculopathy (724.4) (M54.16)   24. Multiple joint pain (719.49) (M25.50)   25. Need for influenza vaccination (V04.81) (Z23)   26. 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 Onychomycosis (110.1) (B35.1)   28. MIGUEL (obstructive sleep apnea) (327.23) (G47.33)   29. Other abnormal finding of urine (791.9) (R82.99)   30. 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 Plantar fascial fibromatosis (728.71) (M72.2)   32. Rectal/anal hemorrhage (569.3) (K62.5)   33. Screening for malignant neoplasm of cervix (V76.2) (Z12.4)   34. Shoulder joint pain, unspecified laterality   35. Thrombocytopenia (287.5) (D69.6)   36. Urticaria (708.9) (L50.9)   37. Vulvovaginitis candida albicans (112.1) (B37.3)     Past Medical History    · History of Acute maxillary sinusitis (461.0) (J01.00)   · History of Acute upper respiratory infection (465.9) (J06.9)   · History of Cellulitis (682.9) (L03.90)   · History of Cough (786.2) (R05)   · History of Dysuria (788.1) (R30.0)   · History of High cholesterol (272.0) (E78.00)   · History of abdominal pain (V13.89) (Z87.898)   · History of acute  bronchitis (V12.69) (Z87.09)   · History of acute sinusitis (V12.69) (Z87.09)   · History of arthritis (V13.4) (Z87.39)   · History of atrial fibrillation (V12.59) (Z86.79)   · History of cataract (V12.49) (Z86.69)   · History of gastroesophageal reflux (GERD) (V12.79) (Z87.19)   · History of hypertension (V12.59) (Z86.79)   · History of Skin rash (782.1) (R21)   · History of Vulvovaginitis (616.10) (N76.0)     The active problems and past medical history were reviewed and updated today.      Surgical History    · History of Cataract Surgery   · History of Colonoscopy (Fiberoptic) Screening   · History of Gallbladder Surgery   · History of Knee Replacement   · History of Pacemaker Placement     The surgical history was reviewed and updated today.       Family History   Father    · Family history of Coronary Artery Disease (V17.49)  Sister    · Family history of Diabetes Mellitus (V18.0)   · Family history of High cholesterol  Family History    · Family history of arthritis (V17.7) (Z82.61)   · Family history of hypertension (V17.49) (Z82.49)     The family history was reviewed and updated today.       Social History    · Exercise: Walking   · 2 x/week   · Former smoker (V15.82) (Z87.891)   · No alcohol use   ·   The social history was reviewed and updated today.      Physical Exam   Left Foot: Appearance: Normal except as noted: excessive pronation-- and-- pes planus. Great toe deformities include a bunion. Tenderness: None except the great toe-- and-- distal first metatarsal.    Right Foot: Appearance: Normal except as noted: excessive pronation-- and-- pes planus. Great toe deformities include a bunion. Tenderness: None except the great toe,-- distal first metatarsal,-- medial calcaneous-- and-- insertion of the plantar fascia.    Left Ankle: ROM: limited ROM in all planes    Right Ankle: ROM: limited ROM in all planes    Neurological Exam: Light touch was decreased bilaterally. Vibratory sensation was  decreased in both first metatarsophalangeal joints.    Vascular Exam: performed Dorsalis pedis pulses were diminished bilaterally. Posterior tibial pulses were diminished bilaterally. Elevation Pallor: present bilaterally. Dependence rubor was present bilaterally. Capillary refill time was greater than 3 seconds bilaterally-- and-- Q. 9, findings bilateral. Edema: moderate bilaterally.    Toenails: All of the toenails were elongated,-- hypertrophied,-- discolored-- and-- Right ptotic.    Hyperkeratosis: present on both first toes,-- present on both first sub metatarsals-- and-- Positive xerosis of skin noted.    Shoe Gear Evaluation: performed (). Recommendation(s): SAS style-- and-- OTC inlays.

## 2024-01-05 ENCOUNTER — REMOTE DEVICE CLINIC VISIT (OUTPATIENT)
Dept: CARDIOLOGY CLINIC | Facility: CLINIC | Age: 84
End: 2024-01-05
Payer: COMMERCIAL

## 2024-01-05 DIAGNOSIS — Z95.0 PRESENCE OF PERMANENT CARDIAC PACEMAKER: Primary | ICD-10-CM

## 2024-01-05 PROCEDURE — 93294 REM INTERROG EVL PM/LDLS PM: CPT | Performed by: INTERNAL MEDICINE

## 2024-01-05 PROCEDURE — 93296 REM INTERROG EVL PM/IDS: CPT | Performed by: INTERNAL MEDICINE

## 2024-01-05 NOTE — PROGRESS NOTES
Results for orders placed or performed in visit on 01/05/24   Cardiac EP device report    Narrative    MDT DUAL CHAMBER PM/ACTIVE SYSTEM IS MRI CONDITIONAL  CARELINK TRANSMISSION: BATTERY VOLTAGE NEARING ISMA.  WILL SCHEDULE MONTHLY BATTERY CHECKS. ( 8 MONS) AP 99%  <1%. ALL AVAILABLE LEAD PARAMETERS WITHIN NORMAL LIMITS. NO SIGNIFICANT HIGH RATE EPISODES. 14 AT/AF EPISODES DETECTED. LONGEST <33 MIN. PATIENT IS ON ELIQUIS, SOTALOL AND METOPROLOL SUCC; EF 50% (2021). NORMAL DEVICE FUNCTION.----VENEGAS

## 2024-02-08 RX ORDER — ALBUTEROL SULFATE 90 UG/1
AEROSOL, METERED RESPIRATORY (INHALATION)
COMMUNITY
Start: 2024-01-05

## 2024-02-08 RX ORDER — AMOXICILLIN AND CLAVULANATE POTASSIUM 875; 125 MG/1; MG/1
1 TABLET, FILM COATED ORAL EVERY 12 HOURS
COMMUNITY
Start: 2023-11-11

## 2024-02-08 RX ORDER — TOBRAMYCIN 3 MG/ML
SOLUTION/ DROPS OPHTHALMIC
COMMUNITY
Start: 2023-11-10

## 2024-02-09 ENCOUNTER — REMOTE DEVICE CLINIC VISIT (OUTPATIENT)
Dept: CARDIOLOGY CLINIC | Facility: CLINIC | Age: 84
End: 2024-02-09
Payer: COMMERCIAL

## 2024-02-09 ENCOUNTER — OFFICE VISIT (OUTPATIENT)
Dept: CARDIOLOGY CLINIC | Facility: CLINIC | Age: 84
End: 2024-02-09
Payer: COMMERCIAL

## 2024-02-09 VITALS
DIASTOLIC BLOOD PRESSURE: 70 MMHG | WEIGHT: 188 LBS | HEIGHT: 65 IN | SYSTOLIC BLOOD PRESSURE: 102 MMHG | HEART RATE: 73 BPM | OXYGEN SATURATION: 98 % | BODY MASS INDEX: 31.32 KG/M2

## 2024-02-09 DIAGNOSIS — I10 PRIMARY HYPERTENSION: ICD-10-CM

## 2024-02-09 DIAGNOSIS — I48.0 PAROXYSMAL ATRIAL FIBRILLATION (HCC): Primary | ICD-10-CM

## 2024-02-09 DIAGNOSIS — Z95.0 CARDIAC PACEMAKER IN SITU: ICD-10-CM

## 2024-02-09 DIAGNOSIS — I77.810 ASCENDING AORTA DILATION (HCC): ICD-10-CM

## 2024-02-09 DIAGNOSIS — I50.32 CHRONIC DIASTOLIC CONGESTIVE HEART FAILURE (HCC): ICD-10-CM

## 2024-02-09 DIAGNOSIS — E78.5 DYSLIPIDEMIA: ICD-10-CM

## 2024-02-09 DIAGNOSIS — Z95.0 PRESENCE OF PERMANENT CARDIAC PACEMAKER: Primary | ICD-10-CM

## 2024-02-09 DIAGNOSIS — I35.1 NONRHEUMATIC AORTIC VALVE INSUFFICIENCY: ICD-10-CM

## 2024-02-09 PROCEDURE — 93294 REM INTERROG EVL PM/LDLS PM: CPT | Performed by: INTERNAL MEDICINE

## 2024-02-09 PROCEDURE — 93296 REM INTERROG EVL PM/IDS: CPT | Performed by: INTERNAL MEDICINE

## 2024-02-09 PROCEDURE — 99214 OFFICE O/P EST MOD 30 MIN: CPT | Performed by: INTERNAL MEDICINE

## 2024-02-09 PROCEDURE — 93000 ELECTROCARDIOGRAM COMPLETE: CPT | Performed by: INTERNAL MEDICINE

## 2024-02-09 NOTE — PROGRESS NOTES
Cardiology   Blaze Shell DO, [facility]  Dennis Calle MD, [facility]  Thad Murdock MD, [facility]  Vijay Villagran MD, [facility]  -------------------------------------------------------------------  [facility]  [address], [address], [address]  [place], [place], [address]  [phone]    Cardiology Follow Up  Yumiko Gaxiola  1940  [id number]          Assessment/Plan:    1. Cough/shortness of breath -  Echocardiogram was normal.    - CT suggest possible atypical pneumonia.  It does fit with pattern of improvement with antibiotics temporarily.  There was no change with discontinuation of ACE-inhibitor.  - she will follow-up with Pulmonary.    2. Paroxysmal atrial fibrillation (HCC) - currently in sinus rhythm.  Continue Eliquis and routine pacemaker clinic follow up.  - Continue sotalol. QT interval normal.  - She has intermittent episodes on pacemaker interrogation.       3. Essential hypertension  - BP well controlled on current Rx.       4. Mixed hyperlipidemia  - Last LDL was 78  -  Continue pravastatin.    5. Chronic diastolic CHF - stable on lasix 20 mg daily.    6. Thoracic aortic root aneurysm - CT chest showed thoracic aorta size of 4.4 cm which is unchanged from previous.   - Discussed stopping surveillance CT as aneurysm has not changed in size over the past 3 studies and age is advanced.  She does not wish to have surgery even if it increases in size.     7. Mild to moderate aortic regurgitation - Repeat 2D echocardiogram next visit.      Interval History:     Yumiko Gaxiola is 83 y.o. female here for followup of atrial fibrillation and hypertension.  Since her last visit, she has developed a worsening cough.  She has been on multiple antibiotic regimen without improvement.  She was seen by pulmonary in the past.  She denies any chest pain, LE edema, orthopnea or PND.     In July 2023, she had a CT chest for follow up of a thoracic aortic aneurysm.  There was no change  compared to previous study.  Aorta measured 4.5 cm at widest diameter which has been unchanged over the past few exams.   CT also showed findings consistent with atypical pneumonia ?BOBBY.  She has similar findings in the past as well.         Her last pacemaker interrogation  showed  14 atrial fibrillation episodes.  Longest episode lasted for 33 minutes.     Current medication regimen includes Eliquis, sotalol and metoprolol.     The patient has a history of atrial fibrillation along with sick sinus syndrome and had a pacemaker inserted at [facility] after developing multiple pauses.     Past Medical History:   Diagnosis Date    A-fib (HCC)     Arthritis     Atrial fibrillation (HCC)     Cardiac disease     Cardiac disease 2016    Cataract     Colon polyp     Gastro-esophageal reflux     GERD    GERD (gastroesophageal reflux disease)     Hyperlipidemia     Hypertension     Nasal congestion     Primary generalized (osteo)arthritis     Rheumatoid arthritis (HCC)     Shingles     Sinusitis     Sleep apnea     Sleep difficulties      Social History     Socioeconomic History    Marital status:      Spouse name: Not on file    Number of children: Not on file    Years of education: Not on file    Highest education level: Not on file   Occupational History    Not on file   Tobacco Use    Smoking status: Former     Current packs/day: 0.00     Average packs/day: 1 pack/day for 25.0 years (25.0 ttl pk-yrs)     Types: Cigarettes     Start date:      Quit date:      Years since quittin.1    Smokeless tobacco: Never   Vaping Use    Vaping status: Never Used   Substance and Sexual Activity    Alcohol use: Yes     Comment: occasional, No alcohol use,per [employer]    Drug use: Never    Sexual activity: Not on file   Other Topics Concern    Not on file   Social History Narrative    Exercise: Walking, 2x/week     Social Determinants of Health     Financial Resource Strain: Not on file   Food  Insecurity: Not on file   Transportation Needs: Not on file   Physical Activity: Not on file   Stress: Not on file   Social Connections: Not on file   Intimate Partner Violence: Not on file   Housing Stability: Not on file      Family History   Problem Relation Age of Onset    Esophageal cancer Mother     Coronary artery disease Father     Diabetes Sister     Hyperlipidemia Sister     Pancreatic cancer Sister     Esophageal cancer Brother     Arthritis Family     Hypertension Family      Past Surgical History:   Procedure Laterality Date    CARDIAC PACEMAKER PLACEMENT Left 2014    CATARACT EXTRACTION      CHOLECYSTECTOMY      resolved: 2009    COLONOSCOPY      Fiberoptic, resolved 2015    EYE SURGERY      KNEE SURGERY Left     left kknee replacement in 2009    UPPER GASTROINTESTINAL ENDOSCOPY         Current Outpatient Medications:     acetaminophen (TYLENOL) 650 mg CR tablet, Take by mouth, Disp: , Rfl:     albuterol (PROVENTIL HFA,VENTOLIN HFA) 90 mcg/act inhaler, , Disp: , Rfl:     amoxicillin-clavulanate (AUGMENTIN) 875-125 mg per tablet, Take 1 tablet by mouth every 12 (twelve) hours, Disp: , Rfl:     apixaban (Eliquis) 5 mg, Take 1 tablet (5 mg total) by mouth 2 (two) times a day, Disp: 180 tablet, Rfl: 0    Calcium Polycarbophil (FIBERCON PO), Take by mouth 2 (two) times a day, Disp: , Rfl:     cholecalciferol (VITAMIN D3) 1,000 units tablet, Take 1,000 Units by mouth daily, Disp: , Rfl:     Coenzyme Q10 (COQ-10) 200 MG CAPS, Take 2 capsules by mouth daily, Disp: , Rfl:     diphenhydrAMINE-acetaminophen (TYLENOL PM)  MG TABS, Take 1 tablet by mouth daily at bedtime as needed for sleep, Disp: , Rfl:     famotidine (PEPCID) 20 mg tablet, Take 1 tablet (20 mg total) by mouth daily at bedtime, Disp: 90 tablet, Rfl: 1    furosemide (LASIX) 20 mg tablet, Take 1 tablet (20 mg total) by mouth daily, Disp: 30 tablet, Rfl: 0    guaiFENesin (ROBITUSSIN) 100 MG/5ML oral liquid, Take 200 mg by mouth daily at bedtime   , Disp: , Rfl:     Lactobacillus (ACIDOPHILUS PO), Take by mouth, Disp: , Rfl:     metoprolol succinate (TOPROL-XL) 25 mg 24 hr tablet, Take 1 tablet (25 mg total) by mouth daily, Disp: 90 tablet, Rfl: 0    Multiple Vitamins-Minerals (DAILY MULTIVITAMIN PO), Take 1 tablet by mouth daily, Disp: , Rfl:     pantoprazole (PROTONIX) 40 mg tablet, Take 1 tablet (40 mg total) by mouth daily, Disp: 90 tablet, Rfl: 3    pravastatin (PRAVACHOL) 10 mg tablet, Take 1 tablet by mouth 3 days per week on MONDAYS WEDNESDAYS and FRIDAYS, Disp: 60 tablet, Rfl: 0    sotalol (BETAPACE) 80 mg tablet, Take 1 tablet (80 mg total) by mouth every 12 (twelve) hours, Disp: 180 tablet, Rfl: 0    tobramycin (TOBREX) 0.3 % SOLN, APPLY 1 DROP BY OPHTHALMIC ROUTE 3 TIMES EVERY DAY, Disp: , Rfl:     valsartan (DIOVAN) 80 mg tablet, Take 1 tablet (80 mg total) by mouth 2 (two) times a day, Disp: 60 tablet, Rfl: 0    Vitamin Mixture (SHADE-C PO), Take 500 mg by mouth daily, Disp: , Rfl:     azelastine (ASTELIN) 0.1 % nasal spray, 1 spray into each nostril in the morning and 1 spray in the evening. (Patient not taking: Reported on 8/10/2023), Disp: 30 mL, Rfl: 6    betamethasone dipropionate (DIPROSONE) 0.05 % cream, Apply topically 2 (two) times a day (Patient not taking: Reported on 10/2/2023), Disp: 45 g, Rfl: 0    Calcium Carbonate-Vitamin D 600-200 MG-UNIT CAPS, Take by mouth 2 (two) times a day (Patient not taking: Reported on 10/2/2023), Disp: , Rfl:     clobetasol (TEMOVATE) 0.05 % cream, , Disp: , Rfl:     fluticasone (FLONASE) 50 mcg/act nasal spray, 2 sprays into each nostril daily (Patient not taking: Reported on 1/23/2023), Disp: 16 g, Rfl: 6    hydroxychloroquine (PLAQUENIL) 200 mg tablet, Take 1 tablet (200 mg total) by mouth daily with breakfast Administer with food or milk. (Patient not taking: Reported on 8/10/2023), Disp: 30 tablet, Rfl: 2    Magnesium 400 MG TABS, Take by mouth daily (Patient not taking: Reported on 10/2/2023),  "Disp: , Rfl:     nitroglycerin (NITROSTAT) 0.4 mg SL tablet, Place 1 tablet (0.4 mg total) under the tongue every 5 (five) minutes as needed for chest pain If no relief after first dose call prescriber or 911 (Patient not taking: Reported on 8/10/2023), Disp: 25 tablet, Rfl: 0        Review of Systems:  Review of Systems   Constitutional:  Positive for fatigue.   Respiratory:  Positive for cough and shortness of breath.    Cardiovascular:  Negative for chest pain, palpitations and leg swelling.   Musculoskeletal:  Positive for arthralgias.   All other systems reviewed and are negative.        Physical Exam:  Vitals:  Vitals:    02/09/24 1300   BP: 102/70   BP Location: Left arm   Patient Position: Sitting   Cuff Size: Large   Pulse: 73   SpO2: 98%   Weight: 85.3 kg (188 lb)   Height: 5' 5\" (1.651 m)     Physical Exam   Constitutional: She appears healthy. No distress.   Eyes: Pupils are equal, round, and reactive to light. Conjunctivae are normal.   Neck: No JVD present.   Cardiovascular: Normal rate and regular rhythm. Exam reveals no gallop and no friction rub.   Murmur heard.  Pulmonary/Chest: Effort normal and breath sounds normal. She has no wheezes. She has no rales.   Musculoskeletal:         General: No tenderness, deformity or edema.      Cervical back: Normal range of motion and neck supple.   Neurological: She is alert and oriented to person, place, and time.   Skin: Skin is warm and dry.        Cardiographics:  EKG: Personally reviewed normal sinus rhythm with rate 75 beats per minute  Last known EF: 55-60%    This note was completed in part utilizing [employer] Software.  Grammatical errors, random word insertions, spelling mistakes, and incomplete sentences can be an occasional consequence of this system secondary to software limitations, ambient noise, and hardware issues.  If you have any questions or concerns about the content, text, or information contained within the body of this " dictation, please contact the provider for clarification.

## 2024-02-09 NOTE — PROGRESS NOTES
Results for orders placed or performed in visit on 02/09/24   Cardiac EP device report    Narrative    MDT DUAL CHAMBER PM/ACTIVE SYSTEM IS MRI CONDITIONAL  CARELINK TRANSMISSION: N/B-- BATTERY CHECK--BATTERY VOLTAGE NEARING ISMA.  WILL SCHEDULE MONTHLY BATTERY CHECKS. (6 MONS) AP 99%  <1%. ALL AVAILABLE LEAD PARAMETERS WITHIN NORMAL LIMITS. 12 AT/AF EPISODES DETECTED. LONGEST <2 HOURS. NO SIGNIFICANT HIGH RATE EPISODES. PATIENT IS ON ELIQUIS, SOTALOL AND METOPROLOL SUCC; EF 50% (2021). NORMAL DEVICE FUNCTION.----VENEGAS

## 2024-02-15 DIAGNOSIS — E78.5 DYSLIPIDEMIA: ICD-10-CM

## 2024-02-15 DIAGNOSIS — K21.9 GASTROESOPHAGEAL REFLUX DISEASE WITHOUT ESOPHAGITIS: ICD-10-CM

## 2024-02-15 RX ORDER — PRAVASTATIN SODIUM 10 MG
TABLET ORAL
Qty: 36 TABLET | Refills: 1 | Status: SHIPPED | OUTPATIENT
Start: 2024-02-15

## 2024-02-15 RX ORDER — PANTOPRAZOLE SODIUM 40 MG/1
40 TABLET, DELAYED RELEASE ORAL DAILY
Qty: 90 TABLET | Refills: 1 | Status: SHIPPED | OUTPATIENT
Start: 2024-02-15

## 2024-02-22 ENCOUNTER — HOSPITAL ENCOUNTER (OUTPATIENT)
Dept: NON INVASIVE DIAGNOSTICS | Facility: HOSPITAL | Age: 84
Discharge: HOME | End: 2024-02-22
Attending: INTERNAL MEDICINE
Payer: COMMERCIAL

## 2024-02-22 VITALS
WEIGHT: 188 LBS | BODY MASS INDEX: 31.32 KG/M2 | HEART RATE: 73 BPM | SYSTOLIC BLOOD PRESSURE: 131 MMHG | HEIGHT: 65 IN | DIASTOLIC BLOOD PRESSURE: 76 MMHG

## 2024-02-22 DIAGNOSIS — I50.32 CHRONIC DIASTOLIC CONGESTIVE HEART FAILURE (HCC): ICD-10-CM

## 2024-02-22 PROCEDURE — 93306 TTE W/DOPPLER COMPLETE: CPT | Performed by: INTERNAL MEDICINE

## 2024-02-22 PROCEDURE — 93306 TTE W/DOPPLER COMPLETE: CPT

## 2024-02-23 LAB
AORTIC ROOT: 4.3 CM
APICAL FOUR CHAMBER EJECTION FRACTION: 56 %
ASCENDING AORTA: 3.9 CM
AV LVOT PEAK GRADIENT: 8 MMHG
AV PEAK GRADIENT: 14 MMHG
AV REGURGITATION PRESSURE HALF TIME: 504 MS
BSA FOR ECHO PROCEDURE: 1.93 M2
E WAVE DECELERATION TIME: 178 MS
E/A RATIO: 0.64
FRACTIONAL SHORTENING: 25 (ref 28–44)
INTERVENTRICULAR SEPTUM IN DIASTOLE (PARASTERNAL SHORT AXIS VIEW): 1.3 CM
INTERVENTRICULAR SEPTUM: 1.3 CM (ref 0.6–1.1)
LAAS-AP2: 20.6 CM2
LAAS-AP4: 16.3 CM2
LEFT ATRIUM SIZE: 3.2 CM
LEFT ATRIUM VOLUME (MOD BIPLANE): 54 ML
LEFT ATRIUM VOLUME INDEX (MOD BIPLANE): 28 ML/M2
LEFT INTERNAL DIMENSION IN SYSTOLE: 3.6 CM (ref 2.1–4)
LEFT VENTRICULAR INTERNAL DIMENSION IN DIASTOLE: 4.8 CM (ref 3.5–6)
LEFT VENTRICULAR POSTERIOR WALL IN END DIASTOLE: 1.2 CM
LEFT VENTRICULAR STROKE VOLUME: 56 ML
LVSV (TEICH): 56 ML
MV E'TISSUE VEL-SEP: 6 CM/S
MV PEAK A VEL: 0.72 M/S
MV PEAK E VEL: 46 CM/S
MV STENOSIS PRESSURE HALF TIME: 52 MS
MV VALVE AREA P 1/2 METHOD: 4.23
RIGHT ATRIUM AREA SYSTOLE A4C: 13.7 CM2
RIGHT VENTRICLE ID DIMENSION: 2.7 CM
SINOTUBULAR JUNCTION: 4 CM
SL CV AV DECELERATION TIME RETROGRADE: 1737 MS
SL CV AV PEAK GRADIENT RETROGRADE: 82 MMHG
SL CV LEFT ATRIUM LENGTH A2C: 4.9 CM
SL CV LV EF: 56
SL CV PED ECHO LEFT VENTRICLE DIASTOLIC VOLUME (MOD BIPLANE) 2D: 109 ML
SL CV PED ECHO LEFT VENTRICLE SYSTOLIC VOLUME (MOD BIPLANE) 2D: 54 ML
SL CV SINUS OF VALSALVA 2D: 4.3 CM
STJ: 4 CM
TR MAX PG: 34 MMHG
TR PEAK VELOCITY: 2.9 M/S
TRICUSPID ANNULAR PLANE SYSTOLIC EXCURSION: 1.5 CM
TRICUSPID VALVE PEAK REGURGITATION VELOCITY: 2.93 M/S

## 2024-02-24 DIAGNOSIS — I50.32 CHRONIC DIASTOLIC CONGESTIVE HEART FAILURE (HCC): ICD-10-CM

## 2024-02-26 RX ORDER — FUROSEMIDE 20 MG/1
20 TABLET ORAL DAILY
Qty: 90 TABLET | Refills: 0 | Status: SHIPPED | OUTPATIENT
Start: 2024-02-26

## 2024-02-29 DIAGNOSIS — I48.91 ATRIAL FIBRILLATION, UNSPECIFIED TYPE (HCC): ICD-10-CM

## 2024-02-29 RX ORDER — METOPROLOL SUCCINATE 25 MG/1
25 TABLET, EXTENDED RELEASE ORAL DAILY
Qty: 90 TABLET | Refills: 3 | Status: SHIPPED | OUTPATIENT
Start: 2024-02-29

## 2024-03-04 ENCOUNTER — TELEPHONE (OUTPATIENT)
Dept: CARDIOLOGY CLINIC | Facility: CLINIC | Age: 84
End: 2024-03-04

## 2024-03-04 NOTE — TELEPHONE ENCOUNTER
----- Message from Blaze Shell DO sent at 3/2/2024  9:54 AM EST -----  Can you please let the patient know echo was normal  - no change in valve

## 2024-03-05 DIAGNOSIS — I10 HYPERTENSION, UNSPECIFIED TYPE: ICD-10-CM

## 2024-03-05 RX ORDER — VALSARTAN 80 MG/1
80 TABLET ORAL 2 TIMES DAILY
Qty: 180 TABLET | Refills: 1 | Status: SHIPPED | OUTPATIENT
Start: 2024-03-05

## 2024-03-12 ENCOUNTER — OFFICE VISIT (OUTPATIENT)
Age: 84
End: 2024-03-12
Payer: COMMERCIAL

## 2024-03-12 VITALS
HEART RATE: 86 BPM | WEIGHT: 188 LBS | DIASTOLIC BLOOD PRESSURE: 75 MMHG | RESPIRATION RATE: 17 BRPM | HEIGHT: 65 IN | BODY MASS INDEX: 31.32 KG/M2 | SYSTOLIC BLOOD PRESSURE: 117 MMHG

## 2024-03-12 DIAGNOSIS — I70.209 PERIPHERAL ARTERIOSCLEROSIS (HCC): Primary | ICD-10-CM

## 2024-03-12 DIAGNOSIS — M79.671 PAIN IN BOTH FEET: ICD-10-CM

## 2024-03-12 DIAGNOSIS — M79.672 PAIN IN BOTH FEET: ICD-10-CM

## 2024-03-12 DIAGNOSIS — L84 CORNS: ICD-10-CM

## 2024-03-12 PROCEDURE — 11056 PARNG/CUTG B9 HYPRKR LES 2-4: CPT | Performed by: PODIATRIST

## 2024-03-13 ENCOUNTER — OFFICE VISIT (OUTPATIENT)
Dept: PULMONOLOGY | Facility: MEDICAL CENTER | Age: 84
End: 2024-03-13
Payer: COMMERCIAL

## 2024-03-13 VITALS
WEIGHT: 186 LBS | DIASTOLIC BLOOD PRESSURE: 84 MMHG | TEMPERATURE: 97.8 F | BODY MASS INDEX: 30.99 KG/M2 | HEART RATE: 86 BPM | RESPIRATION RATE: 12 BRPM | HEIGHT: 65 IN | OXYGEN SATURATION: 94 % | SYSTOLIC BLOOD PRESSURE: 118 MMHG

## 2024-03-13 DIAGNOSIS — G47.33 OSA (OBSTRUCTIVE SLEEP APNEA): ICD-10-CM

## 2024-03-13 DIAGNOSIS — J30.0 VASOMOTOR RHINITIS: ICD-10-CM

## 2024-03-13 DIAGNOSIS — R05.3 CHRONIC COUGH: Primary | ICD-10-CM

## 2024-03-13 DIAGNOSIS — J44.9 CHRONIC OBSTRUCTIVE PULMONARY DISEASE, UNSPECIFIED COPD TYPE (HCC): ICD-10-CM

## 2024-03-13 PROBLEM — I71.21 ASCENDING AORTIC ANEURYSM (HCC): Status: ACTIVE | Noted: 2024-03-13

## 2024-03-13 PROCEDURE — 99214 OFFICE O/P EST MOD 30 MIN: CPT | Performed by: INTERNAL MEDICINE

## 2024-03-13 RX ORDER — IPRATROPIUM BROMIDE 21 UG/1
2 SPRAY, METERED NASAL 3 TIMES DAILY PRN
Qty: 30 ML | Refills: 5 | Status: SHIPPED | OUTPATIENT
Start: 2024-03-13

## 2024-03-13 NOTE — PATIENT INSTRUCTIONS
Call [facility] for a full facemask that has phone send has been a ResMed AirTouch F20 mask.  Tell them you need something without magnetic clips    Try Spiriva 2.5 mcg Respimat 2 puffs once a day in the morning.  Try this for 1 to 2 weeks and try Breztri 2 puffs at bedtime before you go to bed once a day.  Rinse mouth after using the Breztri as this is a steroid    Collect sputum for culture for both routine culture and AFB smear and culture    Call my office if you like the Spiriva Respimat or Breztri

## 2024-03-14 ENCOUNTER — REMOTE DEVICE CLINIC VISIT (OUTPATIENT)
Dept: CARDIOLOGY CLINIC | Facility: CLINIC | Age: 84
End: 2024-03-14

## 2024-03-14 DIAGNOSIS — Z95.0 PRESENCE OF PERMANENT CARDIAC PACEMAKER: Primary | ICD-10-CM

## 2024-03-14 PROCEDURE — RECHECK: Performed by: INTERNAL MEDICINE

## 2024-03-14 NOTE — PROGRESS NOTES
Results for orders placed or performed in visit on 03/14/24   Cardiac EP device report    Narrative    MDT DUAL CHAMBER PM/ACTIVE SYSTEM IS MRI CONDITIONAL  CARELINK TRANSMISSION: N/B-- BATTERY CHECK--BATTERY VOLTAGE NEARING ISMA.  WILL SCHEDULE MONTHLY BATTERY CHECKS. (5 MONS) AP 99%  <1%. ALL AVAILABLE LEAD PARAMETERS WITHIN NORMAL LIMITS. 14 AT/AF EPISODES DETECTED. LONGEST <2 HOURS. NO SIGNIFICANT HIGH RATE EPISODES. PATIENT IS ON ELIQUIS, SOTALOL AND METOPROLOL SUCC; EF 50% (2021). NORMAL DEVICE FUNCTION.----VENEGAS

## 2024-03-16 PROBLEM — J30.0 VASOMOTOR RHINITIS: Status: ACTIVE | Noted: 2024-03-16

## 2024-03-16 PROBLEM — J44.9 CHRONIC OBSTRUCTIVE PULMONARY DISEASE (HCC): Status: ACTIVE | Noted: 2024-03-16

## 2024-03-16 NOTE — ASSESSMENT & PLAN NOTE
She does have severe MIGUEL and does use a AirFit fullface mask.  I did review compliance data of for past 1 month and she did use this on regular basis for average of 8.5 hours per night she is on auto CPAP with pressure of 15 to 18 cmH2O.  On review of her compliance data she used it every night and average CPAP pressure was 16 which resulted in AHI of 1.1 which is satisfactory.    Sometimes she hasproblems with mask air leak andmaking seal on her AirFit fullface mask.  I told her to contact [facility] and see if the AirTouch full facemask would be better for her or not

## 2024-03-16 NOTE — ASSESSMENT & PLAN NOTE
Spirometry today shows evidence of mild to moderate airflow obstruction.  FEV1 was 1.05 L 55% predicted with obstructive ratio of 62%.  There is also some mild restrictive impairment.  Does have a 40-pack-year history of smoking and quit in 1998.    Mine sample of Spiriva Respimat 2.5 mcg to try 2 puffs once a day to see if this helps.  If not she can try sample of Breztri 2 puffs once a day at nighttime as she tends to have symptoms more in the evening such as cough and wheeze.

## 2024-03-16 NOTE — PROGRESS NOTES
Assessment/Plan        Problem List Items Addressed This Visit          Respiratory    MIGUEL (obstructive sleep apnea)     She does have severe MIGUEL and does use a AirFit fullface mask.  I did review compliance data of for past 1 month and she did use this on regular basis for average of 8.5 hours per night she is on auto CPAP with pressure of 15 to 18 cmH2O.  On review of her compliance data she used it every night and average CPAP pressure was 16 which resulted in AHI of 1.1 which is satisfactory.    Sometimes she hasproblems with mask air leak andmaking seal on her AirFit fullface mask.  I told her to contact [facility] and see if the AirTouch full facemask would be better for her or not         Vasomotor rhinitis     She does have features of vasomotor rhinitis and frequent has frequent runny nose and nasal congestion.  Not much help with this with Flonase.  I prescribed her ipratropium bromide she can use 0.03% 2 sprays in each nostril up to 3 times a day to see if this helps         Relevant Medications    ipratropium (ATROVENT) 0.03 % nasal spray    Chronic obstructive pulmonary disease (HCC)     Spirometry today shows evidence of mild to moderate airflow obstruction.  FEV1 was 1.05 L 55% predicted with obstructive ratio of 62%.  There is also some mild restrictive impairment.  Does have a 40-pack-year history of smoking and quit in 1998.    Mine sample of Spiriva Respimat 2.5 mcg to try 2 puffs once a day to see if this helps.  If not she can try sample of Breztri 2 puffs once a day at nighttime as she tends to have symptoms more in the evening such as cough and wheeze.         Relevant Medications    ipratropium (ATROVENT) 0.03 % nasal spray       Other    Chronic cough - Primary     Yumiko does have chronic cough which is in part due to postnasal drainage and also that she does have some mild bronchiectasis.  I did order a follow-up CT scan of her chest to see if there is any worsening of bronchiectasis  or inflammatory changes compared to previous 1 done in July 2023.    I also ordered sputum culture and AFB smear and culture.         Relevant Orders    Sputum culture and Gram stain    AFB Culture and Stain    CT chest without contrast         Cc: Some cough and shortness of breath with exertion      HPI    Yumiko had seen pulmonologist Dr. Raya in February 2023.  At that time she was having problems with recurrent cough and respiratory infections and had been on antibiotic therapy.  She states she has had chronic cough for about 10 years.  Occasionally expectorate some mucus.  Does get some postnasal drainage.  Also states she has frequent runny nose for clear discharge.  Not having any sinus pressure now.  She did have serum gammaglobulin levels done in February of last year which showed IgG to be normal at 712 and IgA was mildly decreased at 56.        She does have history of multiple atrial fibrillation and sick sinus syndrome.  She had a pacemaker insertion done at [facility] (formerly [facility]3].  Also has chronic diastolic heart failure.  She now follows with Dr. Shell.  She is on sotalol.  Also is on anticoagulation with Eliquis.  Echocardiogram done in February of this year showed LV systolic function be normal with left ventricular ejection fraction of 56%.  There was grade 1 diastolic dysfunction.  There was mild aortic regurgitation.  No evidence of pulmonary artery hypertension.    A CT scan of her chest that was done July 2023 with some tree-in-bud opacities in right upper lobe as well as some bandlike consolidation medially and right upper lobe and evidence of some right middle lobe bronchiectasis and also some tree-in-bud opacities in right lower lobe.  It was stable 4 mm left upper lobe lung nodule and 4.6 cm ascending aortic aneurysm.  Also has some mild bronchiectasis of left lower lobe and some scarring in lingula.    She also has history of GERD and has history of esophageal stricture.  She did  have EGD in 2019 with dilatation of distal esophageal stenosis.    Yumiko does have severe obstructive sleep apnea.  Split sleep study done 2019 showed AHI of 60 she was noted to have intermittent snoring.  Also had some intermittent oxygen desaturation during titration study she was placed on 2 L of oxygen CPAP to maintain O2 sats greater than 90%..  She is on auto CPAP set at 15-18 cmH2O.  DME is [employer].  She does have an AirFit fullface mask that she uses.    She quit smoking in 1998 and had smoked 1 pack of cigarettes per day for about 40 years.  She does get short of breath going up a flight of stairs.      Past Medical History:   Diagnosis Date    A-fib (HCC)     Arthritis     Atrial fibrillation (HCC)     Cardiac disease     Cardiac disease 05/31/2016    Cataract     Colon polyp     Gastro-esophageal reflux     GERD    GERD (gastroesophageal reflux disease)     Hyperlipidemia     Hypertension     Nasal congestion     Primary generalized (osteo)arthritis     Rheumatoid arthritis (HCC)     Shingles     Sinusitis     Sleep apnea     Sleep difficulties        Past Surgical History:   Procedure Laterality Date    CARDIAC PACEMAKER PLACEMENT Left 2014    CATARACT EXTRACTION      CHOLECYSTECTOMY      resolved: 2009    COLONOSCOPY      Fiberoptic, resolved 2015    EYE SURGERY      KNEE SURGERY Left     left kknee replacement in 2009    UPPER GASTROINTESTINAL ENDOSCOPY           Current Outpatient Medications:     acetaminophen (TYLENOL) 650 mg CR tablet, Take by mouth, Disp: , Rfl:     albuterol (PROVENTIL HFA,VENTOLIN HFA) 90 mcg/act inhaler, , Disp: , Rfl:     apixaban (Eliquis) 5 mg, Take 1 tablet (5 mg total) by mouth 2 (two) times a day, Disp: 180 tablet, Rfl: 3    Calcium Polycarbophil (FIBERCON PO), Take by mouth 2 (two) times a day, Disp: , Rfl:     cholecalciferol (VITAMIN D3) 1,000 units tablet, Take 1,000 Units by mouth daily, Disp: , Rfl:     Coenzyme Q10 (COQ-10) 200 MG CAPS, Take 2 capsules by mouth  daily, Disp: , Rfl:     diphenhydrAMINE-acetaminophen (TYLENOL PM)  MG TABS, Take 1 tablet by mouth daily at bedtime as needed for sleep, Disp: , Rfl:     famotidine (PEPCID) 20 mg tablet, Take 1 tablet (20 mg total) by mouth daily at bedtime, Disp: 90 tablet, Rfl: 1    furosemide (LASIX) 20 mg tablet, TAKE 1 TABLET BY MOUTH EVERY DAY, Disp: 90 tablet, Rfl: 0    guaiFENesin (ROBITUSSIN) 100 MG/5ML oral liquid, Take 200 mg by mouth daily at bedtime  , Disp: , Rfl:     ipratropium (ATROVENT) 0.03 % nasal spray, 2 sprays into each nostril 3 (three) times a day as needed for rhinitis (Post nasal drainage), Disp: 30 mL, Rfl: 5    Lactobacillus (ACIDOPHILUS PO), Take by mouth, Disp: , Rfl:     metoprolol succinate (TOPROL-XL) 25 mg 24 hr tablet, Take 1 tablet (25 mg total) by mouth daily, Disp: 90 tablet, Rfl: 3    Multiple Vitamins-Minerals (DAILY MULTIVITAMIN PO), Take 1 tablet by mouth daily, Disp: , Rfl:     pantoprazole (PROTONIX) 40 mg tablet, Take 1 tablet (40 mg total) by mouth daily, Disp: 90 tablet, Rfl: 1    pravastatin (PRAVACHOL) 10 mg tablet, Take 1 tablet by mouth 3 days per week on MONDAYS WEDNESDAYS and FRIDAYS, Disp: 36 tablet, Rfl: 1    sotalol (BETAPACE) 80 mg tablet, Take 1 tablet (80 mg total) by mouth every 12 (twelve) hours, Disp: 180 tablet, Rfl: 0    valsartan (DIOVAN) 80 mg tablet, TAKE 1 TABLET BY MOUTH 2 TIMES A DAY., Disp: 180 tablet, Rfl: 1    Vitamin Mixture (SHADE-C PO), Take 500 mg by mouth daily, Disp: , Rfl:     amoxicillin-clavulanate (AUGMENTIN) 875-125 mg per tablet, Take 1 tablet by mouth every 12 (twelve) hours (Patient not taking: Reported on 3/13/2024), Disp: , Rfl:     azelastine (ASTELIN) 0.1 % nasal spray, 1 spray into each nostril in the morning and 1 spray in the evening. (Patient not taking: Reported on 8/10/2023), Disp: 30 mL, Rfl: 6    betamethasone dipropionate (DIPROSONE) 0.05 % cream, Apply topically 2 (two) times a day (Patient not taking: Reported on  10/2/2023), Disp: 45 g, Rfl: 0    Calcium Carbonate-Vitamin D 600-200 MG-UNIT CAPS, Take by mouth 2 (two) times a day (Patient not taking: Reported on 10/2/2023), Disp: , Rfl:     clobetasol (TEMOVATE) 0.05 % cream, , Disp: , Rfl:     fluticasone (FLONASE) 50 mcg/act nasal spray, 2 sprays into each nostril daily (Patient not taking: Reported on 2023), Disp: 16 g, Rfl: 6    hydroxychloroquine (PLAQUENIL) 200 mg tablet, Take 1 tablet (200 mg total) by mouth daily with breakfast Administer with food or milk. (Patient not taking: Reported on 8/10/2023), Disp: 30 tablet, Rfl: 2    Magnesium 400 MG TABS, Take by mouth daily (Patient not taking: Reported on 10/2/2023), Disp: , Rfl:     nitroglycerin (NITROSTAT) 0.4 mg SL tablet, Place 1 tablet (0.4 mg total) under the tongue every 5 (five) minutes as needed for chest pain If no relief after first dose call prescriber or 911 (Patient not taking: Reported on 8/10/2023), Disp: 25 tablet, Rfl: 0    tobramycin (TOBREX) 0.3 % SOLN, APPLY 1 DROP BY OPHTHALMIC ROUTE 3 TIMES EVERY DAY, Disp: , Rfl:     Allergies   Allergen Reactions    Ciprofloxacin Rash    Sulfa Antibiotics Rash    Synvisc [Hylan G-F 20] Rash       Social History     Tobacco Use    Smoking status: Former     Current packs/day: 0.00     Average packs/day: 1 pack/day for 41.0 years (41.0 ttl pk-yrs)     Types: Cigarettes     Start date:      Quit date:      Years since quittin.2    Smokeless tobacco: Never    Tobacco comments:     Has smoked since the age of 17   Substance Use Topics    Alcohol use: Yes     Comment: occasional, No alcohol use,per [employer]         Family History   Problem Relation Age of Onset    Esophageal cancer Mother     Coronary artery disease Father     Diabetes Sister     Hyperlipidemia Sister     Pancreatic cancer Sister     Esophageal cancer Brother     Arthritis Family     Hypertension Family        Review of Systems   Constitutional:  Negative for activity change,  "appetite change, chills, fever and unexpected weight change.   HENT:  Negative for congestion, dental problem, postnasal drip, sinus pressure, sinus pain, sore throat and voice change.    Eyes:  Negative for visual disturbance.   Respiratory:  Positive for cough and shortness of breath. Negative for wheezing.         See HPI   Cardiovascular:  Negative for chest pain, palpitations and leg swelling.   Gastrointestinal:  Negative for abdominal pain, diarrhea, nausea and vomiting.   Genitourinary:  Negative for difficulty urinating.   Musculoskeletal:  Negative for arthralgias.   Skin:  Negative for rash and wound.   Allergic/Immunologic: Negative for environmental allergies and food allergies.   Neurological:  Negative for dizziness, light-headedness and headaches.   Hematological:  Negative for adenopathy.   Psychiatric/Behavioral:  Negative for agitation, behavioral problems and confusion.            Vitals:    03/13/24 1332   BP: 118/84   Pulse: 86   Resp: 12   Temp: 97.8 °F (36.6 °C)   SpO2: 94%     Height: 5' 5\" (165.1 cm)  IBW (Ideal Body Weight): 57 kg  Body mass index is 30.95 kg/m².  Weight (last 2 days)       None                Physical Exam  Vitals reviewed.   Constitutional:       General: She is not in acute distress.     Appearance: Normal appearance. She is well-developed. She is obese.   HENT:      Head: Normocephalic.      Right Ear: External ear normal.      Left Ear: External ear normal.      Nose: Nose normal.      Mouth/Throat:      Mouth: Mucous membranes are moist.      Pharynx: Oropharynx is clear. No oropharyngeal exudate.   Eyes:      Conjunctiva/sclera: Conjunctivae normal.      Pupils: Pupils are equal, round, and reactive to light.   Cardiovascular:      Rate and Rhythm: Normal rate and regular rhythm.      Heart sounds: Normal heart sounds.   Pulmonary:      Effort: Pulmonary effort is normal.      Comments: Lung sounds are clear.  No wheezes, crackles or rhonchi  Abdominal:      " General: There is no distension.      Palpations: Abdomen is soft.      Tenderness: There is no abdominal tenderness.   Musculoskeletal:      Cervical back: Neck supple.      Comments: No edema, cyanosis or clubbing   Lymphadenopathy:      Cervical: No cervical adenopathy.   Skin:     General: Skin is warm and dry.   Neurological:      General: No focal deficit present.      Mental Status: She is alert and oriented to person, place, and time.   Psychiatric:         Mood and Affect: Mood normal.         Behavior: Behavior normal.         Thought Content: Thought content normal.           Office Spirometry Results: done today    FVC -  1.68 L     66%  FEV1 - 1.05 L    55%  FEV1/FVC% - 62%    Moderate airflow obstruction mild restrictive impairment

## 2024-03-16 NOTE — ASSESSMENT & PLAN NOTE
She does have features of vasomotor rhinitis and frequent has frequent runny nose and nasal congestion.  Not much help with this with Flonase.  I prescribed her ipratropium bromide she can use 0.03% 2 sprays in each nostril up to 3 times a day to see if this helps

## 2024-03-16 NOTE — ASSESSMENT & PLAN NOTE
Yumiko does have chronic cough which is in part due to postnasal drainage and also that she does have some mild bronchiectasis.  I did order a follow-up CT scan of her chest to see if there is any worsening of bronchiectasis or inflammatory changes compared to previous 1 done in July 2023.    I also ordered sputum culture and AFB smear and culture.

## 2024-03-19 DIAGNOSIS — I48.91 ATRIAL FIBRILLATION, UNSPECIFIED TYPE (HCC): ICD-10-CM

## 2024-03-19 DIAGNOSIS — I10 HYPERTENSION, UNSPECIFIED TYPE: ICD-10-CM

## 2024-03-19 DIAGNOSIS — I50.32 CHRONIC DIASTOLIC CONGESTIVE HEART FAILURE (HCC): ICD-10-CM

## 2024-03-19 DIAGNOSIS — E78.5 DYSLIPIDEMIA: ICD-10-CM

## 2024-03-21 DIAGNOSIS — K21.9 GASTROESOPHAGEAL REFLUX DISEASE WITHOUT ESOPHAGITIS: Primary | ICD-10-CM

## 2024-03-21 RX ORDER — PANTOPRAZOLE SODIUM 40 MG/1
40 TABLET, DELAYED RELEASE ORAL DAILY
Qty: 90 TABLET | Refills: 1 | Status: SHIPPED | OUTPATIENT
Start: 2024-03-21

## 2024-03-21 RX ORDER — FAMOTIDINE 20 MG/1
20 TABLET, FILM COATED ORAL
Qty: 90 TABLET | Refills: 1 | Status: SHIPPED | OUTPATIENT
Start: 2024-03-21

## 2024-03-22 RX ORDER — SOTALOL HYDROCHLORIDE 80 MG/1
80 TABLET ORAL EVERY 12 HOURS
Qty: 180 TABLET | Refills: 0 | Status: SHIPPED | OUTPATIENT
Start: 2024-03-22 | End: 2024-03-28

## 2024-03-22 RX ORDER — VALSARTAN 80 MG/1
80 TABLET ORAL 2 TIMES DAILY
Qty: 180 TABLET | Refills: 1 | Status: SHIPPED | OUTPATIENT
Start: 2024-03-22

## 2024-03-22 RX ORDER — FUROSEMIDE 20 MG/1
20 TABLET ORAL DAILY
Qty: 90 TABLET | Refills: 0 | Status: SHIPPED | OUTPATIENT
Start: 2024-03-22

## 2024-03-22 RX ORDER — METOPROLOL SUCCINATE 25 MG/1
25 TABLET, EXTENDED RELEASE ORAL DAILY
Qty: 90 TABLET | Refills: 3 | Status: SHIPPED | OUTPATIENT
Start: 2024-03-22

## 2024-03-22 RX ORDER — PRAVASTATIN SODIUM 10 MG
TABLET ORAL
Qty: 36 TABLET | Refills: 1 | Status: SHIPPED | OUTPATIENT
Start: 2024-03-22

## 2024-03-25 DIAGNOSIS — I48.91 ATRIAL FIBRILLATION, UNSPECIFIED TYPE (HCC): ICD-10-CM

## 2024-03-28 RX ORDER — SOTALOL HYDROCHLORIDE 80 MG/1
80 TABLET ORAL EVERY 12 HOURS
Qty: 180 TABLET | Refills: 1 | Status: SHIPPED | OUTPATIENT
Start: 2024-03-28

## 2024-04-12 DIAGNOSIS — R09.82 POST-NASAL DISCHARGE: Primary | ICD-10-CM

## 2024-04-12 RX ORDER — IPRATROPIUM BROMIDE 42 UG/1
2 SPRAY, METERED NASAL 4 TIMES DAILY
Qty: 15 ML | Refills: 8 | Status: SHIPPED | OUTPATIENT
Start: 2024-04-12

## 2024-04-19 ENCOUNTER — REMOTE DEVICE CLINIC VISIT (OUTPATIENT)
Dept: CARDIOLOGY CLINIC | Facility: CLINIC | Age: 84
End: 2024-04-19
Payer: COMMERCIAL

## 2024-04-19 DIAGNOSIS — Z95.0 PRESENCE OF PERMANENT CARDIAC PACEMAKER: Primary | ICD-10-CM

## 2024-04-19 PROCEDURE — 93296 REM INTERROG EVL PM/IDS: CPT | Performed by: INTERNAL MEDICINE

## 2024-04-19 PROCEDURE — 93294 REM INTERROG EVL PM/LDLS PM: CPT | Performed by: INTERNAL MEDICINE

## 2024-04-19 NOTE — PROGRESS NOTES
Results for orders placed or performed in visit on 04/19/24   Cardiac EP device report    Narrative    MDT DUAL CHAMBER PM/ACTIVE SYSTEM IS MRI CONDITIONAL  CARELINK TRANSMISSION: BATTERY VOLTAGE NEARING ISMA.  WILL SCHEDULE MONTHLY BATTERY CHECKS. ( 8 MONS) AP 99%  <1%. ALL AVAILABLE LEAD PARAMETERS WITHIN NORMAL LIMITS. 1 VHR EPISODES DETECTED 8 BEATS @ 350ms. 16 AT/AF EPISODES DETECTED. LONGEST <33 MIN. PATIENT IS ON ELIQUIS, SOTALOL AND METOPROLOL SUCC; EF 50% (2021). NORMAL DEVICE FUNCTION.----VENEGAS

## 2024-04-26 ENCOUNTER — TELEPHONE (OUTPATIENT)
Dept: GASTROENTEROLOGY | Facility: CLINIC | Age: 84
End: 2024-04-26

## 2024-05-06 ENCOUNTER — TELEPHONE (OUTPATIENT)
Dept: GASTROENTEROLOGY | Facility: CLINIC | Age: 84
End: 2024-05-06

## 2024-05-22 DIAGNOSIS — I51.9 CARDIAC DISEASE: Chronic | ICD-10-CM

## 2024-05-22 NOTE — TELEPHONE ENCOUNTER
Pt contacted Call Center requested refill of their medication.        Doctor Name: Dr Shell       Medication Name: nytroglycerin      Dosage of Med: 0.4 mg       Frequency of Med: Place 1 tablet 0.4 mg total) under the tongue every 5 (five) minutes as needed for chest pain. If no relief after first dose, call prescriber or 911.      Remaining Medication: Pt has medication, but it is .       Pharmacy and Location: [facility], [place], [place]        Pt. Preferred Callback Phone Number: [phone]      Thank you.    *Rx is marked that patient is not taking, but patient was told by Dr Shell to have on hand if needed; current bottle is *       PLEASE ADVISE PATIENTS:    REFILL REQUESTS WILL BE PROCESSED WITHIN 24-48 HOURS.

## 2024-05-24 ENCOUNTER — REMOTE DEVICE CLINIC VISIT (OUTPATIENT)
Dept: CARDIOLOGY CLINIC | Facility: CLINIC | Age: 84
End: 2024-05-24

## 2024-05-24 DIAGNOSIS — Z95.0 PRESENCE OF PERMANENT CARDIAC PACEMAKER: Primary | ICD-10-CM

## 2024-05-24 PROCEDURE — RECHECK: Performed by: INTERNAL MEDICINE

## 2024-05-24 RX ORDER — NITROGLYCERIN 0.4 MG/1
0.4 TABLET SUBLINGUAL
Qty: 25 TABLET | Refills: 0 | Status: SHIPPED | OUTPATIENT
Start: 2024-05-24

## 2024-05-24 NOTE — PROGRESS NOTES
Results for orders placed or performed in visit on 05/24/24   Cardiac EP device report    Narrative    MDT DUAL CHAMBER PM/ACTIVE SYSTEM IS MRI CONDITIONAL  CARELINK TRANSMISSION: N/B BATTERYBATTERY VOLTAGE NEARING ISMA.  WILL SCHEDULE MONTHLY BATTERY CHECKS. ( 3 MONS) AP 99%  <1%. ALL AVAILABLE LEAD PARAMETERS WITHIN NORMAL LIMITS. NO SIGNIFICANT HIGH RATE EPISODES. 16 AT/AF EPISODES DETECTED. LONGEST <2 HOURS. PATIENT IS ON ELIQUIS, SOTALOL AND METOPROLOL SUCC; EF 50% (2021). NORMAL DEVICE FUNCTION.----VENEGAS

## 2024-05-28 ENCOUNTER — OFFICE VISIT (OUTPATIENT)
Age: 84
End: 2024-05-28
Payer: COMMERCIAL

## 2024-05-28 ENCOUNTER — APPOINTMENT (OUTPATIENT)
Dept: LAB | Facility: HOSPITAL | Age: 84
End: 2024-05-28
Payer: COMMERCIAL

## 2024-05-28 VITALS
DIASTOLIC BLOOD PRESSURE: 77 MMHG | RESPIRATION RATE: 17 BRPM | HEART RATE: 84 BPM | WEIGHT: 186 LBS | SYSTOLIC BLOOD PRESSURE: 130 MMHG | BODY MASS INDEX: 30.99 KG/M2 | HEIGHT: 65 IN

## 2024-05-28 DIAGNOSIS — R05.3 CHRONIC COUGH: ICD-10-CM

## 2024-05-28 DIAGNOSIS — M79.671 PAIN IN BOTH FEET: ICD-10-CM

## 2024-05-28 DIAGNOSIS — M79.672 PAIN IN BOTH FEET: ICD-10-CM

## 2024-05-28 DIAGNOSIS — I70.209 PERIPHERAL ARTERIOSCLEROSIS (HCC): Primary | ICD-10-CM

## 2024-05-28 DIAGNOSIS — L84 CORNS: ICD-10-CM

## 2024-05-28 PROCEDURE — 87070 CULTURE OTHR SPECIMN AEROBIC: CPT

## 2024-05-28 PROCEDURE — 87206 SMEAR FLUORESCENT/ACID STAI: CPT

## 2024-05-28 PROCEDURE — 87205 SMEAR GRAM STAIN: CPT

## 2024-05-28 PROCEDURE — 87116 MYCOBACTERIA CULTURE: CPT

## 2024-05-28 PROCEDURE — 87186 SC STD MICRODIL/AGAR DIL: CPT

## 2024-05-28 PROCEDURE — 11056 PARNG/CUTG B9 HYPRKR LES 2-4: CPT | Performed by: PODIATRIST

## 2024-05-28 PROCEDURE — 87077 CULTURE AEROBIC IDENTIFY: CPT

## 2024-05-28 NOTE — PROGRESS NOTES
Assessment/Plan:  Pain.  Radiculopathy.  Callus.  Mycotic toenail.  Peripheral artery disease.        Plan.  Chart reviewed.  Foot exam performed.  Calluses debrided.  Procedures performed without pain or complication.  Aftercare instruction given.  Return for follow-up.     Subjective.  Patient has pain in her feet and toes with ambulation.  No history of trauma.  She has pain when wearing shoes.        Discussion/Summary   The patient was counseled regarding instructions for management,-- patient and family education,-- risks and benefits of treatment options.    Patient is able to Self-Care.    Possible side effects of new medications were reviewed with the patient/guardian today. The treatment plan was reviewed with the patient/guardian. The patient/guardian understands and agrees with the treatment plan      Chief Complaint   Patient has foot pain.  She has pain when she wears shoes.  No history of trauma.         History of Present Illness   HPI:  Patient complains of pain in feet with ambulation. She has pain around the toes. She is also concerned with pain in her right heel. This is been ongoing for several weeks. She has no history of trauma. 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  Patient was unable tolerate gabapentin     Review of Systems           Cardiac: chest pain,-- rhythm problems,-- AM fatigue-- and-- witnessed apnea episodes.      Skin: No complaints of nonhealing sores or skin rash.      Genitourinary: loss of bladder control      Psychological: No complaints of feeling depressed, anxiety, panic attacks, or difficulty concentrating.      General: trouble sleeping-- and-- lack of energy/fatigue.      Respiratory: shortness of breath.      HEENT: snoring      Gastrointestinal: heartburn      Hematologic: anemia      Neurological: daytime sleepiness      Musculoskeletal: arthritis-- and-- back pain      Active Problems   1. Allergic rhinitis (477.9) (J30.9)   2. Anxiety disorder (300.00)  (F41.9)   3. Arthropathy (716.90) (M12.9)   4. Atherosclerosis of arteries of extremities (440.20) (I70.209)   5. Atrial fibrillation (427.31) (I48.91)   6. Backache (724.5) (M54.9)   7. Callus (700) (L84)   8. Cervicalgia (723.1) (M54.2)   9. Depression (311) (F32.9)   10. Difficulty in walking (719.7) (R26.2)   11. Encounter for screening for malignant neoplasm of colon (V76.51) (Z12.11)   12. Esophagitis, reflux (530.11) (K21.0)   13. Essential hypertension (401.9) (I10)   14. Foot pain, bilateral (729.5) (M79.671,M79.672)   15. Herpes zoster (053.9) (B02.9)   16. Hospital discharge follow-up (V67.59) (Z09)   17. Hyperlipidemia (272.4) (E78.5)   18. Impaired fasting glucose (790.21) (R73.01)   19. Internal Hemorrhoids (455.0)   20. Leg swelling (729.81) (M79.89)   21. Lichen planus (697.0) (L43.9)   22. Limb pain (729.5) (M79.609)   23. Lumbar radiculopathy (724.4) (M54.16)   24. Multiple joint pain (719.49) (M25.50)   25. Need for influenza vaccination (V04.81) (Z23)   26. 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 Onychomycosis (110.1) (B35.1)   28. MIGUEL (obstructive sleep apnea) (327.23) (G47.33)   29. Other abnormal finding of urine (791.9) (R82.99)   30. 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 Plantar fascial fibromatosis (728.71) (M72.2)   32. Rectal/anal hemorrhage (569.3) (K62.5)   33. Screening for malignant neoplasm of cervix (V76.2) (Z12.4)   34. Shoulder joint pain, unspecified laterality   35. Thrombocytopenia (287.5) (D69.6)   36. Urticaria (708.9) (L50.9)   37. Vulvovaginitis candida albicans (112.1) (B37.3)     Past Medical History    · History of Acute maxillary sinusitis (461.0) (J01.00)   · History of Acute upper respiratory infection (465.9) (J06.9)   · History of Cellulitis (682.9) (L03.90)   · History of Cough (786.2) (R05)   · History of Dysuria (788.1) (R30.0)   · History of High cholesterol (272.0) (E78.00)   · History of abdominal pain (V13.89) (Z87.898)   · History of acute bronchitis (V12.69)  (Z87.09)   · History of acute sinusitis (V12.69) (Z87.09)   · History of arthritis (V13.4) (Z87.39)   · History of atrial fibrillation (V12.59) (Z86.79)   · History of cataract (V12.49) (Z86.69)   · History of gastroesophageal reflux (GERD) (V12.79) (Z87.19)   · History of hypertension (V12.59) (Z86.79)   · History of Skin rash (782.1) (R21)   · History of Vulvovaginitis (616.10) (N76.0)     The active problems and past medical history were reviewed and updated today.      Surgical History    · History of Cataract Surgery   · History of Colonoscopy (Fiberoptic) Screening   · History of Gallbladder Surgery   · History of Knee Replacement   · History of Pacemaker Placement     The surgical history was reviewed and updated today.       Family History   Father    · Family history of Coronary Artery Disease (V17.49)  Sister    · Family history of Diabetes Mellitus (V18.0)   · Family history of High cholesterol  Family History    · Family history of arthritis (V17.7) (Z82.61)   · Family history of hypertension (V17.49) (Z82.49)     The family history was reviewed and updated today.       Social History    · Exercise: Walking   · 2 x/week   · Former smoker (V15.82) (Z87.891)   · No alcohol use   ·   The social history was reviewed and updated today.      Physical Exam   Left Foot: Appearance: Normal except as noted: excessive pronation-- and-- pes planus. Great toe deformities include a bunion. Tenderness: None except the great toe-- and-- distal first metatarsal.    Right Foot: Appearance: Normal except as noted: excessive pronation-- and-- pes planus. Great toe deformities include a bunion. Tenderness: None except the great toe,-- distal first metatarsal,-- medial calcaneous-- and-- insertion of the plantar fascia.    Left Ankle: ROM: limited ROM in all planes    Right Ankle: ROM: limited ROM in all planes    Neurological Exam: Light touch was decreased bilaterally. Vibratory sensation was decreased in both first  metatarsophalangeal joints.    Vascular Exam: performed Dorsalis pedis pulses were diminished bilaterally. Posterior tibial pulses were diminished bilaterally. Elevation Pallor: present bilaterally. Dependence rubor was present bilaterally. Capillary refill time was greater than 3 seconds bilaterally-- and-- Q. 9, findings bilateral. Edema: moderate bilaterally.    Toenails: All of the toenails were elongated,-- hypertrophied,-- discolored-- and-- Right ptotic.    Hyperkeratosis: present on both first toes,-- present on both first sub metatarsals-- and-- Positive xerosis of skin noted.    Shoe Gear Evaluation: performed (). Recommendation(s): SAS style-- and-- OTC inlays.

## 2024-05-29 LAB — RHODAMINE-AURAMINE STN SPEC: NORMAL

## 2024-05-30 LAB
BACTERIA SPT RESP CULT: ABNORMAL
BACTERIA SPT RESP CULT: ABNORMAL
GRAM STN SPEC: ABNORMAL
GRAM STN SPEC: ABNORMAL

## 2024-06-04 LAB
MYCOBACTERIUM SPEC CULT: NORMAL
RHODAMINE-AURAMINE STN SPEC: NORMAL

## 2024-06-12 ENCOUNTER — OFFICE VISIT (OUTPATIENT)
Dept: PULMONOLOGY | Facility: MEDICAL CENTER | Age: 84
End: 2024-06-12
Payer: COMMERCIAL

## 2024-06-12 VITALS
WEIGHT: 187 LBS | DIASTOLIC BLOOD PRESSURE: 80 MMHG | BODY MASS INDEX: 31.16 KG/M2 | HEART RATE: 75 BPM | SYSTOLIC BLOOD PRESSURE: 112 MMHG | HEIGHT: 65 IN | OXYGEN SATURATION: 94 % | TEMPERATURE: 98.4 F

## 2024-06-12 DIAGNOSIS — G47.33 OSA (OBSTRUCTIVE SLEEP APNEA): ICD-10-CM

## 2024-06-12 DIAGNOSIS — E66.09 CLASS 1 OBESITY DUE TO EXCESS CALORIES WITHOUT SERIOUS COMORBIDITY WITH BODY MASS INDEX (BMI) OF 31.0 TO 31.9 IN ADULT: ICD-10-CM

## 2024-06-12 DIAGNOSIS — J47.9 BRONCHIECTASIS WITHOUT COMPLICATION (HCC): Primary | ICD-10-CM

## 2024-06-12 DIAGNOSIS — J44.9 CHRONIC OBSTRUCTIVE PULMONARY DISEASE, UNSPECIFIED COPD TYPE (HCC): ICD-10-CM

## 2024-06-12 LAB
MYCOBACTERIUM SPEC CULT: NORMAL
RHODAMINE-AURAMINE STN SPEC: NORMAL

## 2024-06-12 PROCEDURE — 99214 OFFICE O/P EST MOD 30 MIN: CPT | Performed by: INTERNAL MEDICINE

## 2024-06-12 RX ORDER — CEFUROXIME AXETIL 250 MG/1
250 TABLET ORAL EVERY 12 HOURS
COMMUNITY
Start: 2024-05-31

## 2024-06-12 RX ORDER — BUDESONIDE, GLYCOPYRROLATE, AND FORMOTEROL FUMARATE 160; 9; 4.8 UG/1; UG/1; UG/1
2 AEROSOL, METERED RESPIRATORY (INHALATION)
Qty: 32.1 G | Refills: 3 | Status: SHIPPED | OUTPATIENT
Start: 2024-06-12

## 2024-06-12 RX ORDER — ALBUTEROL SULFATE 2.5 MG/3ML
2.5 SOLUTION RESPIRATORY (INHALATION) EVERY 6 HOURS PRN
Qty: 360 ML | Refills: 6 | Status: SHIPPED | OUTPATIENT
Start: 2024-06-12

## 2024-06-12 NOTE — PATIENT INSTRUCTIONS
Use Breztri 2 puffs once or twice a day depending how you are doing.  I sent this to your mail order [facility]    Can of chest scheduled for July    Can continue ipratropium nasal spray 2 sprays each nostril as 3 times a day as needed for runny nose and can use saline spray as needed    Follow-up CT scan of chest July 9 at 1:30 PM    I ordered nebulizer from [employer].  Can use 1 vial of albuterol up to 4 times a day as needed    I gave you samples at the ipratropium-albuterol that you can use for now in case butyryl was not approved right away.  You can compare this to regular albuterol and see which one you like better for future prescription    Can use flutter valve to help clear mucus    Follow-up in 3 months

## 2024-06-15 PROBLEM — E66.811 CLASS 1 OBESITY DUE TO EXCESS CALORIES WITHOUT SERIOUS COMORBIDITY WITH BODY MASS INDEX (BMI) OF 31.0 TO 31.9 IN ADULT: Status: ACTIVE | Noted: 2021-12-14

## 2024-06-15 PROBLEM — E66.09 CLASS 1 OBESITY DUE TO EXCESS CALORIES WITHOUT SERIOUS COMORBIDITY WITH BODY MASS INDEX (BMI) OF 31.0 TO 31.9 IN ADULT: Status: ACTIVE | Noted: 2021-12-14

## 2024-06-15 LAB
DME PARACHUTE DELIVERY DATE ACTUAL: NORMAL
DME PARACHUTE DELIVERY DATE REQUESTED: NORMAL
DME PARACHUTE ITEM DESCRIPTION: NORMAL
DME PARACHUTE ORDER STATUS: NORMAL
DME PARACHUTE SUPPLIER NAME: NORMAL
DME PARACHUTE SUPPLIER PHONE: NORMAL

## 2024-06-15 NOTE — ASSESSMENT & PLAN NOTE
She will continue Breztri 2 puffs once or twice a day depending on how she is doing.    I did order refill on her medication albuterol 2.5 mg for her nebulizer.

## 2024-06-15 NOTE — PROGRESS NOTES
Assessment & Plan        Problem List Items Addressed This Visit          Respiratory    MIGUEL (obstructive sleep apnea)     Severe obstructive sleep apnea with good compliance to CPAP therapy.  I did review her compliance data for past 3 months.  Her present DreamStation 2 machine was set up on 3/5/2019.  She used the CPAP on regular basis for average of 8.5 hours per night.  This resulted in AHI of 1.3 which is good.  Average CPAP pressure was 16 cm water.  She is set on auto CPAP with pressure range of 15-18 cmH2O and with ramp starting at 4 minutes with ramp time of 30 minutes.  She does use a AirFit fullface mask which she likes.    DME company is [employer]         Chronic obstructive pulmonary disease, unspecified COPD type (HCC)     She will continue Breztri 2 puffs once or twice a day depending on how she is doing.    I did order refill on her medication albuterol 2.5 mg for her nebulizer.         Relevant Medications    Budeson-Glycopyrrol-Formoterol (Breztri Aerosphere) 160-9-4.8 MCG/ACT AERO    albuterol (2.5 mg/3 mL) 0.083 % nebulizer solution    Other Relevant Orders    Nebulizer    Bronchiectasis without complication (HCC) - Primary     She does have some chronic cough related to some mild bronchiectasis and also due to vasomotor rhinitis.  She does have ipratropium nasal spray she can use.    Is scheduled for follow-up CT scan of chest July 9 of this year.         Relevant Medications    Budeson-Glycopyrrol-Formoterol (Breztri Aerosphere) 160-9-4.8 MCG/ACT AERO    albuterol (2.5 mg/3 mL) 0.083 % nebulizer solution    Other Relevant Orders    Nebulizer       Other    Class 1 obesity due to excess calories without serious comorbidity with body mass index (BMI) of 31.0 to 31.9 in adult     I did encourage her to try to lose some weight.              Cc: some cough, shortness of breath      HPI    Yumiko does have some cough and is having some postnasal drainage.  She sometimes has difficulty clearing  mucus.  She does have COPD and last spirometry showed mild to moderate airflow obstruction with FEV1 1.05 L or 55% predicted with obstructive ratio of 62%.  She quit smoking 1998 and did smoke for good 30 or so years.  She does expectorate some white mucus in the morning.  He does use Breztri 2 puffs once a day and takes this in the afternoon.    She does have chronic diastolic dysfunction as well as atrial fibrillation and sick sinus syndrome.  She has pacemaker.  She is on sotalol and is on anticoagulation with Eliquis.  Last echocardiogram showed normal LV systolic function but grade 1 diastolic dysfunction.    CT scan of her chest done in July 2023 showed some tree-in-bud opacities in right upper lobe and some bandlike consolidation medially in the right upper lobe and some right middle lobe bronchiectasis.  There is also some tree-in-bud opacities in right lower lobe.  There is a stable 4 mm left upper lobe lung nodule and 4.6 cm ascending aortic aneurysm as well as some mild bronchiectasis in the left lower lobe and some scarring in the lingula.    She does have severe obstructive sleep apnea diagnosed by split-night sleep study done 2019 which showed overall AHI of 60.  She is on auto CPAP with pressure set at 15 and 18 cm of water and uses an AirFit fullface mask.      He states she was on course of Ceftin 250 mg twice daily for some lower back pain she had in those thought to be possibly due to urinary tract infection.    Past Medical History:   Diagnosis Date    A-fib (HCC)     Arthritis     Atrial fibrillation (HCC)     Cardiac disease     Cardiac disease 05/31/2016    Cataract     Colon polyp     Gastro-esophageal reflux     GERD    GERD (gastroesophageal reflux disease)     Hyperlipidemia     Hypertension     Nasal congestion     Primary generalized (osteo)arthritis     Rheumatoid arthritis (HCC)     Shingles     Sinusitis     Sleep apnea     Sleep difficulties        Past Surgical History:   Procedure  Laterality Date    CARDIAC PACEMAKER PLACEMENT Left 2014    CATARACT EXTRACTION      CHOLECYSTECTOMY      resolved: 2009    COLONOSCOPY      Fiberoptic, resolved 2015    EYE SURGERY      KNEE SURGERY Left     left kknee replacement in 2009    UPPER GASTROINTESTINAL ENDOSCOPY           Current Outpatient Medications:     acetaminophen (TYLENOL) 650 mg CR tablet, Take by mouth, Disp: , Rfl:     albuterol (2.5 mg/3 mL) 0.083 % nebulizer solution, Take 3 mL (2.5 mg total) by nebulization every 6 (six) hours as needed for wheezing or shortness of breath, Disp: 360 mL, Rfl: 6    apixaban (Eliquis) 5 mg, Take 1 tablet (5 mg total) by mouth 2 (two) times a day, Disp: 180 tablet, Rfl: 3    Budeson-Glycopyrrol-Formoterol (Breztri Aerosphere) 160-9-4.8 MCG/ACT AERO, Inhale 2 puffs 2 (two) times a day Rinse mouth after use., Disp: 32.1 g, Rfl: 3    Calcium Polycarbophil (FIBERCON PO), Take by mouth 2 (two) times a day, Disp: , Rfl:     cefuroxime (CEFTIN) 250 mg tablet, Take 250 mg by mouth every 12 (twelve) hours, Disp: , Rfl:     cholecalciferol (VITAMIN D3) 1,000 units tablet, Take 1,000 Units by mouth daily, Disp: , Rfl:     clobetasol (TEMOVATE) 0.05 % cream, , Disp: , Rfl:     Coenzyme Q10 (COQ-10) 200 MG CAPS, Take 2 capsules by mouth daily, Disp: , Rfl:     diphenhydrAMINE-acetaminophen (TYLENOL PM)  MG TABS, Take 1 tablet by mouth daily at bedtime as needed for sleep, Disp: , Rfl:     famotidine (PEPCID) 20 mg tablet, Take 1 tablet (20 mg total) by mouth daily at bedtime, Disp: 90 tablet, Rfl: 1    furosemide (LASIX) 20 mg tablet, Take 1 tablet (20 mg total) by mouth daily, Disp: 90 tablet, Rfl: 0    guaiFENesin (ROBITUSSIN) 100 MG/5ML oral liquid, Take 200 mg by mouth daily at bedtime  , Disp: , Rfl:     metoprolol succinate (TOPROL-XL) 25 mg 24 hr tablet, Take 1 tablet (25 mg total) by mouth daily, Disp: 90 tablet, Rfl: 3    Multiple Vitamins-Minerals (DAILY MULTIVITAMIN PO), Take 1 tablet by mouth daily, Disp:  , Rfl:     nitroglycerin (NITROSTAT) 0.4 mg SL tablet, Place 1 tablet (0.4 mg total) under the tongue every 5 (five) minutes as needed for chest pain If no relief after first dose call prescriber or 911, Disp: 25 tablet, Rfl: 0    pantoprazole (PROTONIX) 40 mg tablet, Take 1 tablet (40 mg total) by mouth daily, Disp: 90 tablet, Rfl: 1    pravastatin (PRAVACHOL) 10 mg tablet, Take 1 tablet by mouth 3 days per week on MONDAYS WEDNESDAYS and FRIDAYS, Disp: 36 tablet, Rfl: 1    sotalol (BETAPACE) 80 mg tablet, TAKE 1 TABLET BY MOUTH EVERY 12  HOURS, Disp: 180 tablet, Rfl: 1    tobramycin (TOBREX) 0.3 % SOLN, APPLY 1 DROP BY OPHTHALMIC ROUTE 3 TIMES EVERY DAY, Disp: , Rfl:     valsartan (DIOVAN) 80 mg tablet, Take 1 tablet (80 mg total) by mouth 2 (two) times a day, Disp: 180 tablet, Rfl: 1    Vitamin Mixture (SHADE-C PO), Take 500 mg by mouth daily, Disp: , Rfl:     albuterol (PROVENTIL HFA,VENTOLIN HFA) 90 mcg/act inhaler, , Disp: , Rfl:     amoxicillin-clavulanate (AUGMENTIN) 875-125 mg per tablet, Take 1 tablet by mouth every 12 (twelve) hours (Patient not taking: Reported on 3/13/2024), Disp: , Rfl:     azelastine (ASTELIN) 0.1 % nasal spray, 1 spray into each nostril in the morning and 1 spray in the evening. (Patient not taking: Reported on 8/10/2023), Disp: 30 mL, Rfl: 6    betamethasone dipropionate (DIPROSONE) 0.05 % cream, Apply topically 2 (two) times a day (Patient not taking: Reported on 10/2/2023), Disp: 45 g, Rfl: 0    Calcium Carbonate-Vitamin D 600-200 MG-UNIT CAPS, Take by mouth 2 (two) times a day (Patient not taking: Reported on 10/2/2023), Disp: , Rfl:     fluticasone (FLONASE) 50 mcg/act nasal spray, 2 sprays into each nostril daily (Patient not taking: Reported on 1/23/2023), Disp: 16 g, Rfl: 6    hydroxychloroquine (PLAQUENIL) 200 mg tablet, Take 1 tablet (200 mg total) by mouth daily with breakfast Administer with food or milk. (Patient not taking: Reported on 8/10/2023), Disp: 30 tablet, Rfl:  2    ipratropium (ATROVENT) 0.03 % nasal spray, 2 sprays into each nostril 3 (three) times a day as needed for rhinitis (Post nasal drainage) (Patient not taking: Reported on 2024), Disp: 30 mL, Rfl: 5    ipratropium (ATROVENT) 0.06 % nasal spray, 2 sprays into each nostril 4 (four) times a day (Patient not taking: Reported on 2024), Disp: 15 mL, Rfl: 8    Lactobacillus (ACIDOPHILUS PO), Take by mouth (Patient not taking: Reported on 2024), Disp: , Rfl:     Magnesium 400 MG TABS, Take by mouth daily (Patient not taking: Reported on 10/2/2023), Disp: , Rfl:     Allergies   Allergen Reactions    Ciprofloxacin Rash    Sulfa Antibiotics Rash    Synvisc [Hylan G-F 20] Rash       Social History     Tobacco Use    Smoking status: Former     Current packs/day: 0.00     Average packs/day: 1 pack/day for 41.0 years (41.0 ttl pk-yrs)     Types: Cigarettes     Start date:      Quit date:      Years since quittin.4    Smokeless tobacco: Never    Tobacco comments:     Has smoked since the age of 17   Substance Use Topics    Alcohol use: Yes     Comment: occasional, No alcohol use,per [employer]         Family History   Problem Relation Age of Onset    Esophageal cancer Mother     Coronary artery disease Father     Diabetes Sister     Hyperlipidemia Sister     Pancreatic cancer Sister     Esophageal cancer Brother     Arthritis Family     Hypertension Family        Review of Systems   Constitutional:  Negative for chills, fever and unexpected weight change.   HENT:  Negative for congestion, rhinorrhea and sore throat.    Eyes:  Negative for discharge and redness.   Respiratory:  Positive for cough. Negative for wheezing.    Cardiovascular:  Negative for chest pain, palpitations and leg swelling.   Gastrointestinal:  Negative for abdominal distention, abdominal pain and nausea.   Endocrine: Negative for polydipsia and polyphagia.   Genitourinary:  Negative for dysuria.   Musculoskeletal:  Negative for  "joint swelling and myalgias.   Skin:  Negative for rash.   Neurological:  Negative for light-headedness.   Psychiatric/Behavioral:  Negative for decreased concentration.            Vitals:    06/12/24 1426   BP: 112/80   Pulse: 75   Temp: 98.4 °F (36.9 °C)   SpO2: 94%     Height: 5' 5\" (165.1 cm)  IBW (Ideal Body Weight): 57 kg  Body mass index is 31.12 kg/m².  Weight (last 2 days)       None                Physical Exam  Vitals reviewed.   Constitutional:       General: She is not in acute distress.     Appearance: Normal appearance. She is well-developed.   HENT:      Head: Normocephalic.      Right Ear: External ear normal.      Left Ear: External ear normal.      Nose: Nose normal.      Mouth/Throat:      Mouth: Oropharynx is clear and moist. Mucous membranes are moist.      Pharynx: Oropharynx is clear. No oropharyngeal exudate.   Eyes:      Conjunctiva/sclera: Conjunctivae normal.      Pupils: Pupils are equal, round, and reactive to light.   Cardiovascular:      Rate and Rhythm: Normal rate and regular rhythm.      Heart sounds: Normal heart sounds.   Pulmonary:      Effort: Pulmonary effort is normal.      Comments: Lung sounds are clear.  No wheezes, crackles or rhonchi  Abdominal:      General: There is no distension.      Palpations: Abdomen is soft.      Tenderness: There is no abdominal tenderness.   Musculoskeletal:      Cervical back: Neck supple.      Comments: No edema, cyanosis or clubbing   Lymphadenopathy:      Cervical: No cervical adenopathy.   Skin:     General: Skin is warm and dry.   Neurological:      General: No focal deficit present.      Mental Status: She is alert and oriented to person, place, and time.   Psychiatric:         Mood and Affect: Mood and affect and mood normal.         Behavior: Behavior normal.         Thought Content: Thought content normal.                  "

## 2024-06-15 NOTE — ASSESSMENT & PLAN NOTE
Severe obstructive sleep apnea with good compliance to CPAP therapy.  I did review her compliance data for past 3 months.  Her present DreamStation 2 machine was set up on 3/5/2019.  She used the CPAP on regular basis for average of 8.5 hours per night.  This resulted in AHI of 1.3 which is good.  Average CPAP pressure was 16 cm water.  She is set on auto CPAP with pressure range of 15-18 cmH2O and with ramp starting at 4 minutes with ramp time of 30 minutes.  She does use a AirFit fullface mask which she likes.    DME company is [employer]

## 2024-06-15 NOTE — ASSESSMENT & PLAN NOTE
She does have some chronic cough related to some mild bronchiectasis and also due to vasomotor rhinitis.  She does have ipratropium nasal spray she can use.    Is scheduled for follow-up CT scan of chest July 9 of this year.

## 2024-06-16 DIAGNOSIS — E78.5 DYSLIPIDEMIA: ICD-10-CM

## 2024-06-17 RX ORDER — PRAVASTATIN SODIUM 10 MG
TABLET ORAL
Qty: 36 TABLET | Refills: 0 | Status: SHIPPED | OUTPATIENT
Start: 2024-06-17

## 2024-06-17 NOTE — TELEPHONE ENCOUNTER
Patient needs updated blood work. Please place orders.     A 90D courtesy refill was provided.      Total Cholesterol within 360 days    LDL within 360 days    HDL within 360 days    Triglycerides within 360 days

## 2024-06-18 LAB

## 2024-06-25 LAB — MYCOBACTERIUM SPEC CULT: NORMAL

## 2024-06-27 ENCOUNTER — REMOTE DEVICE CLINIC VISIT (OUTPATIENT)
Dept: CARDIOLOGY CLINIC | Facility: CLINIC | Age: 84
End: 2024-06-27

## 2024-06-27 DIAGNOSIS — Z95.0 PRESENCE OF PERMANENT CARDIAC PACEMAKER: Primary | ICD-10-CM

## 2024-06-27 PROCEDURE — RECHECK: Performed by: INTERNAL MEDICINE

## 2024-06-28 DIAGNOSIS — I50.32 CHRONIC DIASTOLIC CONGESTIVE HEART FAILURE (HCC): ICD-10-CM

## 2024-06-28 NOTE — PROGRESS NOTES
Results for orders placed or performed in visit on 06/27/24   Cardiac EP device report    Narrative    MDT DUAL CHAMBER PM/ACTIVE SYSTEM IS MRI CONDITIONAL  CARELINK TRANSMISSION: N/B BATTERYBATTERY VOLTAGE NEARING ISMA.  WILL SCHEDULE MONTHLY BATTERY CHECKS. (2 MONS) AP 99%  <1%. ALL AVAILABLE LEAD PARAMETERS WITHIN NORMAL LIMITS. NO SIGNIFICANT HIGH RATE EPISODES. 12 AT/AF EPISODES DETECTED. LONGEST <2 HOURS. PATIENT IS ON ELIQUIS, SOTALOL AND METOPROLOL SUCC. NORMAL DEVICE FUNCTION.----VENEGAS

## 2024-06-29 RX ORDER — FUROSEMIDE 20 MG/1
20 TABLET ORAL DAILY
Qty: 90 TABLET | Refills: 0 | Status: SHIPPED | OUTPATIENT
Start: 2024-06-29

## 2024-06-29 NOTE — TELEPHONE ENCOUNTER
Patient needs updated blood work. Please place orders. A 90D mail order courtesy refill was provided.       Cardiovascular:  Diuretics - Loop [id number]/28/2024 10:32 PM   Protocol Details K in normal range and within 360 days    Na in normal range and within 360 days    eGFR is 60 or above and within 360 days

## 2024-07-02 LAB — MYCOBACTERIUM SPEC CULT: NORMAL

## 2024-07-09 ENCOUNTER — HOSPITAL ENCOUNTER (OUTPATIENT)
Dept: RADIOLOGY | Facility: HOSPITAL | Age: 84
Discharge: HOME | End: 2024-07-09
Attending: INTERNAL MEDICINE
Payer: COMMERCIAL

## 2024-07-09 DIAGNOSIS — R05.3 CHRONIC COUGH: ICD-10-CM

## 2024-07-09 PROCEDURE — 71250 CT THORAX DX C-: CPT

## 2024-07-25 ENCOUNTER — REMOTE DEVICE CLINIC VISIT (OUTPATIENT)
Dept: CARDIOLOGY CLINIC | Facility: CLINIC | Age: 84
End: 2024-07-25
Payer: COMMERCIAL

## 2024-07-25 DIAGNOSIS — Z95.0 PRESENCE OF PERMANENT CARDIAC PACEMAKER: Primary | ICD-10-CM

## 2024-07-25 PROCEDURE — 93294 REM INTERROG EVL PM/LDLS PM: CPT | Performed by: INTERNAL MEDICINE

## 2024-07-25 PROCEDURE — 93296 REM INTERROG EVL PM/IDS: CPT | Performed by: INTERNAL MEDICINE

## 2024-07-26 NOTE — PROGRESS NOTES
Results for orders placed or performed in visit on 07/25/24   Cardiac EP device report    Narrative    MDT DUAL CHAMBER PM/ACTIVE SYSTEM IS MRI CONDITIONAL  CARELINK TRANSMISSION: BATTERY VOLTAGE NEARING ISMA.  WILL SCHEDULE MONTHLY BATTERY CHECKS. (2 MONS) AP 99%  <1%. ALL AVAILABLE LEAD PARAMETERS WITHIN NORMAL LIMITS. NO SIGNIFICANT HIGH RATE EPISODES. 16 AT/AF EPISODES DETECTED. LONGEST <14 MIN. PATIENT IS ON ELIQUIS, SOTALOL AND METOPROLOL SUCC; EF 50% (2021). NORMAL DEVICE FUNCTION.----VENEGAS

## 2024-08-20 ENCOUNTER — OFFICE VISIT (OUTPATIENT)
Age: 84
End: 2024-08-20
Payer: COMMERCIAL

## 2024-08-20 VITALS
HEIGHT: 65 IN | HEART RATE: 86 BPM | RESPIRATION RATE: 17 BRPM | SYSTOLIC BLOOD PRESSURE: 131 MMHG | DIASTOLIC BLOOD PRESSURE: 80 MMHG | WEIGHT: 187 LBS | BODY MASS INDEX: 31.16 KG/M2

## 2024-08-20 DIAGNOSIS — L84 CORNS: ICD-10-CM

## 2024-08-20 DIAGNOSIS — I70.209 PERIPHERAL ARTERIOSCLEROSIS (HCC): Primary | ICD-10-CM

## 2024-08-20 DIAGNOSIS — M79.672 PAIN IN BOTH FEET: ICD-10-CM

## 2024-08-20 DIAGNOSIS — M79.671 PAIN IN BOTH FEET: ICD-10-CM

## 2024-08-20 PROCEDURE — 11056 PARNG/CUTG B9 HYPRKR LES 2-4: CPT | Performed by: PODIATRIST

## 2024-08-20 PROCEDURE — RECHECK: Performed by: PODIATRIST

## 2024-08-26 DIAGNOSIS — E78.5 DYSLIPIDEMIA: ICD-10-CM

## 2024-08-27 RX ORDER — PRAVASTATIN SODIUM 10 MG
TABLET ORAL
Qty: 36 TABLET | Refills: 0 | Status: SHIPPED | OUTPATIENT
Start: 2024-08-27

## 2024-08-27 NOTE — TELEPHONE ENCOUNTER
Patient needs updated blood work. Please place orders. A courtesy refill was provided.    Total Cholesterol within 360 days    LDL within 360 days    HDL within 360 days    Triglycerides within 360 days

## 2024-08-28 ENCOUNTER — TELEPHONE (OUTPATIENT)
Dept: CARDIOLOGY CLINIC | Facility: CLINIC | Age: 84
End: 2024-08-28

## 2024-08-28 ENCOUNTER — OFFICE VISIT (OUTPATIENT)
Dept: PULMONOLOGY | Facility: MEDICAL CENTER | Age: 84
End: 2024-08-28
Payer: COMMERCIAL

## 2024-08-28 VITALS
WEIGHT: 190.4 LBS | HEIGHT: 65 IN | OXYGEN SATURATION: 96 % | BODY MASS INDEX: 31.72 KG/M2 | DIASTOLIC BLOOD PRESSURE: 78 MMHG | HEART RATE: 90 BPM | SYSTOLIC BLOOD PRESSURE: 122 MMHG | RESPIRATION RATE: 12 BRPM

## 2024-08-28 DIAGNOSIS — G47.33 OSA (OBSTRUCTIVE SLEEP APNEA): Primary | ICD-10-CM

## 2024-08-28 DIAGNOSIS — J47.9 BRONCHIECTASIS WITHOUT COMPLICATION (HCC): ICD-10-CM

## 2024-08-28 DIAGNOSIS — J44.9 CHRONIC OBSTRUCTIVE PULMONARY DISEASE, UNSPECIFIED COPD TYPE (HCC): ICD-10-CM

## 2024-08-28 PROCEDURE — 99214 OFFICE O/P EST MOD 30 MIN: CPT | Performed by: INTERNAL MEDICINE

## 2024-08-28 NOTE — TELEPHONE ENCOUNTER
TRIED CALLING PT TO INFORM DEVICE HAS REACHED TIME OF REPLACEMENT. UNABLE TO LM DUE TO VOICEMAIL NOT BEING SET UP.     SCHEDULING WILL BE REACHING OUT TO SCHEDULE H&P.

## 2024-08-28 NOTE — PATIENT INSTRUCTIONS
Try ipratropium might-albuterol in your nebulizer in place of plain albuterol if you like this better than plain albuterol let me know and I will send prescription for it    May need to have pacemaker exchange soon right

## 2024-08-29 ENCOUNTER — PATIENT MESSAGE (OUTPATIENT)
Dept: CARDIOLOGY CLINIC | Facility: CLINIC | Age: 84
End: 2024-08-29

## 2024-08-29 ENCOUNTER — TELEPHONE (OUTPATIENT)
Dept: CARDIOLOGY CLINIC | Facility: CLINIC | Age: 84
End: 2024-08-29

## 2024-08-29 ENCOUNTER — PREP FOR PROCEDURE (OUTPATIENT)
Dept: CARDIOLOGY CLINIC | Facility: CLINIC | Age: 84
End: 2024-08-29

## 2024-08-29 DIAGNOSIS — I50.32 CHRONIC DIASTOLIC CONGESTIVE HEART FAILURE (HCC): ICD-10-CM

## 2024-08-29 DIAGNOSIS — Z95.0 PRESENCE OF PERMANENT CARDIAC PACEMAKER: Primary | ICD-10-CM

## 2024-08-29 NOTE — TELEPHONE ENCOUNTER
----- Message from Jennifer HAWK sent at 8/29/2024 10:37 AM EDT -----  Spoke to pt. she is aware that her PM reached RRT on 7/29, and is already scheduled for an urgent f/u with Dr. Shell on 9/6. Pt will get H&P with Dr. Shell at that appointment. Pt is also aware that one of our surgery coordinator's will be reaching out to her to get her Gen change scheduled. Pt will need to get her surgery sponges given to her at the 9/6 appointment w/ Dr. Shell.    Jennifer  ----- Message -----  From: Ajit Velazquez Jr., MA  Sent: 8/28/2024  10:55 AM EDT  To: Blaze Shell DO; Cardiology Ep Clerical    Device reached RRT ON 7/29/24. PT DEPENDENT PER LAST IN OFFICE CHECK. CAN WE PLEASE SCHEDULE FOR H&P.       Results for orders placed or performed in visit on 08/28/24  -Cardiac EP device report:                                                                 Narrative    MDT DUAL CHAMBER PM/ACTIVE SYSTEM IS MRI CONDITIONAL    CARELINK TRANSMISSION ALERT: RRT REACHED ON 7/29/24. AP 99.5%  0.2% ALL AVAILABLE LEAD PARAMETERS WITHIN NORMAL LIMITS. 6 AT/AF EPISODES W/ LONGEST AT 1 HR 23 MINS. AT/AF BURDEN 0.3% SINCE 7/25/24. TAKES ELIQUIS, METOPROLOL SUCC, SOTALOL. NORMAL DEVICE FUNCTION. AM

## 2024-08-29 NOTE — TELEPHONE ENCOUNTER
Patient scheduled for Dual chamber pacer Gen change at [facility] on  9/12/*2024    with Dr MONZON.    Patient aware of general instructions, labs order. Placed on My Chart.    Meds holds:   Lasix to hold the morning of the procedure.  Valsartan to hold 24hrs prior.    Dr Monzon, can you please advised Eliquis holds for this patient procedure.

## 2024-08-31 NOTE — ASSESSMENT & PLAN NOTE
Severe obstructive sleep apnea with good compliance to CPAP therapy.  She is on auto CPAP with pressure range of 15 to 18 cm and her average CPAP pressure is 16 cm of water.  This resulted in AHI of 1.1.  She uses a AirFit F30 fullface mask which she likes.  She has used his CPAP for average 88 hours per night.  DME company is [employer]

## 2024-08-31 NOTE — PROGRESS NOTES
Assessment & Plan        Problem List Items Addressed This Visit          Respiratory    MIGUEL (obstructive sleep apnea) - Primary     Severe obstructive sleep apnea with good compliance to CPAP therapy.  She is on auto CPAP with pressure range of 15 to 18 cm and her average CPAP pressure is 16 cm of water.  This resulted in AHI of 1.1.  She uses a AirFit F30 fullface mask which she likes.  She has used his CPAP for average 88 hours per night.  DME company is [employer]         Chronic obstructive pulmonary disease, unspecified COPD type (HCC)     She is using Breztri 2 puffs twice a day and doing well on this regimen.  Has periodic use her nebulizer with albuterol and also her albuterol inhaler.  I did give her some samples of albuterol-ipratropium to try place of regular albuterol for her nebulizer.  She like this better she will contact me and I can send prescription for this         Bronchiectasis without complication (HCC)     She does have some mild bronchiectasis seen on CT scan of chest on July 9 of this year.  Has evidence of some chronic bronchitis and some mild tree-in-bud nodularity likely due to inflammation.  Not having much cough at this time            CC: Doing well with her CPAP therapy      HPI    Yumiko has severe obstructive sleep apnea and is on auto CPAP with an AirFit fullface mask.  She has an auto DreamStation 2 machine that was given to her about 2 years ago as part of the recall on previous 1.  She is set on pressure of 15 to 18 cm water and has been doing well with it.  Not having any nocturnal dyspnea and no excessive daytime somnolence.  She does have mild to moderate COPD with FEV1 of 1.05 L or 55% of predicted and obstructive ratio 62%.  She quit smoking 1998 and smoked about a pack of cigarettes per day for 30 years.  She is using Breztri 2 puffs twice a day and doing well on this regimen.  Does use her albuterol inhaler periodically    She does have chronic diastolic dysfunction,  atrial fibrillation and sick sinus syndrome.  She has a pacemaker and needs to get a pacemaker generator change.  She is on chronic anticoagulation with Eliquis and is on sotalol.    Last CT scan of chest done July 9 of this year showed some mild bronchiectasis is mild diffuse bronchial wall thickening and some mild tree-in-bud nodularity.    Past Medical History:   Diagnosis Date    A-fib (HCC)     Arthritis     Atrial fibrillation (HCC)     Cardiac disease     Cardiac disease 05/31/2016    Cataract     Colon polyp     Gastro-esophageal reflux     GERD    GERD (gastroesophageal reflux disease)     Hyperlipidemia     Hypertension     Nasal congestion     Primary generalized (osteo)arthritis     Rheumatoid arthritis (HCC)     Shingles     Sinusitis     Sleep apnea     Sleep difficulties        Past Surgical History:   Procedure Laterality Date    CARDIAC PACEMAKER PLACEMENT Left 2014    CATARACT EXTRACTION      CHOLECYSTECTOMY      resolved: 2009    COLONOSCOPY      Fiberoptic, resolved 2015    EYE SURGERY      KNEE SURGERY Left     left kknee replacement in 2009    UPPER GASTROINTESTINAL ENDOSCOPY           Current Outpatient Medications:     acetaminophen (TYLENOL) 650 mg CR tablet, Take by mouth, Disp: , Rfl:     albuterol (2.5 mg/3 mL) 0.083 % nebulizer solution, Take 3 mL (2.5 mg total) by nebulization every 6 (six) hours as needed for wheezing or shortness of breath, Disp: 360 mL, Rfl: 6    apixaban (Eliquis) 5 mg, Take 1 tablet (5 mg total) by mouth 2 (two) times a day, Disp: 180 tablet, Rfl: 3    cholecalciferol (VITAMIN D3) 1,000 units tablet, Take 1,000 Units by mouth daily, Disp: , Rfl:     clobetasol (TEMOVATE) 0.05 % cream, , Disp: , Rfl:     Coenzyme Q10 (COQ-10) 200 MG CAPS, Take 2 capsules by mouth daily, Disp: , Rfl:     famotidine (PEPCID) 20 mg tablet, Take 1 tablet (20 mg total) by mouth daily at bedtime, Disp: 90 tablet, Rfl: 1    furosemide (LASIX) 20 mg tablet, TAKE 1 TABLET BY MOUTH DAILY,  Disp: 90 tablet, Rfl: 0    guaiFENesin (ROBITUSSIN) 100 MG/5ML oral liquid, Take 200 mg by mouth daily at bedtime  , Disp: , Rfl:     metoprolol succinate (TOPROL-XL) 25 mg 24 hr tablet, Take 1 tablet (25 mg total) by mouth daily, Disp: 90 tablet, Rfl: 3    Multiple Vitamins-Minerals (DAILY MULTIVITAMIN PO), Take 1 tablet by mouth daily, Disp: , Rfl:     nitroglycerin (NITROSTAT) 0.4 mg SL tablet, Place 1 tablet (0.4 mg total) under the tongue every 5 (five) minutes as needed for chest pain If no relief after first dose call prescriber or 911, Disp: 25 tablet, Rfl: 0    pantoprazole (PROTONIX) 40 mg tablet, Take 1 tablet (40 mg total) by mouth daily, Disp: 90 tablet, Rfl: 1    pravastatin (PRAVACHOL) 10 mg tablet, TAKE 1 TABLET BY MOUTH 3 DAYS  WEEKLY ON MONDAY WEDNESDAY AND  FRIDAY, Disp: 36 tablet, Rfl: 0    sotalol (BETAPACE) 80 mg tablet, TAKE 1 TABLET BY MOUTH EVERY 12  HOURS, Disp: 180 tablet, Rfl: 1    valsartan (DIOVAN) 80 mg tablet, Take 1 tablet (80 mg total) by mouth 2 (two) times a day, Disp: 180 tablet, Rfl: 1    Vitamin Mixture (SHADE-C PO), Take 500 mg by mouth daily, Disp: , Rfl:     albuterol (PROVENTIL HFA,VENTOLIN HFA) 90 mcg/act inhaler, , Disp: , Rfl:     amoxicillin-clavulanate (AUGMENTIN) 875-125 mg per tablet, Take 1 tablet by mouth every 12 (twelve) hours (Patient not taking: Reported on 3/13/2024), Disp: , Rfl:     azelastine (ASTELIN) 0.1 % nasal spray, 1 spray into each nostril in the morning and 1 spray in the evening. (Patient not taking: Reported on 8/10/2023), Disp: 30 mL, Rfl: 6    betamethasone dipropionate (DIPROSONE) 0.05 % cream, Apply topically 2 (two) times a day (Patient not taking: Reported on 10/2/2023), Disp: 45 g, Rfl: 0    Budeson-Glycopyrrol-Formoterol (Breztri Aerosphere) 160-9-4.8 MCG/ACT AERO, Inhale 2 puffs 2 (two) times a day Rinse mouth after use., Disp: 32.1 g, Rfl: 3    Calcium Carbonate-Vitamin D 600-200 MG-UNIT CAPS, Take by mouth 2 (two) times a day (Patient  not taking: Reported on 10/2/2023), Disp: , Rfl:     Calcium Polycarbophil (FIBERCON PO), Take by mouth 2 (two) times a day (Patient not taking: Reported on 2024), Disp: , Rfl:     cefuroxime (CEFTIN) 250 mg tablet, Take 250 mg by mouth every 12 (twelve) hours (Patient not taking: Reported on 2024), Disp: , Rfl:     diphenhydrAMINE-acetaminophen (TYLENOL PM)  MG TABS, Take 1 tablet by mouth daily at bedtime as needed for sleep, Disp: , Rfl:     fluticasone (FLONASE) 50 mcg/act nasal spray, 2 sprays into each nostril daily (Patient not taking: Reported on 2023), Disp: 16 g, Rfl: 6    hydroxychloroquine (PLAQUENIL) 200 mg tablet, Take 1 tablet (200 mg total) by mouth daily with breakfast Administer with food or milk. (Patient not taking: Reported on 8/10/2023), Disp: 30 tablet, Rfl: 2    ipratropium (ATROVENT) 0.03 % nasal spray, 2 sprays into each nostril 3 (three) times a day as needed for rhinitis (Post nasal drainage) (Patient not taking: Reported on 2024), Disp: 30 mL, Rfl: 5    ipratropium (ATROVENT) 0.06 % nasal spray, 2 sprays into each nostril 4 (four) times a day (Patient not taking: Reported on 2024), Disp: 15 mL, Rfl: 8    Lactobacillus (ACIDOPHILUS PO), Take by mouth (Patient not taking: Reported on 2024), Disp: , Rfl:     Magnesium 400 MG TABS, Take by mouth daily (Patient not taking: Reported on 10/2/2023), Disp: , Rfl:     tobramycin (TOBREX) 0.3 % SOLN, APPLY 1 DROP BY OPHTHALMIC ROUTE 3 TIMES EVERY DAY (Patient not taking: Reported on 2024), Disp: , Rfl:     Allergies   Allergen Reactions    Ciprofloxacin Rash    Sulfa Antibiotics Rash    Synvisc [Hylan G-F 20] Rash       Social History     Tobacco Use    Smoking status: Former     Current packs/day: 0.00     Average packs/day: 1 pack/day for 41.0 years (41.0 ttl pk-yrs)     Types: Cigarettes     Start date:      Quit date:      Years since quittin.6    Smokeless tobacco: Never    Tobacco  "comments:     Has smoked since the age of 17   Substance Use Topics    Alcohol use: Yes     Comment: occasional, No alcohol use,per [employer]         Family History   Problem Relation Age of Onset    Esophageal cancer Mother     Coronary artery disease Father     Diabetes Sister     Hyperlipidemia Sister     Pancreatic cancer Sister     Esophageal cancer Brother     Arthritis Family     Hypertension Family        Review of Systems   Constitutional:  Negative for activity change, appetite change, chills, fever and unexpected weight change.   HENT:  Negative for congestion, dental problem, postnasal drip, sinus pressure, sinus pain, sore throat and voice change.    Eyes:  Negative for visual disturbance.   Respiratory:          Has periodic cough and does get some shortness of breath with activity   Cardiovascular:  Negative for chest pain, palpitations and leg swelling.   Gastrointestinal:  Negative for abdominal pain, diarrhea, nausea and vomiting.   Endocrine: Negative for polydipsia and polyphagia.   Genitourinary:  Negative for difficulty urinating.   Musculoskeletal:  Negative for arthralgias.   Skin:  Negative for rash and wound.   Allergic/Immunologic: Negative for environmental allergies and food allergies.   Neurological:  Negative for dizziness, light-headedness and headaches.   Hematological:  Negative for adenopathy.   Psychiatric/Behavioral:  Negative for agitation, behavioral problems and confusion.            Vitals:    08/28/24 1318   BP: 122/78   Pulse: 90   Resp: 12   SpO2: 96%     Height: 5' 5\" (165.1 cm)  IBW (Ideal Body Weight): 57 kg  Body mass index is 31.68 kg/m².  Weight (last 2 days)       None                Physical Exam  Vitals reviewed.   Constitutional:       General: She is not in acute distress.     Appearance: Normal appearance. She is well-developed.   HENT:      Head: Normocephalic.      Right Ear: External ear normal.      Left Ear: External ear normal.      Nose: Nose normal.    "   Mouth/Throat:      Mouth: Oropharynx is clear and moist. Mucous membranes are moist.      Pharynx: Oropharynx is clear. No oropharyngeal exudate.   Eyes:      Conjunctiva/sclera: Conjunctivae normal.      Pupils: Pupils are equal, round, and reactive to light.   Cardiovascular:      Rate and Rhythm: Normal rate and regular rhythm.      Heart sounds: Normal heart sounds.   Pulmonary:      Effort: Pulmonary effort is normal.      Comments: Lung sounds are clear.  No wheezes, crackles or rhonchi  Abdominal:      General: There is no distension.      Palpations: Abdomen is soft.      Tenderness: There is no abdominal tenderness.   Musculoskeletal:      Cervical back: Neck supple.      Comments: No edema, cyanosis or clubbing   Lymphadenopathy:      Cervical: No cervical adenopathy.   Skin:     General: Skin is warm and dry.   Neurological:      General: No focal deficit present.      Mental Status: She is alert and oriented to person, place, and time.   Psychiatric:         Mood and Affect: Mood and affect and mood normal.         Behavior: Behavior normal.         Thought Content: Thought content normal.

## 2024-08-31 NOTE — ASSESSMENT & PLAN NOTE
She does have some mild bronchiectasis seen on CT scan of chest on July 9 of this year.  Has evidence of some chronic bronchitis and some mild tree-in-bud nodularity likely due to inflammation.  Not having much cough at this time

## 2024-08-31 NOTE — ASSESSMENT & PLAN NOTE
She is using Breztri 2 puffs twice a day and doing well on this regimen.  Has periodic use her nebulizer with albuterol and also her albuterol inhaler.  I did give her some samples of albuterol-ipratropium to try place of regular albuterol for her nebulizer.  She like this better she will contact me and I can send prescription for this

## 2024-09-06 ENCOUNTER — APPOINTMENT (OUTPATIENT)
Dept: LAB | Facility: CLINIC | Age: 84
End: 2024-09-06
Payer: COMMERCIAL

## 2024-09-06 ENCOUNTER — OFFICE VISIT (OUTPATIENT)
Dept: CARDIOLOGY CLINIC | Facility: CLINIC | Age: 84
End: 2024-09-06
Payer: COMMERCIAL

## 2024-09-06 VITALS
HEIGHT: 65 IN | DIASTOLIC BLOOD PRESSURE: 80 MMHG | SYSTOLIC BLOOD PRESSURE: 130 MMHG | WEIGHT: 191 LBS | BODY MASS INDEX: 31.82 KG/M2 | OXYGEN SATURATION: 99 % | HEART RATE: 72 BPM

## 2024-09-06 DIAGNOSIS — I35.1 NONRHEUMATIC AORTIC VALVE INSUFFICIENCY: ICD-10-CM

## 2024-09-06 DIAGNOSIS — I50.32 CHRONIC DIASTOLIC CONGESTIVE HEART FAILURE (HCC): ICD-10-CM

## 2024-09-06 DIAGNOSIS — I77.810 ASCENDING AORTA DILATION (HCC): ICD-10-CM

## 2024-09-06 DIAGNOSIS — I10 PRIMARY HYPERTENSION: ICD-10-CM

## 2024-09-06 DIAGNOSIS — Z95.0 PRESENCE OF PERMANENT CARDIAC PACEMAKER: ICD-10-CM

## 2024-09-06 DIAGNOSIS — E66.01 MORBID (SEVERE) OBESITY DUE TO EXCESS CALORIES (HCC): ICD-10-CM

## 2024-09-06 DIAGNOSIS — I48.0 PAROXYSMAL ATRIAL FIBRILLATION (HCC): Primary | ICD-10-CM

## 2024-09-06 DIAGNOSIS — E78.5 DYSLIPIDEMIA: ICD-10-CM

## 2024-09-06 LAB
ALBUMIN SERPL BCG-MCNC: 4.3 G/DL (ref 3.5–5)
ALP SERPL-CCNC: 59 U/L (ref 34–104)
ALT SERPL W P-5'-P-CCNC: 13 U/L (ref 7–52)
ANION GAP SERPL CALCULATED.3IONS-SCNC: 9 MMOL/L (ref 4–13)
AST SERPL W P-5'-P-CCNC: 17 U/L (ref 13–39)
BASOPHILS # BLD AUTO: 0 THOUSANDS/ÂΜL (ref 0–0.1)
BASOPHILS NFR BLD AUTO: 0 % (ref 0–1)
BILIRUB SERPL-MCNC: 0.3 MG/DL (ref 0.2–1)
BUN SERPL-MCNC: 31 MG/DL (ref 5–25)
CALCIUM SERPL-MCNC: 10.3 MG/DL (ref 8.4–10.2)
CHLORIDE SERPL-SCNC: 102 MMOL/L (ref 96–108)
CO2 SERPL-SCNC: 27 MMOL/L (ref 21–32)
CREAT SERPL-MCNC: 2.51 MG/DL (ref 0.6–1.3)
EOSINOPHIL # BLD AUTO: 0.12 THOUSAND/ÂΜL (ref 0–0.61)
EOSINOPHIL NFR BLD AUTO: 2 % (ref 0–6)
ERYTHROCYTE [DISTWIDTH] IN BLOOD BY AUTOMATED COUNT: 14.5 % (ref 11.6–15.1)
GFR SERPL CREATININE-BSD FRML MDRD: 16 ML/MIN/1.73SQ M
GLUCOSE P FAST SERPL-MCNC: 108 MG/DL (ref 65–99)
HCT VFR BLD AUTO: 32.1 % (ref 34.8–46.1)
HGB BLD-MCNC: 9.7 G/DL (ref 11.5–15.4)
IMM GRANULOCYTES # BLD AUTO: 0.02 THOUSAND/UL (ref 0–0.2)
IMM GRANULOCYTES NFR BLD AUTO: 0 % (ref 0–2)
LYMPHOCYTES # BLD AUTO: 1.78 THOUSANDS/ÂΜL (ref 0.6–4.47)
LYMPHOCYTES NFR BLD AUTO: 34 % (ref 14–44)
MCH RBC QN AUTO: 27.8 PG (ref 26.8–34.3)
MCHC RBC AUTO-ENTMCNC: 30.2 G/DL (ref 31.4–37.4)
MCV RBC AUTO: 92 FL (ref 82–98)
MONOCYTES # BLD AUTO: 0.46 THOUSAND/ÂΜL (ref 0.17–1.22)
MONOCYTES NFR BLD AUTO: 9 % (ref 4–12)
NEUTROPHILS # BLD AUTO: 2.84 THOUSANDS/ÂΜL (ref 1.85–7.62)
NEUTS SEG NFR BLD AUTO: 55 % (ref 43–75)
NRBC BLD AUTO-RTO: 0 /100 WBCS
PLATELET # BLD AUTO: 136 THOUSANDS/UL (ref 149–390)
PMV BLD AUTO: 9.8 FL (ref 8.9–12.7)
POTASSIUM SERPL-SCNC: 4.8 MMOL/L (ref 3.5–5.3)
PROT SERPL-MCNC: 6.8 G/DL (ref 6.4–8.4)
RBC # BLD AUTO: 3.49 MILLION/UL (ref 3.81–5.12)
SODIUM SERPL-SCNC: 138 MMOL/L (ref 135–147)
WBC # BLD AUTO: 5.22 THOUSAND/UL (ref 4.31–10.16)

## 2024-09-06 PROCEDURE — 36415 COLL VENOUS BLD VENIPUNCTURE: CPT

## 2024-09-06 PROCEDURE — 85025 COMPLETE CBC W/AUTO DIFF WBC: CPT

## 2024-09-06 PROCEDURE — 93000 ELECTROCARDIOGRAM COMPLETE: CPT | Performed by: INTERNAL MEDICINE

## 2024-09-06 PROCEDURE — 80053 COMPREHEN METABOLIC PANEL: CPT

## 2024-09-06 PROCEDURE — 99214 OFFICE O/P EST MOD 30 MIN: CPT | Performed by: INTERNAL MEDICINE

## 2024-09-06 NOTE — PROGRESS NOTES
Cardiology   Blaze Shell DO, [facility]  Dennis Calle MD, [facility]  Thad Murdock MD, [facility]  Vijay Villagran MD, [facility]  -------------------------------------------------------------------  [facility]  [address], [address], [address]  [place], [place], [address]  [phone]    Cardiology Follow Up  Yumiko Gaxiola  1940  [id number]          Assessment/Plan:    1. Cough/shortness of breath -  Echocardiogram was normal.    -She has chronic bronchiectasis and COPD.  She follows with pulmonary.    2. Paroxysmal atrial fibrillation (HCC) - currently in sinus rhythm.    -She is on chronic Eliquis therapy.  Will hold on morning of pacemaker generator change.  - Continue sotalol. QT interval normal.  -She is scheduled for generator change next week.  Procedure was reviewed with her and her daughter.  Questions were answered.    3. Essential hypertension  - BP well controlled on current Rx.       4. Mixed hyperlipidemia  - Last LDL was 78  -  Continue pravastatin.    5. Chronic diastolic CHF - stable on lasix 20 mg daily.    6. Thoracic aortic root aneurysm - CT chest showed thoracic aorta size of 4.4 cm which is unchanged from previous.  She is no longer having surveillance CT as aneurysm has been unchanged and age is advanced.  She does not wish to have surgery even if aneurysm increases in size.    7. Mild to moderate aortic regurgitation - Repeat 2D echocardiogram next visit.      Interval History:     Yumiko Gaxiola is 84 y.o. female here for atrial fibrillation.  Recently, pacemaker interrogations have shown device has anterior elective replacement interval.  She is scheduled for generator change next week.  She has chronic cough and dyspnea secondary to COPD, chronic bronchiectasis.  She follows with pulmonary.  She denies any chest pain, syncope or near syncope.  She has intermittent episodes of palpitations similar to previous atrial fibrillation episodes.  On  pacemaker interrogations, she has intermittent episodes of atrial fibrillation over the years.    In 2023, she had a CT chest for follow up of a thoracic aortic aneurysm.  There was no change compared to previous study.  Aorta measured 4.5 cm at widest diameter which has been unchanged over the past few exams.  CT also showed findings consistent with atypical pneumonia ?BOBBY.  She has similar findings in the past as well.  We discussed continued monitoring last visit and she did not wish to have further CT scans.           Current medication regimen includes Eliquis, sotalol and metoprolol.     The patient has a history of atrial fibrillation along with sick sinus syndrome and had a pacemaker inserted at [facility]] after developing multiple pauses.     Past Medical History:   Diagnosis Date    A-fib (HCC)     Arthritis     Atrial fibrillation (HCC)     Cardiac disease     Cardiac disease 2016    Cataract     Colon polyp     Gastro-esophageal reflux     GERD    GERD (gastroesophageal reflux disease)     Hyperlipidemia     Hypertension     Nasal congestion     Primary generalized (osteo)arthritis     Rheumatoid arthritis (HCC)     Shingles     Sinusitis     Sleep apnea     Sleep difficulties      Social History     Socioeconomic History    Marital status:      Spouse name: Not on file    Number of children: Not on file    Years of education: Not on file    Highest education level: Not on file   Occupational History    Not on file   Tobacco Use    Smoking status: Former     Current packs/day: 0.00     Average packs/day: 1 pack/day for 41.0 years (41.0 ttl pk-yrs)     Types: Cigarettes     Start date:      Quit date:      Years since quittin.6    Smokeless tobacco: Never    Tobacco comments:     Has smoked since the age of 17   Vaping Use    Vaping status: Never Used   Substance and Sexual Activity    Alcohol use: Yes     Comment: occasional, No alcohol use,per [employer]     Drug use: Never    Sexual activity: Not on file   Other Topics Concern    Not on file   Social History Narrative    Exercise: Walking, 2x/week     Social Determinants of Health     Financial Resource Strain: Not on file   Food Insecurity: Not on file   Transportation Needs: Not on file   Physical Activity: Not on file   Stress: Not on file   Social Connections: Not on file   Intimate Partner Violence: Not on file   Housing Stability: Not on file      Family History   Problem Relation Age of Onset    Esophageal cancer Mother     Coronary artery disease Father     Diabetes Sister     Hyperlipidemia Sister     Pancreatic cancer Sister     Esophageal cancer Brother     Arthritis Family     Hypertension Family      Past Surgical History:   Procedure Laterality Date    CARDIAC PACEMAKER PLACEMENT Left 2014    CATARACT EXTRACTION      CHOLECYSTECTOMY      resolved: 2009    COLONOSCOPY      Fiberoptic, resolved 2015    EYE SURGERY      KNEE SURGERY Left     left kknee replacement in 2009    UPPER GASTROINTESTINAL ENDOSCOPY         Current Outpatient Medications:     acetaminophen (TYLENOL) 650 mg CR tablet, Take by mouth, Disp: , Rfl:     albuterol (2.5 mg/3 mL) 0.083 % nebulizer solution, Take 3 mL (2.5 mg total) by nebulization every 6 (six) hours as needed for wheezing or shortness of breath, Disp: 360 mL, Rfl: 6    apixaban (Eliquis) 5 mg, Take 1 tablet (5 mg total) by mouth 2 (two) times a day, Disp: 180 tablet, Rfl: 3    Budeson-Glycopyrrol-Formoterol (Breztri Aerosphere) 160-9-4.8 MCG/ACT AERO, Inhale 2 puffs 2 (two) times a day Rinse mouth after use., Disp: 32.1 g, Rfl: 3    cholecalciferol (VITAMIN D3) 1,000 units tablet, Take 1,000 Units by mouth daily, Disp: , Rfl:     clobetasol (TEMOVATE) 0.05 % cream, , Disp: , Rfl:     Coenzyme Q10 (COQ-10) 200 MG CAPS, Take 2 capsules by mouth daily, Disp: , Rfl:     diphenhydrAMINE-acetaminophen (TYLENOL PM)  MG TABS, Take 1 tablet by mouth daily at bedtime as needed  for sleep, Disp: , Rfl:     famotidine (PEPCID) 20 mg tablet, Take 1 tablet (20 mg total) by mouth daily at bedtime, Disp: 90 tablet, Rfl: 1    furosemide (LASIX) 20 mg tablet, TAKE 1 TABLET BY MOUTH DAILY, Disp: 90 tablet, Rfl: 0    metoprolol succinate (TOPROL-XL) 25 mg 24 hr tablet, Take 1 tablet (25 mg total) by mouth daily, Disp: 90 tablet, Rfl: 3    Multiple Vitamins-Minerals (DAILY MULTIVITAMIN PO), Take 1 tablet by mouth daily, Disp: , Rfl:     nitroglycerin (NITROSTAT) 0.4 mg SL tablet, Place 1 tablet (0.4 mg total) under the tongue every 5 (five) minutes as needed for chest pain If no relief after first dose call prescriber or 911, Disp: 25 tablet, Rfl: 0    pantoprazole (PROTONIX) 40 mg tablet, Take 1 tablet (40 mg total) by mouth daily, Disp: 90 tablet, Rfl: 1    pravastatin (PRAVACHOL) 10 mg tablet, TAKE 1 TABLET BY MOUTH 3 DAYS  WEEKLY ON MONDAY WEDNESDAY AND  FRIDAY, Disp: 36 tablet, Rfl: 0    sotalol (BETAPACE) 80 mg tablet, TAKE 1 TABLET BY MOUTH EVERY 12  HOURS, Disp: 180 tablet, Rfl: 1    valsartan (DIOVAN) 80 mg tablet, Take 1 tablet (80 mg total) by mouth 2 (two) times a day, Disp: 180 tablet, Rfl: 1    Vitamin Mixture (SHADE-C PO), Take 500 mg by mouth daily, Disp: , Rfl:     albuterol (PROVENTIL HFA,VENTOLIN HFA) 90 mcg/act inhaler, , Disp: , Rfl:     amoxicillin-clavulanate (AUGMENTIN) 875-125 mg per tablet, Take 1 tablet by mouth every 12 (twelve) hours (Patient not taking: Reported on 3/13/2024), Disp: , Rfl:     azelastine (ASTELIN) 0.1 % nasal spray, 1 spray into each nostril in the morning and 1 spray in the evening. (Patient not taking: Reported on 8/10/2023), Disp: 30 mL, Rfl: 6    betamethasone dipropionate (DIPROSONE) 0.05 % cream, Apply topically 2 (two) times a day (Patient not taking: Reported on 10/2/2023), Disp: 45 g, Rfl: 0    Calcium Carbonate-Vitamin D 600-200 MG-UNIT CAPS, Take by mouth 2 (two) times a day (Patient not taking: Reported on 10/2/2023), Disp: , Rfl:     Calcium  Polycarbophil (FIBERCON PO), Take by mouth 2 (two) times a day (Patient not taking: Reported on 8/28/2024), Disp: , Rfl:     cefuroxime (CEFTIN) 250 mg tablet, Take 250 mg by mouth every 12 (twelve) hours (Patient not taking: Reported on 8/28/2024), Disp: , Rfl:     fluticasone (FLONASE) 50 mcg/act nasal spray, 2 sprays into each nostril daily (Patient not taking: Reported on 1/23/2023), Disp: 16 g, Rfl: 6    guaiFENesin (ROBITUSSIN) 100 MG/5ML oral liquid, Take 200 mg by mouth daily at bedtime  , Disp: , Rfl:     hydroxychloroquine (PLAQUENIL) 200 mg tablet, Take 1 tablet (200 mg total) by mouth daily with breakfast Administer with food or milk. (Patient not taking: Reported on 8/10/2023), Disp: 30 tablet, Rfl: 2    ipratropium (ATROVENT) 0.03 % nasal spray, 2 sprays into each nostril 3 (three) times a day as needed for rhinitis (Post nasal drainage) (Patient not taking: Reported on 6/12/2024), Disp: 30 mL, Rfl: 5    ipratropium (ATROVENT) 0.06 % nasal spray, 2 sprays into each nostril 4 (four) times a day (Patient not taking: Reported on 6/12/2024), Disp: 15 mL, Rfl: 8    Lactobacillus (ACIDOPHILUS PO), Take by mouth (Patient not taking: Reported on 6/12/2024), Disp: , Rfl:     Magnesium 400 MG TABS, Take by mouth daily (Patient not taking: Reported on 10/2/2023), Disp: , Rfl:     tobramycin (TOBREX) 0.3 % SOLN, APPLY 1 DROP BY OPHTHALMIC ROUTE 3 TIMES EVERY DAY (Patient not taking: Reported on 8/28/2024), Disp: , Rfl:         Review of Systems:  Review of Systems   Constitutional:  Positive for fatigue.   Respiratory:  Positive for shortness of breath.    Cardiovascular:  Positive for palpitations. Negative for chest pain and leg swelling.   Musculoskeletal:  Positive for arthralgias.   All other systems reviewed and are negative.        Physical Exam:  Vitals:  Vitals:    09/06/24 1254   BP: 130/80   BP Location: Right arm   Patient Position: Sitting   Cuff Size: Standard   Pulse: 72   SpO2: 99%   Weight: 86.6  "kg (191 lb)   Height: 5' 5\" (1.651 m)     Physical Exam   Constitutional: She appears healthy. No distress.   Eyes: Pupils are equal, round, and reactive to light. Conjunctivae are normal.   Neck: No JVD present.   Cardiovascular: Normal rate and regular rhythm. Exam reveals no gallop and no friction rub.   Murmur heard.  Pulmonary/Chest: Effort normal and breath sounds normal. She has no wheezes. She has no rales.   Musculoskeletal:         General: No tenderness, deformity or edema.      Cervical back: Normal range of motion and neck supple.   Neurological: She is alert and oriented to person, place, and time.   Skin: Skin is warm and dry.        Cardiographics:  EKG: Personally reviewed normal sinus rhythm with rate 75 beats per minute  Last known EF: 55-60%    This note was completed in part utilizing [employer] Software.  Grammatical errors, random word insertions, spelling mistakes, and incomplete sentences can be an occasional consequence of this system secondary to software limitations, ambient noise, and hardware issues.  If you have any questions or concerns about the content, text, or information contained within the body of this dictation, please contact the provider for clarification.      "

## 2024-09-06 NOTE — H&P (VIEW-ONLY)
Cardiology   Blaze Shell DO, [facility]  Dennis Calle MD, [facility]  Thad Murdock MD, [facility]  Vijay Villagran MD, [facility]  -------------------------------------------------------------------  [facility]  [address], [address], [address]  [place], [place], [address]  [phone]    Cardiology Follow Up  Yumiko Gaxiola  1940  [id number]          Assessment/Plan:    1. Cough/shortness of breath -  Echocardiogram was normal.    -She has chronic bronchiectasis and COPD.  She follows with pulmonary.    2. Paroxysmal atrial fibrillation (HCC) - currently in sinus rhythm.    -She is on chronic Eliquis therapy.  Will hold on morning of pacemaker generator change.  - Continue sotalol. QT interval normal.  -She is scheduled for generator change next week.  Procedure was reviewed with her and her daughter.  Questions were answered.    3. Essential hypertension  - BP well controlled on current Rx.       4. Mixed hyperlipidemia  - Last LDL was 78  -  Continue pravastatin.    5. Chronic diastolic CHF - stable on lasix 20 mg daily.    6. Thoracic aortic root aneurysm - CT chest showed thoracic aorta size of 4.4 cm which is unchanged from previous.  She is no longer having surveillance CT as aneurysm has been unchanged and age is advanced.  She does not wish to have surgery even if aneurysm increases in size.    7. Mild to moderate aortic regurgitation - Repeat 2D echocardiogram next visit.      Interval History:     Yumiko Gaxiola is 84 y.o. female here for atrial fibrillation.  Recently, pacemaker interrogations have shown device has anterior elective replacement interval.  She is scheduled for generator change next week.  She has chronic cough and dyspnea secondary to COPD, chronic bronchiectasis.  She follows with pulmonary.  She denies any chest pain, syncope or near syncope.  She has intermittent episodes of palpitations similar to previous atrial fibrillation episodes.  On  pacemaker interrogations, she has intermittent episodes of atrial fibrillation over the years.    In 2023, she had a CT chest for follow up of a thoracic aortic aneurysm.  There was no change compared to previous study.  Aorta measured 4.5 cm at widest diameter which has been unchanged over the past few exams.  CT also showed findings consistent with atypical pneumonia ?BOBBY.  She has similar findings in the past as well.  We discussed continued monitoring last visit and she did not wish to have further CT scans.           Current medication regimen includes Eliquis, sotalol and metoprolol.     The patient has a history of atrial fibrillation along with sick sinus syndrome and had a pacemaker inserted at [facility] after developing multiple pauses.     Past Medical History:   Diagnosis Date    A-fib (HCC)     Arthritis     Atrial fibrillation (HCC)     Cardiac disease     Cardiac disease 2016    Cataract     Colon polyp     Gastro-esophageal reflux     GERD    GERD (gastroesophageal reflux disease)     Hyperlipidemia     Hypertension     Nasal congestion     Primary generalized (osteo)arthritis     Rheumatoid arthritis (HCC)     Shingles     Sinusitis     Sleep apnea     Sleep difficulties      Social History     Socioeconomic History    Marital status:      Spouse name: Not on file    Number of children: Not on file    Years of education: Not on file    Highest education level: Not on file   Occupational History    Not on file   Tobacco Use    Smoking status: Former     Current packs/day: 0.00     Average packs/day: 1 pack/day for 41.0 years (41.0 ttl pk-yrs)     Types: Cigarettes     Start date:      Quit date:      Years since quittin.6    Smokeless tobacco: Never    Tobacco comments:     Has smoked since the age of 17   Vaping Use    Vaping status: Never Used   Substance and Sexual Activity    Alcohol use: Yes     Comment: occasional, No alcohol use,per [employer]     Drug use: Never    Sexual activity: Not on file   Other Topics Concern    Not on file   Social History Narrative    Exercise: Walking, 2x/week     Social Determinants of Health     Financial Resource Strain: Not on file   Food Insecurity: Not on file   Transportation Needs: Not on file   Physical Activity: Not on file   Stress: Not on file   Social Connections: Not on file   Intimate Partner Violence: Not on file   Housing Stability: Not on file      Family History   Problem Relation Age of Onset    Esophageal cancer Mother     Coronary artery disease Father     Diabetes Sister     Hyperlipidemia Sister     Pancreatic cancer Sister     Esophageal cancer Brother     Arthritis Family     Hypertension Family      Past Surgical History:   Procedure Laterality Date    CARDIAC PACEMAKER PLACEMENT Left 2014    CATARACT EXTRACTION      CHOLECYSTECTOMY      resolved: 2009    COLONOSCOPY      Fiberoptic, resolved 2015    EYE SURGERY      KNEE SURGERY Left     left kknee replacement in 2009    UPPER GASTROINTESTINAL ENDOSCOPY         Current Outpatient Medications:     acetaminophen (TYLENOL) 650 mg CR tablet, Take by mouth, Disp: , Rfl:     albuterol (2.5 mg/3 mL) 0.083 % nebulizer solution, Take 3 mL (2.5 mg total) by nebulization every 6 (six) hours as needed for wheezing or shortness of breath, Disp: 360 mL, Rfl: 6    apixaban (Eliquis) 5 mg, Take 1 tablet (5 mg total) by mouth 2 (two) times a day, Disp: 180 tablet, Rfl: 3    Budeson-Glycopyrrol-Formoterol (Breztri Aerosphere) 160-9-4.8 MCG/ACT AERO, Inhale 2 puffs 2 (two) times a day Rinse mouth after use., Disp: 32.1 g, Rfl: 3    cholecalciferol (VITAMIN D3) 1,000 units tablet, Take 1,000 Units by mouth daily, Disp: , Rfl:     clobetasol (TEMOVATE) 0.05 % cream, , Disp: , Rfl:     Coenzyme Q10 (COQ-10) 200 MG CAPS, Take 2 capsules by mouth daily, Disp: , Rfl:     diphenhydrAMINE-acetaminophen (TYLENOL PM)  MG TABS, Take 1 tablet by mouth daily at bedtime as needed  for sleep, Disp: , Rfl:     famotidine (PEPCID) 20 mg tablet, Take 1 tablet (20 mg total) by mouth daily at bedtime, Disp: 90 tablet, Rfl: 1    furosemide (LASIX) 20 mg tablet, TAKE 1 TABLET BY MOUTH DAILY, Disp: 90 tablet, Rfl: 0    metoprolol succinate (TOPROL-XL) 25 mg 24 hr tablet, Take 1 tablet (25 mg total) by mouth daily, Disp: 90 tablet, Rfl: 3    Multiple Vitamins-Minerals (DAILY MULTIVITAMIN PO), Take 1 tablet by mouth daily, Disp: , Rfl:     nitroglycerin (NITROSTAT) 0.4 mg SL tablet, Place 1 tablet (0.4 mg total) under the tongue every 5 (five) minutes as needed for chest pain If no relief after first dose call prescriber or 911, Disp: 25 tablet, Rfl: 0    pantoprazole (PROTONIX) 40 mg tablet, Take 1 tablet (40 mg total) by mouth daily, Disp: 90 tablet, Rfl: 1    pravastatin (PRAVACHOL) 10 mg tablet, TAKE 1 TABLET BY MOUTH 3 DAYS  WEEKLY ON MONDAY WEDNESDAY AND  FRIDAY, Disp: 36 tablet, Rfl: 0    sotalol (BETAPACE) 80 mg tablet, TAKE 1 TABLET BY MOUTH EVERY 12  HOURS, Disp: 180 tablet, Rfl: 1    valsartan (DIOVAN) 80 mg tablet, Take 1 tablet (80 mg total) by mouth 2 (two) times a day, Disp: 180 tablet, Rfl: 1    Vitamin Mixture (SHADE-C PO), Take 500 mg by mouth daily, Disp: , Rfl:     albuterol (PROVENTIL HFA,VENTOLIN HFA) 90 mcg/act inhaler, , Disp: , Rfl:     amoxicillin-clavulanate (AUGMENTIN) 875-125 mg per tablet, Take 1 tablet by mouth every 12 (twelve) hours (Patient not taking: Reported on 3/13/2024), Disp: , Rfl:     azelastine (ASTELIN) 0.1 % nasal spray, 1 spray into each nostril in the morning and 1 spray in the evening. (Patient not taking: Reported on 8/10/2023), Disp: 30 mL, Rfl: 6    betamethasone dipropionate (DIPROSONE) 0.05 % cream, Apply topically 2 (two) times a day (Patient not taking: Reported on 10/2/2023), Disp: 45 g, Rfl: 0    Calcium Carbonate-Vitamin D 600-200 MG-UNIT CAPS, Take by mouth 2 (two) times a day (Patient not taking: Reported on 10/2/2023), Disp: , Rfl:     Calcium  Polycarbophil (FIBERCON PO), Take by mouth 2 (two) times a day (Patient not taking: Reported on 8/28/2024), Disp: , Rfl:     cefuroxime (CEFTIN) 250 mg tablet, Take 250 mg by mouth every 12 (twelve) hours (Patient not taking: Reported on 8/28/2024), Disp: , Rfl:     fluticasone (FLONASE) 50 mcg/act nasal spray, 2 sprays into each nostril daily (Patient not taking: Reported on 1/23/2023), Disp: 16 g, Rfl: 6    guaiFENesin (ROBITUSSIN) 100 MG/5ML oral liquid, Take 200 mg by mouth daily at bedtime  , Disp: , Rfl:     hydroxychloroquine (PLAQUENIL) 200 mg tablet, Take 1 tablet (200 mg total) by mouth daily with breakfast Administer with food or milk. (Patient not taking: Reported on 8/10/2023), Disp: 30 tablet, Rfl: 2    ipratropium (ATROVENT) 0.03 % nasal spray, 2 sprays into each nostril 3 (three) times a day as needed for rhinitis (Post nasal drainage) (Patient not taking: Reported on 6/12/2024), Disp: 30 mL, Rfl: 5    ipratropium (ATROVENT) 0.06 % nasal spray, 2 sprays into each nostril 4 (four) times a day (Patient not taking: Reported on 6/12/2024), Disp: 15 mL, Rfl: 8    Lactobacillus (ACIDOPHILUS PO), Take by mouth (Patient not taking: Reported on 6/12/2024), Disp: , Rfl:     Magnesium 400 MG TABS, Take by mouth daily (Patient not taking: Reported on 10/2/2023), Disp: , Rfl:     tobramycin (TOBREX) 0.3 % SOLN, APPLY 1 DROP BY OPHTHALMIC ROUTE 3 TIMES EVERY DAY (Patient not taking: Reported on 8/28/2024), Disp: , Rfl:         Review of Systems:  Review of Systems   Constitutional:  Positive for fatigue.   Respiratory:  Positive for shortness of breath.    Cardiovascular:  Positive for palpitations. Negative for chest pain and leg swelling.   Musculoskeletal:  Positive for arthralgias.   All other systems reviewed and are negative.        Physical Exam:  Vitals:  Vitals:    09/06/24 1254   BP: 130/80   BP Location: Right arm   Patient Position: Sitting   Cuff Size: Standard   Pulse: 72   SpO2: 99%   Weight: 86.6  "kg (191 lb)   Height: 5' 5\" (1.651 m)     Physical Exam   Constitutional: She appears healthy. No distress.   Eyes: Pupils are equal, round, and reactive to light. Conjunctivae are normal.   Neck: No JVD present.   Cardiovascular: Normal rate and regular rhythm. Exam reveals no gallop and no friction rub.   Murmur heard.  Pulmonary/Chest: Effort normal and breath sounds normal. She has no wheezes. She has no rales.   Musculoskeletal:         General: No tenderness, deformity or edema.      Cervical back: Normal range of motion and neck supple.   Neurological: She is alert and oriented to person, place, and time.   Skin: Skin is warm and dry.        Cardiographics:  EKG: Personally reviewed normal sinus rhythm with rate 75 beats per minute  Last known EF: 55-60%    This note was completed in part utilizing [employer] Direct Software.  Grammatical errors, random word insertions, spelling mistakes, and incomplete sentences can be an occasional consequence of this system secondary to software limitations, ambient noise, and hardware issues.  If you have any questions or concerns about the content, text, or information contained within the body of this dictation, please contact the provider for clarification.      "

## 2024-09-09 ENCOUNTER — TELEPHONE (OUTPATIENT)
Dept: CARDIOLOGY CLINIC | Facility: CLINIC | Age: 84
End: 2024-09-09

## 2024-09-09 DIAGNOSIS — I50.32 CHRONIC DIASTOLIC CONGESTIVE HEART FAILURE (HCC): Primary | ICD-10-CM

## 2024-09-09 NOTE — TELEPHONE ENCOUNTER
Pt.made aware to continue to HOLD the Losartan and Sotalol-pt.understood to repeat labs in one week. Will advise medication instructions pending those labs.

## 2024-09-09 NOTE — TELEPHONE ENCOUNTER
----- Message from Blaze Shell DO sent at 9/8/2024 10:01 AM EDT -----  Can you please let the patient know her kidney test was abnormal.  I don't have access to Dr. Gallo's blood work over the past couple years but this is a big change from the last blood work I had 3 years ago  Stop sotalol and valsartan

## 2024-09-09 NOTE — TELEPHONE ENCOUNTER
Pt.made aware of lab results and to stop the Sotalol and the Valsartan. Pt.to have labs faxed to our office from . provided her with our direct fax number.

## 2024-09-11 NOTE — DISCHARGE INSTR - AVS FIRST PAGE
Please refer to post pacemaker implantation discharge instructions and restrictions and your pacemaker booklet/temporary card.     Keep incision dry for one week. Leave outer bandage in place for 1 week - it is water proof, and as long as it is fully adhered to your skin you may shower with it.  If it appears as though the bandage is coming off and/or there is any communication to the area of device incision, please then keep the whole area dry for the remaining week.  After 1 week, please remove by pulling all edges away from the center of the bandage. After the bandage is removed, you may then shower normally and get the area wet with soap and water, no scrubbing, and pat dry. Do not use lotions/powders/creams on incision.    Please call the office if you notice redness, swelling, bleeding, or drainage from incision or if you develop fevers.       AFTER PACEMAKER CARE:    If you have any questions, please call [phone] to speak with a nurse (8:30am-4pm, or [phone] after hours). For appointments, please call [phone].      WHAT YOU SHOULD KNOW:   A pacemaker is a small, battery-powered device that is placed under your skin in your upper chest area with wires placed through a vein that lead directly into the heart. It helps regulate your heart rate and prevent your heart from beating too slowly.                 AFTER YOU LEAVE:     Medicines:     Pain medicine:  You may need medicine to take away or decrease pain.     Learn how to take your medicine. Ask what medicine and how much you should take. Be sure you know how, when, and how often to take it. Usually Over the counter pain medicine is sufficient to control pain (Acetominophen or Ibuprofen) Ask your doctor if you may take these. If this does not control your pain, narcotic pain killers may be prescribed, please call if you need prescription.     Do not wait until the pain is severe before you take your medicine. Tell caregivers if your pain does  not decrease.    Pain medicine can make you dizzy or sleepy. Prevent falls by calling someone when you get out of bed or if you need help.        Take your medicine as directed.  Call your healthcare provider if you think your medicine is not helping or if you have side effects. Tell him if you are allergic to any medicine.    Follow up with your cardiologist after your procedure:  You will need a follow-up visit approximately 2 weeks after you leave the hospital. Your cardiologist will check your wound and make sure that your pacemaker is working correctly.     Follow the instructions to check your pacemaker:  Your cardiologist or primary healthcare provider will check your pacemaker and the battery regularly.  He will use a computer to check your pacemaker over the telephone or wireless device which will be given to you.     Pacemaker batteries usually last 8 to 10 years. The pacemaker unit will be replaced when the battery gets low. This is a simpler procedure than the original one to implant your pacemaker.    Wound care:  Keep your incision dry for one week.  Do not use lotions/powders/creams on incision. Leave outer bandage in place for 1 week - it is water proof, and as long as it is fully adhered to your skin you may shower with it.  If it appears as though the bandage is coming off and/or there is any communication to the area of device incision, please then keep the whole area dry for the remaining week.  After 1 week, please remove by pulling all edges away from the center of the bandage. Please call the office if you notice redness, swelling, bleeding, or drainage from incision or if you develop fevers.       Activity:   You may resume your normal activity following a generator change  Driving: you are able to drive 48 hours post PPM implant   Sports:  Ask your caregiver when it is okay to play tennis, golf, basketball, or any sport that requires you to lift your arms. Do not play full contact sports,  such as football, that could damage your pacemaker. Ask your cardiologist or primary healthcare provider how much and what kinds of physical activity are safe for you.    Living with a pacemaker:   Tell all caregivers you have a pacemaker:  This includes surgeons, radiologists, and medical technicians. You may want to wear a medical alert ID bracelet or necklace that states that you have a pacemaker.    Carry your pacemaker ID card:  Make sure you receive a pacemaker ID card. Carry it with you at all times. It lists important information about your pacemaker. Show it to airport security if you travel.     Avoid electrical interference:  Avoid welding equipment and other equipment with large magnets or electric fields. These things could interfere with how your pacemaker works. Use your cell phone on the ear opposite from your pacemaker. Do not carry your cell phone in your shirt pocket over your chest.     Some Pacemakers are MRI safe. Ask you doctor if it is safe to proceed with MRI and let the radiologist and staff know you have a pacemaker.     Do not touch the skin around your pacemaker:  This can cause damage to the lead wires or move the pacemaker unit from where it should be.    Contact your cardiologist or primary healthcare provider if:   The area around your pacemaker has increasing amount of pain after surgery. The pain should improve over first few days after implantation.     The skin around your stitches has increasing redness, swelling, or has drainage. This may mean that you have an infection.     You have a fever.     You have chills, a cough, and feel weak or achy. These are also signs of infection.    Your feet or ankles are more swollen than your baseline.     Your Heart rate is less than 50 beats per minute     Seek care immediately if:   Your bandage becomes soaked with blood.     Your pacemaker is swelling rapidly    Your stitches open up.     You feel your heart suddenly beating very slowly  or quickly.    You become too weak or dizzy to stand, or you pass out.     Your arm or leg feels warm, tender, and painful. It may look swollen and red.    You have chest pain that does not go away with rest or medicine.     You feel lightheaded, short of breath, and have chest pain.     You cough up blood.        © 2014 [employer]. Information is for End User's use only and may not be sold, redistributed or otherwise used for commercial purposes. All illustrations and images included in CareNotes® are the copyrighted property of [employer] or [employer].  The above information is an  only. It is not intended as medical advice for individual conditions or treatments. Talk to your doctor, nurse or pharmacist before following any medical regimen to see if it is safe and effective for you.

## 2024-09-12 ENCOUNTER — ANESTHESIA (OUTPATIENT)
Dept: NON INVASIVE DIAGNOSTICS | Facility: HOSPITAL | Age: 84
End: 2024-09-12
Payer: COMMERCIAL

## 2024-09-12 ENCOUNTER — HOSPITAL ENCOUNTER (OUTPATIENT)
Facility: HOSPITAL | Age: 84
Setting detail: OUTPATIENT SURGERY
Discharge: HOME | End: 2024-09-12
Attending: STUDENT IN AN ORGANIZED HEALTH CARE EDUCATION/TRAINING PROGRAM | Admitting: STUDENT IN AN ORGANIZED HEALTH CARE EDUCATION/TRAINING PROGRAM
Payer: COMMERCIAL

## 2024-09-12 ENCOUNTER — ANESTHESIA EVENT (OUTPATIENT)
Dept: NON INVASIVE DIAGNOSTICS | Facility: HOSPITAL | Age: 84
End: 2024-09-12
Payer: COMMERCIAL

## 2024-09-12 VITALS
BODY MASS INDEX: 30.53 KG/M2 | SYSTOLIC BLOOD PRESSURE: 143 MMHG | HEART RATE: 70 BPM | TEMPERATURE: 97.9 F | WEIGHT: 190 LBS | RESPIRATION RATE: 16 BRPM | DIASTOLIC BLOOD PRESSURE: 65 MMHG | OXYGEN SATURATION: 93 % | HEIGHT: 66 IN

## 2024-09-12 DIAGNOSIS — I50.32 CHRONIC DIASTOLIC CONGESTIVE HEART FAILURE (HCC): ICD-10-CM

## 2024-09-12 DIAGNOSIS — Z45.010 PACEMAKER AT END OF BATTERY LIFE: Primary | ICD-10-CM

## 2024-09-12 DIAGNOSIS — Z95.0 PRESENCE OF PERMANENT CARDIAC PACEMAKER: ICD-10-CM

## 2024-09-12 LAB
ANION GAP SERPL CALCULATED.3IONS-SCNC: 7 MMOL/L (ref 4–13)
ATRIAL RATE: 70 BPM
ATRIAL RATE: 71 BPM
BUN SERPL-MCNC: 32 MG/DL (ref 5–25)
CALCIUM SERPL-MCNC: 10 MG/DL (ref 8.4–10.2)
CHLORIDE SERPL-SCNC: 105 MMOL/L (ref 96–108)
CO2 SERPL-SCNC: 27 MMOL/L (ref 21–32)
CREAT SERPL-MCNC: 2.41 MG/DL (ref 0.6–1.3)
ERYTHROCYTE [DISTWIDTH] IN BLOOD BY AUTOMATED COUNT: 14.5 % (ref 11.6–15.1)
GFR SERPL CREATININE-BSD FRML MDRD: 17 ML/MIN/1.73SQ M
GLUCOSE P FAST SERPL-MCNC: 118 MG/DL (ref 65–99)
GLUCOSE SERPL-MCNC: 118 MG/DL (ref 65–140)
HCT VFR BLD AUTO: 33.4 % (ref 34.8–46.1)
HGB BLD-MCNC: 10.1 G/DL (ref 11.5–15.4)
INR PPP: 1.08 (ref 0.85–1.19)
MAGNESIUM SERPL-MCNC: 2 MG/DL (ref 1.9–2.7)
MCH RBC QN AUTO: 27.6 PG (ref 26.8–34.3)
MCHC RBC AUTO-ENTMCNC: 30.2 G/DL (ref 31.4–37.4)
MCV RBC AUTO: 91 FL (ref 82–98)
P AXIS: 75 DEGREES
PLATELET # BLD AUTO: 136 THOUSANDS/UL (ref 149–390)
PMV BLD AUTO: 8.9 FL (ref 8.9–12.7)
POTASSIUM SERPL-SCNC: 4.2 MMOL/L (ref 3.5–5.3)
PR INTERVAL: 246 MS
PR INTERVAL: 252 MS
PROTHROMBIN TIME: 14.3 SECONDS (ref 12.3–15)
QRS AXIS: 15 DEGREES
QRS AXIS: 54 DEGREES
QRSD INTERVAL: 82 MS
QRSD INTERVAL: 84 MS
QT INTERVAL: 394 MS
QT INTERVAL: 402 MS
QTC INTERVAL: 428 MS
QTC INTERVAL: 440 MS
RBC # BLD AUTO: 3.66 MILLION/UL (ref 3.81–5.12)
SODIUM SERPL-SCNC: 139 MMOL/L (ref 135–147)
T WAVE AXIS: 47 DEGREES
T WAVE AXIS: 74 DEGREES
VENTRICULAR RATE: 71 BPM
VENTRICULAR RATE: 72 BPM
WBC # BLD AUTO: 5.05 THOUSAND/UL (ref 4.31–10.16)

## 2024-09-12 PROCEDURE — 85610 PROTHROMBIN TIME: CPT | Performed by: PHYSICIAN ASSISTANT

## 2024-09-12 PROCEDURE — C1785 PMKR, DUAL, RATE-RESP: HCPCS | Performed by: INTERNAL MEDICINE

## 2024-09-12 PROCEDURE — 93010 ELECTROCARDIOGRAM REPORT: CPT | Performed by: STUDENT IN AN ORGANIZED HEALTH CARE EDUCATION/TRAINING PROGRAM

## 2024-09-12 PROCEDURE — 80048 BASIC METABOLIC PNL TOTAL CA: CPT | Performed by: PHYSICIAN ASSISTANT

## 2024-09-12 PROCEDURE — 85027 COMPLETE CBC AUTOMATED: CPT | Performed by: PHYSICIAN ASSISTANT

## 2024-09-12 PROCEDURE — 33228 REMV&REPLC PM GEN DUAL LEAD: CPT | Performed by: INTERNAL MEDICINE

## 2024-09-12 PROCEDURE — 93005 ELECTROCARDIOGRAM TRACING: CPT

## 2024-09-12 PROCEDURE — 83735 ASSAY OF MAGNESIUM: CPT | Performed by: PHYSICIAN ASSISTANT

## 2024-09-12 DEVICE — ENVELOPE CMRM6122 ABSORB MED MR
Type: IMPLANTABLE DEVICE | Site: CHEST  WALL | Status: FUNCTIONAL
Brand: TYRX™

## 2024-09-12 DEVICE — IPG W1DR01 AZURE XT DR MRI USA
Type: IMPLANTABLE DEVICE | Site: CHEST  WALL | Status: FUNCTIONAL
Brand: AZURE™ XT DR MRI SURESCAN™

## 2024-09-12 RX ORDER — PHENYLEPHRINE HCL IN 0.9% NACL 1 MG/10 ML
SYRINGE (ML) INTRAVENOUS AS NEEDED
Status: DISCONTINUED | OUTPATIENT
Start: 2024-09-12 | End: 2024-09-12

## 2024-09-12 RX ORDER — SODIUM CHLORIDE 9 MG/ML
20 INJECTION, SOLUTION INTRAVENOUS CONTINUOUS
Status: DISCONTINUED | OUTPATIENT
Start: 2024-09-12 | End: 2024-09-12 | Stop reason: HOSPADM

## 2024-09-12 RX ORDER — ACETAMINOPHEN 325 MG/1
650 TABLET ORAL EVERY 4 HOURS PRN
Status: DISCONTINUED | OUTPATIENT
Start: 2024-09-12 | End: 2024-09-12 | Stop reason: HOSPADM

## 2024-09-12 RX ORDER — GENTAMICIN 40 MG/ML
INJECTION, SOLUTION INTRAMUSCULAR; INTRAVENOUS CODE/TRAUMA/SEDATION MEDICATION
Status: DISCONTINUED | OUTPATIENT
Start: 2024-09-12 | End: 2024-09-12 | Stop reason: HOSPADM

## 2024-09-12 RX ORDER — PROPOFOL 10 MG/ML
INJECTION, EMULSION INTRAVENOUS AS NEEDED
Status: DISCONTINUED | OUTPATIENT
Start: 2024-09-12 | End: 2024-09-12

## 2024-09-12 RX ORDER — LIDOCAINE HYDROCHLORIDE 10 MG/ML
INJECTION, SOLUTION EPIDURAL; INFILTRATION; INTRACAUDAL; PERINEURAL CODE/TRAUMA/SEDATION MEDICATION
Status: DISCONTINUED | OUTPATIENT
Start: 2024-09-12 | End: 2024-09-12 | Stop reason: HOSPADM

## 2024-09-12 RX ORDER — MIDAZOLAM HYDROCHLORIDE 2 MG/2ML
INJECTION, SOLUTION INTRAMUSCULAR; INTRAVENOUS AS NEEDED
Status: DISCONTINUED | OUTPATIENT
Start: 2024-09-12 | End: 2024-09-12

## 2024-09-12 RX ORDER — PROPOFOL 10 MG/ML
INJECTION, EMULSION INTRAVENOUS CONTINUOUS PRN
Status: DISCONTINUED | OUTPATIENT
Start: 2024-09-12 | End: 2024-09-12

## 2024-09-12 RX ORDER — FENTANYL CITRATE 50 UG/ML
INJECTION, SOLUTION INTRAMUSCULAR; INTRAVENOUS AS NEEDED
Status: DISCONTINUED | OUTPATIENT
Start: 2024-09-12 | End: 2024-09-12

## 2024-09-12 RX ORDER — CEFAZOLIN SODIUM 2 G/50ML
2000 SOLUTION INTRAVENOUS ONCE
Status: COMPLETED | OUTPATIENT
Start: 2024-09-12 | End: 2024-09-12

## 2024-09-12 RX ADMIN — FENTANYL CITRATE 25 MCG: 50 INJECTION INTRAMUSCULAR; INTRAVENOUS at 12:43

## 2024-09-12 RX ADMIN — SODIUM CHLORIDE 20 ML/HR: 0.9 INJECTION, SOLUTION INTRAVENOUS at 08:01

## 2024-09-12 RX ADMIN — PROPOFOL 20 MG: 10 INJECTION, EMULSION INTRAVENOUS at 12:43

## 2024-09-12 RX ADMIN — FENTANYL CITRATE 25 MCG: 50 INJECTION INTRAMUSCULAR; INTRAVENOUS at 12:55

## 2024-09-12 RX ADMIN — Medication 100 MCG: at 13:19

## 2024-09-12 RX ADMIN — MIDAZOLAM 1 MG: 1 INJECTION INTRAMUSCULAR; INTRAVENOUS at 12:55

## 2024-09-12 RX ADMIN — MIDAZOLAM 1 MG: 1 INJECTION INTRAMUSCULAR; INTRAVENOUS at 12:43

## 2024-09-12 RX ADMIN — Medication 100 MCG: at 13:10

## 2024-09-12 RX ADMIN — FENTANYL CITRATE 25 MCG: 50 INJECTION INTRAMUSCULAR; INTRAVENOUS at 13:19

## 2024-09-12 RX ADMIN — Medication 50 MCG: at 13:09

## 2024-09-12 RX ADMIN — FENTANYL CITRATE 25 MCG: 50 INJECTION INTRAMUSCULAR; INTRAVENOUS at 13:24

## 2024-09-12 RX ADMIN — PROPOFOL 30 MCG/KG/MIN: 10 INJECTION, EMULSION INTRAVENOUS at 12:43

## 2024-09-12 RX ADMIN — CEFAZOLIN SODIUM 2000 MG: 2 SOLUTION INTRAVENOUS at 12:45

## 2024-09-12 NOTE — ANESTHESIA PREPROCEDURE EVALUATION
Procedure:  Cardiac pacer generator change (Chest)    Relevant Problems   CARDIO   (+) Ascending aortic aneurysm (HCC)   (+) Chronic diastolic congestive heart failure (HCC)   (+) Hyperlipidemia   (+) Hypertension   (+) Nonrheumatic aortic valve insufficiency   (+) Paroxysmal atrial fibrillation (HCC)      GI/HEPATIC   (+) Gastroesophageal reflux disease without esophagitis      NEURO/PSYCH   (+) Claustrophobia      PULMONARY   (+) Chronic obstructive pulmonary disease (HCC)   (+) Chronic obstructive pulmonary disease, unspecified COPD type (HCC)   (+) MIGUEL (obstructive sleep apnea)   (+) Recurrent pneumonia       Left Ventricle: Left ventricular cavity size is normal. Wall thickness is mildly increased. There is mild concentric hypertrophy. The left ventricular ejection fraction is 56%. Systolic function is normal. Wall motion is normal. Diastolic function is mildly abnormal, consistent with grade I (abnormal) relaxation.    Right Ventricle: Right ventricular cavity size is normal. Systolic function is normal. Wall thickness is normal.    Aortic Valve: There is mild regurgitation. The aortic valve has no significant stenosis.    Aorta: The aortic root is mildly dilated.    Prior TTE study available for comparison. Prior study date: 10/7/2021. No significant changes noted compared to the prior study. NO change in AI or diastolic function.          Anesthesia Plan  ASA Score- 3     Anesthesia Type- IV sedation with anesthesia with ASA Monitors.         Additional Monitors:     Airway Plan:            Plan Factors-    Chart reviewed.                      Induction- intravenous.    Postoperative Plan-     Perioperative Resuscitation Plan - Level 1 - Full Code.       Informed Consent- Anesthetic plan and risks discussed with patient.  I personally reviewed this patient with the CRNA. Discussed and agreed on the Anesthesia Plan with the CRNA..

## 2024-09-12 NOTE — Clinical Note
The PACER GENERATOR ABRAHAM XT DR PRABHAKAR MARTINEZ - [id number] device was inserted. The leads were placed into the connector and visually verified to be in correct position. Injury current obtained.

## 2024-09-12 NOTE — INTERVAL H&P NOTE
Please see recent office visit with Dr. Shell for full details.  Brief this patient is a pleasant 84-year-old female with sick sinus syndrome/tachy-yady syndrome with [employer]tronic dual-chamber pacemaker in situ, chronic  HFpEF, preserved LV systolic function with LVEF 56% per echo 2/2024, paroxysmal atrial fibrillation maintained on sotalol and Eliquis, hypertension, hyperlipidemia, thoracic aortic root aneurysm followed by cardiology, and mild to moderate aortic regurgitation also followed by cardiology.  She initially had a pacemaker placed 10/2014, and she is primarily atrially paced.  Through routine device interrogation she was found to have reached ISMA as of 7/29/2024.  Simple generator change was recommended and she presents today to undergo that procedure.  She denies recent changes, physical exam unchanged.    Anticipate 2 hours of bedrest/monitoring with potential discharge afterwards if no issues noted.  All discharge instructions and restrictions were reviewed, follow-up has been arranged.    Vitals:    09/12/24 0813   BP: 146/72   Pulse: 73   Resp: 16   Temp: 98.4 °F (36.9 °C)   SpO2: 96%

## 2024-09-12 NOTE — ANESTHESIA POSTPROCEDURE EVALUATION
Post-Op Assessment Note    CV Status:  Stable  Pain Score: 0    Pain management: adequate       Mental Status:  Alert and awake   Hydration Status:  Euvolemic   PONV Controlled:  Controlled   Airway Patency:  Patent     Post Op Vitals Reviewed: Yes    No anethesia notable event occurred.    Staff: CRNA               BP   133/63   Temp   Rn note   Pulse  71   Resp   15   SpO2   99% RA

## 2024-09-13 DIAGNOSIS — I50.32 CHRONIC DIASTOLIC CONGESTIVE HEART FAILURE (HCC): ICD-10-CM

## 2024-09-13 DIAGNOSIS — K21.9 GASTROESOPHAGEAL REFLUX DISEASE WITHOUT ESOPHAGITIS: ICD-10-CM

## 2024-09-16 ENCOUNTER — APPOINTMENT (OUTPATIENT)
Dept: LAB | Facility: CLINIC | Age: 84
End: 2024-09-16
Payer: COMMERCIAL

## 2024-09-16 LAB
ANION GAP SERPL CALCULATED.3IONS-SCNC: 8 MMOL/L (ref 4–13)
BUN SERPL-MCNC: 33 MG/DL (ref 5–25)
CALCIUM SERPL-MCNC: 10.2 MG/DL (ref 8.4–10.2)
CHLORIDE SERPL-SCNC: 104 MMOL/L (ref 96–108)
CO2 SERPL-SCNC: 28 MMOL/L (ref 21–32)
CREAT SERPL-MCNC: 2.28 MG/DL (ref 0.6–1.3)
GFR SERPL CREATININE-BSD FRML MDRD: 19 ML/MIN/1.73SQ M
GLUCOSE SERPL-MCNC: 142 MG/DL (ref 65–140)
POTASSIUM SERPL-SCNC: 5.3 MMOL/L (ref 3.5–5.3)
SODIUM SERPL-SCNC: 140 MMOL/L (ref 135–147)

## 2024-09-16 PROCEDURE — 80048 BASIC METABOLIC PNL TOTAL CA: CPT | Performed by: INTERNAL MEDICINE

## 2024-09-16 PROCEDURE — 36415 COLL VENOUS BLD VENIPUNCTURE: CPT | Performed by: INTERNAL MEDICINE

## 2024-09-16 RX ORDER — PANTOPRAZOLE SODIUM 40 MG/1
40 TABLET, DELAYED RELEASE ORAL DAILY
Qty: 90 TABLET | Refills: 1 | Status: SHIPPED | OUTPATIENT
Start: 2024-09-16

## 2024-09-16 RX ORDER — FUROSEMIDE 20 MG
20 TABLET ORAL DAILY
Qty: 90 TABLET | Refills: 3 | Status: SHIPPED | OUTPATIENT
Start: 2024-09-16

## 2024-09-18 ENCOUNTER — HOSPITAL ENCOUNTER (EMERGENCY)
Facility: HOSPITAL | Age: 84
Discharge: HOME | End: 2024-09-18
Attending: EMERGENCY MEDICINE
Payer: COMMERCIAL

## 2024-09-18 VITALS
TEMPERATURE: 98.9 F | OXYGEN SATURATION: 96 % | HEART RATE: 71 BPM | SYSTOLIC BLOOD PRESSURE: 165 MMHG | DIASTOLIC BLOOD PRESSURE: 72 MMHG | RESPIRATION RATE: 18 BRPM

## 2024-09-18 DIAGNOSIS — M54.31 SCIATICA, RIGHT SIDE: Primary | ICD-10-CM

## 2024-09-18 PROCEDURE — 99283 EMERGENCY DEPT VISIT LOW MDM: CPT

## 2024-09-18 PROCEDURE — 99284 EMERGENCY DEPT VISIT MOD MDM: CPT | Performed by: EMERGENCY MEDICINE

## 2024-09-18 RX ORDER — METHOCARBAMOL 750 MG/1
750 TABLET, FILM COATED ORAL EVERY 6 HOURS PRN
Qty: 30 TABLET | Refills: 0 | Status: SHIPPED | OUTPATIENT
Start: 2024-09-18

## 2024-09-18 RX ORDER — PREDNISONE 20 MG/1
20 TABLET ORAL 2 TIMES DAILY
Qty: 20 TABLET | Refills: 0 | Status: SHIPPED | OUTPATIENT
Start: 2024-09-18 | End: 2024-09-28

## 2024-09-18 RX ORDER — METHOCARBAMOL 500 MG/1
750 TABLET, FILM COATED ORAL ONCE
Status: COMPLETED | OUTPATIENT
Start: 2024-09-18 | End: 2024-09-18

## 2024-09-18 RX ORDER — PREDNISONE 20 MG/1
40 TABLET ORAL ONCE
Status: COMPLETED | OUTPATIENT
Start: 2024-09-18 | End: 2024-09-18

## 2024-09-18 RX ADMIN — METHOCARBAMOL TABLETS 750 MG: 500 TABLET, COATED ORAL at 13:04

## 2024-09-18 RX ADMIN — PREDNISONE 40 MG: 20 TABLET ORAL at 13:05

## 2024-09-18 NOTE — ED PROVIDER NOTES
1. Sciatica, right side      ED Disposition       ED Disposition   Discharge    Condition   Stable    Date/Time   Wed Sep 18, 2024 12:47 PM    Comment   Yumiko SHABANA Heft discharge to home/self care.                   Assessment & Plan       Medical Decision Making  Risk  Prescription drug management.      Patient has prior history of sciatica on the other side.  Her presentation including physical examinations consistent with sciatica on the right side the L4-L5 distribution.  Trial of steroids with follow-up to comprehensive spine               Medications   predniSONE tablet 40 mg (40 mg Oral Given 9/18/24 1305)   methocarbamol (ROBAXIN) tablet 750 mg (750 mg Oral Given 9/18/24 1304)       History of Present Illness       HPI    Patient states she was well until Saturday this last weekend when she attempted to get up from the seated position and felt immediate pain in the low back and right buttocks area which she refers to as the hip.  Pain radiated down the leg.  Not associated with numbness.  Patient states the pain was there for a while on Saturday resolved.  Pain has since come back in the same area.  She states she gets pain with weightbearing and walking but not with laying still.  No new urinary or GI problems, but does have problems with stress incontinence which has not changed.  Patient has a history of sciatica on the other side which resolved with medications.  No prior back surgery    Review of Systems   Constitutional:  Negative for chills and fever.   HENT:  Negative for congestion.    Eyes:  Negative for visual disturbance.   Respiratory:  Negative for cough and shortness of breath.    Cardiovascular:  Negative for chest pain.   Gastrointestinal:  Negative for abdominal pain and vomiting.   Genitourinary:  Negative for difficulty urinating and pelvic pain.   Musculoskeletal:  Positive for arthralgias, back pain and gait problem. Negative for joint swelling.   Skin:  Negative for color change and rash.    Neurological:  Negative for dizziness and headaches.   Hematological:  Does not bruise/bleed easily.   Psychiatric/Behavioral:  Negative for confusion.    All other systems reviewed and are negative.          Objective     ED Triage Vitals [09/18/24 1116]   Temperature Pulse Blood Pressure Respirations SpO2 Patient Position - Orthostatic VS   98.9 °F (37.2 °C) 70 144/67 16 94 % Lying      Temp Source Heart Rate Source BP Location FiO2 (%) Pain Score    Oral Monitor Left arm -- No Pain        Physical Exam  Vitals and nursing note reviewed.   Constitutional:       Appearance: Normal appearance.   HENT:      Head: Normocephalic.      Right Ear: External ear normal.      Left Ear: External ear normal.      Nose: Nose normal.      Mouth/Throat:      Mouth: Mucous membranes are moist.   Eyes:      Conjunctiva/sclera: Conjunctivae normal.   Cardiovascular:      Rate and Rhythm: Normal rate.      Pulses: Normal pulses.   Pulmonary:      Effort: Pulmonary effort is normal.   Abdominal:      Palpations: Abdomen is soft.      Tenderness: There is no abdominal tenderness.   Musculoskeletal:         General: Normal range of motion.      Cervical back: Normal range of motion.   Skin:     General: Skin is warm and dry.      Capillary Refill: Capillary refill takes less than 2 seconds.   Neurological:      General: No focal deficit present.      Mental Status: She is alert.      Motor: Weakness present.      Comments: Weakness on the right to dorsiflexion.  No numbness   Psychiatric:         Mood and Affect: Mood normal.         Labs Reviewed - No data to display  No orders to display       Procedures       Rodrigo Browning MD  09/18/24 8347

## 2024-09-20 ENCOUNTER — OFFICE VISIT (OUTPATIENT)
Dept: CARDIOLOGY CLINIC | Facility: CLINIC | Age: 84
End: 2024-09-20
Payer: COMMERCIAL

## 2024-09-20 VITALS
OXYGEN SATURATION: 97 % | BODY MASS INDEX: 30.94 KG/M2 | HEART RATE: 72 BPM | WEIGHT: 192.5 LBS | DIASTOLIC BLOOD PRESSURE: 70 MMHG | SYSTOLIC BLOOD PRESSURE: 134 MMHG | HEIGHT: 66 IN

## 2024-09-20 DIAGNOSIS — Z95.0 PRESENCE OF PERMANENT CARDIAC PACEMAKER: ICD-10-CM

## 2024-09-20 DIAGNOSIS — I10 PRIMARY HYPERTENSION: ICD-10-CM

## 2024-09-20 DIAGNOSIS — N18.4 CHRONIC RENAL DISEASE, STAGE IV (HCC): ICD-10-CM

## 2024-09-20 DIAGNOSIS — I77.810 ASCENDING AORTA DILATION (HCC): ICD-10-CM

## 2024-09-20 DIAGNOSIS — I50.32 CHRONIC DIASTOLIC CONGESTIVE HEART FAILURE (HCC): ICD-10-CM

## 2024-09-20 DIAGNOSIS — I10 HYPERTENSION, UNSPECIFIED TYPE: ICD-10-CM

## 2024-09-20 DIAGNOSIS — I48.0 PAROXYSMAL ATRIAL FIBRILLATION (HCC): Primary | ICD-10-CM

## 2024-09-20 DIAGNOSIS — I35.1 NONRHEUMATIC AORTIC VALVE INSUFFICIENCY: ICD-10-CM

## 2024-09-20 PROCEDURE — 93000 ELECTROCARDIOGRAM COMPLETE: CPT | Performed by: INTERNAL MEDICINE

## 2024-09-20 PROCEDURE — 99214 OFFICE O/P EST MOD 30 MIN: CPT | Performed by: INTERNAL MEDICINE

## 2024-09-20 NOTE — PROGRESS NOTES
Cardiology   Blaze Shell DO, [facility]  Dennis Calle MD, [facility]  Thad Murdock MD, [facility]  Vijay Villagran MD, [facility]  -------------------------------------------------------------------  [facility]  [address], [address], [address]  [place], [place], [address]  [phone]    Cardiology Follow Up  Yumiko Gaxiola  1940  [id number]          Assessment/Plan:    1. Cough/shortness of breath -  Echocardiogram was normal.    -She has chronic bronchiectasis and COPD.  She follows with pulmonary.    2. Paroxysmal atrial fibrillation (HCC) - currently in sinus rhythm.    -She is on chronic Eliquis therapy.  Given ongoing renal dysfunction, will reduce to 2.5 mg twice daily.    3. Essential hypertension  - BP well controlled in spite of discontinuation of valsartan.  Will continue to hold.    4. Mixed hyperlipidemia  - Last LDL was 78  -  Continue pravastatin.    5. Chronic diastolic CHF - stable on lasix 20 mg daily.    6.  Acute kidney injury -unclear cause.  Repeat blood work in 2 weeks.  Hold sotalol and valsartan.  Eliquis dose reduction.    7. Mild to moderate aortic regurgitation - Repeat 2D echocardiogram next visit.      Interval History:     Yumiko Gaxiola is 84 y.o. female here for followup of recent pacemaker generator change.  Procedure was completed 1 week ago at [facility].  There were no complications.  She denies any chest pain, shortness of breath, lower extremity edema, orthopnea or paroxysmal nocturnal dyspnea.  She has been having back pain since procedure and went to ER.  Diagnosed with sciatica.  She has had worsened kidney function recently.  Sotalol and valsartan stopped.   She has chronic cough and dyspnea secondary to COPD, chronic bronchiectasis.  She follows with pulmonary.  She denies any chest pain, syncope or near syncope.  She has intermittent episodes of palpitations similar to previous atrial fibrillation episodes.  On  pacemaker interrogations, she has intermittent episodes of atrial fibrillation over the years.    In 2023, she had a CT chest for follow up of a thoracic aortic aneurysm.  There was no change compared to previous study.  Aorta measured 4.5 cm at widest diameter which has been unchanged over the past few exams.  CT also showed findings consistent with atypical pneumonia ?BOBBY.  She has similar findings in the past as well.  We discussed continued monitoring last visit and she did not wish to have further CT scans.           Current medication regimen includes Eliquis, sotalol and metoprolol.     The patient has a history of atrial fibrillation along with sick sinus syndrome and had a pacemaker inserted at [facility] after developing multiple pauses.     Past Medical History:   Diagnosis Date    A-fib (HCC)     Arthritis     Atrial fibrillation (HCC)     Cardiac disease     Cardiac disease 2016    Cataract     Colon polyp     Gastro-esophageal reflux     GERD    GERD (gastroesophageal reflux disease)     Hyperlipidemia     Hypertension     Nasal congestion     Primary generalized (osteo)arthritis     Rheumatoid arthritis (HCC)     Shingles     Sinusitis     Sleep apnea     Sleep difficulties      Social History     Socioeconomic History    Marital status:      Spouse name: Not on file    Number of children: Not on file    Years of education: Not on file    Highest education level: Not on file   Occupational History    Not on file   Tobacco Use    Smoking status: Former     Current packs/day: 0.00     Average packs/day: 1 pack/day for 41.0 years (41.0 ttl pk-yrs)     Types: Cigarettes     Start date:      Quit date:      Years since quittin.7    Smokeless tobacco: Never    Tobacco comments:     Has smoked since the age of 17   Vaping Use    Vaping status: Never Used   Substance and Sexual Activity    Alcohol use: Yes     Comment: occasional, No alcohol use,per [employer]     Drug use: Never    Sexual activity: Not on file   Other Topics Concern    Not on file   Social History Narrative    Exercise: Walking, 2x/week     Social Determinants of Health     Financial Resource Strain: Not on file   Food Insecurity: Not on file   Transportation Needs: Not on file   Physical Activity: Not on file   Stress: Not on file   Social Connections: Not on file   Intimate Partner Violence: Not on file   Housing Stability: Not on file      Family History   Problem Relation Age of Onset    Esophageal cancer Mother     Coronary artery disease Father     Diabetes Sister     Hyperlipidemia Sister     Pancreatic cancer Sister     Esophageal cancer Brother     Arthritis Family     Hypertension Family      Past Surgical History:   Procedure Laterality Date    CARDIAC ELECTROPHYSIOLOGY PROCEDURE N/A 9/12/2024    Procedure: Cardiac pacer generator change;  Surgeon: Raciel Landaverde DO;  Location: [facility];  Service: Cardiology    CARDIAC PACEMAKER PLACEMENT Left 2014    CATARACT EXTRACTION      CHOLECYSTECTOMY      resolved: 2009    COLONOSCOPY      Fiberoptic, resolved 2015    EYE SURGERY      KNEE SURGERY Left     left kknee replacement in 2009    UPPER GASTROINTESTINAL ENDOSCOPY         Current Outpatient Medications:     acetaminophen (TYLENOL) 650 mg CR tablet, Take by mouth, Disp: , Rfl:     albuterol (2.5 mg/3 mL) 0.083 % nebulizer solution, Take 3 mL (2.5 mg total) by nebulization every 6 (six) hours as needed for wheezing or shortness of breath, Disp: 360 mL, Rfl: 6    apixaban (Eliquis) 5 mg, Take 1 tablet (5 mg total) by mouth 2 (two) times a day, Disp: 180 tablet, Rfl: 3    Budeson-Glycopyrrol-Formoterol (Breztri Aerosphere) 160-9-4.8 MCG/ACT AERO, Inhale 2 puffs 2 (two) times a day Rinse mouth after use., Disp: 32.1 g, Rfl: 3    cholecalciferol (VITAMIN D3) 1,000 units tablet, Take 1,000 Units by mouth daily, Disp: , Rfl:     clobetasol (TEMOVATE) 0.05 % cream, , Disp: , Rfl:     Coenzyme Q10  (COQ-10) 200 MG CAPS, Take 2 capsules by mouth daily, Disp: , Rfl:     diphenhydrAMINE-acetaminophen (TYLENOL PM)  MG TABS, Take 1 tablet by mouth daily at bedtime as needed for sleep, Disp: , Rfl:     famotidine (PEPCID) 20 mg tablet, Take 1 tablet (20 mg total) by mouth daily at bedtime, Disp: 90 tablet, Rfl: 1    furosemide (LASIX) 20 mg tablet, TAKE 1 TABLET BY MOUTH DAILY, Disp: 90 tablet, Rfl: 3    guaiFENesin (ROBITUSSIN) 100 MG/5ML oral liquid, Take 200 mg by mouth daily at bedtime  , Disp: , Rfl:     methocarbamol (Robaxin-750) 750 mg tablet, Take 1 tablet (750 mg total) by mouth every 6 (six) hours as needed for muscle spasms, Disp: 30 tablet, Rfl: 0    metoprolol succinate (TOPROL-XL) 25 mg 24 hr tablet, Take 1 tablet (25 mg total) by mouth daily, Disp: 90 tablet, Rfl: 3    Multiple Vitamins-Minerals (DAILY MULTIVITAMIN PO), Take 1 tablet by mouth daily, Disp: , Rfl:     nitroglycerin (NITROSTAT) 0.4 mg SL tablet, Place 1 tablet (0.4 mg total) under the tongue every 5 (five) minutes as needed for chest pain If no relief after first dose call prescriber or 911, Disp: 25 tablet, Rfl: 0    pantoprazole (PROTONIX) 40 mg tablet, TAKE 1 TABLET BY MOUTH DAILY, Disp: 90 tablet, Rfl: 1    pravastatin (PRAVACHOL) 10 mg tablet, TAKE 1 TABLET BY MOUTH 3 DAYS  WEEKLY ON MONDAY WEDNESDAY AND  FRIDAY, Disp: 36 tablet, Rfl: 0    predniSONE 20 mg tablet, Take 1 tablet (20 mg total) by mouth 2 (two) times a day for 10 days, Disp: 20 tablet, Rfl: 0    sotalol (BETAPACE) 80 mg tablet, TAKE 1 TABLET BY MOUTH EVERY 12  HOURS, Disp: 180 tablet, Rfl: 1    valsartan (DIOVAN) 80 mg tablet, Take 1 tablet (80 mg total) by mouth 2 (two) times a day, Disp: 180 tablet, Rfl: 1    Vitamin Mixture (SHADE-C PO), Take 500 mg by mouth daily, Disp: , Rfl:     Calcium Carbonate-Vitamin D 600-200 MG-UNIT CAPS, Take by mouth 2 (two) times a day (Patient not taking: Reported on 10/2/2023), Disp: , Rfl:         Review of Systems:  Review of  "Systems   Constitutional:  Positive for fatigue.   Respiratory:  Positive for shortness of breath.    Cardiovascular:  Positive for palpitations. Negative for chest pain and leg swelling.   Musculoskeletal:  Positive for arthralgias and myalgias.   All other systems reviewed and are negative.        Physical Exam:  Vitals:  Vitals:    09/20/24 1252   BP: 134/70   BP Location: Left arm   Patient Position: Sitting   Cuff Size: Standard   Pulse: 72   SpO2: 97%   Weight: 87.3 kg (192 lb 8 oz)   Height: 5' 5.5\" (1.664 m)     Physical Exam   Constitutional: She appears healthy. No distress.   Eyes: Pupils are equal, round, and reactive to light. Conjunctivae are normal.   Neck: No JVD present.   Cardiovascular: Normal rate and regular rhythm. Exam reveals no gallop and no friction rub.   Murmur heard.  Pulmonary/Chest: Effort normal and breath sounds normal. She has no wheezes. She has no rales.   Pacemaker site healing well.  Staples removed   Musculoskeletal:         General: No tenderness, deformity or edema.      Cervical back: Normal range of motion and neck supple.   Neurological: She is alert and oriented to person, place, and time.   Skin: Skin is warm and dry.        Cardiographics:  EKG: Personally reviewed normal atrial paced 75 bpm  Last known EF: 55-60%    This note was completed in part utilizing M-Modal Fluency Direct Software.  Grammatical errors, random word insertions, spelling mistakes, and incomplete sentences can be an occasional consequence of this system secondary to software limitations, ambient noise, and hardware issues.  If you have any questions or concerns about the content, text, or information contained within the body of this dictation, please contact the provider for clarification.      "

## 2024-10-02 DIAGNOSIS — K21.9 GASTROESOPHAGEAL REFLUX DISEASE WITHOUT ESOPHAGITIS: ICD-10-CM

## 2024-10-02 RX ORDER — FAMOTIDINE 20 MG/1
20 TABLET, FILM COATED ORAL
Qty: 90 TABLET | Refills: 1 | Status: SHIPPED | OUTPATIENT
Start: 2024-10-02 | End: 2024-10-07

## 2024-10-07 ENCOUNTER — IN-CLINIC DEVICE VISIT (OUTPATIENT)
Dept: CARDIOLOGY CLINIC | Facility: CLINIC | Age: 84
End: 2024-10-07

## 2024-10-07 ENCOUNTER — OFFICE VISIT (OUTPATIENT)
Dept: GASTROENTEROLOGY | Facility: CLINIC | Age: 84
End: 2024-10-07
Payer: COMMERCIAL

## 2024-10-07 ENCOUNTER — APPOINTMENT (OUTPATIENT)
Dept: LAB | Facility: CLINIC | Age: 84
End: 2024-10-07
Payer: COMMERCIAL

## 2024-10-07 VITALS
HEART RATE: 85 BPM | SYSTOLIC BLOOD PRESSURE: 166 MMHG | DIASTOLIC BLOOD PRESSURE: 67 MMHG | HEIGHT: 66 IN | WEIGHT: 192 LBS | BODY MASS INDEX: 30.86 KG/M2

## 2024-10-07 DIAGNOSIS — K22.2 ESOPHAGEAL STRICTURE: ICD-10-CM

## 2024-10-07 DIAGNOSIS — Z86.0100 HX OF COLONIC POLYPS: ICD-10-CM

## 2024-10-07 DIAGNOSIS — K21.9 GASTROESOPHAGEAL REFLUX DISEASE WITHOUT ESOPHAGITIS: Primary | ICD-10-CM

## 2024-10-07 DIAGNOSIS — Z95.0 CARDIAC PACEMAKER IN SITU: Primary | ICD-10-CM

## 2024-10-07 PROBLEM — D69.6 PLATELETS DECREASED (HCC): Status: ACTIVE | Noted: 2024-10-07

## 2024-10-07 LAB
ANION GAP SERPL CALCULATED.3IONS-SCNC: 12 MMOL/L (ref 4–13)
BUN SERPL-MCNC: 21 MG/DL (ref 5–25)
CALCIUM SERPL-MCNC: 9.5 MG/DL (ref 8.4–10.2)
CHLORIDE SERPL-SCNC: 104 MMOL/L (ref 96–108)
CO2 SERPL-SCNC: 24 MMOL/L (ref 21–32)
CREAT SERPL-MCNC: 2.38 MG/DL (ref 0.6–1.3)
GFR SERPL CREATININE-BSD FRML MDRD: 18 ML/MIN/1.73SQ M
GLUCOSE SERPL-MCNC: 112 MG/DL (ref 65–140)
POTASSIUM SERPL-SCNC: 4.8 MMOL/L (ref 3.5–5.3)
SODIUM SERPL-SCNC: 140 MMOL/L (ref 135–147)

## 2024-10-07 PROCEDURE — 99214 OFFICE O/P EST MOD 30 MIN: CPT | Performed by: NURSE PRACTITIONER

## 2024-10-07 PROCEDURE — 99024 POSTOP FOLLOW-UP VISIT: CPT | Performed by: INTERNAL MEDICINE

## 2024-10-07 PROCEDURE — G2211 COMPLEX E/M VISIT ADD ON: HCPCS | Performed by: NURSE PRACTITIONER

## 2024-10-07 RX ORDER — FAMOTIDINE 20 MG/1
20 TABLET, FILM COATED ORAL
Qty: 90 TABLET | Refills: 2 | Status: SHIPPED | OUTPATIENT
Start: 2024-10-07 | End: 2025-01-05

## 2024-10-07 RX ORDER — PANTOPRAZOLE SODIUM 40 MG/1
40 TABLET, DELAYED RELEASE ORAL DAILY
Qty: 90 TABLET | Refills: 2 | Status: SHIPPED | OUTPATIENT
Start: 2024-10-07 | End: 2025-01-05

## 2024-10-07 NOTE — PROGRESS NOTES
Ambulatory Visit  Name: Yumiko Gaxiola      : 1940      MRN: [id number]  Encounter Provider: SERGIO Richmond  Encounter Date: 10/7/2024   Encounter department: [facility] GASTROENTEROLOGY [address]    Assessment & Plan  Gastroesophageal reflux disease without esophagitis  Patient has history of GERD.  Patient reports GERD symptoms are currently well-controlled on current medication. EGD done 11/15/2019 showed stenosis distal esophagus status post dilation, gastric polyps, and duodenal bulb polyps.   Orders:    famotidine (PEPCID) 20 mg tablet; Take 1 tablet (20 mg total) by mouth daily at bedtime    pantoprazole (PROTONIX) 40 mg tablet; Take 1 tablet (40 mg total) by mouth daily    Esophageal stricture  Patient has history of esophageal stricture/stenosis which was previously dilated.  Patient denies any dysphagia. EGD done 11/15/2019 showed stenosis distal esophagus status post dilation, gastric polyps, and duodenal bulb polyps.   -Advised to eat slow, chew well, alternate solids and liquids       Hx of colonic polyps  Patient has history of colon polyps. Colonoscopy done in 2019 showed sessile serrated polyps and tubular adenoma.   -Patient does not desire any repeat colonoscopy.      Follow up in 1 year or sooner if having GI issues.    History of Present Illness     Yumiko Gaxiola is a 84 y.o. female who presents to office for follow-up.  Patient currently denies any GI issues.  Patient reports that symptoms of GERD are well-controlled on current medication.  Patient denies any dysphagia.  Patient denies nausea, vomiting, acid reflux, heartburn, epigastric or abdominal pain.  Patient denies blood in stool, blood from rectal area, or black tarry stool.  Abdomen exam benign no abdominal tenderness or guarding.    EGD done 11/15/2019 showed stenosis distal esophagus status post dilation, gastric polyps, and duodenal bulb polyps. Colonoscopy done in 2019 showed sessile serrated  polyps and tubular adenoma.  Positive family history of esophageal cancer mother and brother.  Patient is a former smoker.  Patient drinks alcohol socially.      Review of Systems   Constitutional:  Negative for chills and fever.   HENT:  Negative for ear pain and sore throat.    Eyes:  Negative for pain and visual disturbance.   Respiratory:  Negative for cough and shortness of breath.    Cardiovascular:  Negative for chest pain and palpitations.   Gastrointestinal:  Negative for abdominal pain and vomiting.   Genitourinary:  Negative for dysuria and hematuria.   Musculoskeletal:  Negative for arthralgias and back pain.   Skin:  Negative for color change and rash.   Neurological:  Negative for seizures and syncope.   All other systems reviewed and are negative.    Current Outpatient Medications on File Prior to Visit   Medication Sig Dispense Refill    acetaminophen (TYLENOL) 650 mg CR tablet Take by mouth      albuterol (2.5 mg/3 mL) 0.083 % nebulizer solution Take 3 mL (2.5 mg total) by nebulization every 6 (six) hours as needed for wheezing or shortness of breath 360 mL 6    apixaban (ELIQUIS) 2.5 mg Take 1 tablet (2.5 mg total) by mouth 2 (two) times a day      Budeson-Glycopyrrol-Formoterol (Breztri Aerosphere) 160-9-4.8 MCG/ACT AERO Inhale 2 puffs 2 (two) times a day Rinse mouth after use. 32.1 g 3    cholecalciferol (VITAMIN D3) 1,000 units tablet Take 1,000 Units by mouth daily      clobetasol (TEMOVATE) 0.05 % cream       Coenzyme Q10 (COQ-10) 200 MG CAPS Take 2 capsules by mouth daily      diphenhydrAMINE-acetaminophen (TYLENOL PM)  MG TABS Take 1 tablet by mouth daily at bedtime as needed for sleep      furosemide (LASIX) 20 mg tablet TAKE 1 TABLET BY MOUTH DAILY 90 tablet 3    guaiFENesin (ROBITUSSIN) 100 MG/5ML oral liquid Take 200 mg by mouth daily at bedtime        metoprolol succinate (TOPROL-XL) 25 mg 24 hr tablet Take 1 tablet (25 mg total) by mouth daily 90 tablet 3    Multiple  "Vitamins-Minerals (DAILY MULTIVITAMIN PO) Take 1 tablet by mouth daily      nitroglycerin (NITROSTAT) 0.4 mg SL tablet Place 1 tablet (0.4 mg total) under the tongue every 5 (five) minutes as needed for chest pain If no relief after first dose call prescriber or 911 25 tablet 0    pravastatin (PRAVACHOL) 10 mg tablet TAKE 1 TABLET BY MOUTH 3 DAYS  WEEKLY ON  AND  FRIDAY 36 tablet 0    Vitamin Mixture (SHADE-C PO) Take 500 mg by mouth daily      [DISCONTINUED] famotidine (PEPCID) 20 mg tablet TAKE 1 TABLET BY MOUTH DAILY AT  BEDTIME 90 tablet 1    [DISCONTINUED] pantoprazole (PROTONIX) 40 mg tablet TAKE 1 TABLET BY MOUTH DAILY 90 tablet 1    Calcium Carbonate-Vitamin D 600-200 MG-UNIT CAPS Take by mouth 2 (two) times a day (Patient not taking: Reported on 10/2/2023)      methocarbamol (Robaxin-750) 750 mg tablet Take 1 tablet (750 mg total) by mouth every 6 (six) hours as needed for muscle spasms (Patient not taking: Reported on 10/7/2024) 30 tablet 0     No current facility-administered medications on file prior to visit.      Social History     Tobacco Use    Smoking status: Former     Current packs/day: 0.00     Average packs/day: 1 pack/day for 41.0 years (41.0 ttl pk-yrs)     Types: Cigarettes     Start date:      Quit date:      Years since quittin.7    Smokeless tobacco: Never    Tobacco comments:     Has smoked since the age of 17   Vaping Use    Vaping status: Never Used   Substance and Sexual Activity    Alcohol use: Yes     Comment: occasional, No alcohol use,per [employer]    Drug use: Never    Sexual activity: Not on file         Objective     /67 (BP Location: Left arm, Patient Position: Sitting, Cuff Size: Large)   Pulse 85   Ht 5' 5.5\" (1.664 m)   Wt 87.1 kg (192 lb)   BMI 31.46 kg/m²     Physical Exam  Vitals and nursing note reviewed.   Constitutional:       General: She is not in acute distress.     Appearance: She is well-developed.   HENT:      Head: " Normocephalic and atraumatic.   Eyes:      Conjunctiva/sclera: Conjunctivae normal.   Cardiovascular:      Rate and Rhythm: Normal rate and regular rhythm.      Pulses: Normal pulses.      Heart sounds: Normal heart sounds. No murmur heard.  Pulmonary:      Effort: Pulmonary effort is normal. No respiratory distress.      Breath sounds: Normal breath sounds. No stridor. No wheezing, rhonchi or rales.   Abdominal:      General: Bowel sounds are normal. There is no distension.      Palpations: Abdomen is soft. There is no mass.      Tenderness: There is no abdominal tenderness. There is no guarding or rebound.   Musculoskeletal:         General: No swelling.      Cervical back: Neck supple.   Skin:     General: Skin is warm and dry.      Capillary Refill: Capillary refill takes less than 2 seconds.      Coloration: Skin is not jaundiced or pale.   Neurological:      Mental Status: She is alert and oriented to person, place, and time.   Psychiatric:         Mood and Affect: Mood normal.

## 2024-10-07 NOTE — ASSESSMENT & PLAN NOTE
Patient has history of GERD.  Patient reports GERD symptoms are currently well-controlled on current medication. EGD done 11/15/2019 showed stenosis distal esophagus status post dilation, gastric polyps, and duodenal bulb polyps.   Orders:    famotidine (PEPCID) 20 mg tablet; Take 1 tablet (20 mg total) by mouth daily at bedtime    pantoprazole (PROTONIX) 40 mg tablet; Take 1 tablet (40 mg total) by mouth daily

## 2024-10-07 NOTE — ASSESSMENT & PLAN NOTE
Patient has history of colon polyps. Colonoscopy done in 2019 showed sessile serrated polyps and tubular adenoma.   -Patient does not desire any repeat colonoscopy.      Follow up in 1 year or sooner if having GI issues.

## 2024-10-07 NOTE — ASSESSMENT & PLAN NOTE
Patient has history of esophageal stricture/stenosis which was previously dilated.  Patient denies any dysphagia. EGD done 11/15/2019 showed stenosis distal esophagus status post dilation, gastric polyps, and duodenal bulb polyps.   -Advised to eat slow, chew well, alternate solids and liquids

## 2024-10-07 NOTE — PATIENT INSTRUCTIONS
Eat slow, chew well, alternate solids and liquids  Continue to follow antireflux diet and measures

## 2024-10-07 NOTE — PROGRESS NOTES
Results for orders placed or performed in visit on 10/07/24   Cardiac EP device report    Narrative    MDT DUAL CHAMBER PM/ACTIVE SYSTEM IS MRI CONDITIONAL  DEVICE INTERROGATED IN THE [place] OFFICE: BATTERY VOLTAGE ADEQUATE-13 YRS. AP 86%  0%. ALL AVAILABLE LEAD PARAMETERS WITHIN NORMAL LIMITS. 2 VHRS & 1 FAST A/V NOTED. AVAIL EGRAMS PRESENT AS SVT & STACH. 10 AT/AF NOTED; 0% BURDEN; AVAIL EGRAMS PRESENT AS AF. PT ON ELIQUIS & METO SUCC. EF 56% (ECHO 2024). NO PROGRAMMING CHANGES MADE TO DEVICE PARAMETERS. NORMAL DEVICE FUNCTION. NC

## 2024-10-11 ENCOUNTER — NURSE TRIAGE (OUTPATIENT)
Age: 84
End: 2024-10-11

## 2024-10-11 DIAGNOSIS — I10 HYPERTENSION, UNSPECIFIED TYPE: Primary | ICD-10-CM

## 2024-10-11 RX ORDER — AMLODIPINE BESYLATE 5 MG/1
5 TABLET ORAL DAILY
Qty: 30 TABLET | Refills: 3 | Status: SHIPPED | OUTPATIENT
Start: 2024-10-11

## 2024-10-11 NOTE — TELEPHONE ENCOUNTER
"Chief Complaint: Headaches, BP ranging from 168-169/72-73.    History of Present Illness (HPI): Received call from pt stating she has been getting headaches the past few days and has noticed her BP to be ranging from 168-169/72-73. She stated she was recently taken off valsartan and sotalol and is wondering if she needs to go back on them. She stated she took a dose of valsartan last night and felt her headache was better but now this am, she has it again. Denies blurred vision, dizziness, nor swelling. Denies chest pain. Does have sob but is chronic for her. Denies fever, stiff neck.    Advised pt will send a message to the provider to review and advise.     VS/Weight: 168-169/72-73    Risk Factors: Hypertension    Recent Testing: Echo 2/22/2024    Medicine: metoprolol 25mg daily     Upcoming Office Visit: 3/27    Last Office Visit: 9/20      Reason for Disposition   Systolic BP >= 160 OR Diastolic >= 100    Answer Assessment - Initial Assessment Questions  1. LOCATION: \"Where does it hurt?\"       Forehead and left temple over eye  2. ONSET: \"When did the headache start?\" (Minutes, hours or days)       A few days ago   3. PATTERN: \"Does the pain come and go, or has it been constant since it started?\"      Comes and goes - last night had them for a couple of hours   4. SEVERITY: \"How bad is the pain?\" and \"What does it keep you from doing?\"  (e.g., Scale 1-10; mild, moderate, or severe)    - MILD (1-3): doesn't interfere with normal activities     - MODERATE (4-7): interferes with normal activities or awakens from sleep     - SEVERE (8-10): excruciating pain, unable to do any normal activities         3-4/10  5. RECURRENT SYMPTOM: \"Have you ever had headaches before?\" If Yes, ask: \"When was the last time?\" and \"What happened that time?\"       denies  6. CAUSE: \"What do you think is causing the headache?\"      Maybe HTN  7. MIGRAINE: \"Have you been diagnosed with migraine headaches?\" If Yes, ask: \"Is this headache " "similar?\"       denies  8. HEAD INJURY: \"Has there been any recent injury to the head?\"       denies  9. OTHER SYMPTOMS: \"Do you have any other symptoms?\" (fever, stiff neck, eye pain, sore throat, cold symptoms)      Denies chest pain. Does have sob but is chronic for her. Denies fever, stiff neck.    Answer Assessment - Initial Assessment Questions  1. BLOOD PRESSURE: \"What is the blood pressure?\" \"Did you take at least two measurements 5 minutes apart?\"      168-169/72-73. Today, her BP is   2. ONSET: \"When did you take your blood pressure?\"      Last two days   3. HOW: \"How did you obtain the blood pressure?\" (e.g., visiting nurse, automatic home BP monitor)      Automatic   4. HISTORY: \"Do you have a history of high blood pressure?\"      HTN  5. MEDICATIONS: \"Are you taking any medications for blood pressure?\" \"Have you missed any doses recently?\"      Metoprolol 25 daily. Pt was taken off of valsartan and sotalol on 9/9  6. OTHER SYMPTOMS: \"Do you have any symptoms?\" (e.g., headache, chest pain, blurred vision, difficulty breathing, weakness)      Denies blurred vision, dizziness, nor swelling    Protocols used: Headache-ADULT-[place], High Blood Pressure-ADULT-[place]    "

## 2024-10-22 ENCOUNTER — OFFICE VISIT (OUTPATIENT)
Age: 84
End: 2024-10-22
Payer: COMMERCIAL

## 2024-10-22 VITALS
SYSTOLIC BLOOD PRESSURE: 137 MMHG | BODY MASS INDEX: 30.86 KG/M2 | WEIGHT: 192 LBS | DIASTOLIC BLOOD PRESSURE: 82 MMHG | HEIGHT: 66 IN | RESPIRATION RATE: 17 BRPM | HEART RATE: 77 BPM

## 2024-10-22 DIAGNOSIS — I70.209 PERIPHERAL ARTERIOSCLEROSIS (HCC): Primary | ICD-10-CM

## 2024-10-22 DIAGNOSIS — M79.671 PAIN IN BOTH FEET: ICD-10-CM

## 2024-10-22 DIAGNOSIS — L84 CORNS: ICD-10-CM

## 2024-10-22 DIAGNOSIS — M79.672 PAIN IN BOTH FEET: ICD-10-CM

## 2024-10-22 PROCEDURE — RECHECK: Performed by: PODIATRIST

## 2024-10-22 PROCEDURE — 11056 PARNG/CUTG B9 HYPRKR LES 2-4: CPT | Performed by: PODIATRIST

## 2024-10-22 NOTE — PROGRESS NOTES
Assessment/Plan:  Pain.  Radiculopathy.  Callus.  Mycotic toenail.  Peripheral artery disease.  Mucoid cyst left hallux.        Plan.  Chart reviewed.  Foot exam performed.  Calluses debrided.  Procedures performed without pain or complication.  Aftercare instruction given.  Return for follow-up.  Patient advised on care of mucoid cyst left hallux.     Subjective.  Patient has pain in her feet and toes with ambulation.  No history of trauma.  She has pain when wearing shoes.        Discussion/Summary   The patient was counseled regarding instructions for management,-- patient and family education,-- risks and benefits of treatment options.    Patient is able to Self-Care.    Possible side effects of new medications were reviewed with the patient/guardian today. The treatment plan was reviewed with the patient/guardian. The patient/guardian understands and agrees with the treatment plan      Chief Complaint   Patient has foot pain.  She has pain when she wears shoes.  No history of trauma.         History of Present Illness   HPI:  Patient complains of pain in feet with ambulation. She has pain around the toes. She is also concerned with pain in her right heel. This is been ongoing for several weeks. She has no history of trauma. 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  Patient was unable tolerate gabapentin     Review of Systems           Cardiac: chest pain,-- rhythm problems,-- AM fatigue-- and-- witnessed apnea episodes.      Skin: No complaints of nonhealing sores or skin rash.      Genitourinary: loss of bladder control      Psychological: No complaints of feeling depressed, anxiety, panic attacks, or difficulty concentrating.      General: trouble sleeping-- and-- lack of energy/fatigue.      Respiratory: shortness of breath.      HEENT: snoring      Gastrointestinal: heartburn      Hematologic: anemia      Neurological: daytime sleepiness      Musculoskeletal: arthritis-- and-- back pain      Active  Problems   1. Allergic rhinitis (477.9) (J30.9)   2. Anxiety disorder (300.00) (F41.9)   3. Arthropathy (716.90) (M12.9)   4. Atherosclerosis of arteries of extremities (440.20) (I70.209)   5. Atrial fibrillation (427.31) (I48.91)   6. Backache (724.5) (M54.9)   7. Callus (700) (L84)   8. Cervicalgia (723.1) (M54.2)   9. Depression (311) (F32.9)   10. Difficulty in walking (719.7) (R26.2)   11. Encounter for screening for malignant neoplasm of colon (V76.51) (Z12.11)   12. Esophagitis, reflux (530.11) (K21.0)   13. Essential hypertension (401.9) (I10)   14. Foot pain, bilateral (729.5) (M79.671,M79.672)   15. Herpes zoster (053.9) (B02.9)   16. Hospital discharge follow-up (V67.59) (Z09)   17. Hyperlipidemia (272.4) (E78.5)   18. Impaired fasting glucose (790.21) (R73.01)   19. Internal Hemorrhoids (455.0)   20. Leg swelling (729.81) (M79.89)   21. Lichen planus (697.0) (L43.9)   22. Limb pain (729.5) (M79.609)   23. Lumbar radiculopathy (724.4) (M54.16)   24. Multiple joint pain (719.49) (M25.50)   25. Need for influenza vaccination (V04.81) (Z23)   26. 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 Onychomycosis (110.1) (B35.1)   28. MIGUEL (obstructive sleep apnea) (327.23) (G47.33)   29. Other abnormal finding of urine (791.9) (R82.99)   30. 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 Plantar fascial fibromatosis (728.71) (M72.2)   32. Rectal/anal hemorrhage (569.3) (K62.5)   33. Screening for malignant neoplasm of cervix (V76.2) (Z12.4)   34. Shoulder joint pain, unspecified laterality   35. Thrombocytopenia (287.5) (D69.6)   36. Urticaria (708.9) (L50.9)   37. Vulvovaginitis candida albicans (112.1) (B37.3)     Past Medical History    · History of Acute maxillary sinusitis (461.0) (J01.00)   · History of Acute upper respiratory infection (465.9) (J06.9)   · History of Cellulitis (682.9) (L03.90)   · History of Cough (786.2) (R05)   · History of Dysuria (788.1) (R30.0)   · History of High cholesterol (272.0) (E78.00)   ·  History of abdominal pain (V13.89) (Z87.898)   · History of acute bronchitis (V12.69) (Z87.09)   · History of acute sinusitis (V12.69) (Z87.09)   · History of arthritis (V13.4) (Z87.39)   · History of atrial fibrillation (V12.59) (Z86.79)   · History of cataract (V12.49) (Z86.69)   · History of gastroesophageal reflux (GERD) (V12.79) (Z87.19)   · History of hypertension (V12.59) (Z86.79)   · History of Skin rash (782.1) (R21)   · History of Vulvovaginitis (616.10) (N76.0)     The active problems and past medical history were reviewed and updated today.      Surgical History    · History of Cataract Surgery   · History of Colonoscopy (Fiberoptic) Screening   · History of Gallbladder Surgery   · History of Knee Replacement   · History of Pacemaker Placement     The surgical history was reviewed and updated today.       Family History   Father    · Family history of Coronary Artery Disease (V17.49)  Sister    · Family history of Diabetes Mellitus (V18.0)   · Family history of High cholesterol  Family History    · Family history of arthritis (V17.7) (Z82.61)   · Family history of hypertension (V17.49) (Z82.49)     The family history was reviewed and updated today.       Social History    · Exercise: Walking   · 2 x/week   · Former smoker (V15.82) (Z87.891)   · No alcohol use   ·   The social history was reviewed and updated today.      Physical Exam   Left Foot: Appearance: Normal except as noted: excessive pronation-- and-- pes planus. Great toe deformities include a bunion. Tenderness: None except the great toe-- and-- distal first metatarsal.    Right Foot: Appearance: Normal except as noted: excessive pronation-- and-- pes planus. Great toe deformities include a bunion. Tenderness: None except the great toe,-- distal first metatarsal,-- medial calcaneous-- and-- insertion of the plantar fascia.    Left Ankle: ROM: limited ROM in all planes    Right Ankle: ROM: limited ROM in all planes    Neurological Exam:  Light touch was decreased bilaterally. Vibratory sensation was decreased in both first metatarsophalangeal joints.    Vascular Exam: performed Dorsalis pedis pulses were diminished bilaterally. Posterior tibial pulses were diminished bilaterally. Elevation Pallor: present bilaterally. Dependence rubor was present bilaterally. Capillary refill time was greater than 3 seconds bilaterally-- and-- Q. 9, findings bilateral. Edema: moderate bilaterally.    Toenails: All of the toenails were elongated,-- hypertrophied,-- discolored-- and-- Right ptotic.    Hyperkeratosis: present on both first toes,-- present on both first sub metatarsals-- and-- Positive xerosis of skin noted.    Shoe Gear Evaluation: performed (). Recommendation(s): SAS style-- and-- OTC inlays.

## 2024-10-29 DIAGNOSIS — E78.5 DYSLIPIDEMIA: ICD-10-CM

## 2024-10-31 RX ORDER — PRAVASTATIN SODIUM 10 MG
TABLET ORAL
Qty: 36 TABLET | Refills: 3 | Status: SHIPPED | OUTPATIENT
Start: 2024-10-31

## 2024-11-02 DIAGNOSIS — I10 HYPERTENSION, UNSPECIFIED TYPE: ICD-10-CM

## 2024-11-04 RX ORDER — AMLODIPINE BESYLATE 5 MG/1
5 TABLET ORAL DAILY
Qty: 90 TABLET | Refills: 2 | Status: SHIPPED | OUTPATIENT
Start: 2024-11-04

## 2024-11-21 ENCOUNTER — OFFICE VISIT (OUTPATIENT)
Dept: PULMONOLOGY | Facility: MEDICAL CENTER | Age: 84
End: 2024-11-21
Payer: COMMERCIAL

## 2024-11-21 VITALS
WEIGHT: 190 LBS | TEMPERATURE: 76 F | DIASTOLIC BLOOD PRESSURE: 70 MMHG | HEART RATE: 76 BPM | RESPIRATION RATE: 14 BRPM | BODY MASS INDEX: 31.65 KG/M2 | HEIGHT: 65 IN | SYSTOLIC BLOOD PRESSURE: 142 MMHG | OXYGEN SATURATION: 96 %

## 2024-11-21 DIAGNOSIS — J44.9 CHRONIC OBSTRUCTIVE PULMONARY DISEASE, UNSPECIFIED COPD TYPE (HCC): Primary | ICD-10-CM

## 2024-11-21 DIAGNOSIS — J47.9 BRONCHIECTASIS WITHOUT COMPLICATION (HCC): ICD-10-CM

## 2024-11-21 DIAGNOSIS — G47.33 OSA (OBSTRUCTIVE SLEEP APNEA): ICD-10-CM

## 2024-11-21 PROCEDURE — 99214 OFFICE O/P EST MOD 30 MIN: CPT | Performed by: INTERNAL MEDICINE

## 2024-11-21 RX ORDER — LEVALBUTEROL INHALATION SOLUTION 0.63 MG/3ML
0.63 SOLUTION RESPIRATORY (INHALATION) 3 TIMES DAILY PRN
Qty: 810 ML | Refills: 3 | Status: SHIPPED | OUTPATIENT
Start: 2024-11-21

## 2024-11-21 NOTE — PATIENT INSTRUCTIONS
Can switch Breztri to 2 puffs once a day in the morning.  When you start having increasing shortness of breath or cough try taking it twice a day    I change your nebulizer medication from albuterol 2.5 mg to levalbuterol 0.63 mg dose that you can use 1 vial 3 times a day as needed and your nebulizer    RSV   - Arexvy

## 2024-11-23 NOTE — ASSESSMENT & PLAN NOTE
Mel does have severe obstructive sleep apnea and is using auto DreamStation 2 machine just at a pressure range of 15 to 18 cm water.  I reviewed compliance data of from past month and she used on regular basis for over 8 hours per night.  This resulted in AHI of 1.2 which is good.  Average CPAP pressure was 16.  Her DME company is [employer].  Continue same settings.

## 2024-11-23 NOTE — ASSESSMENT & PLAN NOTE
Mel has been mild to moderate COPD with FEV1 of 1.05 L or 55% predicted and obstructive index of 62%.  She will continue with Breztri 2 puffs twice a day and when doing well can decrease dose to once a day.  She also states that albuterol nebulizer does make her have some shakiness.  I did prescribe her levalbuterol 0.63 mg she can use when needed

## 2024-11-23 NOTE — ASSESSMENT & PLAN NOTE
Last CT scan of chest done July 9 showed some mild bronchiectasis.     I did talk to her about considering RSV vaccine.

## 2025-01-10 ENCOUNTER — RESULTS FOLLOW-UP (OUTPATIENT)
Dept: CARDIOLOGY CLINIC | Facility: CLINIC | Age: 85
End: 2025-01-10

## 2025-01-10 ENCOUNTER — REMOTE DEVICE CLINIC VISIT (OUTPATIENT)
Dept: CARDIOLOGY CLINIC | Facility: CLINIC | Age: 85
End: 2025-01-10
Payer: COMMERCIAL

## 2025-01-10 DIAGNOSIS — Z95.0 PRESENCE OF PERMANENT CARDIAC PACEMAKER: Primary | ICD-10-CM

## 2025-01-10 PROCEDURE — 93296 REM INTERROG EVL PM/IDS: CPT | Performed by: INTERNAL MEDICINE

## 2025-01-10 PROCEDURE — 93294 REM INTERROG EVL PM/LDLS PM: CPT | Performed by: INTERNAL MEDICINE

## 2025-01-10 NOTE — PROGRESS NOTES
Results for orders placed or performed in visit on 01/10/25   Cardiac EP device report    Narrative    MDT DUAL CHAMBER PM/ACTIVE SYSTEM IS MRI CONDITIONAL  CARELINK TRANSMISSION: BATTERY VOLTAGE ADEQUATE.(13.1 YRS) AP 88%  <1%. ALL AVAILABLE LEAD PARAMETERS WITHIN NORMAL LIMITS. 1 VHR EPISODE DETECTED, SVT NOTED . 15 AT/AF EPISODES DETECTED. LONGEST <4 HOURS. PATIENT IS ON ELIQUISAND METOPROLOL SUCC; EF 56% (2024). NORMAL DEVICE FUNCTION.----VENEGAS

## 2025-01-14 ENCOUNTER — OFFICE VISIT (OUTPATIENT)
Age: 85
End: 2025-01-14
Payer: COMMERCIAL

## 2025-01-14 VITALS — RESPIRATION RATE: 17 BRPM | BODY MASS INDEX: 31.65 KG/M2 | HEIGHT: 65 IN | WEIGHT: 190 LBS

## 2025-01-14 DIAGNOSIS — I70.209 PERIPHERAL ARTERIOSCLEROSIS (HCC): Primary | ICD-10-CM

## 2025-01-14 DIAGNOSIS — M79.671 PAIN IN BOTH FEET: ICD-10-CM

## 2025-01-14 DIAGNOSIS — M79.672 PAIN IN BOTH FEET: ICD-10-CM

## 2025-01-14 DIAGNOSIS — L84 CORNS: ICD-10-CM

## 2025-01-14 DIAGNOSIS — I10 HYPERTENSION, UNSPECIFIED TYPE: ICD-10-CM

## 2025-01-14 PROCEDURE — 11056 PARNG/CUTG B9 HYPRKR LES 2-4: CPT | Performed by: PODIATRIST

## 2025-01-14 PROCEDURE — RECHECK: Performed by: PODIATRIST

## 2025-01-15 RX ORDER — AMLODIPINE BESYLATE 5 MG/1
5 TABLET ORAL DAILY
Qty: 90 TABLET | Refills: 1 | Status: SHIPPED | OUTPATIENT
Start: 2025-01-15

## 2025-01-17 DIAGNOSIS — I48.91 ATRIAL FIBRILLATION, UNSPECIFIED TYPE (HCC): ICD-10-CM

## 2025-01-17 RX ORDER — METOPROLOL SUCCINATE 25 MG/1
25 TABLET, EXTENDED RELEASE ORAL DAILY
Qty: 90 TABLET | Refills: 1 | Status: SHIPPED | OUTPATIENT
Start: 2025-01-17

## 2025-03-25 ENCOUNTER — OFFICE VISIT (OUTPATIENT)
Age: 85
End: 2025-03-25
Payer: COMMERCIAL

## 2025-03-25 VITALS — RESPIRATION RATE: 17 BRPM | WEIGHT: 190 LBS | HEART RATE: 77 BPM | BODY MASS INDEX: 31.65 KG/M2 | HEIGHT: 65 IN

## 2025-03-25 DIAGNOSIS — I70.209 PERIPHERAL ARTERIOSCLEROSIS (HCC): Primary | ICD-10-CM

## 2025-03-25 DIAGNOSIS — M79.672 PAIN IN BOTH FEET: ICD-10-CM

## 2025-03-25 DIAGNOSIS — M79.671 PAIN IN BOTH FEET: ICD-10-CM

## 2025-03-25 DIAGNOSIS — L84 CORNS: ICD-10-CM

## 2025-03-25 PROCEDURE — 11056 PARNG/CUTG B9 HYPRKR LES 2-4: CPT | Performed by: PODIATRIST

## 2025-03-25 PROCEDURE — RECHECK: Performed by: PODIATRIST

## 2025-03-27 ENCOUNTER — OFFICE VISIT (OUTPATIENT)
Dept: CARDIOLOGY CLINIC | Facility: CLINIC | Age: 85
End: 2025-03-27
Payer: COMMERCIAL

## 2025-03-27 VITALS
SYSTOLIC BLOOD PRESSURE: 138 MMHG | BODY MASS INDEX: 31.65 KG/M2 | HEIGHT: 65 IN | HEART RATE: 79 BPM | DIASTOLIC BLOOD PRESSURE: 70 MMHG | OXYGEN SATURATION: 99 % | WEIGHT: 190 LBS

## 2025-03-27 DIAGNOSIS — I50.32 CHRONIC DIASTOLIC CONGESTIVE HEART FAILURE (HCC): ICD-10-CM

## 2025-03-27 DIAGNOSIS — I71.21 ASCENDING AORTIC ANEURYSM, UNSPECIFIED WHETHER RUPTURED (HCC): ICD-10-CM

## 2025-03-27 DIAGNOSIS — I48.0 PAROXYSMAL ATRIAL FIBRILLATION (HCC): ICD-10-CM

## 2025-03-27 DIAGNOSIS — E66.01 MORBID (SEVERE) OBESITY DUE TO EXCESS CALORIES (HCC): ICD-10-CM

## 2025-03-27 DIAGNOSIS — E78.2 MIXED HYPERLIPIDEMIA: Primary | ICD-10-CM

## 2025-03-27 DIAGNOSIS — I10 HYPERTENSION, UNSPECIFIED TYPE: ICD-10-CM

## 2025-03-27 DIAGNOSIS — I72.3 ANEURYSM OF ILIAC ARTERY (HCC): ICD-10-CM

## 2025-03-27 DIAGNOSIS — N18.4 CHRONIC RENAL DISEASE, STAGE IV (HCC): ICD-10-CM

## 2025-03-27 PROCEDURE — 93000 ELECTROCARDIOGRAM COMPLETE: CPT | Performed by: INTERNAL MEDICINE

## 2025-03-27 PROCEDURE — 99214 OFFICE O/P EST MOD 30 MIN: CPT | Performed by: INTERNAL MEDICINE

## 2025-03-27 RX ORDER — AMLODIPINE BESYLATE 5 MG/1
5 TABLET ORAL DAILY
Qty: 90 TABLET | Refills: 3 | Status: SHIPPED | OUTPATIENT
Start: 2025-03-27

## 2025-03-27 NOTE — PROGRESS NOTES
Cardiology   Blaze Shell DO, [facility]  Dennis Calle MD, [facility]  Thad Murdock MD, [facility]  Vijay Villagran MD, [facility]  -------------------------------------------------------------------  [facility]  [address], [address], [address]  [place], [place], [address]  [phone]    Cardiology Follow Up  Yumiko Gaxiola  1940  [id number]          Assessment/Plan:    1. Cough/shortness of breath -  Echocardiogram was normal.    -She has chronic bronchiectasis and COPD.  She follows with pulmonary.    2. Paroxysmal atrial fibrillation (HCC) - currently in sinus rhythm.    -She is on chronic Eliquis therapy     3. Essential hypertension  - BP well controlled in spite of discontinuation of valsartan.  Will continue to hold.    4. Mixed hyperlipidemia  - Last LDL was 78  -  Continue pravastatin.  - Lipid panel ordered.     5. Chronic diastolic CHF - stable on lasix 20 mg daily.    6.  Chronic kidney disease.  CMP ordered    7. Mild to moderate aortic regurgitation - Repeat 2D echocardiogram next visit.      Interval History:     Yumiko Gaxiola is 84 y.o. female here for followup of SSS and hypertension.  Since her last visit, she has overall been feeling well without any chest pain.  She denies any lower extremity edema, orthopnea or paroxysmal nocturnal dyspnea.  She has bilateral knee pain which limits activity.        Sotalol and valsartan stopped due to worsening renal function.  Renal function has been stable and BP remains good off of valsartan.  Last pacemaker check showed normal function.   She has chronic cough and dyspnea secondary to COPD, chronic bronchiectasis.  She follows with pulmonary.   She has intermittent episodes of palpitations similar to previous atrial fibrillation episodes.  On pacemaker interrogations, she has intermittent episodes of atrial fibrillation over the years.    In July 2023, she had a CT chest for follow up of a thoracic aortic aneurysm.   There was no change compared to previous study.  Aorta measured 4.5 cm at widest diameter which has been unchanged over the past few exams.  CT also showed findings consistent with atypical pneumonia ?BOBBY.  She has similar findings in the past as well.  We discussed continued monitoring last visit and she did not wish to have further CT scans.            The patient has a history of atrial fibrillation along with sick sinus syndrome and had a pacemaker inserted at [facility] after developing multiple pauses.     Past Medical History:   Diagnosis Date    A-fib (HCC)     Arthritis     Atrial fibrillation (HCC)     Cardiac disease     Cardiac disease 2016    Cataract     Chronic renal disease, stage IV (HCC) 2024    Colon polyp     Gastro-esophageal reflux     GERD    GERD (gastroesophageal reflux disease)     Hyperlipidemia     Hypertension     Nasal congestion     Primary generalized (osteo)arthritis     Rheumatoid arthritis (HCC)     Shingles     Sinusitis     Sleep apnea     Sleep difficulties      Social History     Socioeconomic History    Marital status:      Spouse name: Not on file    Number of children: Not on file    Years of education: Not on file    Highest education level: Not on file   Occupational History    Not on file   Tobacco Use    Smoking status: Former     Current packs/day: 0.00     Average packs/day: 1 pack/day for 41.0 years (41.0 ttl pk-yrs)     Types: Cigarettes     Start date:      Quit date:      Years since quittin.2    Smokeless tobacco: Never    Tobacco comments:     Has smoked since the age of 17   Vaping Use    Vaping status: Never Used   Substance and Sexual Activity    Alcohol use: Yes     Comment: occasional, No alcohol use,per [employer]    Drug use: Never    Sexual activity: Not on file   Other Topics Concern    Not on file   Social History Narrative    Exercise: Walking, 2x/week     Social Drivers of Health     Financial Resource  Strain: Not on file   Food Insecurity: Not on file   Transportation Needs: Not on file   Physical Activity: Not on file   Stress: Not on file   Social Connections: Not on file   Intimate Partner Violence: Not on file   Housing Stability: Not on file      Family History   Problem Relation Age of Onset    Esophageal cancer Mother     Coronary artery disease Father     Diabetes Sister     Hyperlipidemia Sister     Pancreatic cancer Sister     Esophageal cancer Brother     Arthritis Family     Hypertension Family      Past Surgical History:   Procedure Laterality Date    CARDIAC ELECTROPHYSIOLOGY PROCEDURE N/A 9/12/2024    Procedure: Cardiac pacer generator change;  Surgeon: Raciel Landaverde DO;  Location: [facility];  Service: Cardiology    CARDIAC PACEMAKER PLACEMENT Left 2014    CATARACT EXTRACTION      CHOLECYSTECTOMY      resolved: 2009    COLONOSCOPY      Fiberoptic, resolved 2015    EYE SURGERY      KNEE SURGERY Left     left kknee replacement in 2009    UPPER GASTROINTESTINAL ENDOSCOPY         Current Outpatient Medications:     acetaminophen (TYLENOL) 650 mg CR tablet, Take by mouth, Disp: , Rfl:     albuterol (2.5 mg/3 mL) 0.083 % nebulizer solution, Take 3 mL (2.5 mg total) by nebulization every 6 (six) hours as needed for wheezing or shortness of breath, Disp: 360 mL, Rfl: 6    amLODIPine (NORVASC) 5 mg tablet, Take 1 tablet (5 mg total) by mouth daily, Disp: 90 tablet, Rfl: 1    apixaban (ELIQUIS) 2.5 mg, Take 1 tablet (2.5 mg total) by mouth 2 (two) times a day, Disp: , Rfl:     Budeson-Glycopyrrol-Formoterol (Breztri Aerosphere) 160-9-4.8 MCG/ACT AERO, Inhale 2 puffs 2 (two) times a day Rinse mouth after use., Disp: 32.1 g, Rfl: 3    cholecalciferol (VITAMIN D3) 1,000 units tablet, Take 1,000 Units by mouth daily, Disp: , Rfl:     clobetasol (TEMOVATE) 0.05 % cream, , Disp: , Rfl:     Coenzyme Q10 (COQ-10) 200 MG CAPS, Take 2 capsules by mouth daily, Disp: , Rfl:     diphenhydrAMINE-acetaminophen  (TYLENOL PM)  MG TABS, Take 1 tablet by mouth daily at bedtime as needed for sleep, Disp: , Rfl:     furosemide (LASIX) 20 mg tablet, TAKE 1 TABLET BY MOUTH DAILY, Disp: 90 tablet, Rfl: 3    levalbuterol (Xopenex) 0.63 mg/3 mL nebulizer solution, Take 3 mL (0.63 mg total) by nebulization 3 (three) times a day as needed for wheezing or shortness of breath, Disp: 810 mL, Rfl: 3    metoprolol succinate (TOPROL-XL) 25 mg 24 hr tablet, TAKE 1 TABLET BY MOUTH DAILY, Disp: 90 tablet, Rfl: 1    Multiple Vitamins-Minerals (DAILY MULTIVITAMIN PO), Take 1 tablet by mouth daily, Disp: , Rfl:     nitroglycerin (NITROSTAT) 0.4 mg SL tablet, Place 1 tablet (0.4 mg total) under the tongue every 5 (five) minutes as needed for chest pain If no relief after first dose call prescriber or 911, Disp: 25 tablet, Rfl: 0    pravastatin (PRAVACHOL) 10 mg tablet, TAKE 1 TABLET BY MOUTH 3 DAYS  WEEKLY ON MONDAY WEDNESDAY AND  FRIDAY, Disp: 36 tablet, Rfl: 3    Vitamin Mixture (SHADE-C PO), Take 500 mg by mouth daily, Disp: , Rfl:     Calcium Carbonate-Vitamin D 600-200 MG-UNIT CAPS, Take by mouth 2 (two) times a day (Patient not taking: Reported on 11/21/2024), Disp: , Rfl:     famotidine (PEPCID) 20 mg tablet, Take 1 tablet (20 mg total) by mouth daily at bedtime, Disp: 90 tablet, Rfl: 2    guaiFENesin (ROBITUSSIN) 100 MG/5ML oral liquid, Take 200 mg by mouth daily at bedtime   (Patient not taking: Reported on 11/21/2024), Disp: , Rfl:     methocarbamol (Robaxin-750) 750 mg tablet, Take 1 tablet (750 mg total) by mouth every 6 (six) hours as needed for muscle spasms (Patient not taking: Reported on 11/21/2024), Disp: 30 tablet, Rfl: 0    pantoprazole (PROTONIX) 40 mg tablet, Take 1 tablet (40 mg total) by mouth daily, Disp: 90 tablet, Rfl: 2        Review of Systems:  Review of Systems   Constitutional:  Positive for fatigue.   Respiratory:  Positive for cough and shortness of breath.    Cardiovascular:  Positive for palpitations.  "Negative for chest pain and leg swelling.   Musculoskeletal:  Positive for arthralgias and gait problem.   All other systems reviewed and are negative.        Physical Exam:  Vitals:  Vitals:    03/27/25 1411   BP: 138/70   BP Location: Left arm   Patient Position: Sitting   Cuff Size: Large   Pulse: 79   SpO2: 99%   Weight: 86.2 kg (190 lb)   Height: 5' 5\" (1.651 m)     Physical Exam   Constitutional: She appears healthy. No distress.   Eyes: Pupils are equal, round, and reactive to light. Conjunctivae are normal.   Neck: No JVD present.   Cardiovascular: Normal rate and regular rhythm. Exam reveals no gallop and no friction rub.   Murmur heard.  Pulmonary/Chest: Effort normal and breath sounds normal. She has no wheezes. She has no rales.       Musculoskeletal:         General: No tenderness, deformity or edema.      Cervical back: Normal range of motion and neck supple.   Neurological: She is alert and oriented to person, place, and time.   Skin: Skin is warm and dry.        Cardiographics:  EKG: Personally reviewed sinus rhythm with rate 80  Last known EF: 55-60%    This note was completed in part utilizing [employer] Software.  Grammatical errors, random word insertions, spelling mistakes, and incomplete sentences can be an occasional consequence of this system secondary to software limitations, ambient noise, and hardware issues.  If you have any questions or concerns about the content, text, or information contained within the body of this dictation, please contact the provider for clarification.      "

## 2025-04-02 ENCOUNTER — APPOINTMENT (OUTPATIENT)
Dept: LAB | Facility: CLINIC | Age: 85
End: 2025-04-02
Payer: COMMERCIAL

## 2025-04-02 DIAGNOSIS — I50.32 CHRONIC DIASTOLIC CONGESTIVE HEART FAILURE (HCC): ICD-10-CM

## 2025-04-02 DIAGNOSIS — E78.2 MIXED HYPERLIPIDEMIA: ICD-10-CM

## 2025-04-02 LAB
ALBUMIN SERPL BCG-MCNC: 4.5 G/DL (ref 3.5–5)
ALP SERPL-CCNC: 56 U/L (ref 34–104)
ALT SERPL W P-5'-P-CCNC: 19 U/L (ref 7–52)
ANION GAP SERPL CALCULATED.3IONS-SCNC: 9 MMOL/L (ref 4–13)
AST SERPL W P-5'-P-CCNC: 20 U/L (ref 13–39)
BILIRUB SERPL-MCNC: 0.36 MG/DL (ref 0.2–1)
BUN SERPL-MCNC: 36 MG/DL (ref 5–25)
CALCIUM SERPL-MCNC: 10.1 MG/DL (ref 8.4–10.2)
CHLORIDE SERPL-SCNC: 106 MMOL/L (ref 96–108)
CHOLEST SERPL-MCNC: 143 MG/DL (ref ?–200)
CO2 SERPL-SCNC: 25 MMOL/L (ref 21–32)
CREAT SERPL-MCNC: 2.68 MG/DL (ref 0.6–1.3)
ERYTHROCYTE [DISTWIDTH] IN BLOOD BY AUTOMATED COUNT: 14.9 % (ref 11.6–15.1)
GFR SERPL CREATININE-BSD FRML MDRD: 15 ML/MIN/1.73SQ M
GLUCOSE P FAST SERPL-MCNC: 123 MG/DL (ref 65–99)
HCT VFR BLD AUTO: 32.7 % (ref 34.8–46.1)
HDLC SERPL-MCNC: 30 MG/DL
HGB BLD-MCNC: 9.9 G/DL (ref 11.5–15.4)
LDLC SERPL CALC-MCNC: 76 MG/DL (ref 0–100)
MCH RBC QN AUTO: 27.3 PG (ref 26.8–34.3)
MCHC RBC AUTO-ENTMCNC: 30.3 G/DL (ref 31.4–37.4)
MCV RBC AUTO: 90 FL (ref 82–98)
NONHDLC SERPL-MCNC: 113 MG/DL
PLATELET # BLD AUTO: 120 THOUSANDS/UL (ref 149–390)
PMV BLD AUTO: 10.3 FL (ref 8.9–12.7)
POTASSIUM SERPL-SCNC: 4.8 MMOL/L (ref 3.5–5.3)
PROT SERPL-MCNC: 6.6 G/DL (ref 6.4–8.4)
RBC # BLD AUTO: 3.62 MILLION/UL (ref 3.81–5.12)
SODIUM SERPL-SCNC: 140 MMOL/L (ref 135–147)
TRIGL SERPL-MCNC: 186 MG/DL (ref ?–150)
WBC # BLD AUTO: 3.55 THOUSAND/UL (ref 4.31–10.16)

## 2025-04-02 PROCEDURE — 80061 LIPID PANEL: CPT

## 2025-04-02 PROCEDURE — 85027 COMPLETE CBC AUTOMATED: CPT

## 2025-04-02 PROCEDURE — 80053 COMPREHEN METABOLIC PANEL: CPT

## 2025-04-02 PROCEDURE — 36415 COLL VENOUS BLD VENIPUNCTURE: CPT

## 2025-04-03 ENCOUNTER — RESULTS FOLLOW-UP (OUTPATIENT)
Dept: CARDIOLOGY CLINIC | Facility: CLINIC | Age: 85
End: 2025-04-03

## 2025-04-03 DIAGNOSIS — N18.4 CHRONIC RENAL DISEASE, STAGE IV (HCC): Primary | ICD-10-CM

## 2025-04-04 NOTE — RESULT ENCOUNTER NOTE
Called and spoke to patient about results and referral to Nephrology.  Patient given phone number and office location in [place].  I advised patient that the referral que is gone though and she should receive a call from them to schedule an appointment, but I advised her to call them by Friday next week if she has not gotten a call from them by then.  Patient understood.

## 2025-04-17 ENCOUNTER — REMOTE DEVICE CLINIC VISIT (OUTPATIENT)
Dept: CARDIOLOGY CLINIC | Facility: CLINIC | Age: 85
End: 2025-04-17
Payer: COMMERCIAL

## 2025-04-17 DIAGNOSIS — Z95.0 PRESENCE OF PERMANENT CARDIAC PACEMAKER: Primary | ICD-10-CM

## 2025-04-17 PROCEDURE — 93296 REM INTERROG EVL PM/IDS: CPT | Performed by: INTERNAL MEDICINE

## 2025-04-17 PROCEDURE — 93294 REM INTERROG EVL PM/LDLS PM: CPT | Performed by: INTERNAL MEDICINE

## 2025-04-17 NOTE — PROGRESS NOTES
Results for orders placed or performed in visit on 04/17/25   Cardiac EP device report    Narrative    MDT DUAL CHAMBER PM/ACTIVE SYSTEM IS MRI CONDITIONAL  CARELINK TRANSMISSION: BATTERY VOLTAGE ADEQUATE.(12.7YRS) AP 93%  <1%. ALL AVAILABLE LEAD PARAMETERS WITHIN NORMAL LIMITS. 1 VHR EPISODE DETECTED, SVT NOTED ATRIAL UNDERSENSE . 125 AT/AF EPISODES DETECTED. LONGEST <2 HOURS. PATIENT IS ON ELIQUIS AND METOPROLOL SUCC; EF 56% (2024). NORMAL DEVICE FUNCTION.----VENEGAS

## 2025-04-18 ENCOUNTER — RESULTS FOLLOW-UP (OUTPATIENT)
Dept: CARDIOLOGY CLINIC | Facility: CLINIC | Age: 85
End: 2025-04-18

## 2025-04-30 ENCOUNTER — CONSULT (OUTPATIENT)
Dept: NEPHROLOGY | Facility: CLINIC | Age: 85
End: 2025-04-30
Attending: INTERNAL MEDICINE
Payer: COMMERCIAL

## 2025-04-30 VITALS
DIASTOLIC BLOOD PRESSURE: 64 MMHG | WEIGHT: 189 LBS | SYSTOLIC BLOOD PRESSURE: 132 MMHG | OXYGEN SATURATION: 96 % | BODY MASS INDEX: 31.49 KG/M2 | HEART RATE: 78 BPM | HEIGHT: 65 IN

## 2025-04-30 DIAGNOSIS — N18.4 CHRONIC RENAL DISEASE, STAGE IV (HCC): ICD-10-CM

## 2025-04-30 DIAGNOSIS — N18.9 ANEMIA OF CHRONIC KIDNEY FAILURE, UNSPECIFIED STAGE: ICD-10-CM

## 2025-04-30 DIAGNOSIS — E83.9 CHRONIC KIDNEY DISEASE-MINERAL AND BONE DISORDER: ICD-10-CM

## 2025-04-30 DIAGNOSIS — N18.9 CHRONIC KIDNEY DISEASE-MINERAL AND BONE DISORDER: ICD-10-CM

## 2025-04-30 DIAGNOSIS — D63.1 ANEMIA OF CHRONIC KIDNEY FAILURE, UNSPECIFIED STAGE: ICD-10-CM

## 2025-04-30 DIAGNOSIS — I12.9 BENIGN HYPERTENSION WITH CHRONIC KIDNEY DISEASE: Primary | ICD-10-CM

## 2025-04-30 DIAGNOSIS — I50.32 CHRONIC DIASTOLIC CONGESTIVE HEART FAILURE (HCC): ICD-10-CM

## 2025-04-30 DIAGNOSIS — M89.9 CHRONIC KIDNEY DISEASE-MINERAL AND BONE DISORDER: ICD-10-CM

## 2025-04-30 LAB
SL AMB  POCT GLUCOSE, UA: NORMAL
SL AMB LEUKOCYTE ESTERASE,UA: NORMAL
SL AMB POCT BILIRUBIN,UA: NORMAL
SL AMB POCT BLOOD,UA: NORMAL
SL AMB POCT CLARITY,UA: CLEAR
SL AMB POCT COLOR,UA: YELLOW
SL AMB POCT KETONES,UA: NORMAL
SL AMB POCT NITRITE,UA: NORMAL
SL AMB POCT PH,UA: 5
SL AMB POCT SPECIFIC GRAVITY,UA: 1.01
SL AMB POCT URINE PROTEIN: NORMAL
SL AMB POCT UROBILINOGEN: 0.2

## 2025-04-30 PROCEDURE — 81002 URINALYSIS NONAUTO W/O SCOPE: CPT | Performed by: INTERNAL MEDICINE

## 2025-04-30 PROCEDURE — 99204 OFFICE O/P NEW MOD 45 MIN: CPT | Performed by: INTERNAL MEDICINE

## 2025-04-30 NOTE — PATIENT INSTRUCTIONS
You have chronic kidney disease, stage IV since at least Sep 2024  The reason for the kidney disease is not entirely clear yet.   Please go for a kidney ultrasound.   Please check labs in the middle of May 2025.   Please go for kidney disease education.   I recommend no changes to your current medications.   Stay away from NSAIDs  Please let me know if other doctors prescribe you medications.   Follow up in 3 months.

## 2025-04-30 NOTE — PROGRESS NOTES
NEPHROLOGY OUTPATIENT CONSULTATION   Yumiko Gaxiola 85 y.o. female MRN: [id number]  Date: 04/30/25  Reason for consultation: Initial evaluation of CKD    ASSESSMENT and PLAN:  Chronic kidney disease, stage IV:  Acs creatinine was found to be around 2.3 to 2.5 in Sep to Oct 2024.   There is a paucity of records from 20 21-20 23 but her creatinine was noted to be 0.8-1.1 in 2020.  Will call her PCP's office to see if there are available records.   Unfortunately, her most recent labs from 4/2/25 indicate worsening renal function.  Her creatinine is up to 2.68.  Currently, no new medications to implicate. No volume depletion or overload by exam or history. She is euvolemic.   For work up, will check a renal US to explore a post renal etiology.   Check SPEP, UPEP, KL ratio in light of the presence of anemia and renal disease.   Check urine ACR to evaluate for proteinuria.   Will refer for CKD education.  If renal function continues to worsen, would consider stopping PPI or decreasing Furosemide.   We briefly discussed her stage IV CKD and the potential that dialysis may be necessary if her renal function worsens.  She appears to be very clear on what she wants and she clearly stated that she would not want dialysis even if it were necessary to save her life.  She cites that quality of life is much more important to her than quantity.  She will need advance care planning in the near future.    Hypertension  BP is close to goal.   Continue amlodipine 5 mg daily, furosemide 20 mg daily, metoprolol succinate 25 mg daily.    Diastolic congestive heart failure:  Echo 2/22/2024 EF 56%, G1 DD  Rx: Furosemide 20 mg daily (misses 1-2 doses a week).   Appears euvolemic.     Anemia  Hgb is 9.9.   Check iron studies.   Check SPEP, UPEP, KL ratio.     Mineral and bone disease  Check PTH, Vitamin D.   Check Mg, Phos.     Patient Instructions   You have chronic kidney disease, stage IV since at least Sep 2024  The reason for the kidney  disease is not entirely clear yet.   Please go for a kidney ultrasound.   Please check labs in the middle of May 2025.   Please go for kidney disease education.   I recommend no changes to your current medications.   Stay away from NSAIDs  Please let me know if other doctors prescribe you medications.   Follow up in 3 months.     HISTORY OF PRESENT ILLNESS:  Requesting Physician: Jose Gallo MD    Yumiko Gaxiola is a 85 y.o. female who was referred for initial evaluation of chronic kidney disease.    Yumiko has a history of hypertension, prediabetes, hyperlipidemia, paroxysmal atrial fibrillation, congestive heart failure, chronic obstructive pulmonary disease, sleep apnea, gastroesophageal reflux disease, thoracic aortic aneurysm, sciatica, peripheral neuropathy, osteoarthritis, and rheumatoid arthritis.  She only found out about having kidney disease recently after being recently referred to us by her cardiologist, Dr. Shell.     Based on my personal review of the record, I note that her creatinine was around 0.8 from 2016 to 2018. In 2019 to 2020, her creatinine ranged between 0.8 and 1.1.  I do not have any blood work from 2021 to 2023 - she reports getting them done at Dr. Gallo's office (PCP).  During routine labs in September 2024, her creatinine was noted to be 2.51 and she was advised to stop the Losartan by Dr. Shell. Repeat lab work after stopping Losartan revealed a creatinine of around 2.28 to 2.41 in late Sep and early Oct 2024.   She had routine labs done on April 2, 2025 and her creatinine was noted to be 2.68 prompting renal consultation.    She used to take NSAIDs daily (4-5 per day) about 30 years ago.   No history of gross hematuria.   No frequent UTIs.   No recent major hospitalizations.   She has been on a PPI x 20 years.     PAST MEDICAL HISTORY:  HTN: Diagnosed about 20 years ago.  No admissions for hypertensive emergency.  Highest SBP recalled was > 160 mm Hg.   PreDM  HLP: She is on  pravastatin.  PAF: She is on metoprolol for rate control and Eliquis for anticoagulation. Managed by Dr. Shell  CHF: She is on Furosemide  COPD: She is on Breztri. Managed by Dr. Nunn.   MIGUEL: She is on CPAP.   GERD: She is on Pepcid and Protonix. She has been on a PPI x 20 years.   Thoracic Aortic Aneurysm: Refusing further imaging studies.   Sciatica  Peripheral neuropathy  Osteoarthritis s/p L knee replacement  Rheumatoid arthritis: She used to see Dr. Epps.     No known history of CAD, CVA, PAD, asthma, thromboembolism, liver disease, nephrolithiasis, malignancy, psychiatric disease.    PAST SURGICAL HISTORY:  PPM placement.   L knee replacement  Cataract surgery  Cholecystectomy    ALLERGIES:  Allergies   Allergen Reactions    Ciprofloxacin Rash    Sulfa Antibiotics Rash    Synvisc [Hylan G-F 20] Rash     SOCIAL HISTORY:  Former smoker and consumed <1 PPD x 30+ years. Stopped 25 years ago.   Social alcohol use.   No IVDA.     FAMILY HISTORY:  Negative for ESRD.     MEDICATIONS:    Current Outpatient Medications:     acetaminophen (TYLENOL) 650 mg CR tablet, Take by mouth, Disp: , Rfl:     albuterol (2.5 mg/3 mL) 0.083 % nebulizer solution, Take 3 mL (2.5 mg total) by nebulization every 6 (six) hours as needed for wheezing or shortness of breath, Disp: 360 mL, Rfl: 6    amLODIPine (NORVASC) 5 mg tablet, Take 1 tablet (5 mg total) by mouth daily, Disp: 90 tablet, Rfl: 3    apixaban (ELIQUIS) 2.5 mg, Take 1 tablet (2.5 mg total) by mouth 2 (two) times a day, Disp: 180 tablet, Rfl: 3    Budeson-Glycopyrrol-Formoterol (Breztri Aerosphere) 160-9-4.8 MCG/ACT AERO, Inhale 2 puffs 2 (two) times a day Rinse mouth after use., Disp: 32.1 g, Rfl: 3    cholecalciferol (VITAMIN D3) 1,000 units tablet, Take 1,000 Units by mouth daily, Disp: , Rfl:     clobetasol (TEMOVATE) 0.05 % cream, , Disp: , Rfl:     Coenzyme Q10 (COQ-10) 200 MG CAPS, Take 2 capsules by mouth daily, Disp: , Rfl:     diphenhydrAMINE-acetaminophen  (TYLENOL PM)  MG TABS, Take 1 tablet by mouth daily at bedtime as needed for sleep, Disp: , Rfl:     famotidine (PEPCID) 20 mg tablet, Take 1 tablet (20 mg total) by mouth daily at bedtime, Disp: 90 tablet, Rfl: 2    furosemide (LASIX) 20 mg tablet, TAKE 1 TABLET BY MOUTH DAILY, Disp: 90 tablet, Rfl: 3    levalbuterol (Xopenex) 0.63 mg/3 mL nebulizer solution, Take 3 mL (0.63 mg total) by nebulization 3 (three) times a day as needed for wheezing or shortness of breath, Disp: 810 mL, Rfl: 3    metoprolol succinate (TOPROL-XL) 25 mg 24 hr tablet, TAKE 1 TABLET BY MOUTH DAILY, Disp: 90 tablet, Rfl: 1    Multiple Vitamins-Minerals (DAILY MULTIVITAMIN PO), Take 1 tablet by mouth daily, Disp: , Rfl:     nitroglycerin (NITROSTAT) 0.4 mg SL tablet, Place 1 tablet (0.4 mg total) under the tongue every 5 (five) minutes as needed for chest pain If no relief after first dose call prescriber or 911, Disp: 25 tablet, Rfl: 0    pantoprazole (PROTONIX) 40 mg tablet, Take 1 tablet (40 mg total) by mouth daily, Disp: 90 tablet, Rfl: 2    pravastatin (PRAVACHOL) 10 mg tablet, TAKE 1 TABLET BY MOUTH 3 DAYS  WEEKLY ON MONDAY WEDNESDAY AND  FRIDAY, Disp: 36 tablet, Rfl: 3    Vitamin Mixture (SHADE-C PO), Take 500 mg by mouth daily, Disp: , Rfl:     Calcium Carbonate-Vitamin D 600-200 MG-UNIT CAPS, Take by mouth 2 (two) times a day (Patient not taking: Reported on 11/21/2024), Disp: , Rfl:     guaiFENesin (ROBITUSSIN) 100 MG/5ML oral liquid, Take 200 mg by mouth daily at bedtime   (Patient not taking: Reported on 11/21/2024), Disp: , Rfl:     methocarbamol (Robaxin-750) 750 mg tablet, Take 1 tablet (750 mg total) by mouth every 6 (six) hours as needed for muscle spasms (Patient not taking: Reported on 11/21/2024), Disp: 30 tablet, Rfl: 0    REVIEW OF SYSTEMS:  Review of Systems   Constitutional:  Negative for appetite change, chills, fatigue and fever.   HENT:  Negative for hearing loss, sinus pressure and sinus pain.    Eyes:   "Negative for visual disturbance.   Respiratory:  Positive for cough and shortness of breath (on mild exertion).    Cardiovascular:  Negative for chest pain and leg swelling.   Gastrointestinal:  Positive for abdominal pain and nausea (on and off). Negative for diarrhea and vomiting.   Endocrine: Positive for cold intolerance.   Genitourinary:  Negative for hematuria.   Musculoskeletal:  Positive for arthralgias and back pain.   Skin:  Negative for rash.   Neurological:  Negative for dizziness and light-headedness.   Hematological:  Bruises/bleeds easily.   Psychiatric/Behavioral:  Negative for dysphoric mood. The patient is not nervous/anxious.    All the systems were reviewed and were negative except as documented on the HPI.    PHYSICAL EXAMINATION:  /64 (BP Location: Left arm, Patient Position: Sitting, Cuff Size: Standard)   Pulse 78   Ht 5' 5\" (1.651 m)   Wt 85.7 kg (189 lb)   SpO2 96%   BMI 31.45 kg/m²   Current Weight: Weight - Scale: 85.7 kg (189 lb) Body mass index is 31.45 kg/m².  General: conscious, coherent, cooperative, not in distress.   Skin: warm, dry, good turgor.   Eyes: pink conjunctivae, no scleral icterus.   ENT: moist lips and mucous membranes.   Respiratory: equal chest expansion, clear breath sounds.   Cardiovascular: distinct heart sounds, normal rate, irregularly irregular rhythm, no rub  Abdomen: soft, non-tender, non-distended, normoactive bowel sounds  Extremities: no edema.  Genitourinary: no akers catheter.   Neuro: awake, alert, oriented to time, place and person.   Psych: appropriate affect.     LABORATORY RESULTS:  Lab Results   Component Value Date    SODIUM 140 04/02/2025    K 4.8 04/02/2025     04/02/2025    CO2 25 04/02/2025    BUN 36 (H) 04/02/2025    CREATININE 2.68 (H) 04/02/2025    GLUC 112 10/07/2024    CALCIUM 10.1 04/02/2025     Lab Results   Component Value Date    WBC 3.55 (L) 04/02/2025    HGB 9.9 (L) 04/02/2025    HCT 32.7 (L) 04/02/2025    MCV 90 " 04/02/2025     (L) 04/02/2025     IMAGING: None.

## 2025-05-12 ENCOUNTER — HOSPITAL ENCOUNTER (OUTPATIENT)
Dept: RADIOLOGY | Facility: HOSPITAL | Age: 85
Discharge: HOME | End: 2025-05-12
Attending: INTERNAL MEDICINE
Payer: COMMERCIAL

## 2025-05-12 DIAGNOSIS — N18.4 CHRONIC RENAL DISEASE, STAGE IV (HCC): ICD-10-CM

## 2025-05-12 PROCEDURE — 76770 US EXAM ABDO BACK WALL COMP: CPT

## 2025-05-14 ENCOUNTER — APPOINTMENT (OUTPATIENT)
Dept: LAB | Facility: CLINIC | Age: 85
End: 2025-05-14
Payer: COMMERCIAL

## 2025-05-14 DIAGNOSIS — D63.1 ANEMIA OF CHRONIC KIDNEY FAILURE, UNSPECIFIED STAGE: ICD-10-CM

## 2025-05-14 DIAGNOSIS — N18.4 CHRONIC RENAL DISEASE, STAGE IV (HCC): ICD-10-CM

## 2025-05-14 DIAGNOSIS — N18.9 ANEMIA OF CHRONIC KIDNEY FAILURE, UNSPECIFIED STAGE: ICD-10-CM

## 2025-05-14 LAB
25(OH)D3 SERPL-MCNC: 68 NG/ML (ref 30–100)
ALBUMIN SERPL BCG-MCNC: 4.5 G/DL (ref 3.5–5)
ANION GAP SERPL CALCULATED.3IONS-SCNC: 10 MMOL/L (ref 4–13)
BACTERIA UR QL AUTO: ABNORMAL /HPF
BILIRUB UR QL STRIP: NEGATIVE
BUN SERPL-MCNC: 34 MG/DL (ref 5–25)
CALCIUM SERPL-MCNC: 9.2 MG/DL (ref 8.4–10.2)
CHLORIDE SERPL-SCNC: 105 MMOL/L (ref 96–108)
CLARITY UR: CLEAR
CO2 SERPL-SCNC: 23 MMOL/L (ref 21–32)
COLOR UR: ABNORMAL
CREAT SERPL-MCNC: 2.41 MG/DL (ref 0.6–1.3)
CREAT UR-MCNC: 41.3 MG/DL
FERRITIN SERPL-MCNC: 23 NG/ML (ref 30–307)
GFR SERPL CREATININE-BSD FRML MDRD: 17 ML/MIN/1.73SQ M
GLUCOSE P FAST SERPL-MCNC: 158 MG/DL (ref 65–99)
GLUCOSE UR STRIP-MCNC: NEGATIVE MG/DL
HGB UR QL STRIP.AUTO: ABNORMAL
IRON SATN MFR SERPL: 9 % (ref 15–50)
IRON SERPL-MCNC: 34 UG/DL (ref 50–212)
KETONES UR STRIP-MCNC: NEGATIVE MG/DL
LEUKOCYTE ESTERASE UR QL STRIP: ABNORMAL
MAGNESIUM SERPL-MCNC: 2.1 MG/DL (ref 1.9–2.7)
MICROALBUMIN UR-MCNC: 28.8 MG/L
MICROALBUMIN/CREAT 24H UR: 70 MG/G CREATININE (ref 0–30)
MUCOUS THREADS UR QL AUTO: ABNORMAL
NITRITE UR QL STRIP: NEGATIVE
NON-SQ EPI CELLS URNS QL MICRO: ABNORMAL /HPF
PH UR STRIP.AUTO: 5.5 [PH]
PHOSPHATE SERPL-MCNC: 2.8 MG/DL (ref 2.3–4.1)
POTASSIUM SERPL-SCNC: 4.4 MMOL/L (ref 3.5–5.3)
PROT UR STRIP-MCNC: NEGATIVE MG/DL
PTH-INTACT SERPL-MCNC: 70.9 PG/ML (ref 12–88)
RBC #/AREA URNS AUTO: ABNORMAL /HPF
SODIUM SERPL-SCNC: 138 MMOL/L (ref 135–147)
SP GR UR STRIP.AUTO: 1.01 (ref 1–1.03)
TIBC SERPL-MCNC: 383.6 UG/DL (ref 250–450)
TRANSFERRIN SERPL-MCNC: 274 MG/DL (ref 203–362)
UIBC SERPL-MCNC: 350 UG/DL (ref 155–355)
UROBILINOGEN UR STRIP-ACNC: <2 MG/DL
WBC #/AREA URNS AUTO: ABNORMAL /HPF

## 2025-05-14 PROCEDURE — 83550 IRON BINDING TEST: CPT

## 2025-05-14 PROCEDURE — 81001 URINALYSIS AUTO W/SCOPE: CPT

## 2025-05-14 PROCEDURE — 84165 PROTEIN E-PHORESIS SERUM: CPT

## 2025-05-14 PROCEDURE — 82728 ASSAY OF FERRITIN: CPT

## 2025-05-14 PROCEDURE — 36415 COLL VENOUS BLD VENIPUNCTURE: CPT

## 2025-05-14 PROCEDURE — 83521 IG LIGHT CHAINS FREE EACH: CPT

## 2025-05-14 PROCEDURE — 84166 PROTEIN E-PHORESIS/URINE/CSF: CPT

## 2025-05-14 PROCEDURE — 83540 ASSAY OF IRON: CPT

## 2025-05-14 PROCEDURE — 83735 ASSAY OF MAGNESIUM: CPT

## 2025-05-14 PROCEDURE — 80069 RENAL FUNCTION PANEL: CPT

## 2025-05-14 PROCEDURE — 82043 UR ALBUMIN QUANTITATIVE: CPT

## 2025-05-14 PROCEDURE — 82306 VITAMIN D 25 HYDROXY: CPT

## 2025-05-14 PROCEDURE — 83970 ASSAY OF PARATHORMONE: CPT

## 2025-05-14 PROCEDURE — 82570 ASSAY OF URINE CREATININE: CPT

## 2025-05-15 ENCOUNTER — NURSE TRIAGE (OUTPATIENT)
Age: 85
End: 2025-05-15

## 2025-05-15 DIAGNOSIS — N30.00 ACUTE CYSTITIS WITHOUT HEMATURIA: Primary | ICD-10-CM

## 2025-05-15 LAB
ALBUMIN SERPL ELPH-MCNC: 4.15 G/DL (ref 3.2–5.1)
ALBUMIN SERPL ELPH-MCNC: 66.9 % (ref 48–70)
ALBUMIN UR ELPH-MCNC: 100 %
ALPHA1 GLOB MFR UR ELPH: 0 %
ALPHA1 GLOB SERPL ELPH-MCNC: 0.37 G/DL (ref 0.15–0.47)
ALPHA1 GLOB SERPL ELPH-MCNC: 6 % (ref 1.8–7)
ALPHA2 GLOB MFR UR ELPH: 0 %
ALPHA2 GLOB SERPL ELPH-MCNC: 0.62 G/DL (ref 0.42–1.04)
ALPHA2 GLOB SERPL ELPH-MCNC: 10 % (ref 5.9–14.9)
B-GLOBULIN MFR UR ELPH: 0 %
BETA GLOB ABNORMAL SERPL ELPH-MCNC: 0.41 G/DL (ref 0.31–0.57)
BETA1 GLOB SERPL ELPH-MCNC: 6.6 % (ref 4.7–7.7)
BETA2 GLOB SERPL ELPH-MCNC: 2.8 % (ref 3.1–7.9)
BETA2+GAMMA GLOB SERPL ELPH-MCNC: 0.17 G/DL (ref 0.2–0.58)
GAMMA GLOB ABNORMAL SERPL ELPH-MCNC: 0.48 G/DL (ref 0.4–1.66)
GAMMA GLOB MFR UR ELPH: 0 %
GAMMA GLOB SERPL ELPH-MCNC: 7.7 % (ref 6.9–22.3)
IGG/ALB SER: 2.02 {RATIO} (ref 1.1–1.8)
PROT SERPL-MCNC: 6.2 G/DL (ref 6.4–8.4)
PROT UR-MCNC: 18.6 MG/DL

## 2025-05-15 PROCEDURE — 84166 PROTEIN E-PHORESIS/URINE/CSF: CPT | Performed by: PATHOLOGY

## 2025-05-15 PROCEDURE — 84165 PROTEIN E-PHORESIS SERUM: CPT | Performed by: PATHOLOGY

## 2025-05-15 NOTE — TELEPHONE ENCOUNTER
"FOLLOW UP: call patient with urinalysis results or appointment      REASON FOR CONVERSATION: Possible UTI    SYMPTOMS: burning, itching and urinary frequency    OTHER: Patients daughter called to report UTI symptoms including burning, itching and urinary frequency. She reports she feels the urge to go every 15-20 minutes. Patients daughter Halina states she has a urinalysis done yesterday and is asking if the provider can review the results and give her a call. Per protocol, patient should be seen in office today.   Please call patients daughter    DISPOSITION: See Today in Office                    Reason for Disposition   Urinating more frequently than usual (i.e., frequency) OR new-onset of the feeling of an urgent need to urinate (i.e., urgency)    Answer Assessment - Initial Assessment Questions  1. SYMPTOM: \"What's the main symptom you're concerned about?\" (e.g., frequency, incontinence)      Itching, burning and urinary frequency    2. ONSET: \"When did the  symptoms  start?\"      Yesterday    3. PAIN: \"Is there any pain?\" If Yes, ask: \"How bad is it?\" (Scale: 1-10; mild, moderate, severe)      Burning and itching. No pain    4. CAUSE: \"What do you think is causing the symptoms?\"      Possible UTI    5. OTHER SYMPTOMS: \"Do you have any other symptoms?\" (e.g., blood in urine, fever, flank pain, pain with urination)      Denies    6. PREGNANCY: \"Is there any chance you are pregnant?\" \"When was your last menstrual period?\"      N/A    Protocols used: Urinary Symptoms-Adult-[place]    "

## 2025-05-15 NOTE — TELEPHONE ENCOUNTER
Talked to daughter, Halina, over the phone.   Patient started having UTI symptoms in the afternoon yesterday.   UA in the AM yesterday was not indicative of a UTI.   Labs from 5/14/25 reviewed - renal function is stable.   Iron studies indicate CURRY.     Recommend:  Push oral fluids and cranberry juice.   If symptoms persist despite above, recheck UA and check urine culture.   If symptoms improve, no need for UA.   Recommend iron supplements 1 tab QOD - daughter has been pushing this with patient for a while but patient is hesitant due to constipation. I offered IV iron infusions for the patient and Halina said she will discuss with patient and get back to us.

## 2025-05-16 LAB
KAPPA LC FREE SER-MCNC: 34.3 MG/L (ref 3.3–19.4)
KAPPA LC FREE/LAMBDA FREE SER: 2.1 {RATIO} (ref 0.26–1.65)
LAMBDA LC FREE SERPL-MCNC: 16.3 MG/L (ref 5.7–26.3)

## 2025-05-20 ENCOUNTER — TELEPHONE (OUTPATIENT)
Dept: NEPHROLOGY | Facility: CLINIC | Age: 85
End: 2025-05-20

## 2025-05-20 DIAGNOSIS — K21.9 GASTROESOPHAGEAL REFLUX DISEASE WITHOUT ESOPHAGITIS: ICD-10-CM

## 2025-05-20 NOTE — TELEPHONE ENCOUNTER
May 12, 2025 renal ultrasound results were discussed with daughter, Halina, over the phone.  There were no acute findings.  There were bilateral renal cysts without suspicious features.  She was incidentally noted to have a 3.8 cm infrarenal abdominal aortic aneurysm.  She will need follow-up imaging in 2 years.

## 2025-05-21 RX ORDER — PANTOPRAZOLE SODIUM 40 MG/1
40 TABLET, DELAYED RELEASE ORAL DAILY
Qty: 90 TABLET | Refills: 1 | Status: SHIPPED | OUTPATIENT
Start: 2025-05-21

## 2025-05-24 ENCOUNTER — APPOINTMENT (EMERGENCY)
Dept: RADIOLOGY | Facility: HOSPITAL | Age: 85
End: 2025-05-24
Payer: COMMERCIAL

## 2025-05-24 ENCOUNTER — HOSPITAL ENCOUNTER (EMERGENCY)
Facility: HOSPITAL | Age: 85
Discharge: HOME | End: 2025-05-24
Attending: EMERGENCY MEDICINE | Admitting: EMERGENCY MEDICINE
Payer: COMMERCIAL

## 2025-05-24 VITALS
TEMPERATURE: 97.4 F | RESPIRATION RATE: 20 BRPM | SYSTOLIC BLOOD PRESSURE: 130 MMHG | OXYGEN SATURATION: 95 % | DIASTOLIC BLOOD PRESSURE: 61 MMHG | HEART RATE: 73 BPM

## 2025-05-24 DIAGNOSIS — K57.92 DIVERTICULITIS: Primary | ICD-10-CM

## 2025-05-24 LAB
ALBUMIN SERPL BCG-MCNC: 4.3 G/DL (ref 3.5–5)
ALP SERPL-CCNC: 61 U/L (ref 34–104)
ALT SERPL W P-5'-P-CCNC: 13 U/L (ref 7–52)
ANION GAP SERPL CALCULATED.3IONS-SCNC: 10 MMOL/L (ref 4–13)
AST SERPL W P-5'-P-CCNC: 15 U/L (ref 13–39)
BASOPHILS # BLD AUTO: 0.01 THOUSANDS/ÂΜL (ref 0–0.1)
BASOPHILS NFR BLD AUTO: 0 % (ref 0–1)
BILIRUB SERPL-MCNC: 0.42 MG/DL (ref 0.2–1)
BUN SERPL-MCNC: 34 MG/DL (ref 5–25)
CALCIUM SERPL-MCNC: 9.7 MG/DL (ref 8.4–10.2)
CHLORIDE SERPL-SCNC: 107 MMOL/L (ref 96–108)
CO2 SERPL-SCNC: 21 MMOL/L (ref 21–32)
CREAT SERPL-MCNC: 2.9 MG/DL (ref 0.6–1.3)
EOSINOPHIL # BLD AUTO: 0.13 THOUSAND/ÂΜL (ref 0–0.61)
EOSINOPHIL NFR BLD AUTO: 3 % (ref 0–6)
ERYTHROCYTE [DISTWIDTH] IN BLOOD BY AUTOMATED COUNT: 15.1 % (ref 11.6–15.1)
GFR SERPL CREATININE-BSD FRML MDRD: 14 ML/MIN/1.73SQ M
GLUCOSE SERPL-MCNC: 128 MG/DL (ref 65–140)
HCT VFR BLD AUTO: 33.6 % (ref 34.8–46.1)
HGB BLD-MCNC: 10.5 G/DL (ref 11.5–15.4)
IMM GRANULOCYTES # BLD AUTO: 0.01 THOUSAND/UL (ref 0–0.2)
IMM GRANULOCYTES NFR BLD AUTO: 0 % (ref 0–2)
LACTATE SERPL-SCNC: 0.8 MMOL/L (ref 0.5–2)
LIPASE SERPL-CCNC: 42 U/L (ref 11–82)
LYMPHOCYTES # BLD AUTO: 0.99 THOUSANDS/ÂΜL (ref 0.6–4.47)
LYMPHOCYTES NFR BLD AUTO: 20 % (ref 14–44)
MAGNESIUM SERPL-MCNC: 2.1 MG/DL (ref 1.9–2.7)
MCH RBC QN AUTO: 27.3 PG (ref 26.8–34.3)
MCHC RBC AUTO-ENTMCNC: 31.3 G/DL (ref 31.4–37.4)
MCV RBC AUTO: 88 FL (ref 82–98)
MONOCYTES # BLD AUTO: 0.36 THOUSAND/ÂΜL (ref 0.17–1.22)
MONOCYTES NFR BLD AUTO: 7 % (ref 4–12)
NEUTROPHILS # BLD AUTO: 3.46 THOUSANDS/ÂΜL (ref 1.85–7.62)
NEUTS SEG NFR BLD AUTO: 70 % (ref 43–75)
NRBC BLD AUTO-RTO: 0 /100 WBCS
PLATELET # BLD AUTO: 112 THOUSANDS/UL (ref 149–390)
PMV BLD AUTO: 9.7 FL (ref 8.9–12.7)
POTASSIUM SERPL-SCNC: 3.8 MMOL/L (ref 3.5–5.3)
PROT SERPL-MCNC: 6.5 G/DL (ref 6.4–8.4)
RBC # BLD AUTO: 3.84 MILLION/UL (ref 3.81–5.12)
SODIUM SERPL-SCNC: 138 MMOL/L (ref 135–147)
WBC # BLD AUTO: 4.96 THOUSAND/UL (ref 4.31–10.16)

## 2025-05-24 PROCEDURE — 83690 ASSAY OF LIPASE: CPT | Performed by: EMERGENCY MEDICINE

## 2025-05-24 PROCEDURE — 83735 ASSAY OF MAGNESIUM: CPT | Performed by: EMERGENCY MEDICINE

## 2025-05-24 PROCEDURE — 74176 CT ABD & PELVIS W/O CONTRAST: CPT

## 2025-05-24 PROCEDURE — 80053 COMPREHEN METABOLIC PANEL: CPT | Performed by: EMERGENCY MEDICINE

## 2025-05-24 PROCEDURE — 99284 EMERGENCY DEPT VISIT MOD MDM: CPT

## 2025-05-24 PROCEDURE — 85025 COMPLETE CBC W/AUTO DIFF WBC: CPT | Performed by: EMERGENCY MEDICINE

## 2025-05-24 PROCEDURE — 83605 ASSAY OF LACTIC ACID: CPT | Performed by: EMERGENCY MEDICINE

## 2025-05-24 PROCEDURE — 36415 COLL VENOUS BLD VENIPUNCTURE: CPT | Performed by: EMERGENCY MEDICINE

## 2025-05-24 PROCEDURE — 99284 EMERGENCY DEPT VISIT MOD MDM: CPT | Performed by: EMERGENCY MEDICINE

## 2025-05-24 RX ORDER — METRONIDAZOLE 500 MG/1
500 TABLET ORAL EVERY 12 HOURS SCHEDULED
Qty: 20 TABLET | Refills: 0 | Status: SHIPPED | OUTPATIENT
Start: 2025-05-24 | End: 2025-06-03

## 2025-05-24 RX ORDER — CEFUROXIME AXETIL 500 MG/1
500 TABLET ORAL EVERY 24 HOURS
Qty: 10 TABLET | Refills: 0 | Status: SHIPPED | OUTPATIENT
Start: 2025-05-24 | End: 2025-06-03

## 2025-05-24 RX ORDER — METRONIDAZOLE 500 MG/1
500 TABLET ORAL EVERY 12 HOURS SCHEDULED
Qty: 20 TABLET | Refills: 0 | Status: SHIPPED | OUTPATIENT
Start: 2025-05-24 | End: 2025-05-24

## 2025-05-24 RX ORDER — METRONIDAZOLE 500 MG/1
500 TABLET ORAL 3 TIMES DAILY
Qty: 30 TABLET | Refills: 0 | Status: SHIPPED | OUTPATIENT
Start: 2025-05-24 | End: 2025-05-24

## 2025-05-24 RX ORDER — METRONIDAZOLE 500 MG/1
500 TABLET ORAL ONCE
Status: COMPLETED | OUTPATIENT
Start: 2025-05-24 | End: 2025-05-24

## 2025-05-24 RX ORDER — CEFUROXIME AXETIL 250 MG/1
500 TABLET ORAL ONCE
Status: COMPLETED | OUTPATIENT
Start: 2025-05-24 | End: 2025-05-24

## 2025-05-24 RX ORDER — METRONIDAZOLE 500 MG/1
500 TABLET ORAL EVERY 8 HOURS SCHEDULED
Qty: 30 TABLET | Refills: 0 | Status: SHIPPED | OUTPATIENT
Start: 2025-05-24 | End: 2025-05-24

## 2025-05-24 RX ADMIN — CEFUROXIME AXETIL 500 MG: 250 TABLET ORAL at 13:35

## 2025-05-24 RX ADMIN — METRONIDAZOLE 500 MG: 500 TABLET ORAL at 13:39

## 2025-05-24 NOTE — ED PROVIDER NOTES
Time reflects when diagnosis was documented in both MDM as applicable and the Disposition within this note       Time User Action Codes Description Comment    5/24/2025  1:29 PM Tabitha Guzman Add [K57.92] Diverticulitis           ED Disposition       ED Disposition   Discharge    Condition   Stable    Date/Time   Sat May 24, 2025  1:29 PM    Comment   Yumiko DONALD Heft discharge to home/self care.                   Assessment & Plan       Medical Decision Making  Patient evaluated with labs imaging.  I reviewed the results and discussed them with the patient.  Patient discharged with appropriate instructions medications and follow-up.  Patient verbalized understanding had no further questions at the time of discharge.  Patient had stable vital signs and well-appearing at the time of discharge.    Differential diagnosis not limited to colitis diverticulitis diverticulitis with perforation and abscess small bowel obstruction ureteral stones AAA rupture gynecologic pathology    Problems Addressed:  Diverticulitis: acute illness or injury    Amount and/or Complexity of Data Reviewed  External Data Reviewed: notes.  Labs: ordered. Decision-making details documented in ED Course.  Radiology: ordered. Decision-making details documented in ED Course.    Risk  Prescription drug management.             Medications   cefuroxime (CEFTIN) tablet 500 mg (500 mg Oral Given 5/24/25 1335)   metroNIDAZOLE (FLAGYL) tablet 500 mg (500 mg Oral Given 5/24/25 1339)       ED Risk Strat Scores                    No data recorded                            History of Present Illness       Chief Complaint   Patient presents with    Rectal Bleeding     Low abd pain for several days., states having diarrhea, today having some dark red stool per rectum. Also hx of diverticulitis       Past Medical History[1]   Past Surgical History[2]   Family History[3]   Social History[4]   E-Cigarette/Vaping    E-Cigarette Use Never User       E-Cigarette/Vaping  Substances    Nicotine No     THC No     CBD No     Flavoring No     Other No     Unknown No       I have reviewed and agree with the history as documented.     85-year-old female presents with lower abdominal pain sharp continuous nonradiating currently 4 out of 10 nothing makes it better or worse has been having some diarrhea today noticed blood in it no nausea vomiting fevers chills dysuria frequency no other symptoms history of cholecystectomy and diverticulitis without complications noted      History provided by:  Patient   used: No        Review of Systems   Constitutional: Negative.    HENT: Negative.     Eyes: Negative.    Respiratory: Negative.     Cardiovascular: Negative.    Gastrointestinal:  Positive for abdominal pain, blood in stool and diarrhea.   Endocrine: Negative.    Genitourinary: Negative.    Musculoskeletal: Negative.    Skin: Negative.    Allergic/Immunologic: Negative.    Neurological: Negative.    Hematological: Negative.    Psychiatric/Behavioral: Negative.     All other systems reviewed and are negative.          Objective       ED Triage Vitals [05/24/25 1100]   Temperature Pulse Blood Pressure Respirations SpO2 Patient Position - Orthostatic VS   (!) 97.4 °F (36.3 °C) 79 167/70 20 96 % Sitting      Temp Source Heart Rate Source BP Location FiO2 (%) Pain Score    Tympanic Monitor Right arm -- 7      Vitals      Date and Time Temp Pulse SpO2 Resp BP Pain Score FACES Pain Rating User   05/24/25 1330 -- 73 95 % 20 130/61 -- -- CS   05/24/25 1300 -- 70 96 % 20 140/63 -- -- CS   05/24/25 1245 -- 72 97 % 20 134/63 -- --    05/24/25 1230 -- 88 97 % -- -- -- -- CS   05/24/25 1200 -- 79 96 % -- 146/66 -- --    05/24/25 1100 97.4 °F (36.3 °C) 79 96 % 20 167/70 7 -- LS            Physical Exam  Vitals and nursing note reviewed.   Constitutional:       Appearance: Normal appearance.   HENT:      Head: Normocephalic and atraumatic.      Nose: Nose normal.      Mouth/Throat:       Mouth: Mucous membranes are moist.     Eyes:      Extraocular Movements: Extraocular movements intact.      Pupils: Pupils are equal, round, and reactive to light.       Cardiovascular:      Rate and Rhythm: Normal rate and regular rhythm.   Pulmonary:      Effort: Pulmonary effort is normal.      Breath sounds: Normal breath sounds.   Abdominal:      General: Abdomen is flat. Bowel sounds are normal.      Palpations: Abdomen is soft.      Tenderness: There is abdominal tenderness.      Comments: Lower abdominal tenderness noted no guarding rigidity rebound tenderness abdomen is peritoneal and benign bowel sounds normal     Musculoskeletal:         General: Normal range of motion.      Cervical back: Normal range of motion and neck supple.     Skin:     General: Skin is warm.      Capillary Refill: Capillary refill takes less than 2 seconds.     Neurological:      General: No focal deficit present.      Mental Status: She is alert and oriented to person, place, and time. Mental status is at baseline.     Psychiatric:         Mood and Affect: Mood normal.         Thought Content: Thought content normal.         Results Reviewed       Procedure Component Value Units Date/Time    Comprehensive metabolic panel [[id number]]  (Abnormal) Collected: 05/24/25 1118    Lab Status: Final result Specimen: Blood from Arm, Right Updated: 05/24/25 1152     Sodium 138 mmol/L      Potassium 3.8 mmol/L      Chloride 107 mmol/L      CO2 21 mmol/L      ANION GAP 10 mmol/L      BUN 34 mg/dL      Creatinine 2.90 mg/dL      Glucose 128 mg/dL      Calcium 9.7 mg/dL      AST 15 U/L      ALT 13 U/L      Alkaline Phosphatase 61 U/L      Total Protein 6.5 g/dL      Albumin 4.3 g/dL      Total Bilirubin 0.42 mg/dL      eGFR 14 ml/min/1.73sq m     Narrative:      National Kid[employer] Foundation guidelines for Chronic Kidney Disease (CKD):     Stage 1 with normal or high GFR (GFR > 90 mL/min/1.73 square meters)    Stage 2 Mild CKD (GFR =  60-89 mL/min/1.73 square meters)    Stage 3A Moderate CKD (GFR = 45-59 mL/min/1.73 square meters)    Stage 3B Moderate CKD (GFR = 30-44 mL/min/1.73 square meters)    Stage 4 Severe CKD (GFR = 15-29 mL/min/1.73 square meters)    Stage 5 End Stage CKD (GFR <15 mL/min/1.73 square meters)  Note: GFR calculation is accurate only with a steady state creatinine    Lipase [[id number]]  (Normal) Collected: 05/24/25 1118    Lab Status: Final result Specimen: Blood from Arm, Right Updated: 05/24/25 1152     Lipase 42 u/L     Magnesium [[id number]]  (Normal) Collected: 05/24/25 1118    Lab Status: Final result Specimen: Blood from Arm, Right Updated: 05/24/25 1152     Magnesium 2.1 mg/dL     Lactic acid, plasma (w/reflex if result > 2.0) [[id number]]  (Normal) Collected: 05/24/25 1118    Lab Status: Final result Specimen: Blood from Arm, Right Updated: 05/24/25 1151     LACTIC ACID 0.8 mmol/L     Narrative:      Result may be elevated if tourniquet was used during collection.    CBC and differential [[id number]]  (Abnormal) Collected: 05/24/25 1118    Lab Status: Final result Specimen: Blood from Arm, Right Updated: 05/24/25 1131     WBC 4.96 Thousand/uL      RBC 3.84 Million/uL      Hemoglobin 10.5 g/dL      Hematocrit 33.6 %      MCV 88 fL      MCH 27.3 pg      MCHC 31.3 g/dL      RDW 15.1 %      MPV 9.7 fL      Platelets 112 Thousands/uL      nRBC 0 /100 WBCs      Segmented % 70 %      Immature Grans % 0 %      Lymphocytes % 20 %      Monocytes % 7 %      Eosinophils Relative 3 %      Basophils Relative 0 %      Absolute Neutrophils 3.46 Thousands/µL      Absolute Immature Grans 0.01 Thousand/uL      Absolute Lymphocytes 0.99 Thousands/µL      Absolute Monocytes 0.36 Thousand/µL      Eosinophils Absolute 0.13 Thousand/µL      Basophils Absolute 0.01 Thousands/µL             CT abdomen pelvis wo contrast   Final Interpretation by Chris Samayoa MD (05/24 9469)      1.  Mild colonic diverticulitis. Follow-up colonoscopy  recommended after the acute episode.   2.  Splenomegaly.   3.  Incidental 3.3 cm abdominal aortic aneurysm.      Workstation performed: [id number]             Procedures    ED Medication and Procedure Management   Prior to Admission Medications   Prescriptions Last Dose Informant Patient Reported? Taking?   Budeson-Glycopyrrol-Formoterol (Breztri Aerosphere) 160-9-4.8 MCG/ACT AERO  Self No No   Sig: Inhale 2 puffs 2 (two) times a day Rinse mouth after use.   Calcium Carbonate-Vitamin D 600-200 MG-UNIT CAPS  Self Yes No   Sig: Take by mouth 2 (two) times a day   Patient not taking: Reported on 11/21/2024   Coenzyme Q10 (COQ-10) 200 MG CAPS  Self Yes No   Sig: Take 2 capsules by mouth daily   Multiple Vitamins-Minerals (DAILY MULTIVITAMIN PO)  Self Yes No   Sig: Take 1 tablet by mouth daily   Vitamin Mixture (SHADE-C PO)  Self Yes No   Sig: Take 500 mg by mouth daily   acetaminophen (TYLENOL) 650 mg CR tablet  Self Yes No   Sig: Take by mouth   albuterol (2.5 mg/3 mL) 0.083 % nebulizer solution  Self No No   Sig: Take 3 mL (2.5 mg total) by nebulization every 6 (six) hours as needed for wheezing or shortness of breath   amLODIPine (NORVASC) 5 mg tablet  Self No No   Sig: Take 1 tablet (5 mg total) by mouth daily   apixaban (ELIQUIS) 2.5 mg  Self No No   Sig: Take 1 tablet (2.5 mg total) by mouth 2 (two) times a day   cholecalciferol (VITAMIN D3) 1,000 units tablet  Self Yes No   Sig: Take 1,000 Units by mouth daily   clobetasol (TEMOVATE) 0.05 % cream  Self Yes No   diphenhydrAMINE-acetaminophen (TYLENOL PM)  MG TABS  Self Yes No   Sig: Take 1 tablet by mouth daily at bedtime as needed for sleep   famotidine (PEPCID) 20 mg tablet  Self No No   Sig: Take 1 tablet (20 mg total) by mouth daily at bedtime   furosemide (LASIX) 20 mg tablet  Self No No   Sig: TAKE 1 TABLET BY MOUTH DAILY   guaiFENesin (ROBITUSSIN) 100 MG/5ML oral liquid  Self Yes No   Sig: Take 200 mg by mouth daily at bedtime     Patient not taking:  Reported on 11/21/2024   levalbuterol (Xopenex) 0.63 mg/3 mL nebulizer solution  Self No No   Sig: Take 3 mL (0.63 mg total) by nebulization 3 (three) times a day as needed for wheezing or shortness of breath   methocarbamol (Robaxin-750) 750 mg tablet  Self No No   Sig: Take 1 tablet (750 mg total) by mouth every 6 (six) hours as needed for muscle spasms   Patient not taking: Reported on 11/21/2024   metoprolol succinate (TOPROL-XL) 25 mg 24 hr tablet  Self No No   Sig: TAKE 1 TABLET BY MOUTH DAILY   nitroglycerin (NITROSTAT) 0.4 mg SL tablet  Self No No   Sig: Place 1 tablet (0.4 mg total) under the tongue every 5 (five) minutes as needed for chest pain If no relief after first dose call prescriber or 911   pantoprazole (PROTONIX) 40 mg tablet   No No   Sig: TAKE 1 TABLET BY MOUTH DAILY   pravastatin (PRAVACHOL) 10 mg tablet  Self No No   Sig: TAKE 1 TABLET BY MOUTH 3 DAYS  WEEKLY ON MONDAY WEDNESDAY AND  FRIDAY      Facility-Administered Medications: None     Discharge Medication List as of 5/24/2025  1:37 PM        START taking these medications    Details   cefuroxime (CEFTIN) 500 mg tablet Take 1 tablet (500 mg total) by mouth every 24 hours for 10 days, Starting Sat 5/24/2025, Until Tue 6/3/2025, Normal      !! metroNIDAZOLE (FLAGYL) 500 mg tablet Take 1 tablet (500 mg total) by mouth 3 (three) times a day for 10 days, Starting Sat 5/24/2025, Until Tue 6/3/2025, Normal      !! metroNIDAZOLE (FLAGYL) 500 mg tablet Take 1 tablet (500 mg total) by mouth every 8 (eight) hours for 10 days, Starting Sat 5/24/2025, Until Tue 6/3/2025, Normal       !! - Potential duplicate medications found. Please discuss with provider.        CONTINUE these medications which have NOT CHANGED    Details   acetaminophen (TYLENOL) 650 mg CR tablet Take by mouth, Historical Med      albuterol (2.5 mg/3 mL) 0.083 % nebulizer solution Take 3 mL (2.5 mg total) by nebulization every 6 (six) hours as needed for wheezing or shortness of  breath, Starting Wed 6/12/2024, Normal      amLODIPine (NORVASC) 5 mg tablet Take 1 tablet (5 mg total) by mouth daily, Starting Thu 3/27/2025, Normal      apixaban (ELIQUIS) 2.5 mg Take 1 tablet (2.5 mg total) by mouth 2 (two) times a day, Starting Thu 3/27/2025, Normal      Budeson-Glycopyrrol-Formoterol (Breztri Aerosphere) 160-9-4.8 MCG/ACT AERO Inhale 2 puffs 2 (two) times a day Rinse mouth after use., Starting Wed 6/12/2024, Normal      Calcium Carbonate-Vitamin D 600-200 MG-UNIT CAPS Take by mouth 2 (two) times a day, Starting Wed 6/13/2012, Historical Med      cholecalciferol (VITAMIN D3) 1,000 units tablet Take 1,000 Units by mouth daily, Historical Med      clobetasol (TEMOVATE) 0.05 % cream Starting Thu 11/16/2017, Historical Med      Coenzyme Q10 (COQ-10) 200 MG CAPS Take 2 capsules by mouth daily, Historical Med      diphenhydrAMINE-acetaminophen (TYLENOL PM)  MG TABS Take 1 tablet by mouth daily at bedtime as needed for sleep, Historical Med      famotidine (PEPCID) 20 mg tablet Take 1 tablet (20 mg total) by mouth daily at bedtime, Starting Mon 10/7/2024, Until Wed 4/30/2025, Normal      furosemide (LASIX) 20 mg tablet TAKE 1 TABLET BY MOUTH DAILY, Starting Mon 9/16/2024, Normal      guaiFENesin (ROBITUSSIN) 100 MG/5ML oral liquid Take 200 mg by mouth daily at bedtime  , Historical Med      levalbuterol (Xopenex) 0.63 mg/3 mL nebulizer solution Take 3 mL (0.63 mg total) by nebulization 3 (three) times a day as needed for wheezing or shortness of breath, Starting Thu 11/21/2024, Normal      methocarbamol (Robaxin-750) 750 mg tablet Take 1 tablet (750 mg total) by mouth every 6 (six) hours as needed for muscle spasms, Starting Wed 9/18/2024, Normal      metoprolol succinate (TOPROL-XL) 25 mg 24 hr tablet TAKE 1 TABLET BY MOUTH DAILY, Starting Fri 1/17/2025, Normal      Multiple Vitamins-Minerals (DAILY MULTIVITAMIN PO) Take 1 tablet by mouth daily, Historical Med      nitroglycerin (NITROSTAT) 0.4  mg SL tablet Place 1 tablet (0.4 mg total) under the tongue every 5 (five) minutes as needed for chest pain If no relief after first dose call prescriber or 911, Starting Fri 5/24/2024, Normal      pantoprazole (PROTONIX) 40 mg tablet TAKE 1 TABLET BY MOUTH DAILY, Starting Wed 5/21/2025, Normal      pravastatin (PRAVACHOL) 10 mg tablet TAKE 1 TABLET BY MOUTH 3 DAYS  WEEKLY ON MONDAY WEDNESDAY AND  FRIDAY, Normal      Vitamin Mixture (SHADE-C PO) Take 500 mg by mouth daily, Historical Med           No discharge procedures on file.  ED SEPSIS DOCUMENTATION   Time reflects when diagnosis was documented in both MDM as applicable and the Disposition within this note       Time User Action Codes Description Comment    5/24/2025  1:29 PM Tabitha Guzman Add [K57.92] Diverticulitis                      [1]   Past Medical History:  Diagnosis Date    A-fib (HCC)     Arthritis     Atrial fibrillation (HCC)     Cardiac disease     Cardiac disease 05/31/2016    Cataract     Chronic renal disease, stage IV (HCC) 9/20/2024    Colon polyp     Gastro-esophageal reflux     GERD    GERD (gastroesophageal reflux disease)     Hyperlipidemia     Hypertension     Nasal congestion     Primary generalized (osteo)arthritis     Rheumatoid arthritis (HCC)     Shingles     Sinusitis     Sleep apnea     Sleep difficulties    [2]   Past Surgical History:  Procedure Laterality Date    CARDIAC ELECTROPHYSIOLOGY PROCEDURE N/A 9/12/2024    Procedure: Cardiac pacer generator change;  Surgeon: Raciel Landaverde DO;  Location: [facility];  Service: Cardiology    CARDIAC PACEMAKER PLACEMENT Left 2014    CATARACT EXTRACTION      CHOLECYSTECTOMY      resolved: 2009    COLONOSCOPY      Fiberoptic, resolved 2015    EYE SURGERY      KNEE SURGERY Left     left kknee replacement in 2009    UPPER GASTROINTESTINAL ENDOSCOPY     [3]   Family History  Problem Relation Name Age of Onset    Esophageal cancer Mother      Coronary artery disease Father      Diabetes  Sister      Hyperlipidemia Sister      Pancreatic cancer Sister      Esophageal cancer Brother      Arthritis Family      Hypertension Family     [4]   Social History  Tobacco Use    Smoking status: Former     Current packs/day: 0.00     Average packs/day: 1 pack/day for 41.0 years (41.0 ttl pk-yrs)     Types: Cigarettes     Start date:      Quit date:      Years since quittin.4    Smokeless tobacco: Never    Tobacco comments:     Has smoked since the age of 17   Vaping Use    Vaping status: Never Used   Substance Use Topics    Alcohol use: Yes     Comment: occasional, No alcohol use,per [employer]    Drug use: Never        Tabitha Guzman DO  [id number]

## 2025-05-24 NOTE — DISCHARGE INSTRUCTIONS
Patient Education     Diverticulitis Discharge Instructions   About this topic   Sometimes, you may get small pouches or pockets in the walls of your large bowel. This is called diverticulosis. The pouches may become inflamed or infected. This is called diverticulitis. You are more likely to have this problem if you are between 60 and 80 years of age or if you have chronic constipation.     What care is needed at home?   Ask your doctor what you need to do when you go home. Make sure you ask questions if you do not understand what the doctor says. This way you will know what you need to do.  Take your drugs as ordered by your doctor.  Eat a balanced diet.  Do not delay having a bowel movement. Go as soon as you have the urge.  Do not strain or force your bowel movements.  Drink 8 to 10 glasses of water each day. Talk to your doctor if you are drinking less fluids due to a health problem.  What follow-up care is needed?   Your doctor may ask you to make visits to the office to check on your progress. Be sure to keep these visits.  What drugs may be needed?   The doctor may order drugs to:  Help with pain and swelling  Fight an infection  Soften stools to help prevent straining with bowel movements  Will physical activity be limited?   When you are in pain, you may need to rest in bed. To ease the pain, use a heat compress on your belly. This should last only for a few days.  What changes to diet are needed?   Talk to your doctor about any changes you need to make to your diet. When the pouches become inflamed or infected, you may need to take in just liquids for a few days. This may help lessen your pain and help you heal.  When you are feeling better, start to add more fiber to your diet.  You do not need to avoid seeds, nuts, corn, or other similar foods.  You will need to eat food rich in fiber and drink more water.  Eat 5 or more servings of fresh fruits and vegetables every day.  Eat 6 or more servings of  whole-wheat grain breads and cereals.  Try to get 25 to 30 grams of fiber every day. Read the labels to learn how much fiber is in foods.  Do not drink beer, wine, and mixed drinks (alcohol).  What problems could happen?   Pockets or pouches in your bowel may be infected or filled with pus.  Hole or tear in your bowel  Narrowing of a part in your bowel  Surgery may be needed to drain an infection or abscess  If untreated, the colon may rupture and surgery may be needed.  What can be done to prevent this health problem?   The best way to keep from having diverticulosis is to keep your bowel movements soft and normal. To keep more pouches from forming:  Eat more whole grains, vegetables, and fruits. Try to get 25 to 30 grams of fiber each day. Read the labels to learn how much fiber is in foods.  Drink more water. Drink ten 8 ounce (240 mL) glasses a day.  Be active. Walk, garden, or do something active for 30 minutes or more on most days of the week.  Talk with your doctor about adding an over-the-counter (OTC) fiber product to keep your stools soft.  Overuse of some pain drugs can cause hard stools; talk with your doctor.  If you have a lot of pouches in your large intestine, surgery may be right for you. Talk to your doctor about this.  When do I need to call the doctor?   Signs of infection. These include a fever of 100.4°F (38°C) or higher and chills.  Upset stomach or very bad belly pain.  Vomiting or being unable to eat, drink, or take your medications.  Extreme fatigue, lightheadedness, dizziness, or passing out.  Diarrhea or blood in your stool.  Not having bowel movements or not passing any gas.  Teach Back: Helping You Understand   The Teach Back Method helps you understand the information we are giving you. After you talk with the staff, tell them in your own words what you learned. This helps to make sure the staff has described each thing clearly. It also helps to explain things that may have been  confusing. Before going home, make sure you can do these:  I can tell you about my condition.  I can tell you what changes I need to make with my diet or drugs.  I can tell you what I will do if I have very bad belly pain or hard or bloody stools.  Last Reviewed Date   2021-04-13  Consumer Information Use and Disclaimer   This generalized information is a limited summary of diagnosis, treatment, and/or medication information. It is not meant to be comprehensive and should be used as a tool to help the user understand and/or assess potential diagnostic and treatment options. It does NOT include all information about conditions, treatments, medications, side effects, or risks that may apply to a specific patient. It is not intended to be medical advice or a substitute for the medical advice, diagnosis, or treatment of a health care provider based on the health care provider's examination and assessment of a patient’s specific and unique circumstances. Patients must speak with a health care provider for complete information about their health, medical questions, and treatment options, including any risks or benefits regarding use of medications. This information does not endorse any treatments or medications as safe, effective, or approved for treating a specific patient. [employer] and its affiliates disclaim any warranty or liability relating to this information or the use thereof. The use of this information is governed by the Terms of Use, available at https://www.[employer].com/en/know/clinical-effectiveness-terms   Copyright   Copyright © 2024 [employer] and its affiliates and/or licensors. All rights reserved.

## 2025-06-06 ENCOUNTER — OFFICE VISIT (OUTPATIENT)
Dept: PULMONOLOGY | Facility: MEDICAL CENTER | Age: 85
End: 2025-06-06
Payer: COMMERCIAL

## 2025-06-06 VITALS
DIASTOLIC BLOOD PRESSURE: 60 MMHG | BODY MASS INDEX: 30.99 KG/M2 | WEIGHT: 186 LBS | RESPIRATION RATE: 12 BRPM | TEMPERATURE: 98.6 F | OXYGEN SATURATION: 96 % | SYSTOLIC BLOOD PRESSURE: 122 MMHG | HEART RATE: 79 BPM | HEIGHT: 65 IN

## 2025-06-06 DIAGNOSIS — J44.9 CHRONIC OBSTRUCTIVE PULMONARY DISEASE, UNSPECIFIED COPD TYPE (HCC): ICD-10-CM

## 2025-06-06 DIAGNOSIS — J30.0 VASOMOTOR RHINITIS: ICD-10-CM

## 2025-06-06 DIAGNOSIS — G47.33 OSA (OBSTRUCTIVE SLEEP APNEA): Primary | ICD-10-CM

## 2025-06-06 DIAGNOSIS — J47.9 BRONCHIECTASIS WITHOUT COMPLICATION (HCC): ICD-10-CM

## 2025-06-06 PROCEDURE — 99214 OFFICE O/P EST MOD 30 MIN: CPT | Performed by: INTERNAL MEDICINE

## 2025-06-06 RX ORDER — ALBUTEROL SULFATE 0.83 MG/ML
2.5 SOLUTION RESPIRATORY (INHALATION) EVERY 6 HOURS PRN
Qty: 1080 ML | Refills: 3 | Status: SHIPPED | OUTPATIENT
Start: 2025-06-06

## 2025-06-06 RX ORDER — IPRATROPIUM BROMIDE 21 UG/1
SPRAY, METERED NASAL
Qty: 30 ML | Refills: 5 | Status: SHIPPED | OUTPATIENT
Start: 2025-06-06

## 2025-06-06 RX ORDER — AZITHROMYCIN 250 MG/1
TABLET, FILM COATED ORAL
Qty: 6 TABLET | Refills: 0 | Status: SHIPPED | OUTPATIENT
Start: 2025-06-06 | End: 2025-06-08

## 2025-06-06 NOTE — PATIENT INSTRUCTIONS
Can increase Breztri to 2 puffs twice a day for few days to see if this helps the breathing with activity    I prescribed 5-day Z-Fernando to your pharmacy    Nasal spray ipratropium bromide can use 2 sprays 3 times a day as needed for nasal congestion, runny nose    Can use saline nasal spray as often as you want    If your breathing or condition does not get better please contact us

## 2025-06-08 RX ORDER — AZITHROMYCIN 250 MG/1
TABLET, FILM COATED ORAL
Qty: 6 TABLET | Refills: 0 | Status: SHIPPED | OUTPATIENT
Start: 2025-06-08 | End: 2025-06-12

## 2025-06-09 NOTE — ASSESSMENT & PLAN NOTE
Severe MIGUEL with good compliance to CPAP therapy.  I did review her compliance data for past 30 days and she used her CPAP every night for 8.5 hours per night.  This resulted in AHI of 1.6 which is good.  She does have an auto DreamStation 2 CPAP machine set on pressure of 1518 cm water.  Her average CPAP pressure was 16.5 cm water.  Continue same settings.  DME is [facility].

## 2025-06-09 NOTE — ASSESSMENT & PLAN NOTE
I told her increase her Breztri to 2 puffs twice a day to see if this helps her mild shortness with activity.  She also has some cough mostly nonproductive.    Orders:    albuterol (2.5 mg/3 mL) 0.083 % nebulizer solution; Take 3 mL (2.5 mg total) by nebulization every 6 (six) hours as needed for wheezing or shortness of breath

## 2025-06-09 NOTE — ASSESSMENT & PLAN NOTE
She does have symptoms and signs of vasomotor rhinitis.  I did prescribe Pratropium bromide nasal spray for her to use    Orders:    ipratropium (ATROVENT) 0.03 % nasal spray; Can use 2 sprays each nostril up to 3 times a day as needed for runny nose, nasal congestion

## 2025-06-09 NOTE — ASSESSMENT & PLAN NOTE
Complains of some increased cough so I did prescribe her a 5-day Z-Fernando.  She recent completed course of Ceftin and Flagyl for acute diverticulitis    Orders:    albuterol (2.5 mg/3 mL) 0.083 % nebulizer solution; Take 3 mL (2.5 mg total) by nebulization every 6 (six) hours as needed for wheezing or shortness of breath    azithromycin (ZITHROMAX) 250 mg tablet; Take 2 tablets today then 1 tablet daily x 4 days

## 2025-06-09 NOTE — PROGRESS NOTES
Follow-up  Visit - Pulmonary Medicine   Name: Yumiko Gaxiola      : 1940      MRN: [id number]  Encounter Provider: Pablo Nunn DO  Encounter Date: 2025   Encounter department: [facility]  :  Assessment & Plan  Chronic obstructive pulmonary disease, unspecified COPD type (HCC)  I told her increase her Breztri to 2 puffs twice a day to see if this helps her mild shortness with activity.  She also has some cough mostly nonproductive.    Orders:    albuterol (2.5 mg/3 mL) 0.083 % nebulizer solution; Take 3 mL (2.5 mg total) by nebulization every 6 (six) hours as needed for wheezing or shortness of breath    Bronchiectasis without complication (HCC)  Complains of some increased cough so I did prescribe her a 5-day Z-Fernando.  She recent completed course of Ceftin and Flagyl for acute diverticulitis    Orders:    albuterol (2.5 mg/3 mL) 0.083 % nebulizer solution; Take 3 mL (2.5 mg total) by nebulization every 6 (six) hours as needed for wheezing or shortness of breath    azithromycin (ZITHROMAX) 250 mg tablet; Take 2 tablets today then 1 tablet daily x 4 days    Vasomotor rhinitis  She does have symptoms and signs of vasomotor rhinitis.  I did prescribe Pratropium bromide nasal spray for her to use    Orders:    ipratropium (ATROVENT) 0.03 % nasal spray; Can use 2 sprays each nostril up to 3 times a day as needed for runny nose, nasal congestion    MIGUEL (obstructive sleep apnea)  Severe MIGUEL with good compliance to CPAP therapy.  I did review her compliance data for past 30 days and she used her CPAP every night for 8.5 hours per night.  This resulted in AHI of 1.6 which is good.  She does have an auto DreamStation 2 CPAP machine set on pressure of 1518 cm water.  Her average CPAP pressure was 16.5 cm water.  Continue same settings.  DME is [employer].           Return in about 3 months (around 2025).    History of Present Illness   Yumiko Gaxiola is a 85 y.o. female who presents  "for follow-up of her mild to moderate COPD.  Last measured FEV1 was 1.05 L or 55% predicted obstructive index of 62%.  She does use Breztri inhaler.  Also has nebulizer at home she can use.  She also has some mild lower lobe bronchiectasis.  Does have some mild shortness of breath with activity states she has been having some cough.        She does have severe obstructive sleep apnea and does use auto CPAP DreamStation 2 machine at a pressure range of 15 to 18 cm water.  Her [employer] company is [employer].  She states she has been using a regular basis and not having any nocturnal dyspnea or excessive daytime somnolence.    She did have visit to [facility] ER on May 24 complaining of abdominal pain and some diarrhea.  She was diagnosed with acute diverticulitis and prescribed cefuroxime and metronidazole for several days.    She does complain of frequent runny nose.  This is for clear drainage.    Review of Systems   Constitutional:  Negative for chills, fever and unexpected weight change.   HENT:  Positive for rhinorrhea. Negative for congestion and sore throat.    Eyes:  Negative for discharge and redness.   Respiratory:  Positive for cough and shortness of breath.    Cardiovascular:  Negative for chest pain, palpitations and leg swelling.   Gastrointestinal:  Negative for abdominal distention, abdominal pain and nausea.   Endocrine: Negative for polydipsia and polyphagia.   Genitourinary:  Negative for dysuria.   Musculoskeletal:  Negative for joint swelling and myalgias.   Skin:  Negative for rash.   Neurological:  Negative for light-headedness.   Psychiatric/Behavioral:  Negative for decreased concentration.        Aside from what is mentioned in the HPI, ROS is otherwise negative         Medical History Reviewed by provider this encounter:     .    Objective   /60   Pulse 79   Temp 98.6 °F (37 °C)   Resp 12   Ht 5' 5\" (1.651 m)   Wt 84.4 kg (186 lb)   SpO2 96%   BMI 30.95 kg/m²     Physical " Exam  Constitutional:       General: She is not in acute distress.     Appearance: Normal appearance. She is well-developed. She is obese.   HENT:      Head: Normocephalic.      Right Ear: External ear normal.      Left Ear: External ear normal.      Nose: Nose normal.      Mouth/Throat:      Mouth: Mucous membranes are moist.      Pharynx: Oropharynx is clear. No oropharyngeal exudate.     Eyes:      Conjunctiva/sclera: Conjunctivae normal.      Pupils: Pupils are equal, round, and reactive to light.       Cardiovascular:      Rate and Rhythm: Normal rate and regular rhythm.      Heart sounds: Normal heart sounds.   Pulmonary:      Effort: Pulmonary effort is normal.      Comments: Lung sounds are clear.  No wheezes, crackles or rhonchi  Abdominal:      General: There is no distension.      Palpations: Abdomen is soft.      Tenderness: There is no abdominal tenderness.     Musculoskeletal:      Cervical back: Neck supple.      Comments: No edema, cyanosis or clubbing   Lymphadenopathy:      Cervical: No cervical adenopathy.     Skin:     General: Skin is warm and dry.     Neurological:      General: No focal deficit present.      Mental Status: She is alert and oriented to person, place, and time.     Psychiatric:         Mood and Affect: Mood normal.         Behavior: Behavior normal.         Thought Content: Thought content normal.       Pablo Nunn DO

## 2025-06-17 ENCOUNTER — PROCEDURE VISIT (OUTPATIENT)
Age: 85
End: 2025-06-17
Payer: COMMERCIAL

## 2025-06-17 VITALS — HEIGHT: 65 IN | WEIGHT: 186 LBS | RESPIRATION RATE: 17 BRPM | BODY MASS INDEX: 30.99 KG/M2

## 2025-06-17 DIAGNOSIS — M79.672 PAIN IN BOTH FEET: ICD-10-CM

## 2025-06-17 DIAGNOSIS — M79.671 PAIN IN BOTH FEET: ICD-10-CM

## 2025-06-17 DIAGNOSIS — I70.209 PERIPHERAL ARTERIOSCLEROSIS (HCC): Primary | ICD-10-CM

## 2025-06-17 DIAGNOSIS — L84 CORNS: ICD-10-CM

## 2025-06-17 PROCEDURE — 11056 PARNG/CUTG B9 HYPRKR LES 2-4: CPT | Performed by: PODIATRIST

## 2025-06-18 DIAGNOSIS — I48.91 ATRIAL FIBRILLATION, UNSPECIFIED TYPE (HCC): ICD-10-CM

## 2025-06-19 RX ORDER — METOPROLOL SUCCINATE 25 MG/1
25 TABLET, EXTENDED RELEASE ORAL DAILY
Qty: 90 TABLET | Refills: 1 | Status: SHIPPED | OUTPATIENT
Start: 2025-06-19

## 2025-06-24 DIAGNOSIS — E78.5 DYSLIPIDEMIA: ICD-10-CM

## 2025-06-25 RX ORDER — PRAVASTATIN SODIUM 10 MG
TABLET ORAL
Qty: 39 TABLET | Refills: 1 | Status: SHIPPED | OUTPATIENT
Start: 2025-06-25

## 2025-06-26 DIAGNOSIS — J44.9 CHRONIC OBSTRUCTIVE PULMONARY DISEASE, UNSPECIFIED COPD TYPE (HCC): ICD-10-CM

## 2025-06-26 DIAGNOSIS — J47.9 BRONCHIECTASIS WITHOUT COMPLICATION (HCC): ICD-10-CM

## 2025-06-26 RX ORDER — ALBUTEROL SULFATE 0.83 MG/ML
2.5 SOLUTION RESPIRATORY (INHALATION) EVERY 6 HOURS PRN
Qty: 1080 ML | Refills: 0 | Status: SHIPPED | OUTPATIENT
Start: 2025-06-26

## 2025-06-29 DIAGNOSIS — J30.0 VASOMOTOR RHINITIS: ICD-10-CM

## 2025-06-30 RX ORDER — IPRATROPIUM BROMIDE 21 UG/1
SPRAY, METERED NASAL
Qty: 90 ML | Refills: 2 | Status: SHIPPED | OUTPATIENT
Start: 2025-06-30

## 2025-07-03 ENCOUNTER — TELEPHONE (OUTPATIENT)
Dept: NEPHROLOGY | Facility: CLINIC | Age: 85
End: 2025-07-03

## 2025-07-03 NOTE — TELEPHONE ENCOUNTER
Attempted to reach pt to offer sooner appt w/Dr. Moralez 7/7 or 7/8 in [place] -needs labs.  VM not set up

## 2025-07-07 ENCOUNTER — NURSE TRIAGE (OUTPATIENT)
Age: 85
End: 2025-07-07

## 2025-07-07 NOTE — TELEPHONE ENCOUNTER
"REASON FOR CONVERSATION: Bleeding/Bruising    SYMPTOMS: Pt is taking eliquis 2.5mg bid for afib since 2023. Received call from pt stating starting in the past 4 days, she has noticed bruising present on her arms, legs, shoulder, and back. She denies any trauma. Denies any chest pain, new sob, dizziness, nor swelling. Denies blood in urine nor stool. Denies nosebleeds. Denies any pain. She stated she did have a slight headache over her left eye for a couple days but it was resolved with Tylenol.     OTHER HEALTH INFORMATION: Pt is taking eliquis 2.5mg bid for afib since 2023.    PROTOCOL DISPOSITION: Discuss with Provider and Call Back Patient    CARE ADVICE PROVIDED: Advised pt easy bruising can occur while on a blood thinner however will send a message to the provider to review and advise.     PRACTICE FOLLOW-UP: Please return call to pt with provider advice.         Answer Assessment - Initial Assessment Questions  1. APPEARANCE of BRUISE: \"Describe the bruise.\"       Dark purple   2. SIZE: \"How large is the bruise?\"       Big bruises on arms, legs, shoulders, and back   3. NUMBER: \"How many bruises are there?\"       Scattered   4. LOCATION: \"Where is the bruise located?\"       arms, legs, shoulders, and back   5. ONSET: \"How long ago did the bruise occur?\"       Last 4 days   6. CAUSE: \"What do you think caused the bruise?\"      Eliquis   7. MEDICAL HISTORY: \"Do you have any medical problems that can cause easy bruising or bleeding?\" (e.g., leukemia, liver disease, recent chemotherapy)      Afib on eliquis. Also has a pacemaker   8. MEDICINES: \"Do you take any medicines which thin the blood such as: aspirin, apixaban, heparin, ibuprofen (NSAIDS), Plavix, or Coumadin?\"      Eliquis 2.5mg bid   9. OTHER SYMPTOMS: \"Do you have any other symptoms?\"  (e.g., weakness, dizziness, pain, fever, nosebleed, blood in urine/stool)      Denies chest pain, new sob, swelling. Denies blood in urine, stool. Denies nose bleeds. " "Stated did have a mild headache for a couple days but is now gone   10. PREGNANCY: \"Is there any chance you are pregnant?\" \"When was your last menstrual period?\"        N/a    Protocols used: Bruises-Adult-[place]    "

## 2025-07-24 ENCOUNTER — REMOTE DEVICE CLINIC VISIT (OUTPATIENT)
Dept: CARDIOLOGY CLINIC | Facility: CLINIC | Age: 85
End: 2025-07-24
Payer: COMMERCIAL

## 2025-07-24 DIAGNOSIS — Z95.0 PRESENCE OF PERMANENT CARDIAC PACEMAKER: Primary | ICD-10-CM

## 2025-07-24 PROCEDURE — 93296 REM INTERROG EVL PM/IDS: CPT | Performed by: INTERNAL MEDICINE

## 2025-07-24 PROCEDURE — 93294 REM INTERROG EVL PM/LDLS PM: CPT | Performed by: INTERNAL MEDICINE

## 2025-07-24 NOTE — PROGRESS NOTES
Results for orders placed or performed in visit on 07/24/25   Cardiac EP device report    Narrative    MDT DUAL CHAMBER PM/ACTIVE SYSTEM IS MRI CONDITIONAL  CARELINK TRANSMISSION: BATTERY VOLTAGE ADEQUATE.(12.1 YRS) AP 82%  5%. ALL AVAILABLE LEAD PARAMETERS WITHIN NORMAL LIMITS. 5 VHR EPISODE DETECTED, SVT NOTED; HOWEVER EPISODE 447 SHOWS NSVT 13 BEATS @ 380ms. 43 FAST A & V EPISODES DETECTED. 442 AT/AF EPISODES DETECTED. LONGEST <3 HOURS. PATIENT IS ON ELIQUIS AND METOPROLOL SUCC; EF 56% (2024). NORMAL DEVICE FUNCTION.----VENEGAS

## 2025-07-25 DIAGNOSIS — I50.32 CHRONIC DIASTOLIC CONGESTIVE HEART FAILURE (HCC): ICD-10-CM

## 2025-07-25 DIAGNOSIS — K21.9 GASTROESOPHAGEAL REFLUX DISEASE WITHOUT ESOPHAGITIS: ICD-10-CM

## 2025-07-28 DIAGNOSIS — J30.0 VASOMOTOR RHINITIS: ICD-10-CM

## 2025-07-28 RX ORDER — FUROSEMIDE 20 MG/1
20 TABLET ORAL DAILY
Qty: 90 TABLET | Refills: 1 | Status: SHIPPED | OUTPATIENT
Start: 2025-07-28

## 2025-07-28 RX ORDER — FAMOTIDINE 20 MG/1
20 TABLET, FILM COATED ORAL
Qty: 90 TABLET | Refills: 0 | Status: SHIPPED | OUTPATIENT
Start: 2025-07-28

## 2025-07-30 RX ORDER — IPRATROPIUM BROMIDE 21 UG/1
SPRAY, METERED NASAL
Qty: 90 ML | Refills: 3 | Status: SHIPPED | OUTPATIENT
Start: 2025-07-30

## (undated) DEVICE — PLASMABLADE PS200-040 4.0: Brand: PLASMABLADE™